# Patient Record
Sex: FEMALE | Race: WHITE | Employment: OTHER | URBAN - NONMETROPOLITAN AREA
[De-identification: names, ages, dates, MRNs, and addresses within clinical notes are randomized per-mention and may not be internally consistent; named-entity substitution may affect disease eponyms.]

---

## 2017-01-24 DIAGNOSIS — M79.7 FIBROMYALGIA: ICD-10-CM

## 2017-01-24 RX ORDER — OXYCODONE AND ACETAMINOPHEN 7.5; 325 MG/1; MG/1
1 TABLET ORAL
Qty: 150 TABLET | Refills: 0 | Status: SHIPPED | OUTPATIENT
Start: 2017-01-30 | End: 2017-02-16 | Stop reason: SDUPTHER

## 2017-02-16 ENCOUNTER — OFFICE VISIT (OUTPATIENT)
Dept: PAIN MANAGEMENT | Age: 57
End: 2017-02-16

## 2017-02-16 ENCOUNTER — HOSPITAL ENCOUNTER (OUTPATIENT)
Age: 57
Setting detail: SPECIMEN
Discharge: HOME | End: 2017-02-16
Payer: MEDICARE

## 2017-02-16 VITALS
HEIGHT: 63 IN | SYSTOLIC BLOOD PRESSURE: 130 MMHG | RESPIRATION RATE: 18 BRPM | WEIGHT: 243.8 LBS | HEART RATE: 80 BPM | DIASTOLIC BLOOD PRESSURE: 90 MMHG | BODY MASS INDEX: 43.2 KG/M2

## 2017-02-16 DIAGNOSIS — G89.29 CHRONIC BILATERAL LOW BACK PAIN WITHOUT SCIATICA: ICD-10-CM

## 2017-02-16 DIAGNOSIS — Z02.83 ENCOUNTER FOR DRUG SCREENING: ICD-10-CM

## 2017-02-16 DIAGNOSIS — M79.7 FIBROMYALGIA: ICD-10-CM

## 2017-02-16 DIAGNOSIS — Z79.891 ENCOUNTER FOR LONG-TERM OPIATE ANALGESIC USE: Primary | ICD-10-CM

## 2017-02-16 DIAGNOSIS — M50.20 CERVICAL HERNIATED DISC: ICD-10-CM

## 2017-02-16 DIAGNOSIS — M25.551 HIP PAIN, BILATERAL: ICD-10-CM

## 2017-02-16 DIAGNOSIS — M54.50 CHRONIC BILATERAL LOW BACK PAIN WITHOUT SCIATICA: ICD-10-CM

## 2017-02-16 DIAGNOSIS — M47.22 CERVICAL RADICULOPATHY DUE TO DEGENERATIVE JOINT DISEASE OF SPINE: ICD-10-CM

## 2017-02-16 DIAGNOSIS — M25.552 HIP PAIN, BILATERAL: ICD-10-CM

## 2017-02-16 PROCEDURE — 99214 OFFICE O/P EST MOD 30 MIN: CPT | Performed by: NURSE PRACTITIONER

## 2017-02-16 PROCEDURE — G8427 DOCREV CUR MEDS BY ELIG CLIN: HCPCS | Performed by: NURSE PRACTITIONER

## 2017-02-16 PROCEDURE — 3014F SCREEN MAMMO DOC REV: CPT | Performed by: NURSE PRACTITIONER

## 2017-02-16 PROCEDURE — G8417 CALC BMI ABV UP PARAM F/U: HCPCS | Performed by: NURSE PRACTITIONER

## 2017-02-16 PROCEDURE — 3017F COLORECTAL CA SCREEN DOC REV: CPT | Performed by: NURSE PRACTITIONER

## 2017-02-16 PROCEDURE — G8484 FLU IMMUNIZE NO ADMIN: HCPCS | Performed by: NURSE PRACTITIONER

## 2017-02-16 PROCEDURE — 1036F TOBACCO NON-USER: CPT | Performed by: NURSE PRACTITIONER

## 2017-02-16 RX ORDER — OXYCODONE AND ACETAMINOPHEN 7.5; 325 MG/1; MG/1
1 TABLET ORAL
Qty: 150 TABLET | Refills: 0 | Status: SHIPPED | OUTPATIENT
Start: 2017-03-01 | End: 2017-03-27 | Stop reason: SDUPTHER

## 2017-02-16 ASSESSMENT — ENCOUNTER SYMPTOMS
CONSTIPATION: 0
BLOOD IN STOOL: 0
BACK PAIN: 1

## 2017-02-22 LAB
6-ACETYLMORPHINE, UR: NOT DETECTED
7-AMINOCLONAZEPAM, URINE: NOT DETECTED
ALPHA-OH-ALPRAZ, URINE: PRESENT
ALPRAZOLAM, URINE: PRESENT
AMPHETAMINES, URINE: NOT DETECTED
BARBITURATES, URINE: NOT DETECTED
BENZOYLECGONINE, UR: NOT DETECTED
BUPRENORPHINE URINE: NOT DETECTED
CARISOPRODOL, UR: NOT DETECTED
CLONAZEPAM, URINE: NOT DETECTED
CODEINE, URINE: NOT DETECTED
CREATININE URINE: 158.1 MG/DL (ref 20–400)
DIAZEPAM, URINE: NOT DETECTED
EER PAIN MGT DRUG PANEL, HIGH RES/EMIT U: NORMAL
ETHYL GLUCURONIDE UR: NOT DETECTED
FENTANYL URINE: NOT DETECTED
HYDROCODONE, URINE: NOT DETECTED
HYDROMORPHONE, URINE: NOT DETECTED
LORAZEPAM, URINE: NOT DETECTED
MARIJUANA METAB, UR: NOT DETECTED
MDA, UR: NOT DETECTED
MDEA, EVE, UR: NOT DETECTED
MDMA URINE: NOT DETECTED
MEPERIDINE METAB, UR: NOT DETECTED
METHADONE, URINE: NOT DETECTED
METHAMPHETAMINE, URINE: NOT DETECTED
METHYLPHENIDATE: NOT DETECTED
MIDAZOLAM, URINE: NOT DETECTED
MORPHINE URINE: NOT DETECTED
NORBUPRENORPHINE, URINE: NOT DETECTED
NORDIAZEPAM, URINE: NOT DETECTED
NORFENTANYL, URINE: NOT DETECTED
NORHYDROCODONE, URINE: NOT DETECTED
NOROXYCODONE, URINE: PRESENT
NOROXYMORPHONE, URINE: NOT DETECTED
OXAZEPAM, URINE: NOT DETECTED
OXYCODONE URINE: PRESENT
OXYMORPHONE, URINE: PRESENT
PAIN MGT DRUG PANEL, HI RES, UR: NORMAL
PCP,URINE: NOT DETECTED
PHENTERMINE, UR: NOT DETECTED
PROPOXYPHENE, URINE: NOT DETECTED
TAPENTADOL, URINE: NOT DETECTED
TAPENTADOL-O-SULFATE, URINE: NOT DETECTED
TEMAZEPAM, URINE: NOT DETECTED
TRAMADOL, URINE: NOT DETECTED
ZOLPIDEM, URINE: NOT DETECTED

## 2017-02-24 ENCOUNTER — OFFICE VISIT (OUTPATIENT)
Dept: FAMILY MEDICINE CLINIC | Age: 57
End: 2017-02-24

## 2017-02-24 VITALS
WEIGHT: 236.4 LBS | TEMPERATURE: 97.3 F | SYSTOLIC BLOOD PRESSURE: 150 MMHG | HEART RATE: 91 BPM | DIASTOLIC BLOOD PRESSURE: 90 MMHG | HEIGHT: 63 IN | OXYGEN SATURATION: 97 % | BODY MASS INDEX: 41.89 KG/M2

## 2017-02-24 DIAGNOSIS — F33.42 RECURRENT MAJOR DEPRESSIVE EPISODES, IN FULL REMISSION (HCC): ICD-10-CM

## 2017-02-24 DIAGNOSIS — I10 ESSENTIAL HYPERTENSION: ICD-10-CM

## 2017-02-24 DIAGNOSIS — E66.01 MORBID OBESITY WITH BMI OF 40.0-44.9, ADULT (HCC): ICD-10-CM

## 2017-02-24 DIAGNOSIS — F41.1 GENERALIZED ANXIETY DISORDER: ICD-10-CM

## 2017-02-24 DIAGNOSIS — M25.562 CHRONIC PAIN OF LEFT KNEE: ICD-10-CM

## 2017-02-24 DIAGNOSIS — G89.29 CHRONIC PAIN OF LEFT KNEE: ICD-10-CM

## 2017-02-24 DIAGNOSIS — I10 ESSENTIAL HYPERTENSION: Primary | ICD-10-CM

## 2017-02-24 DIAGNOSIS — Z87.891 HISTORY OF TOBACCO ABUSE: ICD-10-CM

## 2017-02-24 DIAGNOSIS — I47.1 PAT (PAROXYSMAL ATRIAL TACHYCARDIA) (HCC): ICD-10-CM

## 2017-02-24 PROCEDURE — 1036F TOBACCO NON-USER: CPT | Performed by: FAMILY MEDICINE

## 2017-02-24 PROCEDURE — G8484 FLU IMMUNIZE NO ADMIN: HCPCS | Performed by: FAMILY MEDICINE

## 2017-02-24 PROCEDURE — G8427 DOCREV CUR MEDS BY ELIG CLIN: HCPCS | Performed by: FAMILY MEDICINE

## 2017-02-24 PROCEDURE — 99214 OFFICE O/P EST MOD 30 MIN: CPT | Performed by: FAMILY MEDICINE

## 2017-02-24 PROCEDURE — G8417 CALC BMI ABV UP PARAM F/U: HCPCS | Performed by: FAMILY MEDICINE

## 2017-02-24 PROCEDURE — 3014F SCREEN MAMMO DOC REV: CPT | Performed by: FAMILY MEDICINE

## 2017-02-24 PROCEDURE — 3017F COLORECTAL CA SCREEN DOC REV: CPT | Performed by: FAMILY MEDICINE

## 2017-02-24 RX ORDER — ARIPIPRAZOLE 5 MG/1
TABLET ORAL
Qty: 30 TABLET | Refills: 5 | Status: SHIPPED | OUTPATIENT
Start: 2017-02-24 | End: 2017-08-23 | Stop reason: SDUPTHER

## 2017-02-24 RX ORDER — CARVEDILOL 3.12 MG/1
TABLET ORAL
Qty: 60 TABLET | Refills: 5 | Status: SHIPPED | OUTPATIENT
Start: 2017-02-24 | End: 2017-04-26

## 2017-02-24 RX ORDER — LOSARTAN POTASSIUM 50 MG/1
50 TABLET ORAL DAILY
Qty: 30 TABLET | Refills: 1 | Status: SHIPPED | OUTPATIENT
Start: 2017-02-24 | End: 2017-04-20 | Stop reason: SDUPTHER

## 2017-02-24 ASSESSMENT — ENCOUNTER SYMPTOMS
CHEST TIGHTNESS: 1
COUGH: 0
DIARRHEA: 0
SHORTNESS OF BREATH: 1
BACK PAIN: 0
ABDOMINAL PAIN: 0
WHEEZING: 0
CONSTIPATION: 0

## 2017-03-15 ENCOUNTER — OFFICE VISIT (OUTPATIENT)
Dept: ORTHOPEDIC SURGERY | Age: 57
End: 2017-03-15
Payer: MEDICARE

## 2017-03-15 VITALS
BODY MASS INDEX: 41.82 KG/M2 | DIASTOLIC BLOOD PRESSURE: 80 MMHG | WEIGHT: 236 LBS | HEIGHT: 63 IN | HEART RATE: 74 BPM | SYSTOLIC BLOOD PRESSURE: 139 MMHG

## 2017-03-15 DIAGNOSIS — M17.12 PRIMARY OSTEOARTHRITIS OF LEFT KNEE: Primary | ICD-10-CM

## 2017-03-15 PROCEDURE — G8427 DOCREV CUR MEDS BY ELIG CLIN: HCPCS | Performed by: FAMILY MEDICINE

## 2017-03-15 PROCEDURE — 99214 OFFICE O/P EST MOD 30 MIN: CPT | Performed by: FAMILY MEDICINE

## 2017-03-15 PROCEDURE — G8417 CALC BMI ABV UP PARAM F/U: HCPCS | Performed by: FAMILY MEDICINE

## 2017-03-15 PROCEDURE — 20610 DRAIN/INJ JOINT/BURSA W/O US: CPT | Performed by: FAMILY MEDICINE

## 2017-03-15 PROCEDURE — 3017F COLORECTAL CA SCREEN DOC REV: CPT | Performed by: FAMILY MEDICINE

## 2017-03-15 PROCEDURE — 3014F SCREEN MAMMO DOC REV: CPT | Performed by: FAMILY MEDICINE

## 2017-03-15 PROCEDURE — 1036F TOBACCO NON-USER: CPT | Performed by: FAMILY MEDICINE

## 2017-03-15 PROCEDURE — G8484 FLU IMMUNIZE NO ADMIN: HCPCS | Performed by: FAMILY MEDICINE

## 2017-03-15 RX ORDER — BUPIVACAINE HYDROCHLORIDE 5 MG/ML
4 INJECTION, SOLUTION PERINEURAL ONCE
Status: COMPLETED | OUTPATIENT
Start: 2017-03-15 | End: 2017-03-15

## 2017-03-15 RX ORDER — TRIAMCINOLONE ACETONIDE 40 MG/ML
40 INJECTION, SUSPENSION INTRA-ARTICULAR; INTRAMUSCULAR ONCE
Status: COMPLETED | OUTPATIENT
Start: 2017-03-15 | End: 2017-03-15

## 2017-03-15 RX ADMIN — BUPIVACAINE HYDROCHLORIDE 20 MG: 5 INJECTION, SOLUTION PERINEURAL at 15:15

## 2017-03-15 RX ADMIN — TRIAMCINOLONE ACETONIDE 40 MG: 40 INJECTION, SUSPENSION INTRA-ARTICULAR; INTRAMUSCULAR at 15:15

## 2017-03-27 DIAGNOSIS — M79.7 FIBROMYALGIA: ICD-10-CM

## 2017-03-27 RX ORDER — OXYCODONE AND ACETAMINOPHEN 7.5; 325 MG/1; MG/1
1 TABLET ORAL
Qty: 150 TABLET | Refills: 0 | Status: SHIPPED | OUTPATIENT
Start: 2017-03-31 | End: 2017-04-17 | Stop reason: SDUPTHER

## 2017-04-17 ENCOUNTER — OFFICE VISIT (OUTPATIENT)
Dept: PAIN MANAGEMENT | Age: 57
End: 2017-04-17
Payer: MEDICARE

## 2017-04-17 VITALS
BODY MASS INDEX: 42.7 KG/M2 | WEIGHT: 241 LBS | HEIGHT: 63 IN | HEART RATE: 92 BPM | DIASTOLIC BLOOD PRESSURE: 70 MMHG | RESPIRATION RATE: 18 BRPM | SYSTOLIC BLOOD PRESSURE: 124 MMHG

## 2017-04-17 DIAGNOSIS — M51.36 LUMBAR DEGENERATIVE DISC DISEASE: ICD-10-CM

## 2017-04-17 DIAGNOSIS — M15.9 PRIMARY OSTEOARTHRITIS INVOLVING MULTIPLE JOINTS: ICD-10-CM

## 2017-04-17 DIAGNOSIS — M79.7 FIBROMYALGIA: Primary | ICD-10-CM

## 2017-04-17 PROCEDURE — G8427 DOCREV CUR MEDS BY ELIG CLIN: HCPCS | Performed by: NURSE PRACTITIONER

## 2017-04-17 PROCEDURE — 3014F SCREEN MAMMO DOC REV: CPT | Performed by: NURSE PRACTITIONER

## 2017-04-17 PROCEDURE — G8417 CALC BMI ABV UP PARAM F/U: HCPCS | Performed by: NURSE PRACTITIONER

## 2017-04-17 PROCEDURE — 3017F COLORECTAL CA SCREEN DOC REV: CPT | Performed by: NURSE PRACTITIONER

## 2017-04-17 PROCEDURE — 99213 OFFICE O/P EST LOW 20 MIN: CPT | Performed by: NURSE PRACTITIONER

## 2017-04-17 PROCEDURE — 1036F TOBACCO NON-USER: CPT | Performed by: NURSE PRACTITIONER

## 2017-04-17 RX ORDER — OXYCODONE AND ACETAMINOPHEN 7.5; 325 MG/1; MG/1
1 TABLET ORAL
Qty: 150 TABLET | Refills: 0 | Status: SHIPPED | OUTPATIENT
Start: 2017-04-30 | End: 2017-05-24 | Stop reason: SDUPTHER

## 2017-04-17 ASSESSMENT — ENCOUNTER SYMPTOMS
BLOOD IN STOOL: 0
BACK PAIN: 1
CONSTIPATION: 0

## 2017-04-26 ENCOUNTER — OFFICE VISIT (OUTPATIENT)
Dept: FAMILY MEDICINE CLINIC | Age: 57
End: 2017-04-26
Payer: MEDICARE

## 2017-04-26 ENCOUNTER — TELEPHONE (OUTPATIENT)
Dept: CARDIOLOGY | Age: 57
End: 2017-04-26

## 2017-04-26 ENCOUNTER — OFFICE VISIT (OUTPATIENT)
Dept: ORTHOPEDIC SURGERY | Age: 57
End: 2017-04-26
Payer: MEDICARE

## 2017-04-26 VITALS
HEIGHT: 63 IN | DIASTOLIC BLOOD PRESSURE: 82 MMHG | SYSTOLIC BLOOD PRESSURE: 116 MMHG | WEIGHT: 239 LBS | OXYGEN SATURATION: 97 % | HEART RATE: 110 BPM | BODY MASS INDEX: 42.35 KG/M2

## 2017-04-26 VITALS
BODY MASS INDEX: 41.82 KG/M2 | HEIGHT: 63 IN | HEART RATE: 98 BPM | DIASTOLIC BLOOD PRESSURE: 82 MMHG | WEIGHT: 236 LBS | SYSTOLIC BLOOD PRESSURE: 128 MMHG

## 2017-04-26 DIAGNOSIS — I10 ESSENTIAL HYPERTENSION: Primary | ICD-10-CM

## 2017-04-26 DIAGNOSIS — M17.12 PRIMARY OSTEOARTHRITIS OF LEFT KNEE: Primary | ICD-10-CM

## 2017-04-26 DIAGNOSIS — R07.9 CHEST PAIN ON EXERTION: ICD-10-CM

## 2017-04-26 DIAGNOSIS — R73.01 IMPAIRED FASTING GLUCOSE: ICD-10-CM

## 2017-04-26 PROCEDURE — G8417 CALC BMI ABV UP PARAM F/U: HCPCS | Performed by: FAMILY MEDICINE

## 2017-04-26 PROCEDURE — 3017F COLORECTAL CA SCREEN DOC REV: CPT | Performed by: FAMILY MEDICINE

## 2017-04-26 PROCEDURE — 3014F SCREEN MAMMO DOC REV: CPT | Performed by: FAMILY MEDICINE

## 2017-04-26 PROCEDURE — G8427 DOCREV CUR MEDS BY ELIG CLIN: HCPCS | Performed by: FAMILY MEDICINE

## 2017-04-26 PROCEDURE — 93000 ELECTROCARDIOGRAM COMPLETE: CPT | Performed by: FAMILY MEDICINE

## 2017-04-26 PROCEDURE — 99214 OFFICE O/P EST MOD 30 MIN: CPT | Performed by: FAMILY MEDICINE

## 2017-04-26 PROCEDURE — 1036F TOBACCO NON-USER: CPT | Performed by: FAMILY MEDICINE

## 2017-04-26 PROCEDURE — 99213 OFFICE O/P EST LOW 20 MIN: CPT | Performed by: FAMILY MEDICINE

## 2017-04-26 ASSESSMENT — ENCOUNTER SYMPTOMS
WHEEZING: 0
SHORTNESS OF BREATH: 1
CHEST TIGHTNESS: 1
COUGH: 0

## 2017-05-01 ENCOUNTER — TELEPHONE (OUTPATIENT)
Dept: CARDIOLOGY | Age: 57
End: 2017-05-01

## 2017-05-03 ENCOUNTER — TELEPHONE (OUTPATIENT)
Dept: CARDIOLOGY | Age: 57
End: 2017-05-03

## 2017-05-03 DIAGNOSIS — R07.9 CHEST PAIN ON EXERTION: Primary | ICD-10-CM

## 2017-05-10 ENCOUNTER — PROCEDURE VISIT (OUTPATIENT)
Dept: CARDIOLOGY | Age: 57
End: 2017-05-10
Payer: MEDICARE

## 2017-05-10 DIAGNOSIS — R07.9 CHEST PAIN ON EXERTION: Primary | ICD-10-CM

## 2017-05-10 PROCEDURE — 93015 CV STRESS TEST SUPVJ I&R: CPT | Performed by: INTERNAL MEDICINE

## 2017-05-10 RX ORDER — AMINOPHYLLINE DIHYDRATE 25 MG/ML
100 INJECTION, SOLUTION INTRAVENOUS ONCE
Status: COMPLETED | OUTPATIENT
Start: 2017-05-10 | End: 2017-05-10

## 2017-05-10 RX ADMIN — AMINOPHYLLINE DIHYDRATE 100 MG: 25 INJECTION, SOLUTION INTRAVENOUS at 10:28

## 2017-05-11 DIAGNOSIS — R07.9 CHEST PAIN ON EXERTION: ICD-10-CM

## 2017-05-16 RX ORDER — ERGOCALCIFEROL 1.25 MG/1
CAPSULE ORAL
Qty: 4 CAPSULE | Refills: 11 | Status: SHIPPED | OUTPATIENT
Start: 2017-05-16 | End: 2018-06-15 | Stop reason: SDUPTHER

## 2017-05-24 DIAGNOSIS — M79.7 FIBROMYALGIA: ICD-10-CM

## 2017-05-25 RX ORDER — OXYCODONE AND ACETAMINOPHEN 7.5; 325 MG/1; MG/1
1 TABLET ORAL
Qty: 150 TABLET | Refills: 0 | Status: SHIPPED | OUTPATIENT
Start: 2017-05-30 | End: 2017-06-12 | Stop reason: SDUPTHER

## 2017-06-12 ENCOUNTER — OFFICE VISIT (OUTPATIENT)
Dept: FAMILY MEDICINE CLINIC | Age: 57
End: 2017-06-12
Payer: MEDICARE

## 2017-06-12 ENCOUNTER — OFFICE VISIT (OUTPATIENT)
Dept: PAIN MANAGEMENT | Age: 57
End: 2017-06-12
Payer: MEDICARE

## 2017-06-12 ENCOUNTER — HOSPITAL ENCOUNTER (OUTPATIENT)
Age: 57
Setting detail: SPECIMEN
Discharge: HOME | End: 2017-06-12
Payer: MEDICARE

## 2017-06-12 VITALS
TEMPERATURE: 98.3 F | HEART RATE: 72 BPM | HEIGHT: 63 IN | BODY MASS INDEX: 42.74 KG/M2 | OXYGEN SATURATION: 98 % | WEIGHT: 241.2 LBS | DIASTOLIC BLOOD PRESSURE: 86 MMHG | SYSTOLIC BLOOD PRESSURE: 120 MMHG

## 2017-06-12 VITALS
WEIGHT: 240.4 LBS | SYSTOLIC BLOOD PRESSURE: 118 MMHG | DIASTOLIC BLOOD PRESSURE: 90 MMHG | HEART RATE: 64 BPM | BODY MASS INDEX: 42.59 KG/M2 | HEIGHT: 63 IN | RESPIRATION RATE: 12 BRPM

## 2017-06-12 DIAGNOSIS — M15.9 PRIMARY OSTEOARTHRITIS INVOLVING MULTIPLE JOINTS: Primary | ICD-10-CM

## 2017-06-12 DIAGNOSIS — M25.511 CHRONIC RIGHT SHOULDER PAIN: ICD-10-CM

## 2017-06-12 DIAGNOSIS — Z02.83 ENCOUNTER FOR DRUG SCREENING: ICD-10-CM

## 2017-06-12 DIAGNOSIS — I10 ESSENTIAL HYPERTENSION: Primary | ICD-10-CM

## 2017-06-12 DIAGNOSIS — M51.9 LUMBAR DISC DISEASE: ICD-10-CM

## 2017-06-12 DIAGNOSIS — M50.20 CERVICAL HERNIATED DISC: ICD-10-CM

## 2017-06-12 DIAGNOSIS — Z79.891 ENCOUNTER FOR LONG-TERM OPIATE ANALGESIC USE: ICD-10-CM

## 2017-06-12 DIAGNOSIS — G89.29 CHRONIC RIGHT SHOULDER PAIN: ICD-10-CM

## 2017-06-12 DIAGNOSIS — M79.7 FIBROMYALGIA: ICD-10-CM

## 2017-06-12 DIAGNOSIS — Z12.31 SCREENING MAMMOGRAM, ENCOUNTER FOR: ICD-10-CM

## 2017-06-12 PROCEDURE — 99214 OFFICE O/P EST MOD 30 MIN: CPT | Performed by: NURSE PRACTITIONER

## 2017-06-12 PROCEDURE — G8427 DOCREV CUR MEDS BY ELIG CLIN: HCPCS | Performed by: NURSE PRACTITIONER

## 2017-06-12 PROCEDURE — 3014F SCREEN MAMMO DOC REV: CPT | Performed by: NURSE PRACTITIONER

## 2017-06-12 PROCEDURE — 99213 OFFICE O/P EST LOW 20 MIN: CPT | Performed by: FAMILY MEDICINE

## 2017-06-12 PROCEDURE — G8417 CALC BMI ABV UP PARAM F/U: HCPCS | Performed by: FAMILY MEDICINE

## 2017-06-12 PROCEDURE — 3017F COLORECTAL CA SCREEN DOC REV: CPT | Performed by: NURSE PRACTITIONER

## 2017-06-12 PROCEDURE — G8417 CALC BMI ABV UP PARAM F/U: HCPCS | Performed by: NURSE PRACTITIONER

## 2017-06-12 PROCEDURE — 1036F TOBACCO NON-USER: CPT | Performed by: NURSE PRACTITIONER

## 2017-06-12 PROCEDURE — G8427 DOCREV CUR MEDS BY ELIG CLIN: HCPCS | Performed by: FAMILY MEDICINE

## 2017-06-12 PROCEDURE — 3017F COLORECTAL CA SCREEN DOC REV: CPT | Performed by: FAMILY MEDICINE

## 2017-06-12 PROCEDURE — 1036F TOBACCO NON-USER: CPT | Performed by: FAMILY MEDICINE

## 2017-06-12 PROCEDURE — 3014F SCREEN MAMMO DOC REV: CPT | Performed by: FAMILY MEDICINE

## 2017-06-12 RX ORDER — CARVEDILOL 3.12 MG/1
TABLET ORAL
Refills: 0 | COMMUNITY
Start: 2017-05-20 | End: 2017-06-12 | Stop reason: ALTCHOICE

## 2017-06-12 RX ORDER — OXYCODONE AND ACETAMINOPHEN 7.5; 325 MG/1; MG/1
1 TABLET ORAL
Qty: 150 TABLET | Refills: 0 | Status: SHIPPED | OUTPATIENT
Start: 2017-06-29 | End: 2017-07-19 | Stop reason: SDUPTHER

## 2017-06-12 ASSESSMENT — ENCOUNTER SYMPTOMS
BACK PAIN: 1
CONSTIPATION: 0
WHEEZING: 0
COUGH: 0
BLOOD IN STOOL: 0
CHEST TIGHTNESS: 0
SHORTNESS OF BREATH: 1

## 2017-06-17 LAB
6-ACETYLMORPHINE, UR: NOT DETECTED
7-AMINOCLONAZEPAM, URINE: NOT DETECTED
ALPHA-OH-ALPRAZ, URINE: PRESENT
ALPRAZOLAM, URINE: PRESENT
AMPHETAMINES, URINE: NOT DETECTED
BARBITURATES, URINE: NOT DETECTED
BENZOYLECGONINE, UR: NOT DETECTED
BUPRENORPHINE URINE: NOT DETECTED
CARISOPRODOL, UR: NOT DETECTED
CLONAZEPAM, URINE: NOT DETECTED
CODEINE, URINE: NOT DETECTED
CREATININE URINE: 202 MG/DL (ref 20–400)
DIAZEPAM, URINE: NOT DETECTED
EER PAIN MGT DRUG PANEL, HIGH RES/EMIT U: NORMAL
ETHYL GLUCURONIDE UR: NOT DETECTED
FENTANYL URINE: NOT DETECTED
HYDROCODONE, URINE: NOT DETECTED
HYDROMORPHONE, URINE: NOT DETECTED
LORAZEPAM, URINE: NOT DETECTED
MARIJUANA METAB, UR: NOT DETECTED
MDA, UR: NOT DETECTED
MDEA, EVE, UR: NOT DETECTED
MDMA URINE: NOT DETECTED
MEPERIDINE METAB, UR: NOT DETECTED
METHADONE, URINE: NOT DETECTED
METHAMPHETAMINE, URINE: NOT DETECTED
METHYLPHENIDATE: NOT DETECTED
MIDAZOLAM, URINE: NOT DETECTED
MORPHINE URINE: NOT DETECTED
NORBUPRENORPHINE, URINE: NOT DETECTED
NORDIAZEPAM, URINE: NOT DETECTED
NORFENTANYL, URINE: NOT DETECTED
NORHYDROCODONE, URINE: NOT DETECTED
NOROXYCODONE, URINE: NOT DETECTED
NOROXYMORPHONE, URINE: NOT DETECTED
OXAZEPAM, URINE: NOT DETECTED
OXYCODONE URINE: NOT DETECTED
OXYMORPHONE, URINE: NOT DETECTED
PAIN MGT DRUG PANEL, HI RES, UR: NORMAL
PCP,URINE: NOT DETECTED
PHENTERMINE, UR: NOT DETECTED
PROPOXYPHENE, URINE: NOT DETECTED
TAPENTADOL, URINE: NOT DETECTED
TAPENTADOL-O-SULFATE, URINE: NOT DETECTED
TEMAZEPAM, URINE: NOT DETECTED
TRAMADOL, URINE: NOT DETECTED
ZOLPIDEM, URINE: NOT DETECTED

## 2017-06-18 DIAGNOSIS — I10 ESSENTIAL HYPERTENSION: Primary | ICD-10-CM

## 2017-07-13 DIAGNOSIS — F41.1 GENERALIZED ANXIETY DISORDER: ICD-10-CM

## 2017-07-13 RX ORDER — ALPRAZOLAM 1 MG/1
TABLET ORAL
Qty: 90 TABLET | Refills: 0 | Status: SHIPPED | OUTPATIENT
Start: 2017-07-13 | End: 2017-08-23 | Stop reason: SDUPTHER

## 2017-07-19 DIAGNOSIS — M79.7 FIBROMYALGIA: ICD-10-CM

## 2017-07-20 RX ORDER — OXYCODONE AND ACETAMINOPHEN 7.5; 325 MG/1; MG/1
1 TABLET ORAL
Qty: 150 TABLET | Refills: 0 | Status: SHIPPED | OUTPATIENT
Start: 2017-07-29 | End: 2017-08-11 | Stop reason: SDUPTHER

## 2017-08-11 ENCOUNTER — OFFICE VISIT (OUTPATIENT)
Dept: PAIN MANAGEMENT | Age: 57
End: 2017-08-11
Payer: MEDICARE

## 2017-08-11 VITALS
HEART RATE: 84 BPM | SYSTOLIC BLOOD PRESSURE: 138 MMHG | RESPIRATION RATE: 14 BRPM | DIASTOLIC BLOOD PRESSURE: 74 MMHG | BODY MASS INDEX: 43.12 KG/M2 | WEIGHT: 243.4 LBS | HEIGHT: 63 IN

## 2017-08-11 DIAGNOSIS — M51.36 LUMBAR DEGENERATIVE DISC DISEASE: ICD-10-CM

## 2017-08-11 DIAGNOSIS — M79.7 FIBROMYALGIA: Primary | ICD-10-CM

## 2017-08-11 DIAGNOSIS — G89.29 ENCOUNTER FOR CHRONIC PAIN MANAGEMENT: ICD-10-CM

## 2017-08-11 PROCEDURE — 3017F COLORECTAL CA SCREEN DOC REV: CPT | Performed by: NURSE PRACTITIONER

## 2017-08-11 PROCEDURE — G8427 DOCREV CUR MEDS BY ELIG CLIN: HCPCS | Performed by: NURSE PRACTITIONER

## 2017-08-11 PROCEDURE — 99213 OFFICE O/P EST LOW 20 MIN: CPT | Performed by: NURSE PRACTITIONER

## 2017-08-11 PROCEDURE — G8417 CALC BMI ABV UP PARAM F/U: HCPCS | Performed by: NURSE PRACTITIONER

## 2017-08-11 PROCEDURE — 1036F TOBACCO NON-USER: CPT | Performed by: NURSE PRACTITIONER

## 2017-08-11 PROCEDURE — 3014F SCREEN MAMMO DOC REV: CPT | Performed by: NURSE PRACTITIONER

## 2017-08-11 RX ORDER — OXYCODONE AND ACETAMINOPHEN 7.5; 325 MG/1; MG/1
1 TABLET ORAL
Qty: 150 TABLET | Refills: 0 | Status: SHIPPED | OUTPATIENT
Start: 2017-08-11 | End: 2017-09-22 | Stop reason: SDUPTHER

## 2017-08-11 ASSESSMENT — ENCOUNTER SYMPTOMS
BACK PAIN: 1
CONSTIPATION: 0
BLOOD IN STOOL: 0

## 2017-08-23 DIAGNOSIS — F41.1 GENERALIZED ANXIETY DISORDER: ICD-10-CM

## 2017-08-23 DIAGNOSIS — I10 ESSENTIAL HYPERTENSION: ICD-10-CM

## 2017-08-23 RX ORDER — ALPRAZOLAM 1 MG/1
TABLET ORAL
Qty: 90 TABLET | Refills: 0 | Status: CANCELLED | OUTPATIENT
Start: 2017-08-23

## 2017-08-23 RX ORDER — ARIPIPRAZOLE 5 MG/1
TABLET ORAL
Qty: 30 TABLET | Refills: 5 | Status: SHIPPED | OUTPATIENT
Start: 2017-08-23 | End: 2018-02-17 | Stop reason: SDUPTHER

## 2017-08-23 RX ORDER — ALPRAZOLAM 1 MG/1
TABLET ORAL
Qty: 90 TABLET | Refills: 0 | Status: SHIPPED | OUTPATIENT
Start: 2017-08-23 | End: 2017-09-19 | Stop reason: SDUPTHER

## 2017-08-23 RX ORDER — CARVEDILOL 3.12 MG/1
TABLET ORAL
Qty: 60 TABLET | Refills: 5 | Status: SHIPPED | OUTPATIENT
Start: 2017-08-23 | End: 2017-09-14 | Stop reason: ALTCHOICE

## 2017-09-14 ENCOUNTER — HOSPITAL ENCOUNTER (OUTPATIENT)
Dept: LAB | Age: 57
Discharge: HOME | End: 2017-09-14
Payer: MEDICARE

## 2017-09-14 ENCOUNTER — OFFICE VISIT (OUTPATIENT)
Dept: FAMILY MEDICINE CLINIC | Age: 57
End: 2017-09-14
Payer: MEDICARE

## 2017-09-14 VITALS
DIASTOLIC BLOOD PRESSURE: 80 MMHG | OXYGEN SATURATION: 94 % | TEMPERATURE: 98 F | HEIGHT: 63 IN | WEIGHT: 245.6 LBS | SYSTOLIC BLOOD PRESSURE: 118 MMHG | BODY MASS INDEX: 43.52 KG/M2 | HEART RATE: 86 BPM

## 2017-09-14 DIAGNOSIS — B96.89 ACUTE BACTERIAL SINUSITIS: Primary | ICD-10-CM

## 2017-09-14 DIAGNOSIS — Z12.31 ENCOUNTER FOR SCREENING MAMMOGRAM FOR MALIGNANT NEOPLASM OF BREAST: ICD-10-CM

## 2017-09-14 DIAGNOSIS — R73.01 IMPAIRED FASTING GLUCOSE: ICD-10-CM

## 2017-09-14 DIAGNOSIS — J01.90 ACUTE BACTERIAL SINUSITIS: Primary | ICD-10-CM

## 2017-09-14 DIAGNOSIS — I10 ESSENTIAL HYPERTENSION: ICD-10-CM

## 2017-09-14 DIAGNOSIS — J02.9 SORE THROAT: ICD-10-CM

## 2017-09-14 LAB
ANION GAP SERPL CALCULATED.3IONS-SCNC: 12 MMOL/L (ref 9–17)
BUN BLDV-MCNC: 16 MG/DL (ref 6–20)
BUN/CREAT BLD: 26 (ref 9–20)
CALCIUM SERPL-MCNC: 9.2 MG/DL (ref 8.6–10.4)
CHLORIDE BLD-SCNC: 104 MMOL/L (ref 98–107)
CO2: 27 MMOL/L (ref 20–31)
CREAT SERPL-MCNC: 0.61 MG/DL (ref 0.5–0.9)
ESTIMATED AVERAGE GLUCOSE: 114 MG/DL
GFR AFRICAN AMERICAN: >60 ML/MIN
GFR NON-AFRICAN AMERICAN: >60 ML/MIN
GFR SERPL CREATININE-BSD FRML MDRD: ABNORMAL ML/MIN/{1.73_M2}
GFR SERPL CREATININE-BSD FRML MDRD: ABNORMAL ML/MIN/{1.73_M2}
GLUCOSE BLD-MCNC: 119 MG/DL (ref 70–99)
HBA1C MFR BLD: 5.6 % (ref 4.8–5.9)
POTASSIUM SERPL-SCNC: 4.5 MMOL/L (ref 3.7–5.3)
S PYO AG THROAT QL: NORMAL
SODIUM BLD-SCNC: 143 MMOL/L (ref 135–144)

## 2017-09-14 PROCEDURE — 99214 OFFICE O/P EST MOD 30 MIN: CPT | Performed by: FAMILY MEDICINE

## 2017-09-14 PROCEDURE — 3017F COLORECTAL CA SCREEN DOC REV: CPT | Performed by: FAMILY MEDICINE

## 2017-09-14 PROCEDURE — G8417 CALC BMI ABV UP PARAM F/U: HCPCS | Performed by: FAMILY MEDICINE

## 2017-09-14 PROCEDURE — 1036F TOBACCO NON-USER: CPT | Performed by: FAMILY MEDICINE

## 2017-09-14 PROCEDURE — 83036 HEMOGLOBIN GLYCOSYLATED A1C: CPT

## 2017-09-14 PROCEDURE — 87880 STREP A ASSAY W/OPTIC: CPT | Performed by: FAMILY MEDICINE

## 2017-09-14 PROCEDURE — G8427 DOCREV CUR MEDS BY ELIG CLIN: HCPCS | Performed by: FAMILY MEDICINE

## 2017-09-14 PROCEDURE — 36415 COLL VENOUS BLD VENIPUNCTURE: CPT

## 2017-09-14 PROCEDURE — 3014F SCREEN MAMMO DOC REV: CPT | Performed by: FAMILY MEDICINE

## 2017-09-14 PROCEDURE — 80048 BASIC METABOLIC PNL TOTAL CA: CPT

## 2017-09-14 RX ORDER — ALBUTEROL SULFATE 90 UG/1
AEROSOL, METERED RESPIRATORY (INHALATION)
Qty: 8.5 INHALER | Refills: 6 | Status: SHIPPED | OUTPATIENT
Start: 2017-09-14 | End: 2019-03-22 | Stop reason: SDUPTHER

## 2017-09-14 RX ORDER — PREDNISONE 20 MG/1
40 TABLET ORAL DAILY
Qty: 10 TABLET | Refills: 0 | Status: SHIPPED | OUTPATIENT
Start: 2017-09-14 | End: 2017-09-19

## 2017-09-14 RX ORDER — AMOXICILLIN 500 MG/1
500 CAPSULE ORAL 3 TIMES DAILY
Qty: 30 CAPSULE | Refills: 0 | Status: SHIPPED | OUTPATIENT
Start: 2017-09-14 | End: 2017-12-13 | Stop reason: ALTCHOICE

## 2017-09-14 ASSESSMENT — ENCOUNTER SYMPTOMS
TROUBLE SWALLOWING: 0
WHEEZING: 0
DIARRHEA: 0
CHOKING: 0
NAUSEA: 0
CONSTIPATION: 0
COUGH: 1
SINUS PRESSURE: 1
CHEST TIGHTNESS: 0
SORE THROAT: 1
SHORTNESS OF BREATH: 1
HOARSE VOICE: 1

## 2017-09-19 DIAGNOSIS — F41.1 GENERALIZED ANXIETY DISORDER: ICD-10-CM

## 2017-09-19 RX ORDER — ALPRAZOLAM 1 MG/1
TABLET ORAL
Qty: 90 TABLET | Refills: 2 | Status: SHIPPED | OUTPATIENT
Start: 2017-09-19 | End: 2017-12-13 | Stop reason: SDUPTHER

## 2017-09-22 DIAGNOSIS — M79.7 FIBROMYALGIA: ICD-10-CM

## 2017-09-25 RX ORDER — OXYCODONE AND ACETAMINOPHEN 7.5; 325 MG/1; MG/1
1 TABLET ORAL
Qty: 150 TABLET | Refills: 0 | Status: SHIPPED | OUTPATIENT
Start: 2017-09-27 | End: 2017-10-23 | Stop reason: SDUPTHER

## 2017-09-25 NOTE — TELEPHONE ENCOUNTER
OARRS Report checked for [place], [place], and [place]: 8/28/17 percocet 7.5-325mg #150. Due 9/27/17.

## 2017-09-26 ENCOUNTER — TELEPHONE (OUTPATIENT)
Dept: FAMILY MEDICINE CLINIC | Age: 57
End: 2017-09-26

## 2017-09-26 NOTE — TELEPHONE ENCOUNTER
She likely has allergies. She needs to stay on the flonase, claritin and nasal saline and try to stay inside especially when it is windy. If she is outside she should rinse her nose with the saline after she comes in to get the pollen out of her nose.

## 2017-09-29 ENCOUNTER — HOSPITAL ENCOUNTER (OUTPATIENT)
Dept: MAMMOGRAPHY | Age: 57
Discharge: HOME | End: 2017-09-29
Payer: MEDICARE

## 2017-09-29 ENCOUNTER — NURSE ONLY (OUTPATIENT)
Dept: LAB | Age: 57
End: 2017-09-29
Payer: MEDICARE

## 2017-09-29 DIAGNOSIS — Z23 NEED FOR INFLUENZA VACCINATION: Primary | ICD-10-CM

## 2017-09-29 DIAGNOSIS — Z12.31 ENCOUNTER FOR SCREENING MAMMOGRAM FOR MALIGNANT NEOPLASM OF BREAST: ICD-10-CM

## 2017-09-29 PROCEDURE — G0202 SCR MAMMO BI INCL CAD: HCPCS

## 2017-09-29 PROCEDURE — G0008 ADMIN INFLUENZA VIRUS VAC: HCPCS | Performed by: FAMILY MEDICINE

## 2017-09-29 PROCEDURE — 90686 IIV4 VACC NO PRSV 0.5 ML IM: CPT | Performed by: FAMILY MEDICINE

## 2017-10-11 ENCOUNTER — OFFICE VISIT (OUTPATIENT)
Dept: PAIN MANAGEMENT | Age: 57
End: 2017-10-11
Payer: MEDICARE

## 2017-10-11 ENCOUNTER — TELEPHONE (OUTPATIENT)
Dept: FAMILY MEDICINE CLINIC | Age: 57
End: 2017-10-11

## 2017-10-11 ENCOUNTER — HOSPITAL ENCOUNTER (OUTPATIENT)
Dept: LAB | Age: 57
Setting detail: SPECIMEN
Discharge: HOME | End: 2017-10-11
Payer: MEDICARE

## 2017-10-11 ENCOUNTER — HOSPITAL ENCOUNTER (OUTPATIENT)
Age: 57
Setting detail: SPECIMEN
Discharge: HOME | End: 2017-10-11
Payer: MEDICARE

## 2017-10-11 VITALS
WEIGHT: 244 LBS | SYSTOLIC BLOOD PRESSURE: 120 MMHG | BODY MASS INDEX: 41.66 KG/M2 | HEIGHT: 64 IN | RESPIRATION RATE: 16 BRPM | DIASTOLIC BLOOD PRESSURE: 72 MMHG | HEART RATE: 80 BPM

## 2017-10-11 DIAGNOSIS — Z79.891 ENCOUNTER FOR LONG-TERM OPIATE ANALGESIC USE: ICD-10-CM

## 2017-10-11 DIAGNOSIS — Z02.83 ENCOUNTER FOR DRUG SCREENING: ICD-10-CM

## 2017-10-11 DIAGNOSIS — M54.2 NECK PAIN: Primary | ICD-10-CM

## 2017-10-11 DIAGNOSIS — J02.9 SORE THROAT: ICD-10-CM

## 2017-10-11 DIAGNOSIS — M15.9 PRIMARY OSTEOARTHRITIS INVOLVING MULTIPLE JOINTS: ICD-10-CM

## 2017-10-11 DIAGNOSIS — M79.7 FIBROMYALGIA: ICD-10-CM

## 2017-10-11 DIAGNOSIS — J02.9 SORE THROAT: Primary | ICD-10-CM

## 2017-10-11 LAB — MONONUCLEOSIS SCREEN: NEGATIVE

## 2017-10-11 PROCEDURE — 99214 OFFICE O/P EST MOD 30 MIN: CPT | Performed by: NURSE PRACTITIONER

## 2017-10-11 PROCEDURE — G8417 CALC BMI ABV UP PARAM F/U: HCPCS | Performed by: NURSE PRACTITIONER

## 2017-10-11 PROCEDURE — 1036F TOBACCO NON-USER: CPT | Performed by: NURSE PRACTITIONER

## 2017-10-11 PROCEDURE — 36415 COLL VENOUS BLD VENIPUNCTURE: CPT

## 2017-10-11 PROCEDURE — 86308 HETEROPHILE ANTIBODY SCREEN: CPT

## 2017-10-11 PROCEDURE — G8427 DOCREV CUR MEDS BY ELIG CLIN: HCPCS | Performed by: NURSE PRACTITIONER

## 2017-10-11 PROCEDURE — 3017F COLORECTAL CA SCREEN DOC REV: CPT | Performed by: NURSE PRACTITIONER

## 2017-10-11 PROCEDURE — G8484 FLU IMMUNIZE NO ADMIN: HCPCS | Performed by: NURSE PRACTITIONER

## 2017-10-11 PROCEDURE — 3014F SCREEN MAMMO DOC REV: CPT | Performed by: NURSE PRACTITIONER

## 2017-10-11 PROCEDURE — 80307 DRUG TEST PRSMV CHEM ANLYZR: CPT

## 2017-10-11 ASSESSMENT — ENCOUNTER SYMPTOMS
BLOOD IN STOOL: 0
BACK PAIN: 1
CONSTIPATION: 0

## 2017-10-11 NOTE — TELEPHONE ENCOUNTER
Pt calling stating she would like the order placed to test for mono since her son has mono. Pt informed order will be placed and she can have it completed when she comes to [facility] for another apt this afternoon.

## 2017-10-11 NOTE — PROGRESS NOTES
Subjective:      Patient ID: Ivanna Mark is a 56 y.o. female. Neck Pain    Associated symptoms include numbness (tingling in hands). Shoulder Pain    Associated symptoms include numbness (tingling in hands). Hip Pain    Associated symptoms include numbness (tingling in hands). Here today for routine pain clinic recheck. Pain is stable on current medications. Fatigues easily, relates to her weight, fibromyalgia, depression and medications. Babysits for her grandchildren. Denies side effects. Pain Assessment  Location of Pain: Neck  Location Modifiers:  (C6, C5 hernination )  Severity of Pain: 4  Quality of Pain: Aching (burning )  Duration of Pain: Persistent  Frequency of Pain: Constant  Aggravating Factors:  (pt states \"everything, even sleeping\")  Limiting Behavior: Yes  Relieving Factors: Heat, Rest (warm baths, pain meds)  Are there other pain locations you wish to document?: No    Allergies   Allergen Reactions    Latex Rash    Fentanyl Other (See Comments)     sleepwalking       No outpatient prescriptions have been marked as taking for the 10/11/17 encounter (Office Visit) with Kaity Power NP. Past Medical History:   Diagnosis Date    Asthma     Blindness of left eye     Cervical herniated disc     C5-C6, with nerve compression.  Congenital hip dysplasia     Degenerative joint disease     (Right knee) -     Depression     Dysfunctional uterine bleeding     Dyspnea     (progressive - multifactorial.    Failed total right knee replacement (HCC)     Fatigue     Fibromyalgia     Generalized anxiety disorder     Grief at loss of child     4-yr old granddaughter  of brain tumor in .  Hip pain, bilateral     Secondary to piriformis syndrome and bilateral greater trochanteric bursitis. (Injection of Celestone and Marcaine).  History of tobacco use     Currently not smoking.  Hypertension     Kidney stone     Lumbar disc disease     Chronic.     Obesity     Osteoarthritis     Degenerative joint disease - of all joints.  Seasonal allergic rhinitis     Shoulder pain, left     (Injection of Celestone/Marcaine subacromially) - Dr. Ricardo Hodges - 10/2008.  Shoulder pain, right     (Injection of Celestone/Marcaine subacromially). Dr. Ricardo Hodges - 10/2008.  Supraventricular tachycardia, paroxysmal (HCC)     Status post cardiac ablation.  Vitamin D deficiency        Past Surgical History:   Procedure Laterality Date    ATRIAL ABLATION SURGERY      Radiofrequency ablation of slow AV pawel pathway.  CARDIAC CATHETERIZATION      no blockages    CHOLECYSTECTOMY, LAPAROSCOPIC  2011    (Dr. Marlen Hawthorne)    JOINT REPLACEMENT Right     FAILED KNEE RE;PLAC    KNEE ARTHROPLASTY Right 2006    OTHER SURGICAL HISTORY      Epidural steroids to cervical spine.  OTHER SURGICAL HISTORY      ECT therapy, one session only and she refused any further    TUBAL LIGATION         Family History   Problem Relation Age of Onset    Breast Cancer Mother     High Blood Pressure Mother     Coronary Art Dis Mother      Multiple stents.  Diabetes Mother     Heart Disease Father     Coronary Art Dis Father      Myocardial infarction at age 54 and .  COPD Father     Arthritis Brother     COPD Brother     Heart Disease Brother      Congenital heart defect -  at 10 wks of age. Social History     Social History    Marital status:      Spouse name: N/A    Number of children: N/A    Years of education: N/A     Occupational History    disabled STNA      Social History Main Topics    Smoking status: Former Smoker     Packs/day: 0.25     Years: 35.00     Types: Cigarettes    Smokeless tobacco: Former User     Quit date: 2015      Comment: 6 cigarettes per day since age 15 or so.     Alcohol use Yes      Comment: rarely    Drug use: No    Sexual activity: Not Currently     Partners: Male     Other Topics Concern    None Social History Narrative    None     Review of Systems   Gastrointestinal: Negative for blood in stool and constipation. Musculoskeletal: Positive for arthralgias, back pain, myalgias and neck pain. Neurological: Positive for numbness (tingling in hands). Psychiatric/Behavioral: Negative for sleep disturbance. Objective:   Physical Exam   Constitutional: She is oriented to person, place, and time. She appears well-developed and well-nourished. She is cooperative. HENT:   Head: Normocephalic and atraumatic. Right Ear: External ear normal.   Left Ear: External ear normal.   Nose: Nose normal.   Eyes: Conjunctivae, EOM and lids are normal.   Cardiovascular: Intact distal pulses and normal pulses. Pulmonary/Chest: Effort normal.   Abdominal: Normal appearance. Musculoskeletal:   Ambulates with single cane   Neurological: She is alert and oriented to person, place, and time. No cranial nerve deficit. Skin: Skin is warm, dry and intact. No cyanosis. Nails show no clubbing. Psychiatric: She has a normal mood and affect. Her speech is normal and behavior is normal.   Vitals reviewed. Assessment:      1. Neck pain    2. Encounter for long-term opiate analgesic use    3. Encounter for drug screening    4. Primary osteoarthritis involving multiple joints    5. Fibromyalgia           Plan:   Continue current pain medications to improve quality of life and function. SOAPP- the score is 6 . (Values indicates patient is <4minimal potential 4-7 Moderate potential >7 High potential for drug addiction)  Controlled Substances Monitoring: Attestation: The Prescription Monitoring Report for this patient was reviewed today. Akila Slaughter NP)  Documentation: Random urine drug screen sent today., Obtaining appropriate analgesic effect of treatment., No signs of potential drug abuse or diversion identified., Existing medication contract.  Akila Slaughter NP)  Follow up 2 months

## 2017-10-11 NOTE — PATIENT INSTRUCTIONS
Pain Management Plan:  1. Continue current pain medications to improve quality of life and function. Pain clinic phone numbers  Refills  - Call [phone]. Please leave your name, a working phone number, date of birth, the name, dose, quantity, and last refill date of the prescription, and the pharmacy. Medication or Procedure Questions - Call [phone]. This is the direct line to the [facility] Pain Management Nurses. Appointment or General Questions - Call  [phone]. This is the direct line the  [address] [place] [address] can also use [employer]. Is your MyChart Activated? How to dispose of unused pain medications safely:  · Flush all unused pain medications. This is the FDA's recommended way of disposing these medications. · All other medications can be disposed in the trash mixed in undesirable contents (used coffee grounds, used [employer], etc).

## 2017-10-15 LAB
6-ACETYLMORPHINE, UR: NOT DETECTED
7-AMINOCLONAZEPAM, URINE: NOT DETECTED
ALPHA-OH-ALPRAZ, URINE: PRESENT
ALPRAZOLAM, URINE: PRESENT
AMPHETAMINES, URINE: NOT DETECTED
BARBITURATES, URINE: NOT DETECTED
BENZOYLECGONINE, UR: NOT DETECTED
BUPRENORPHINE URINE: NOT DETECTED
CARISOPRODOL, UR: NOT DETECTED
CLONAZEPAM, URINE: NOT DETECTED
CODEINE, URINE: NOT DETECTED
CREATININE URINE: 326.8 MG/DL (ref 20–400)
DIAZEPAM, URINE: NOT DETECTED
EER PAIN MGT DRUG PANEL, HIGH RES/EMIT U: NORMAL
ETHYL GLUCURONIDE UR: NOT DETECTED
FENTANYL URINE: NOT DETECTED
HYDROCODONE, URINE: NOT DETECTED
HYDROMORPHONE, URINE: NOT DETECTED
LORAZEPAM, URINE: NOT DETECTED
MARIJUANA METAB, UR: NOT DETECTED
MDA, UR: NOT DETECTED
MDEA, EVE, UR: NOT DETECTED
MDMA URINE: NOT DETECTED
MEPERIDINE METAB, UR: NOT DETECTED
METHADONE, URINE: NOT DETECTED
METHAMPHETAMINE, URINE: NOT DETECTED
METHYLPHENIDATE: NOT DETECTED
MIDAZOLAM, URINE: NOT DETECTED
MORPHINE URINE: NOT DETECTED
NORBUPRENORPHINE, URINE: NOT DETECTED
NORDIAZEPAM, URINE: NOT DETECTED
NORFENTANYL, URINE: NOT DETECTED
NORHYDROCODONE, URINE: NOT DETECTED
NOROXYCODONE, URINE: PRESENT
NOROXYMORPHONE, URINE: PRESENT
OXAZEPAM, URINE: NOT DETECTED
OXYCODONE URINE: PRESENT
OXYMORPHONE, URINE: PRESENT
PAIN MGT DRUG PANEL, HI RES, UR: NORMAL
PCP,URINE: NOT DETECTED
PHENTERMINE, UR: NOT DETECTED
PROPOXYPHENE, URINE: NOT DETECTED
TAPENTADOL, URINE: NOT DETECTED
TAPENTADOL-O-SULFATE, URINE: NOT DETECTED
TEMAZEPAM, URINE: NOT DETECTED
TRAMADOL, URINE: NOT DETECTED
ZOLPIDEM, URINE: NOT DETECTED

## 2017-10-23 DIAGNOSIS — M79.7 FIBROMYALGIA: ICD-10-CM

## 2017-10-23 NOTE — TELEPHONE ENCOUNTER
OARRS Report checked for [place], and [place]: 9-27-17 Percocet 7.5-325  #150. Due 10-27-17.

## 2017-10-23 NOTE — TELEPHONE ENCOUNTER
Pt called refill line requesting Percocet.     Last Appt:  10/11/2017  Next Appt:   12/13/2017  Med verified in Epic

## 2017-10-24 RX ORDER — OXYCODONE AND ACETAMINOPHEN 7.5; 325 MG/1; MG/1
1 TABLET ORAL
Qty: 150 TABLET | Refills: 0 | Status: SHIPPED | OUTPATIENT
Start: 2017-10-27 | End: 2017-11-17 | Stop reason: SDUPTHER

## 2017-11-17 DIAGNOSIS — M79.7 FIBROMYALGIA: ICD-10-CM

## 2017-11-17 RX ORDER — DULOXETIN HYDROCHLORIDE 30 MG/1
CAPSULE, DELAYED RELEASE ORAL
Qty: 90 CAPSULE | Refills: 2 | Status: SHIPPED | OUTPATIENT
Start: 2017-11-17 | End: 2018-03-15 | Stop reason: SDUPTHER

## 2017-11-17 RX ORDER — OLOPATADINE HCL 0.2 %
DROPS OPHTHALMIC (EYE)
Qty: 2.5 ML | Refills: 11 | Status: SHIPPED | OUTPATIENT
Start: 2017-11-17 | End: 2019-03-22 | Stop reason: SDUPTHER

## 2017-11-17 RX ORDER — FLUTICASONE PROPIONATE 50 MCG
SPRAY, SUSPENSION (ML) NASAL
Qty: 16 G | Refills: 11 | Status: SHIPPED | OUTPATIENT
Start: 2017-11-17 | End: 2019-03-22 | Stop reason: SDUPTHER

## 2017-11-22 RX ORDER — OXYCODONE AND ACETAMINOPHEN 7.5; 325 MG/1; MG/1
1 TABLET ORAL
Qty: 150 TABLET | Refills: 0 | Status: SHIPPED | OUTPATIENT
Start: 2017-11-26 | End: 2017-12-13 | Stop reason: SDUPTHER

## 2017-11-29 ENCOUNTER — OFFICE VISIT (OUTPATIENT)
Dept: ORTHOPEDIC SURGERY | Age: 57
End: 2017-11-29
Payer: MEDICARE

## 2017-11-29 VITALS
HEIGHT: 63 IN | BODY MASS INDEX: 41.82 KG/M2 | WEIGHT: 236 LBS | HEART RATE: 74 BPM | DIASTOLIC BLOOD PRESSURE: 73 MMHG | SYSTOLIC BLOOD PRESSURE: 134 MMHG

## 2017-11-29 DIAGNOSIS — M17.12 PRIMARY OSTEOARTHRITIS OF LEFT KNEE: Primary | ICD-10-CM

## 2017-11-29 PROCEDURE — G8427 DOCREV CUR MEDS BY ELIG CLIN: HCPCS | Performed by: FAMILY MEDICINE

## 2017-11-29 PROCEDURE — 1036F TOBACCO NON-USER: CPT | Performed by: FAMILY MEDICINE

## 2017-11-29 PROCEDURE — 3017F COLORECTAL CA SCREEN DOC REV: CPT | Performed by: FAMILY MEDICINE

## 2017-11-29 PROCEDURE — G8484 FLU IMMUNIZE NO ADMIN: HCPCS | Performed by: FAMILY MEDICINE

## 2017-11-29 PROCEDURE — 99213 OFFICE O/P EST LOW 20 MIN: CPT | Performed by: FAMILY MEDICINE

## 2017-11-29 PROCEDURE — 20610 DRAIN/INJ JOINT/BURSA W/O US: CPT | Performed by: FAMILY MEDICINE

## 2017-11-29 PROCEDURE — G8417 CALC BMI ABV UP PARAM F/U: HCPCS | Performed by: FAMILY MEDICINE

## 2017-11-29 PROCEDURE — 3014F SCREEN MAMMO DOC REV: CPT | Performed by: FAMILY MEDICINE

## 2017-11-29 RX ORDER — TRIAMCINOLONE ACETONIDE 40 MG/ML
40 INJECTION, SUSPENSION INTRA-ARTICULAR; INTRAMUSCULAR ONCE
Status: COMPLETED | OUTPATIENT
Start: 2017-11-29 | End: 2017-11-29

## 2017-11-29 RX ORDER — BUPIVACAINE HYDROCHLORIDE 5 MG/ML
4 INJECTION, SOLUTION PERINEURAL ONCE
Status: COMPLETED | OUTPATIENT
Start: 2017-11-29 | End: 2017-11-29

## 2017-11-29 RX ADMIN — BUPIVACAINE HYDROCHLORIDE 20 MG: 5 INJECTION, SOLUTION PERINEURAL at 11:08

## 2017-11-29 RX ADMIN — TRIAMCINOLONE ACETONIDE 40 MG: 40 INJECTION, SUSPENSION INTRA-ARTICULAR; INTRAMUSCULAR at 11:07

## 2017-11-29 NOTE — PROGRESS NOTES
Number of children: N/A    Years of education: N/A     Occupational History    disabled STNA      Social History Main Topics    Smoking status: Former Smoker     Packs/day: 0.25     Years: 35.00     Types: Cigarettes    Smokeless tobacco: Former User     Quit date: 6/1/2015      Comment: 6 cigarettes per day since age 15 or so.  Alcohol use Yes      Comment: rarely    Drug use: No    Sexual activity: Not Currently     Partners: Male     Other Topics Concern    Not on file     Social History Narrative    No narrative on file       Current Outpatient Prescriptions   Medication Sig Dispense Refill    oxyCODONE-acetaminophen (PERCOCET) 7.5-325 MG per tablet Take 1 tablet by mouth 5 times daily .  150 tablet 0    DULoxetine (CYMBALTA) 30 MG extended release capsule take 3 capsules by mouth once daily 90 capsule 2    fluticasone (FLONASE) 50 MCG/ACT nasal spray instill 2 spray into each nostril once daily 16 g 11    PATADAY 0.2 % SOLN ophthalmic solution instill 1 drop into both eyes once daily if needed 2.5 mL 11    ALPRAZolam (XANAX) 1 MG tablet take 1 tablet by mouth three times a day if needed for anxiety 90 tablet 2    amoxicillin (AMOXIL) 500 MG capsule Take 1 capsule by mouth 3 times daily 30 capsule 0    albuterol sulfate HFA (PROAIR HFA) 108 (90 Base) MCG/ACT inhaler inhale 2 puffs by mouth every 4 hours 8.5 Inhaler 6    ARIPiprazole (ABILIFY) 5 MG tablet take 1 tablet by mouth every evening 30 tablet 5    metoprolol tartrate (LOPRESSOR) 25 MG tablet take 1 tablet by mouth twice a day 60 tablet 5    omeprazole (PRILOSEC) 20 MG delayed release capsule take 1 capsule by mouth twice a day 60 capsule 5    vitamin D (ERGOCALCIFEROL) 43709 UNITS CAPS capsule take 1 capsule by mouth every week 4 capsule 11    clonazePAM (KLONOPIN) 1 MG tablet take 1 tablet by mouth at bedtime 30 tablet 0    cetirizine (ZYRTEC) 10 MG tablet take 1 tablet by mouth once daily 30 tablet 11    cyclobenzaprine (FLEXERIL) 10 MG tablet Take 1 tablet by mouth every 8 hours as needed for Muscle spasms. 90 tablet 11    MAXALT-MLT 10 MG disintegrating tablet take as directed if needed for migraines 9 tablet 11     No current facility-administered medications for this visit. Allergies:  sheis allergic to latex and fentanyl. ROS:  CV:  Denies chest pain; palpitations; shortness of breath; swelling of feet, ankles; and loss of consciousness. CON: Denies fever and dizziness. ENT:  Denies hearing loss / ringing, ear infections hoarseness, and swallowing problems. RESP:  Denies chronic cough, spitting up blood, and asthma/wheezing. GI: Denies abdominal pain, change in bowel habits, nausea or vomiting, and blood in stools. :  Denies frequent urination, burning or painful urination, blood in the urine, and bladder incontinence. NEURO:  Denies headache, memory loss, sleep disturbance, and tremor or movement disorder. PHYSICAL EXAM:   /73   Pulse 74   Ht 5' 3\" (1.6 m)   Wt 236 lb (107 kg)   LMP 11/01/2011   BMI 41.81 kg/m²   GENERAL: Asha Turner is a 56 y.o. female who is alert and oriented and sitting comfortably in our office. SKIN:  Intact without rashes, lesions or ulcerations. NEURO: Sensation to the extremity is intact. VASC:  Capillary refill is less than 3 seconds. Distal pulses are palpable. There is no lymphadenopathy. Knee Exam  Musculoskeletal/Neurologic:  Inspection-Swelling: mild, Ecchymosis: no  Palpation-Tenderness:medial joint line  Pain with patellar grind: yes  ROM- 5-120  Strength- WNL  Sensation-normal to light touch    Special Tests-  Varus Laxity: negative   Valgus Laxity:  negative   Anterior Drawer: negative   Posterior Drawer: negative  Lachman's: negative  Roberto's:negative  Gait: antalgic    PSYCH:  Good fund of knowledge and displays understanding of exam.    RADIOLOGY: No results found.     Radiology:  4 views of the Left knee were ordered, independently

## 2017-12-13 ENCOUNTER — OFFICE VISIT (OUTPATIENT)
Dept: PAIN MANAGEMENT | Age: 57
End: 2017-12-13
Payer: MEDICARE

## 2017-12-13 VITALS
HEIGHT: 63 IN | SYSTOLIC BLOOD PRESSURE: 118 MMHG | WEIGHT: 245 LBS | HEART RATE: 68 BPM | BODY MASS INDEX: 43.41 KG/M2 | DIASTOLIC BLOOD PRESSURE: 94 MMHG

## 2017-12-13 DIAGNOSIS — M79.7 FIBROMYALGIA: ICD-10-CM

## 2017-12-13 DIAGNOSIS — M51.9 LUMBAR DISC DISEASE: ICD-10-CM

## 2017-12-13 DIAGNOSIS — G89.29 ENCOUNTER FOR CHRONIC PAIN MANAGEMENT: ICD-10-CM

## 2017-12-13 DIAGNOSIS — F41.1 GENERALIZED ANXIETY DISORDER: ICD-10-CM

## 2017-12-13 DIAGNOSIS — M50.20 CERVICAL HERNIATED DISC: Primary | ICD-10-CM

## 2017-12-13 PROCEDURE — 3014F SCREEN MAMMO DOC REV: CPT | Performed by: NURSE PRACTITIONER

## 2017-12-13 PROCEDURE — G8484 FLU IMMUNIZE NO ADMIN: HCPCS | Performed by: NURSE PRACTITIONER

## 2017-12-13 PROCEDURE — 3017F COLORECTAL CA SCREEN DOC REV: CPT | Performed by: NURSE PRACTITIONER

## 2017-12-13 PROCEDURE — 99214 OFFICE O/P EST MOD 30 MIN: CPT | Performed by: NURSE PRACTITIONER

## 2017-12-13 PROCEDURE — G8427 DOCREV CUR MEDS BY ELIG CLIN: HCPCS | Performed by: NURSE PRACTITIONER

## 2017-12-13 PROCEDURE — 1036F TOBACCO NON-USER: CPT | Performed by: NURSE PRACTITIONER

## 2017-12-13 PROCEDURE — G8417 CALC BMI ABV UP PARAM F/U: HCPCS | Performed by: NURSE PRACTITIONER

## 2017-12-13 RX ORDER — ALPRAZOLAM 1 MG/1
TABLET ORAL
Qty: 90 TABLET | Refills: 2 | Status: SHIPPED | OUTPATIENT
Start: 2017-12-13 | End: 2018-03-15 | Stop reason: SDUPTHER

## 2017-12-14 RX ORDER — OXYCODONE AND ACETAMINOPHEN 7.5; 325 MG/1; MG/1
1 TABLET ORAL
Qty: 150 TABLET | Refills: 0 | Status: SHIPPED | OUTPATIENT
Start: 2017-12-26 | End: 2017-12-21 | Stop reason: SDUPTHER

## 2017-12-14 ASSESSMENT — ENCOUNTER SYMPTOMS
CONSTIPATION: 0
BACK PAIN: 1
BLOOD IN STOOL: 0

## 2017-12-14 NOTE — PROGRESS NOTES
cardiac ablation.  Vitamin D deficiency        Past Surgical History:   Procedure Laterality Date    ATRIAL ABLATION SURGERY  2006    Radiofrequency ablation of slow AV pawel pathway.  CARDIAC CATHETERIZATION  2006    no blockages    CHOLECYSTECTOMY, LAPAROSCOPIC  2011    (Dr. Letty Pace)    JOINT REPLACEMENT Right     FAILED KNEE RE;PLAC    KNEE ARTHROPLASTY Right 2006    OTHER SURGICAL HISTORY      Epidural steroids to cervical spine.  OTHER SURGICAL HISTORY      ECT therapy, one session only and she refused any further    TUBAL LIGATION         Family History   Problem Relation Age of Onset    Breast Cancer Mother     High Blood Pressure Mother     Coronary Art Dis Mother      Multiple stents.  Diabetes Mother     Heart Disease Father     Coronary Art Dis Father      Myocardial infarction at age 54 and .  COPD Father     Arthritis Brother     COPD Brother     Heart Disease Brother      Congenital heart defect -  at 10 wks of age. Social History     Social History    Marital status:      Spouse name: N/A    Number of children: N/A    Years of education: N/A     Occupational History    disabled STNA      Social History Main Topics    Smoking status: Former Smoker     Packs/day: 0.25     Years: 35.00     Types: Cigarettes    Smokeless tobacco: Former User     Quit date: 2015      Comment: 6 cigarettes per day since age 15 or so.  Alcohol use Yes      Comment: rarely    Drug use: No    Sexual activity: Not Currently     Partners: Male     Other Topics Concern    None     Social History Narrative    None     Review of Systems   Gastrointestinal: Negative for blood in stool and constipation. Genitourinary: Positive for flank pain. Musculoskeletal: Positive for arthralgias, back pain, myalgias and neck pain. Neurological: Positive for numbness (tingling in hands). Psychiatric/Behavioral: Negative for sleep disturbance. Objective:   Physical Exam   Constitutional: She is oriented to person, place, and time. She appears well-developed and well-nourished. She is cooperative. HENT:   Head: Normocephalic and atraumatic. Right Ear: External ear normal.   Left Ear: External ear normal.   Nose: Nose normal.   Eyes: Conjunctivae, EOM and lids are normal.   Cardiovascular: Intact distal pulses and normal pulses. Pulmonary/Chest: Effort normal.   Abdominal: Normal appearance. Musculoskeletal:   Ambulates with single cane   Neurological: She is alert and oriented to person, place, and time. No cranial nerve deficit. Skin: Skin is warm, dry and intact. No cyanosis. Nails show no clubbing. Psychiatric: She has a normal mood and affect. Her speech is normal and behavior is normal.   Vitals reviewed. Assessment:      1. Cervical herniated disc    2. Fibromyalgia    3. Lumbar disc disease    4. Encounter for chronic pain management           Plan:   Continue current pain medications to improve quality of life and function. SOAPP- the score is 6 . (Values indicates patient is <4minimal potential 4-7 Moderate potential >7 High potential for drug addiction)  Controlled Substances Monitoring: Attestation: The Prescription Monitoring Report for this patient was reviewed today. Karo Mcbride NP)  Documentation: Obtaining appropriate analgesic effect of treatment., No signs of potential drug abuse or diversion identified.  Karo Mcbride NP)  Follow up 2 months

## 2017-12-21 ENCOUNTER — TELEPHONE (OUTPATIENT)
Dept: ORTHOPEDIC SURGERY | Age: 57
End: 2017-12-21

## 2017-12-21 DIAGNOSIS — M79.7 FIBROMYALGIA: ICD-10-CM

## 2017-12-21 RX ORDER — OXYCODONE AND ACETAMINOPHEN 7.5; 325 MG/1; MG/1
1 TABLET ORAL
Qty: 150 TABLET | Refills: 0 | Status: SHIPPED | OUTPATIENT
Start: 2017-12-26 | End: 2018-01-22 | Stop reason: SDUPTHER

## 2018-01-22 DIAGNOSIS — M79.7 FIBROMYALGIA: ICD-10-CM

## 2018-01-22 RX ORDER — OXYCODONE AND ACETAMINOPHEN 7.5; 325 MG/1; MG/1
1 TABLET ORAL
Qty: 150 TABLET | Refills: 0 | Status: SHIPPED | OUTPATIENT
Start: 2018-01-25 | End: 2018-02-23 | Stop reason: SDUPTHER

## 2018-01-29 RX ORDER — OMEPRAZOLE 20 MG/1
CAPSULE, DELAYED RELEASE ORAL
Qty: 60 CAPSULE | Refills: 5 | Status: SHIPPED | OUTPATIENT
Start: 2018-01-29 | End: 2019-02-05 | Stop reason: SDUPTHER

## 2018-02-17 DIAGNOSIS — F41.1 GENERALIZED ANXIETY DISORDER: ICD-10-CM

## 2018-02-17 DIAGNOSIS — I10 ESSENTIAL HYPERTENSION: ICD-10-CM

## 2018-02-19 RX ORDER — ARIPIPRAZOLE 5 MG/1
TABLET ORAL
Qty: 30 TABLET | Refills: 5 | Status: SHIPPED | OUTPATIENT
Start: 2018-02-19 | End: 2018-06-15

## 2018-02-23 DIAGNOSIS — M79.7 FIBROMYALGIA: ICD-10-CM

## 2018-02-23 RX ORDER — OXYCODONE AND ACETAMINOPHEN 7.5; 325 MG/1; MG/1
1 TABLET ORAL
Qty: 150 TABLET | Refills: 0 | Status: SHIPPED | OUTPATIENT
Start: 2018-02-24 | End: 2018-03-16 | Stop reason: SDUPTHER

## 2018-02-23 NOTE — TELEPHONE ENCOUNTER
Pt called refill line for Percocet , to be sent to [facility]. Last Appt:  12/13/2017  Next Appt:   3/9/2018  Med verified in [address] checked for [place], [place], and [place]: 01/25/2018 Percocet #150. Due 02/24/2018.

## 2018-03-13 DIAGNOSIS — G89.29 CHRONIC PAIN OF LEFT KNEE: Primary | ICD-10-CM

## 2018-03-13 DIAGNOSIS — M25.562 CHRONIC PAIN OF LEFT KNEE: Primary | ICD-10-CM

## 2018-03-14 ENCOUNTER — OFFICE VISIT (OUTPATIENT)
Dept: ORTHOPEDIC SURGERY | Age: 58
End: 2018-03-14
Payer: MEDICARE

## 2018-03-14 ENCOUNTER — HOSPITAL ENCOUNTER (OUTPATIENT)
Dept: GENERAL RADIOLOGY | Age: 58
Discharge: HOME | End: 2018-03-16
Payer: MEDICARE

## 2018-03-14 VITALS
WEIGHT: 245 LBS | HEIGHT: 63 IN | BODY MASS INDEX: 43.41 KG/M2 | DIASTOLIC BLOOD PRESSURE: 59 MMHG | SYSTOLIC BLOOD PRESSURE: 131 MMHG | HEART RATE: 69 BPM

## 2018-03-14 DIAGNOSIS — G89.29 CHRONIC PAIN OF LEFT KNEE: ICD-10-CM

## 2018-03-14 DIAGNOSIS — M25.562 CHRONIC PAIN OF LEFT KNEE: ICD-10-CM

## 2018-03-14 DIAGNOSIS — M17.12 PRIMARY OSTEOARTHRITIS OF LEFT KNEE: Primary | ICD-10-CM

## 2018-03-14 PROCEDURE — 3017F COLORECTAL CA SCREEN DOC REV: CPT | Performed by: FAMILY MEDICINE

## 2018-03-14 PROCEDURE — G8417 CALC BMI ABV UP PARAM F/U: HCPCS | Performed by: FAMILY MEDICINE

## 2018-03-14 PROCEDURE — 73562 X-RAY EXAM OF KNEE 3: CPT

## 2018-03-14 PROCEDURE — 99213 OFFICE O/P EST LOW 20 MIN: CPT | Performed by: FAMILY MEDICINE

## 2018-03-14 PROCEDURE — 1036F TOBACCO NON-USER: CPT | Performed by: FAMILY MEDICINE

## 2018-03-14 PROCEDURE — 3014F SCREEN MAMMO DOC REV: CPT | Performed by: FAMILY MEDICINE

## 2018-03-14 PROCEDURE — G8482 FLU IMMUNIZE ORDER/ADMIN: HCPCS | Performed by: FAMILY MEDICINE

## 2018-03-14 PROCEDURE — G8427 DOCREV CUR MEDS BY ELIG CLIN: HCPCS | Performed by: FAMILY MEDICINE

## 2018-03-14 PROCEDURE — 20610 DRAIN/INJ JOINT/BURSA W/O US: CPT | Performed by: FAMILY MEDICINE

## 2018-03-14 RX ORDER — BUPIVACAINE HYDROCHLORIDE 5 MG/ML
4 INJECTION, SOLUTION PERINEURAL ONCE
Status: COMPLETED | OUTPATIENT
Start: 2018-03-14 | End: 2018-03-14

## 2018-03-14 RX ORDER — TRIAMCINOLONE ACETONIDE 40 MG/ML
40 INJECTION, SUSPENSION INTRA-ARTICULAR; INTRAMUSCULAR ONCE
Status: COMPLETED | OUTPATIENT
Start: 2018-03-14 | End: 2018-03-14

## 2018-03-14 RX ADMIN — TRIAMCINOLONE ACETONIDE 40 MG: 40 INJECTION, SUSPENSION INTRA-ARTICULAR; INTRAMUSCULAR at 13:05

## 2018-03-14 RX ADMIN — BUPIVACAINE HYDROCHLORIDE 20 MG: 5 INJECTION, SOLUTION PERINEURAL at 13:05

## 2018-03-14 NOTE — PROGRESS NOTES
Sports Medicine Consultation     CHIEF COMPLAINT:  Knee Pain (rech left knee)      HPI:  Jonathan Randolph is a 66y.o. year old female who is a  established patient being seen for regarding follow up of a pre-existing problem left knee pain. The pain has been present for  month(s). The patient recalls a no new injury. The patient has tried cortisone with improvement. The pain is described as sharp. There is  pain on weightbearing. The knee does swell. There is is not painful popping and clicking. The knee does not catch or lock. It has not given out. It is  stiff upon arising from sitting. It is  painful to go up and down stairs and sit for a prolonged period of time. she has a past medical history of Asthma; Blindness of left eye; Cervical herniated disc; Congenital hip dysplasia; Degenerative joint disease; Depression; Dysfunctional uterine bleeding; Dyspnea; Failed total right knee replacement ([address] Fatigue; Fibromyalgia; Generalized anxiety disorder; Grief at loss of child; Hip pain, bilateral; History of tobacco use; Hypertension; Kidney stone; Lumbar disc disease; Obesity; Osteoarthritis; Seasonal allergic rhinitis; Shoulder pain, left; Shoulder pain, right; Supraventricular tachycardia, paroxysmal ([address] and Vitamin D deficiency. she has a past surgical history that includes Atrial ablation surgery (2006); Knee Arthroplasty (Right, 2006); joint replacement (Right); Tubal ligation (1996); Cardiac catheterization (2006); Cholecystectomy, laparoscopic (03-); other surgical history (2009); and other surgical history (2014). family history includes Arthritis in her brother; Breast Cancer in her mother; COPD in her brother and father; Coronary Art Dis in her father and mother; Diabetes in her mother; Heart Disease in her brother and father; High Blood Pressure in her mother.     Social History     Social History    Marital status:      Spouse name: N/A    Number of thrust    PSYCH:  Good fund of knowledge and displays understanding of exam.    RADIOLOGY: No results found. Radiology:  4 views of the Left knee were ordered, independently visualized by me, and discussed with patient. Findings: Radiographs of left knee demonstrate valgus alignment and severe bone-on-bone arthritis particularly in the lateral compartment    IMPRESSION:     1. Primary osteoarthritis of left knee          PLAN:   We discussed some of the etiologies and natural histories of     ICD-10-CM ICD-9-CM    1. Primary osteoarthritis of left knee M17.12 715.16 bupivacaine (MARCAINE) 0.5 % injection 20 mg      triamcinolone acetonide (KENALOG-40) injection 40 mg      NM ARTHROCENTESIS ASPIR&/INJ MAJOR JT/BURSA W/O US   . We discussed the various treatment alternatives including anti-inflammatory medications, physical therapy, injections, further imaging studies and as a last resort surgery. Her complaint is pain and do think we should treat it with a cortisone injection one was a  into her left knee in the manners typed and chart. Patient tolerated procedure well. Follow up in 6 weeks for viscous supplementation    Return to clinic in No Follow-up on file. .    Electronically signed by Jasen Fang DO, FAOASM  on 3/14/18 at 11:42 AM            Procedures    NM ARTHROCENTESIS ASPIR&/INJ MAJOR JT/BURSA W/O US     KNEE INJECTION PROCEDURE NOTE:  The patient was identified. The left knee was confirmed with the patient. After a sterile prep with Betadine followed by an alcohol wipe the knee was injected using a bent knee medial joint line approach with a mixture of 4 mL of 0.5% Marcaine and 40 mg of Kenalog. Patient tolerated the procedure well without post injection complications. I instructed the patient to call our office immediately if they have any swelling or increased pain at the injection site.

## 2018-03-15 ENCOUNTER — OFFICE VISIT (OUTPATIENT)
Dept: FAMILY MEDICINE CLINIC | Age: 58
End: 2018-03-15
Payer: MEDICARE

## 2018-03-15 VITALS
HEART RATE: 94 BPM | HEIGHT: 63 IN | DIASTOLIC BLOOD PRESSURE: 84 MMHG | BODY MASS INDEX: 43.27 KG/M2 | OXYGEN SATURATION: 99 % | SYSTOLIC BLOOD PRESSURE: 124 MMHG | WEIGHT: 244.2 LBS

## 2018-03-15 DIAGNOSIS — F41.1 GENERALIZED ANXIETY DISORDER: Primary | ICD-10-CM

## 2018-03-15 DIAGNOSIS — F32.1 MODERATE MAJOR DEPRESSION (HCC): ICD-10-CM

## 2018-03-15 DIAGNOSIS — I10 ESSENTIAL HYPERTENSION: ICD-10-CM

## 2018-03-15 DIAGNOSIS — Z87.891 HISTORY OF TOBACCO USE: ICD-10-CM

## 2018-03-15 PROCEDURE — G8482 FLU IMMUNIZE ORDER/ADMIN: HCPCS | Performed by: FAMILY MEDICINE

## 2018-03-15 PROCEDURE — G8417 CALC BMI ABV UP PARAM F/U: HCPCS | Performed by: FAMILY MEDICINE

## 2018-03-15 PROCEDURE — 3017F COLORECTAL CA SCREEN DOC REV: CPT | Performed by: FAMILY MEDICINE

## 2018-03-15 PROCEDURE — 3014F SCREEN MAMMO DOC REV: CPT | Performed by: FAMILY MEDICINE

## 2018-03-15 PROCEDURE — 1036F TOBACCO NON-USER: CPT | Performed by: FAMILY MEDICINE

## 2018-03-15 PROCEDURE — G8427 DOCREV CUR MEDS BY ELIG CLIN: HCPCS | Performed by: FAMILY MEDICINE

## 2018-03-15 PROCEDURE — 99214 OFFICE O/P EST MOD 30 MIN: CPT | Performed by: FAMILY MEDICINE

## 2018-03-15 RX ORDER — CLONAZEPAM 1 MG/1
TABLET ORAL
Qty: 30 TABLET | Refills: 5 | Status: SHIPPED | OUTPATIENT
Start: 2018-03-15 | End: 2019-07-17

## 2018-03-15 RX ORDER — DULOXETIN HYDROCHLORIDE 30 MG/1
CAPSULE, DELAYED RELEASE ORAL
Qty: 90 CAPSULE | Refills: 2 | Status: SHIPPED | OUTPATIENT
Start: 2018-03-15 | End: 2018-06-15

## 2018-03-15 RX ORDER — ALPRAZOLAM 1 MG/1
TABLET ORAL
Qty: 90 TABLET | Refills: 2 | Status: SHIPPED | OUTPATIENT
Start: 2018-03-15 | End: 2018-06-14 | Stop reason: SDUPTHER

## 2018-03-15 RX ORDER — CYCLOBENZAPRINE HCL 10 MG
10 TABLET ORAL EVERY 8 HOURS PRN
Qty: 90 TABLET | Refills: 11 | Status: SHIPPED | OUTPATIENT
Start: 2018-03-15 | End: 2019-03-11 | Stop reason: SDUPTHER

## 2018-03-15 ASSESSMENT — PATIENT HEALTH QUESTIONNAIRE - PHQ9
SUM OF ALL RESPONSES TO PHQ9 QUESTIONS 1 & 2: 1
SUM OF ALL RESPONSES TO PHQ QUESTIONS 1-9: 1
2. FEELING DOWN, DEPRESSED OR HOPELESS: 1
1. LITTLE INTEREST OR PLEASURE IN DOING THINGS: 0

## 2018-03-15 ASSESSMENT — ENCOUNTER SYMPTOMS
ABDOMINAL PAIN: 0
CONSTIPATION: 0
CHEST TIGHTNESS: 0
COUGH: 0
WHEEZING: 0
NAUSEA: 0
DIARRHEA: 0
SHORTNESS OF BREATH: 1

## 2018-03-16 ENCOUNTER — HOSPITAL ENCOUNTER (OUTPATIENT)
Age: 58
Setting detail: SPECIMEN
Discharge: HOME | End: 2018-03-16
Payer: MEDICARE

## 2018-03-16 ENCOUNTER — OFFICE VISIT (OUTPATIENT)
Dept: PAIN MANAGEMENT | Age: 58
End: 2018-03-16
Payer: MEDICARE

## 2018-03-16 VITALS
DIASTOLIC BLOOD PRESSURE: 80 MMHG | BODY MASS INDEX: 42.88 KG/M2 | SYSTOLIC BLOOD PRESSURE: 128 MMHG | HEIGHT: 63 IN | WEIGHT: 242 LBS | HEART RATE: 80 BPM

## 2018-03-16 DIAGNOSIS — G89.29 CHRONIC BILATERAL LOW BACK PAIN WITHOUT SCIATICA: Primary | ICD-10-CM

## 2018-03-16 DIAGNOSIS — Z79.891 ENCOUNTER FOR LONG-TERM OPIATE ANALGESIC USE: ICD-10-CM

## 2018-03-16 DIAGNOSIS — Z02.83 ENCOUNTER FOR DRUG SCREENING: ICD-10-CM

## 2018-03-16 DIAGNOSIS — M54.50 CHRONIC BILATERAL LOW BACK PAIN WITHOUT SCIATICA: Primary | ICD-10-CM

## 2018-03-16 DIAGNOSIS — M79.7 FIBROMYALGIA: ICD-10-CM

## 2018-03-16 PROCEDURE — 99214 OFFICE O/P EST MOD 30 MIN: CPT | Performed by: NURSE PRACTITIONER

## 2018-03-16 PROCEDURE — 80307 DRUG TEST PRSMV CHEM ANLYZR: CPT

## 2018-03-16 PROCEDURE — 3014F SCREEN MAMMO DOC REV: CPT | Performed by: NURSE PRACTITIONER

## 2018-03-16 PROCEDURE — G8482 FLU IMMUNIZE ORDER/ADMIN: HCPCS | Performed by: NURSE PRACTITIONER

## 2018-03-16 PROCEDURE — G8427 DOCREV CUR MEDS BY ELIG CLIN: HCPCS | Performed by: NURSE PRACTITIONER

## 2018-03-16 PROCEDURE — G8417 CALC BMI ABV UP PARAM F/U: HCPCS | Performed by: NURSE PRACTITIONER

## 2018-03-16 PROCEDURE — 3017F COLORECTAL CA SCREEN DOC REV: CPT | Performed by: NURSE PRACTITIONER

## 2018-03-16 PROCEDURE — 1036F TOBACCO NON-USER: CPT | Performed by: NURSE PRACTITIONER

## 2018-03-16 RX ORDER — OXYCODONE AND ACETAMINOPHEN 7.5; 325 MG/1; MG/1
1 TABLET ORAL
Qty: 150 TABLET | Refills: 0 | Status: SHIPPED | OUTPATIENT
Start: 2018-03-26 | End: 2018-04-25 | Stop reason: SDUPTHER

## 2018-03-16 ASSESSMENT — ENCOUNTER SYMPTOMS
CONSTIPATION: 0
BLOOD IN STOOL: 0
BACK PAIN: 1

## 2018-03-20 LAB
6-ACETYLMORPHINE, UR: NOT DETECTED
7-AMINOCLONAZEPAM, URINE: NOT DETECTED
ALPHA-OH-ALPRAZ, URINE: PRESENT
ALPRAZOLAM, URINE: PRESENT
AMPHETAMINES, URINE: NOT DETECTED
BARBITURATES, URINE: NOT DETECTED
BENZOYLECGONINE, UR: NOT DETECTED
BUPRENORPHINE URINE: NOT DETECTED
CARISOPRODOL, UR: NOT DETECTED
CLONAZEPAM, URINE: NOT DETECTED
CODEINE, URINE: NOT DETECTED
CREATININE URINE: 76.1 MG/DL (ref 20–400)
DIAZEPAM, URINE: NOT DETECTED
EER PAIN MGT DRUG PANEL, HIGH RES/EMIT U: NORMAL
ETHYL GLUCURONIDE UR: NOT DETECTED
FENTANYL URINE: NOT DETECTED
HYDROCODONE, URINE: NOT DETECTED
HYDROMORPHONE, URINE: NOT DETECTED
LORAZEPAM, URINE: NOT DETECTED
MARIJUANA METAB, UR: NOT DETECTED
MDA, UR: NOT DETECTED
MDEA, EVE, UR: NOT DETECTED
MDMA URINE: NOT DETECTED
MEPERIDINE METAB, UR: NOT DETECTED
METHADONE, URINE: NOT DETECTED
METHAMPHETAMINE, URINE: NOT DETECTED
METHYLPHENIDATE: NOT DETECTED
MIDAZOLAM, URINE: NOT DETECTED
MORPHINE URINE: NOT DETECTED
NORBUPRENORPHINE, URINE: NOT DETECTED
NORDIAZEPAM, URINE: NOT DETECTED
NORFENTANYL, URINE: NOT DETECTED
NORHYDROCODONE, URINE: NOT DETECTED
NOROXYCODONE, URINE: PRESENT
NOROXYMORPHONE, URINE: PRESENT
OXAZEPAM, URINE: NOT DETECTED
OXYCODONE URINE: PRESENT
OXYMORPHONE, URINE: NOT DETECTED
PAIN MGT DRUG PANEL, HI RES, UR: NORMAL
PCP,URINE: NOT DETECTED
PHENTERMINE, UR: NOT DETECTED
PROPOXYPHENE, URINE: NOT DETECTED
TAPENTADOL, URINE: NOT DETECTED
TAPENTADOL-O-SULFATE, URINE: NOT DETECTED
TEMAZEPAM, URINE: NOT DETECTED
TRAMADOL, URINE: NOT DETECTED
ZOLPIDEM, URINE: NOT DETECTED

## 2018-03-23 ENCOUNTER — TELEPHONE (OUTPATIENT)
Dept: ORTHOPEDIC SURGERY | Age: 58
End: 2018-03-23

## 2018-04-18 ENCOUNTER — OFFICE VISIT (OUTPATIENT)
Dept: ORTHOPEDIC SURGERY | Age: 58
End: 2018-04-18
Payer: MEDICARE

## 2018-04-18 ENCOUNTER — TELEPHONE (OUTPATIENT)
Dept: PAIN MANAGEMENT | Age: 58
End: 2018-04-18

## 2018-04-18 DIAGNOSIS — M17.12 PRIMARY OSTEOARTHRITIS OF LEFT KNEE: Primary | ICD-10-CM

## 2018-04-18 PROCEDURE — 20610 DRAIN/INJ JOINT/BURSA W/O US: CPT | Performed by: FAMILY MEDICINE

## 2018-04-18 RX ORDER — HYALURONATE SODIUM 10 MG/ML
20 SYRINGE (ML) INTRAARTICULAR ONCE
Status: COMPLETED | OUTPATIENT
Start: 2018-04-18 | End: 2018-04-18

## 2018-04-18 RX ADMIN — Medication 20 MG: at 12:55

## 2018-04-19 DIAGNOSIS — M79.7 FIBROMYALGIA: ICD-10-CM

## 2018-04-19 DIAGNOSIS — M25.552 BILATERAL HIP PAIN: Primary | ICD-10-CM

## 2018-04-19 DIAGNOSIS — M25.551 BILATERAL HIP PAIN: Primary | ICD-10-CM

## 2018-04-19 RX ORDER — OXYCODONE AND ACETAMINOPHEN 7.5; 325 MG/1; MG/1
1 TABLET ORAL
Qty: 150 TABLET | Refills: 0 | Status: CANCELLED | OUTPATIENT
Start: 2018-04-25 | End: 2018-05-25

## 2018-04-24 ENCOUNTER — HOSPITAL ENCOUNTER (OUTPATIENT)
Dept: GENERAL RADIOLOGY | Age: 58
Discharge: HOME | End: 2018-04-26
Payer: MEDICARE

## 2018-04-24 ENCOUNTER — OFFICE VISIT (OUTPATIENT)
Dept: ORTHOPEDIC SURGERY | Age: 58
End: 2018-04-24
Payer: MEDICARE

## 2018-04-24 ENCOUNTER — HOSPITAL ENCOUNTER (OUTPATIENT)
Dept: GENERAL RADIOLOGY | Age: 58
End: 2018-04-24
Payer: MEDICARE

## 2018-04-24 VITALS
DIASTOLIC BLOOD PRESSURE: 84 MMHG | WEIGHT: 242 LBS | HEIGHT: 63 IN | SYSTOLIC BLOOD PRESSURE: 128 MMHG | HEART RATE: 82 BPM | BODY MASS INDEX: 42.88 KG/M2

## 2018-04-24 DIAGNOSIS — M25.551 BILATERAL HIP PAIN: ICD-10-CM

## 2018-04-24 DIAGNOSIS — M25.552 BILATERAL HIP PAIN: ICD-10-CM

## 2018-04-24 DIAGNOSIS — M25.551 RIGHT HIP PAIN: ICD-10-CM

## 2018-04-24 DIAGNOSIS — M17.12 PRIMARY OSTEOARTHRITIS OF LEFT KNEE: Primary | ICD-10-CM

## 2018-04-24 PROCEDURE — 73502 X-RAY EXAM HIP UNI 2-3 VIEWS: CPT

## 2018-04-24 PROCEDURE — G8417 CALC BMI ABV UP PARAM F/U: HCPCS | Performed by: PHYSICIAN ASSISTANT

## 2018-04-24 PROCEDURE — G8427 DOCREV CUR MEDS BY ELIG CLIN: HCPCS | Performed by: PHYSICIAN ASSISTANT

## 2018-04-24 PROCEDURE — 3017F COLORECTAL CA SCREEN DOC REV: CPT | Performed by: PHYSICIAN ASSISTANT

## 2018-04-24 PROCEDURE — 99213 OFFICE O/P EST LOW 20 MIN: CPT | Performed by: PHYSICIAN ASSISTANT

## 2018-04-24 PROCEDURE — 1036F TOBACCO NON-USER: CPT | Performed by: PHYSICIAN ASSISTANT

## 2018-04-25 DIAGNOSIS — M79.7 FIBROMYALGIA: ICD-10-CM

## 2018-04-26 RX ORDER — OXYCODONE AND ACETAMINOPHEN 7.5; 325 MG/1; MG/1
1 TABLET ORAL
Qty: 150 TABLET | Refills: 0 | Status: SHIPPED | OUTPATIENT
Start: 2018-04-26 | End: 2018-05-18 | Stop reason: SDUPTHER

## 2018-04-27 ENCOUNTER — OFFICE VISIT (OUTPATIENT)
Dept: FAMILY MEDICINE CLINIC | Age: 58
End: 2018-04-27
Payer: MEDICARE

## 2018-04-27 ENCOUNTER — HOSPITAL ENCOUNTER (EMERGENCY)
Age: 58
Discharge: HOME | End: 2018-04-28
Attending: EMERGENCY MEDICINE
Payer: MEDICARE

## 2018-04-27 ENCOUNTER — HOSPITAL ENCOUNTER (OUTPATIENT)
Dept: LAB | Age: 58
Setting detail: SPECIMEN
Discharge: HOME | End: 2018-04-27
Payer: MEDICARE

## 2018-04-27 VITALS
HEIGHT: 63 IN | BODY MASS INDEX: 44.65 KG/M2 | SYSTOLIC BLOOD PRESSURE: 118 MMHG | DIASTOLIC BLOOD PRESSURE: 84 MMHG | HEART RATE: 72 BPM | TEMPERATURE: 97.4 F | WEIGHT: 252 LBS | OXYGEN SATURATION: 98 %

## 2018-04-27 DIAGNOSIS — R10.9 ABDOMINAL PAIN, UNSPECIFIED ABDOMINAL LOCATION: Primary | ICD-10-CM

## 2018-04-27 DIAGNOSIS — R10.84 ABDOMINAL DISCOMFORT, GENERALIZED: ICD-10-CM

## 2018-04-27 DIAGNOSIS — Z01.810 PREOP CARDIOVASCULAR EXAM: ICD-10-CM

## 2018-04-27 DIAGNOSIS — M15.9 PRIMARY OSTEOARTHRITIS INVOLVING MULTIPLE JOINTS: ICD-10-CM

## 2018-04-27 DIAGNOSIS — M25.562 CHRONIC PAIN OF LEFT KNEE: Primary | ICD-10-CM

## 2018-04-27 DIAGNOSIS — G89.29 CHRONIC PAIN OF LEFT KNEE: Primary | ICD-10-CM

## 2018-04-27 DIAGNOSIS — R39.15 URINARY URGENCY: ICD-10-CM

## 2018-04-27 DIAGNOSIS — I47.1 SUPRAVENTRICULAR TACHYCARDIA, PAROXYSMAL (HCC): ICD-10-CM

## 2018-04-27 LAB
-: ABNORMAL
ABSOLUTE EOS #: 0.23 K/UL (ref 0–0.4)
ABSOLUTE IMMATURE GRANULOCYTE: ABNORMAL K/UL (ref 0–0.3)
ABSOLUTE LYMPH #: 4.83 K/UL (ref 1–4.8)
ABSOLUTE MONO #: 0.81 K/UL (ref 0.1–1.2)
AMORPHOUS: ABNORMAL
ANION GAP SERPL CALCULATED.3IONS-SCNC: 9 MMOL/L (ref 9–17)
BACTERIA: ABNORMAL
BASOPHILS # BLD: 0 % (ref 0–1)
BASOPHILS ABSOLUTE: 0 K/UL (ref 0–0.2)
BILIRUBIN URINE: NEGATIVE
BUN BLDV-MCNC: 16 MG/DL (ref 6–20)
BUN/CREAT BLD: 24 (ref 9–20)
CALCIUM SERPL-MCNC: 9.3 MG/DL (ref 8.6–10.4)
CASTS UA: ABNORMAL /LPF (ref 0–2)
CHLORIDE BLD-SCNC: 103 MMOL/L (ref 98–107)
CO2: 31 MMOL/L (ref 20–31)
COLOR: ABNORMAL
COMMENT UA: ABNORMAL
CREAT SERPL-MCNC: 0.68 MG/DL (ref 0.5–0.9)
CRYSTALS, UA: ABNORMAL /HPF
DIFFERENTIAL TYPE: ABNORMAL
EOSINOPHILS RELATIVE PERCENT: 2 % (ref 1–7)
EPITHELIAL CELLS UA: ABNORMAL /HPF (ref 0–5)
GFR AFRICAN AMERICAN: >60 ML/MIN
GFR NON-AFRICAN AMERICAN: >60 ML/MIN
GFR SERPL CREATININE-BSD FRML MDRD: ABNORMAL ML/MIN/{1.73_M2}
GFR SERPL CREATININE-BSD FRML MDRD: ABNORMAL ML/MIN/{1.73_M2}
GLUCOSE BLD-MCNC: 106 MG/DL (ref 70–99)
GLUCOSE URINE: NEGATIVE
HCT VFR BLD CALC: 42.7 % (ref 36–46)
HEMOGLOBIN: 13.9 G/DL (ref 12–16)
IMMATURE GRANULOCYTES: ABNORMAL %
KETONES, URINE: NEGATIVE
LEUKOCYTE ESTERASE, URINE: NEGATIVE
LYMPHOCYTES # BLD: 42 % (ref 16–46)
MCH RBC QN AUTO: 30.2 PG (ref 26–34)
MCHC RBC AUTO-ENTMCNC: 32.6 G/DL (ref 31–37)
MCV RBC AUTO: 92.6 FL (ref 80–100)
MONOCYTES # BLD: 7 % (ref 4–11)
MORPHOLOGY: ABNORMAL
MUCUS: ABNORMAL
NITRITE, URINE: NEGATIVE
NRBC AUTOMATED: ABNORMAL PER 100 WBC
OTHER OBSERVATIONS UA: ABNORMAL
PDW BLD-RTO: 13.5 % (ref 11–14.5)
PH UA: 6 (ref 5–6)
PLATELET # BLD: 320 K/UL (ref 140–450)
PLATELET ESTIMATE: ABNORMAL
PMV BLD AUTO: 7.8 FL (ref 6–12)
POTASSIUM SERPL-SCNC: 4.5 MMOL/L (ref 3.7–5.3)
PROTEIN UA: NEGATIVE
RBC # BLD: 4.61 M/UL (ref 4–5.2)
RBC # BLD: ABNORMAL 10*6/UL
RBC UA: ABNORMAL /HPF (ref 0–4)
RENAL EPITHELIAL, UA: ABNORMAL /HPF
SEG NEUTROPHILS: 49 % (ref 43–77)
SEGMENTED NEUTROPHILS ABSOLUTE COUNT: 5.63 K/UL (ref 1.8–7.7)
SODIUM BLD-SCNC: 143 MMOL/L (ref 135–144)
SPECIFIC GRAVITY UA: 1.03 (ref 1.01–1.02)
TRICHOMONAS: ABNORMAL
TURBIDITY: ABNORMAL
URINE HGB: NEGATIVE
UROBILINOGEN, URINE: NORMAL
WBC # BLD: 11.5 K/UL (ref 3.5–11)
WBC # BLD: ABNORMAL 10*3/UL
WBC UA: ABNORMAL /HPF (ref 0–4)
YEAST: ABNORMAL

## 2018-04-27 PROCEDURE — 3017F COLORECTAL CA SCREEN DOC REV: CPT | Performed by: NURSE PRACTITIONER

## 2018-04-27 PROCEDURE — 80048 BASIC METABOLIC PNL TOTAL CA: CPT

## 2018-04-27 PROCEDURE — G8417 CALC BMI ABV UP PARAM F/U: HCPCS | Performed by: NURSE PRACTITIONER

## 2018-04-27 PROCEDURE — G8427 DOCREV CUR MEDS BY ELIG CLIN: HCPCS | Performed by: NURSE PRACTITIONER

## 2018-04-27 PROCEDURE — 99284 EMERGENCY DEPT VISIT MOD MDM: CPT

## 2018-04-27 PROCEDURE — 99214 OFFICE O/P EST MOD 30 MIN: CPT | Performed by: NURSE PRACTITIONER

## 2018-04-27 PROCEDURE — 85025 COMPLETE CBC W/AUTO DIFF WBC: CPT

## 2018-04-27 PROCEDURE — 81001 URINALYSIS AUTO W/SCOPE: CPT

## 2018-04-27 PROCEDURE — 80053 COMPREHEN METABOLIC PANEL: CPT

## 2018-04-27 PROCEDURE — 1036F TOBACCO NON-USER: CPT | Performed by: NURSE PRACTITIONER

## 2018-04-27 PROCEDURE — 83690 ASSAY OF LIPASE: CPT

## 2018-04-27 PROCEDURE — 36415 COLL VENOUS BLD VENIPUNCTURE: CPT

## 2018-04-27 ASSESSMENT — PAIN DESCRIPTION - ORIENTATION: ORIENTATION: LOWER

## 2018-04-27 ASSESSMENT — PAIN SCALES - GENERAL: PAINLEVEL_OUTOF10: 8

## 2018-04-27 ASSESSMENT — PAIN DESCRIPTION - PAIN TYPE: TYPE: ACUTE PAIN

## 2018-04-27 ASSESSMENT — PAIN DESCRIPTION - LOCATION: LOCATION: ABDOMEN

## 2018-04-28 ENCOUNTER — APPOINTMENT (OUTPATIENT)
Dept: CT IMAGING | Age: 58
End: 2018-04-28
Payer: MEDICARE

## 2018-04-28 VITALS
RESPIRATION RATE: 16 BRPM | HEIGHT: 63 IN | WEIGHT: 252 LBS | SYSTOLIC BLOOD PRESSURE: 142 MMHG | HEART RATE: 76 BPM | OXYGEN SATURATION: 93 % | BODY MASS INDEX: 44.65 KG/M2 | TEMPERATURE: 99 F | DIASTOLIC BLOOD PRESSURE: 91 MMHG

## 2018-04-28 LAB
-: ABNORMAL
ALBUMIN SERPL-MCNC: 4.1 G/DL (ref 3.5–5.2)
ALBUMIN/GLOBULIN RATIO: 1.6 (ref 1–2.5)
ALP BLD-CCNC: 117 U/L (ref 35–104)
ALT SERPL-CCNC: 45 U/L (ref 5–33)
AMORPHOUS: ABNORMAL
AST SERPL-CCNC: 27 U/L
BACTERIA: ABNORMAL
BILIRUB SERPL-MCNC: 0.25 MG/DL (ref 0.3–1.2)
BILIRUBIN DIRECT: <0.08 MG/DL
BILIRUBIN URINE: NEGATIVE
BILIRUBIN, INDIRECT: ABNORMAL MG/DL (ref 0–1)
CASTS UA: ABNORMAL /LPF (ref 0–2)
COLOR: ABNORMAL
COMMENT UA: ABNORMAL
CRYSTALS, UA: ABNORMAL /HPF
DIRECT EXAM: NORMAL
EPITHELIAL CELLS UA: ABNORMAL /HPF (ref 0–5)
GLOBULIN: 2.6 G/DL (ref 1.5–3.8)
GLUCOSE URINE: NEGATIVE
KETONES, URINE: NEGATIVE
LACTIC ACID, SEPSIS WHOLE BLOOD: NORMAL MMOL/L (ref 0.5–1.9)
LACTIC ACID, SEPSIS: 1 MMOL/L (ref 0.5–1.9)
LEUKOCYTE ESTERASE, URINE: NEGATIVE
LIPASE: 22 U/L (ref 13–60)
Lab: NORMAL
Lab: NORMAL
MUCUS: ABNORMAL
NITRITE, URINE: NEGATIVE
OTHER OBSERVATIONS UA: ABNORMAL
PH UA: 5 (ref 5–6)
PROTEIN UA: NEGATIVE
RBC UA: ABNORMAL /HPF (ref 0–4)
RENAL EPITHELIAL, UA: ABNORMAL /HPF
SPECIFIC GRAVITY UA: 1.01 (ref 1.01–1.02)
SPECIMEN DESCRIPTION: NORMAL
SPECIMEN DESCRIPTION: NORMAL
STATUS: NORMAL
STATUS: NORMAL
TOTAL PROTEIN: 6.7 G/DL (ref 6.4–8.3)
TRICHOMONAS: ABNORMAL
TURBIDITY: ABNORMAL
URINE HGB: NEGATIVE
UROBILINOGEN, URINE: NORMAL
WBC UA: ABNORMAL /HPF (ref 0–4)
YEAST: ABNORMAL

## 2018-04-28 PROCEDURE — 87210 SMEAR WET MOUNT SALINE/INK: CPT

## 2018-04-28 PROCEDURE — 83605 ASSAY OF LACTIC ACID: CPT

## 2018-04-28 PROCEDURE — 36415 COLL VENOUS BLD VENIPUNCTURE: CPT

## 2018-04-28 PROCEDURE — 2580000003 HC RX 258: Performed by: EMERGENCY MEDICINE

## 2018-04-28 PROCEDURE — 6360000004 HC RX CONTRAST MEDICATION: Performed by: EMERGENCY MEDICINE

## 2018-04-28 PROCEDURE — 96375 TX/PRO/DX INJ NEW DRUG ADDON: CPT

## 2018-04-28 PROCEDURE — 74177 CT ABD & PELVIS W/CONTRAST: CPT

## 2018-04-28 PROCEDURE — 81001 URINALYSIS AUTO W/SCOPE: CPT

## 2018-04-28 PROCEDURE — 87491 CHLMYD TRACH DNA AMP PROBE: CPT

## 2018-04-28 PROCEDURE — 80076 HEPATIC FUNCTION PANEL: CPT

## 2018-04-28 PROCEDURE — 6360000002 HC RX W HCPCS: Performed by: EMERGENCY MEDICINE

## 2018-04-28 PROCEDURE — 87591 N.GONORRHOEAE DNA AMP PROB: CPT

## 2018-04-28 PROCEDURE — 87086 URINE CULTURE/COLONY COUNT: CPT

## 2018-04-28 PROCEDURE — 96374 THER/PROPH/DIAG INJ IV PUSH: CPT

## 2018-04-28 PROCEDURE — 83690 ASSAY OF LIPASE: CPT

## 2018-04-28 RX ORDER — KETOROLAC TROMETHAMINE 30 MG/ML
15 INJECTION, SOLUTION INTRAMUSCULAR; INTRAVENOUS ONCE
Status: COMPLETED | OUTPATIENT
Start: 2018-04-28 | End: 2018-04-28

## 2018-04-28 RX ORDER — GRANULES FOR ORAL 3 G/1
3 POWDER ORAL ONCE
Qty: 1 EACH | Refills: 0 | Status: SHIPPED | OUTPATIENT
Start: 2018-04-28 | End: 2018-04-28

## 2018-04-28 RX ORDER — 0.9 % SODIUM CHLORIDE 0.9 %
1000 INTRAVENOUS SOLUTION INTRAVENOUS ONCE
Status: COMPLETED | OUTPATIENT
Start: 2018-04-28 | End: 2018-04-28

## 2018-04-28 RX ORDER — ONDANSETRON 2 MG/ML
4 INJECTION INTRAMUSCULAR; INTRAVENOUS ONCE
Status: COMPLETED | OUTPATIENT
Start: 2018-04-28 | End: 2018-04-28

## 2018-04-28 RX ADMIN — IOPAMIDOL 100 ML: 755 INJECTION, SOLUTION INTRAVENOUS at 01:02

## 2018-04-28 RX ADMIN — SODIUM CHLORIDE 1000 ML: 9 INJECTION, SOLUTION INTRAVENOUS at 00:19

## 2018-04-28 RX ADMIN — KETOROLAC TROMETHAMINE 15 MG: 30 INJECTION, SOLUTION INTRAMUSCULAR at 00:19

## 2018-04-28 RX ADMIN — ONDANSETRON 4 MG: 2 INJECTION INTRAMUSCULAR; INTRAVENOUS at 00:19

## 2018-04-28 ASSESSMENT — ENCOUNTER SYMPTOMS
SHORTNESS OF BREATH: 0
VOMITING: 0
ABDOMINAL PAIN: 1
NAUSEA: 0

## 2018-04-28 ASSESSMENT — PAIN DESCRIPTION - PROGRESSION: CLINICAL_PROGRESSION: GRADUALLY IMPROVING

## 2018-04-28 ASSESSMENT — PAIN SCALES - GENERAL: PAINLEVEL_OUTOF10: 7

## 2018-04-30 ENCOUNTER — TELEPHONE (OUTPATIENT)
Dept: FAMILY MEDICINE CLINIC | Age: 58
End: 2018-04-30

## 2018-04-30 LAB
C TRACH DNA GENITAL QL NAA+PROBE: NEGATIVE
CULTURE: NORMAL
CULTURE: NORMAL
Lab: NORMAL
N. GONORRHOEAE DNA: NEGATIVE
SPECIMEN DESCRIPTION: NORMAL
SPECIMEN DESCRIPTION: NORMAL
STATUS: NORMAL

## 2018-04-30 RX ORDER — CEPHALEXIN 500 MG/1
500 CAPSULE ORAL 2 TIMES DAILY
Qty: 14 CAPSULE | Refills: 0 | Status: SHIPPED | OUTPATIENT
Start: 2018-04-30 | End: 2018-05-07

## 2018-05-10 ENCOUNTER — TELEPHONE (OUTPATIENT)
Dept: FAMILY MEDICINE CLINIC | Age: 58
End: 2018-05-10

## 2018-05-10 ENCOUNTER — HOSPITAL ENCOUNTER (OUTPATIENT)
Dept: LAB | Age: 58
Setting detail: SPECIMEN
Discharge: HOME | End: 2018-05-10
Payer: MEDICARE

## 2018-05-10 ENCOUNTER — OFFICE VISIT (OUTPATIENT)
Dept: CARDIOLOGY | Age: 58
End: 2018-05-10
Payer: MEDICARE

## 2018-05-10 VITALS
HEIGHT: 63 IN | WEIGHT: 251.45 LBS | BODY MASS INDEX: 44.55 KG/M2 | DIASTOLIC BLOOD PRESSURE: 72 MMHG | SYSTOLIC BLOOD PRESSURE: 138 MMHG | HEART RATE: 80 BPM

## 2018-05-10 DIAGNOSIS — D72.829 LEUKOCYTOSIS, UNSPECIFIED TYPE: ICD-10-CM

## 2018-05-10 DIAGNOSIS — Q65.89 CONGENITAL HIP DYSPLASIA: ICD-10-CM

## 2018-05-10 DIAGNOSIS — R06.02 SOB (SHORTNESS OF BREATH): ICD-10-CM

## 2018-05-10 DIAGNOSIS — Z01.818 PREOP TESTING: ICD-10-CM

## 2018-05-10 DIAGNOSIS — N93.8 DYSFUNCTIONAL UTERINE BLEEDING: Primary | ICD-10-CM

## 2018-05-10 DIAGNOSIS — Z79.01 LONG TERM CURRENT USE OF ANTICOAGULANT: ICD-10-CM

## 2018-05-10 DIAGNOSIS — I47.1 SUPRAVENTRICULAR TACHYCARDIA, PAROXYSMAL (HCC): Primary | ICD-10-CM

## 2018-05-10 DIAGNOSIS — N30.00 ACUTE CYSTITIS WITHOUT HEMATURIA: Primary | ICD-10-CM

## 2018-05-10 DIAGNOSIS — Z01.818 PRE-OP TESTING: ICD-10-CM

## 2018-05-10 DIAGNOSIS — M15.9 PRIMARY OSTEOARTHRITIS INVOLVING MULTIPLE JOINTS: ICD-10-CM

## 2018-05-10 DIAGNOSIS — Z22.322 MRSA (METHICILLIN RESISTANT STAPHYLOCOCCUS AUREUS) CARRIER: Primary | ICD-10-CM

## 2018-05-10 DIAGNOSIS — R82.90 ABNORMAL URINE FINDINGS: ICD-10-CM

## 2018-05-10 DIAGNOSIS — D72.829 LEUKOCYTOSIS, UNSPECIFIED TYPE: Primary | ICD-10-CM

## 2018-05-10 DIAGNOSIS — M15.9 PRIMARY OSTEOARTHRITIS INVOLVING MULTIPLE JOINTS: Primary | ICD-10-CM

## 2018-05-10 LAB
-: ABNORMAL
ABSOLUTE EOS #: 0.2 K/UL (ref 0–0.4)
ABSOLUTE IMMATURE GRANULOCYTE: ABNORMAL K/UL (ref 0–0.3)
ABSOLUTE LYMPH #: 5.5 K/UL (ref 1–4.8)
ABSOLUTE MONO #: 0.9 K/UL (ref 0.1–1.2)
AMORPHOUS: ABNORMAL
BACTERIA: ABNORMAL
BASOPHILS # BLD: 1 % (ref 0–1)
BASOPHILS ABSOLUTE: 0.1 K/UL (ref 0–0.2)
BILIRUBIN URINE: NEGATIVE
CASTS UA: ABNORMAL /LPF (ref 0–2)
COLOR: ABNORMAL
COMMENT UA: ABNORMAL
CRYSTALS, UA: ABNORMAL /HPF
DIFFERENTIAL TYPE: ABNORMAL
EOSINOPHILS RELATIVE PERCENT: 2 % (ref 1–7)
EPITHELIAL CELLS UA: ABNORMAL /HPF (ref 0–5)
GLUCOSE URINE: NEGATIVE
HCT VFR BLD CALC: 42.8 % (ref 36–46)
HEMOGLOBIN: 14 G/DL (ref 12–16)
IMMATURE GRANULOCYTES: ABNORMAL %
KETONES, URINE: NEGATIVE
LEUKOCYTE ESTERASE, URINE: ABNORMAL
LYMPHOCYTES # BLD: 41 % (ref 16–46)
MCH RBC QN AUTO: 30.2 PG (ref 26–34)
MCHC RBC AUTO-ENTMCNC: 32.7 G/DL (ref 31–37)
MCV RBC AUTO: 92.5 FL (ref 80–100)
MONOCYTES # BLD: 7 % (ref 4–11)
MUCUS: ABNORMAL
NITRITE, URINE: NEGATIVE
NRBC AUTOMATED: ABNORMAL PER 100 WBC
OTHER OBSERVATIONS UA: ABNORMAL
PDW BLD-RTO: 13.9 % (ref 11–14.5)
PH UA: 6 (ref 5–6)
PLATELET # BLD: 325 K/UL (ref 140–450)
PLATELET ESTIMATE: ABNORMAL
PMV BLD AUTO: 7.9 FL (ref 6–12)
PROTEIN UA: NEGATIVE
RBC # BLD: 4.63 M/UL (ref 4–5.2)
RBC # BLD: ABNORMAL 10*6/UL
RBC UA: ABNORMAL /HPF (ref 0–4)
RENAL EPITHELIAL, UA: ABNORMAL /HPF
SEG NEUTROPHILS: 49 % (ref 43–77)
SEGMENTED NEUTROPHILS ABSOLUTE COUNT: 6.7 K/UL (ref 1.8–7.7)
SPECIFIC GRAVITY UA: 1.02 (ref 1.01–1.02)
TRICHOMONAS: ABNORMAL
TURBIDITY: ABNORMAL
URINE HGB: NEGATIVE
UROBILINOGEN, URINE: NORMAL
WBC # BLD: 13.3 K/UL (ref 3.5–11)
WBC # BLD: ABNORMAL 10*3/UL
WBC UA: ABNORMAL /HPF (ref 0–4)
YEAST: ABNORMAL

## 2018-05-10 PROCEDURE — 3017F COLORECTAL CA SCREEN DOC REV: CPT | Performed by: INTERNAL MEDICINE

## 2018-05-10 PROCEDURE — 93000 ELECTROCARDIOGRAM COMPLETE: CPT | Performed by: INTERNAL MEDICINE

## 2018-05-10 PROCEDURE — 99204 OFFICE O/P NEW MOD 45 MIN: CPT | Performed by: INTERNAL MEDICINE

## 2018-05-10 PROCEDURE — G8417 CALC BMI ABV UP PARAM F/U: HCPCS | Performed by: INTERNAL MEDICINE

## 2018-05-10 PROCEDURE — 87086 URINE CULTURE/COLONY COUNT: CPT

## 2018-05-10 PROCEDURE — G8427 DOCREV CUR MEDS BY ELIG CLIN: HCPCS | Performed by: INTERNAL MEDICINE

## 2018-05-10 PROCEDURE — 36415 COLL VENOUS BLD VENIPUNCTURE: CPT

## 2018-05-10 PROCEDURE — 85025 COMPLETE CBC W/AUTO DIFF WBC: CPT

## 2018-05-10 PROCEDURE — 81001 URINALYSIS AUTO W/SCOPE: CPT

## 2018-05-10 PROCEDURE — 1036F TOBACCO NON-USER: CPT | Performed by: INTERNAL MEDICINE

## 2018-05-10 RX ORDER — CIPROFLOXACIN 500 MG/1
500 TABLET, FILM COATED ORAL 2 TIMES DAILY
Qty: 10 TABLET | Refills: 0 | Status: SHIPPED | OUTPATIENT
Start: 2018-05-10 | End: 2018-05-15

## 2018-05-11 ENCOUNTER — TELEPHONE (OUTPATIENT)
Dept: FAMILY MEDICINE CLINIC | Age: 58
End: 2018-05-11

## 2018-05-11 LAB
CULTURE: NORMAL
CULTURE: NORMAL
Lab: NORMAL
SPECIMEN DESCRIPTION: NORMAL
SPECIMEN DESCRIPTION: NORMAL
STATUS: NORMAL

## 2018-05-14 ENCOUNTER — TELEPHONE (OUTPATIENT)
Dept: FAMILY MEDICINE CLINIC | Age: 58
End: 2018-05-14

## 2018-05-14 DIAGNOSIS — D72.829 LEUKOCYTOSIS, UNSPECIFIED TYPE: Primary | ICD-10-CM

## 2018-05-18 ENCOUNTER — OFFICE VISIT (OUTPATIENT)
Dept: PAIN MANAGEMENT | Age: 58
End: 2018-05-18
Payer: MEDICARE

## 2018-05-18 VITALS
HEIGHT: 63 IN | BODY MASS INDEX: 45 KG/M2 | SYSTOLIC BLOOD PRESSURE: 138 MMHG | DIASTOLIC BLOOD PRESSURE: 98 MMHG | HEART RATE: 84 BPM | WEIGHT: 254 LBS

## 2018-05-18 DIAGNOSIS — M17.12 OSTEOARTHRITIS OF LEFT KNEE, UNSPECIFIED OSTEOARTHRITIS TYPE: ICD-10-CM

## 2018-05-18 DIAGNOSIS — M25.562 CHRONIC PAIN OF LEFT KNEE: Primary | ICD-10-CM

## 2018-05-18 DIAGNOSIS — G89.29 CHRONIC PAIN OF LEFT KNEE: Primary | ICD-10-CM

## 2018-05-18 DIAGNOSIS — M79.7 FIBROMYALGIA: ICD-10-CM

## 2018-05-18 PROCEDURE — 1036F TOBACCO NON-USER: CPT | Performed by: NURSE PRACTITIONER

## 2018-05-18 PROCEDURE — G8417 CALC BMI ABV UP PARAM F/U: HCPCS | Performed by: NURSE PRACTITIONER

## 2018-05-18 PROCEDURE — G8427 DOCREV CUR MEDS BY ELIG CLIN: HCPCS | Performed by: NURSE PRACTITIONER

## 2018-05-18 PROCEDURE — 3017F COLORECTAL CA SCREEN DOC REV: CPT | Performed by: NURSE PRACTITIONER

## 2018-05-18 PROCEDURE — 99213 OFFICE O/P EST LOW 20 MIN: CPT | Performed by: NURSE PRACTITIONER

## 2018-05-18 RX ORDER — OXYCODONE AND ACETAMINOPHEN 7.5; 325 MG/1; MG/1
1 TABLET ORAL
Qty: 150 TABLET | Refills: 0 | Status: SHIPPED | OUTPATIENT
Start: 2018-05-18 | End: 2018-06-20 | Stop reason: SDUPTHER

## 2018-05-18 ASSESSMENT — ENCOUNTER SYMPTOMS
BLOOD IN STOOL: 0
BACK PAIN: 1
CONSTIPATION: 0

## 2018-05-21 ENCOUNTER — TELEPHONE (OUTPATIENT)
Dept: FAMILY MEDICINE CLINIC | Age: 58
End: 2018-05-21

## 2018-05-21 ENCOUNTER — HOSPITAL ENCOUNTER (OUTPATIENT)
Dept: NON INVASIVE DIAGNOSTICS | Age: 58
Discharge: HOME | End: 2018-05-21
Payer: MEDICARE

## 2018-05-21 ENCOUNTER — HOSPITAL ENCOUNTER (OUTPATIENT)
Dept: LAB | Age: 58
Setting detail: SPECIMEN
Discharge: HOME | End: 2018-05-21
Payer: MEDICARE

## 2018-05-21 DIAGNOSIS — Z79.01 LONG TERM CURRENT USE OF ANTICOAGULANT: ICD-10-CM

## 2018-05-21 DIAGNOSIS — R06.02 SOB (SHORTNESS OF BREATH): ICD-10-CM

## 2018-05-21 DIAGNOSIS — Z01.818 PRE-OP TESTING: Primary | ICD-10-CM

## 2018-05-21 DIAGNOSIS — D72.829 LEUKOCYTOSIS, UNSPECIFIED TYPE: ICD-10-CM

## 2018-05-21 DIAGNOSIS — Z01.818 PRE-OP TESTING: ICD-10-CM

## 2018-05-21 DIAGNOSIS — I47.1 SUPRAVENTRICULAR TACHYCARDIA, PAROXYSMAL (HCC): ICD-10-CM

## 2018-05-21 LAB
ABSOLUTE EOS #: 0.1 K/UL (ref 0–0.4)
ABSOLUTE IMMATURE GRANULOCYTE: NORMAL K/UL (ref 0–0.3)
ABSOLUTE LYMPH #: 3.3 K/UL (ref 1–4.8)
ABSOLUTE MONO #: 0.6 K/UL (ref 0.1–1.2)
BASOPHILS # BLD: 1 % (ref 0–1)
BASOPHILS ABSOLUTE: 0.1 K/UL (ref 0–0.2)
DIFFERENTIAL TYPE: NORMAL
EOSINOPHILS RELATIVE PERCENT: 1 % (ref 1–7)
HCT VFR BLD CALC: 41.6 % (ref 36–46)
HEMOGLOBIN: 13.8 G/DL (ref 12–16)
IMMATURE GRANULOCYTES: NORMAL %
LV EF: 55 %
LVEF MODALITY: NORMAL
LYMPHOCYTES # BLD: 37 % (ref 16–46)
MCH RBC QN AUTO: 30.6 PG (ref 26–34)
MCHC RBC AUTO-ENTMCNC: 33.1 G/DL (ref 31–37)
MCV RBC AUTO: 92.4 FL (ref 80–100)
MONOCYTES # BLD: 7 % (ref 4–11)
NRBC AUTOMATED: NORMAL PER 100 WBC
PDW BLD-RTO: 13.6 % (ref 11–14.5)
PLATELET # BLD: 314 K/UL (ref 140–450)
PLATELET ESTIMATE: NORMAL
PMV BLD AUTO: 8 FL (ref 6–12)
RBC # BLD: 4.5 M/UL (ref 4–5.2)
RBC # BLD: NORMAL 10*6/UL
SEG NEUTROPHILS: 54 % (ref 43–77)
SEGMENTED NEUTROPHILS ABSOLUTE COUNT: 4.9 K/UL (ref 1.8–7.7)
WBC # BLD: 9 K/UL (ref 3.5–11)
WBC # BLD: NORMAL 10*3/UL

## 2018-05-21 PROCEDURE — 85025 COMPLETE CBC W/AUTO DIFF WBC: CPT

## 2018-05-21 PROCEDURE — 93306 TTE W/DOPPLER COMPLETE: CPT

## 2018-05-21 PROCEDURE — 36415 COLL VENOUS BLD VENIPUNCTURE: CPT

## 2018-05-24 ENCOUNTER — TELEPHONE (OUTPATIENT)
Dept: ORTHOPEDIC SURGERY | Age: 58
End: 2018-05-24

## 2018-05-24 ENCOUNTER — NURSE ONLY (OUTPATIENT)
Dept: ORTHOPEDIC SURGERY | Age: 58
End: 2018-05-24

## 2018-05-24 ENCOUNTER — HOSPITAL ENCOUNTER (OUTPATIENT)
Age: 58
Setting detail: SPECIMEN
Discharge: HOME | End: 2018-05-24
Payer: MEDICARE

## 2018-05-24 DIAGNOSIS — Z22.322 MRSA (METHICILLIN RESISTANT STAPHYLOCOCCUS AUREUS) CARRIER: ICD-10-CM

## 2018-05-24 LAB
MRSA, DNA, NASAL: NORMAL
SPECIMEN DESCRIPTION: NORMAL

## 2018-05-24 PROCEDURE — 87641 MR-STAPH DNA AMP PROBE: CPT

## 2018-06-04 ENCOUNTER — HOSPITAL ENCOUNTER (OUTPATIENT)
Dept: LAB | Age: 58
Setting detail: SPECIMEN
Discharge: HOME | DRG: 470 | End: 2018-06-04
Payer: MEDICARE

## 2018-06-04 DIAGNOSIS — Z79.01 LONG TERM CURRENT USE OF ANTICOAGULANT: ICD-10-CM

## 2018-06-04 DIAGNOSIS — Z01.818 PRE-OP TESTING: ICD-10-CM

## 2018-06-04 LAB
ABO/RH: NORMAL
ABSOLUTE EOS #: 0.2 K/UL (ref 0–0.4)
ABSOLUTE IMMATURE GRANULOCYTE: NORMAL K/UL (ref 0–0.3)
ABSOLUTE LYMPH #: 4.6 K/UL (ref 1–4.8)
ABSOLUTE MONO #: 0.8 K/UL (ref 0.1–1.2)
ANTIBODY SCREEN: NEGATIVE
ARM BAND NUMBER: NORMAL
BASOPHILS # BLD: 1 % (ref 0–1)
BASOPHILS ABSOLUTE: 0.1 K/UL (ref 0–0.2)
DIFFERENTIAL TYPE: NORMAL
EOSINOPHILS RELATIVE PERCENT: 2 % (ref 1–7)
EXPIRATION DATE: NORMAL
HCT VFR BLD CALC: 42 % (ref 36–46)
HEMOGLOBIN: 13.9 G/DL (ref 12–16)
IMMATURE GRANULOCYTES: NORMAL %
INR BLD: 0.9
LYMPHOCYTES # BLD: 45 % (ref 16–46)
MCH RBC QN AUTO: 31.4 PG (ref 26–34)
MCHC RBC AUTO-ENTMCNC: 33.2 G/DL (ref 31–37)
MCV RBC AUTO: 94.6 FL (ref 80–100)
MONOCYTES # BLD: 7 % (ref 4–11)
NRBC AUTOMATED: NORMAL PER 100 WBC
PARTIAL THROMBOPLASTIN TIME: 25.4 SEC (ref 27–35)
PDW BLD-RTO: 13.7 % (ref 11–14.5)
PLATELET # BLD: 308 K/UL (ref 140–450)
PLATELET ESTIMATE: NORMAL
PMV BLD AUTO: 8.1 FL (ref 6–12)
PROTHROMBIN TIME: 9.9 SEC (ref 9.4–11.3)
RBC # BLD: 4.44 M/UL (ref 4–5.2)
RBC # BLD: NORMAL 10*6/UL
SEG NEUTROPHILS: 45 % (ref 43–77)
SEGMENTED NEUTROPHILS ABSOLUTE COUNT: 4.7 K/UL (ref 1.8–7.7)
WBC # BLD: 10.3 K/UL (ref 3.5–11)
WBC # BLD: NORMAL 10*3/UL

## 2018-06-04 PROCEDURE — 36415 COLL VENOUS BLD VENIPUNCTURE: CPT

## 2018-06-04 PROCEDURE — 85610 PROTHROMBIN TIME: CPT

## 2018-06-04 PROCEDURE — 86850 RBC ANTIBODY SCREEN: CPT

## 2018-06-04 PROCEDURE — 85730 THROMBOPLASTIN TIME PARTIAL: CPT

## 2018-06-04 PROCEDURE — 85025 COMPLETE CBC W/AUTO DIFF WBC: CPT

## 2018-06-04 PROCEDURE — 86900 BLOOD TYPING SEROLOGIC ABO: CPT

## 2018-06-04 PROCEDURE — 86901 BLOOD TYPING SEROLOGIC RH(D): CPT

## 2018-06-05 ENCOUNTER — ANESTHESIA EVENT (OUTPATIENT)
Dept: OPERATING ROOM | Age: 58
DRG: 470 | End: 2018-06-05
Payer: MEDICARE

## 2018-06-05 ENCOUNTER — HOSPITAL ENCOUNTER (INPATIENT)
Age: 58
LOS: 2 days | Discharge: HOME | DRG: 470 | End: 2018-06-07
Attending: ORTHOPAEDIC SURGERY | Admitting: ORTHOPAEDIC SURGERY
Payer: MEDICARE

## 2018-06-05 ENCOUNTER — ANESTHESIA (OUTPATIENT)
Dept: OPERATING ROOM | Age: 58
DRG: 470 | End: 2018-06-05
Payer: MEDICARE

## 2018-06-05 ENCOUNTER — APPOINTMENT (OUTPATIENT)
Dept: GENERAL RADIOLOGY | Age: 58
DRG: 470 | End: 2018-06-05
Attending: ORTHOPAEDIC SURGERY
Payer: MEDICARE

## 2018-06-05 VITALS
RESPIRATION RATE: 33 BRPM | SYSTOLIC BLOOD PRESSURE: 105 MMHG | OXYGEN SATURATION: 96 % | TEMPERATURE: 98.6 F | DIASTOLIC BLOOD PRESSURE: 79 MMHG

## 2018-06-05 DIAGNOSIS — Z96.652 STATUS POST LEFT KNEE REPLACEMENT: Primary | ICD-10-CM

## 2018-06-05 DIAGNOSIS — M17.12 PRIMARY OSTEOARTHRITIS OF LEFT KNEE: ICD-10-CM

## 2018-06-05 PROCEDURE — 6360000002 HC RX W HCPCS: Performed by: INTERNAL MEDICINE

## 2018-06-05 PROCEDURE — 6370000000 HC RX 637 (ALT 250 FOR IP)

## 2018-06-05 PROCEDURE — A6454 SELF-ADHER BAND W>=3" <5"/YD: HCPCS | Performed by: ORTHOPAEDIC SURGERY

## 2018-06-05 PROCEDURE — 6360000002 HC RX W HCPCS: Performed by: NURSE ANESTHETIST, CERTIFIED REGISTERED

## 2018-06-05 PROCEDURE — 7100000001 HC PACU RECOVERY - ADDTL 15 MIN: Performed by: ORTHOPAEDIC SURGERY

## 2018-06-05 PROCEDURE — 73560 X-RAY EXAM OF KNEE 1 OR 2: CPT

## 2018-06-05 PROCEDURE — 2720000010 HC SURG SUPPLY STERILE: Performed by: ORTHOPAEDIC SURGERY

## 2018-06-05 PROCEDURE — A6402 STERILE GAUZE <= 16 SQ IN: HCPCS | Performed by: ORTHOPAEDIC SURGERY

## 2018-06-05 PROCEDURE — 6360000002 HC RX W HCPCS

## 2018-06-05 PROCEDURE — 0SRD0J9 REPLACEMENT OF LEFT KNEE JOINT WITH SYNTHETIC SUBSTITUTE, CEMENTED, OPEN APPROACH: ICD-10-PCS | Performed by: ORTHOPAEDIC SURGERY

## 2018-06-05 PROCEDURE — 6370000000 HC RX 637 (ALT 250 FOR IP): Performed by: INTERNAL MEDICINE

## 2018-06-05 PROCEDURE — 2580000003 HC RX 258: Performed by: ORTHOPAEDIC SURGERY

## 2018-06-05 PROCEDURE — 3700000000 HC ANESTHESIA ATTENDED CARE: Performed by: ORTHOPAEDIC SURGERY

## 2018-06-05 PROCEDURE — 6360000002 HC RX W HCPCS: Performed by: NURSE PRACTITIONER

## 2018-06-05 PROCEDURE — 3700000001 HC ADD 15 MINUTES (ANESTHESIA): Performed by: ORTHOPAEDIC SURGERY

## 2018-06-05 PROCEDURE — 27447 TOTAL KNEE ARTHROPLASTY: CPT | Performed by: ORTHOPAEDIC SURGERY

## 2018-06-05 PROCEDURE — 01402 ANES OPN/ARTH TOT KNE ARTHRP: CPT | Performed by: NURSE ANESTHETIST, CERTIFIED REGISTERED

## 2018-06-05 PROCEDURE — 6360000002 HC RX W HCPCS: Performed by: ORTHOPAEDIC SURGERY

## 2018-06-05 PROCEDURE — 99222 1ST HOSP IP/OBS MODERATE 55: CPT | Performed by: INTERNAL MEDICINE

## 2018-06-05 PROCEDURE — 3600000004 HC SURGERY LEVEL 4 BASE: Performed by: ORTHOPAEDIC SURGERY

## 2018-06-05 PROCEDURE — 27447 TOTAL KNEE ARTHROPLASTY: CPT | Performed by: PHYSICIAN ASSISTANT

## 2018-06-05 PROCEDURE — 3600000014 HC SURGERY LEVEL 4 ADDTL 15MIN: Performed by: ORTHOPAEDIC SURGERY

## 2018-06-05 PROCEDURE — C1776 JOINT DEVICE (IMPLANTABLE): HCPCS | Performed by: ORTHOPAEDIC SURGERY

## 2018-06-05 PROCEDURE — 6370000000 HC RX 637 (ALT 250 FOR IP): Performed by: NURSE PRACTITIONER

## 2018-06-05 PROCEDURE — 2500000003 HC RX 250 WO HCPCS: Performed by: NURSE ANESTHETIST, CERTIFIED REGISTERED

## 2018-06-05 PROCEDURE — 94760 N-INVAS EAR/PLS OXIMETRY 1: CPT

## 2018-06-05 PROCEDURE — 94664 DEMO&/EVAL PT USE INHALER: CPT

## 2018-06-05 PROCEDURE — 94640 AIRWAY INHALATION TREATMENT: CPT

## 2018-06-05 PROCEDURE — 7100000000 HC PACU RECOVERY - FIRST 15 MIN: Performed by: ORTHOPAEDIC SURGERY

## 2018-06-05 PROCEDURE — C1713 ANCHOR/SCREW BN/BN,TIS/BN: HCPCS | Performed by: ORTHOPAEDIC SURGERY

## 2018-06-05 PROCEDURE — 2500000003 HC RX 250 WO HCPCS: Performed by: ORTHOPAEDIC SURGERY

## 2018-06-05 PROCEDURE — 94150 VITAL CAPACITY TEST: CPT

## 2018-06-05 PROCEDURE — 2060000000 HC ICU INTERMEDIATE R&B

## 2018-06-05 PROCEDURE — 2500000003 HC RX 250 WO HCPCS

## 2018-06-05 PROCEDURE — 2580000003 HC RX 258: Performed by: NURSE PRACTITIONER

## 2018-06-05 PROCEDURE — 6370000000 HC RX 637 (ALT 250 FOR IP): Performed by: NURSE ANESTHETIST, CERTIFIED REGISTERED

## 2018-06-05 DEVICE — COMPONENT TOT KNEE CAPPED FIX BEAR STN SIG: Type: IMPLANTABLE DEVICE | Site: KNEE | Status: FUNCTIONAL

## 2018-06-05 DEVICE — CEMENT BNE 40GM FULL DOSE PMMA W/O GENT M VISC N RADPQ LNG: Type: IMPLANTABLE DEVICE | Site: KNEE | Status: FUNCTIONAL

## 2018-06-05 DEVICE — IMPLANTABLE DEVICE: Type: IMPLANTABLE DEVICE | Site: KNEE | Status: FUNCTIONAL

## 2018-06-05 DEVICE — COMPONENT PAT DIA32MM DST POLY OVL DOME 3 PEG NP CEM MOD: Type: IMPLANTABLE DEVICE | Site: KNEE | Status: FUNCTIONAL

## 2018-06-05 RX ORDER — DEXAMETHASONE SODIUM PHOSPHATE 10 MG/ML
INJECTION INTRAMUSCULAR; INTRAVENOUS PRN
Status: DISCONTINUED | OUTPATIENT
Start: 2018-06-05 | End: 2018-06-05 | Stop reason: SDUPTHER

## 2018-06-05 RX ORDER — MORPHINE SULFATE 2 MG/ML
2 INJECTION, SOLUTION INTRAMUSCULAR; INTRAVENOUS
Status: DISCONTINUED | OUTPATIENT
Start: 2018-06-05 | End: 2018-06-07 | Stop reason: HOSPADM

## 2018-06-05 RX ORDER — FLUTICASONE PROPIONATE 50 MCG
2 SPRAY, SUSPENSION (ML) NASAL DAILY
Status: DISCONTINUED | OUTPATIENT
Start: 2018-06-05 | End: 2018-06-07 | Stop reason: HOSPADM

## 2018-06-05 RX ORDER — LIDOCAINE HYDROCHLORIDE 20 MG/ML
INJECTION, SOLUTION INFILTRATION; PERINEURAL PRN
Status: DISCONTINUED | OUTPATIENT
Start: 2018-06-05 | End: 2018-06-05 | Stop reason: SDUPTHER

## 2018-06-05 RX ORDER — SODIUM CHLORIDE FOR INHALATION 0.9 %
3 VIAL, NEBULIZER (ML) INHALATION
Status: DISCONTINUED | OUTPATIENT
Start: 2018-06-05 | End: 2018-06-05 | Stop reason: ALTCHOICE

## 2018-06-05 RX ORDER — DULOXETIN HYDROCHLORIDE 30 MG/1
30 CAPSULE, DELAYED RELEASE ORAL DAILY
Status: DISCONTINUED | OUTPATIENT
Start: 2018-06-05 | End: 2018-06-07 | Stop reason: HOSPADM

## 2018-06-05 RX ORDER — TRANEXAMIC ACID 100 MG/ML
INJECTION, SOLUTION INTRAVENOUS PRN
Status: DISCONTINUED | OUTPATIENT
Start: 2018-06-05 | End: 2018-06-05 | Stop reason: SDUPTHER

## 2018-06-05 RX ORDER — LACTULOSE 10 G/15ML
20 SOLUTION ORAL 3 TIMES DAILY
Status: DISCONTINUED | OUTPATIENT
Start: 2018-06-05 | End: 2018-06-07 | Stop reason: HOSPADM

## 2018-06-05 RX ORDER — MORPHINE SULFATE 10 MG/ML
INJECTION, SOLUTION INTRAMUSCULAR; INTRAVENOUS PRN
Status: DISCONTINUED | OUTPATIENT
Start: 2018-06-05 | End: 2018-06-05 | Stop reason: SDUPTHER

## 2018-06-05 RX ORDER — DIPHENHYDRAMINE HYDROCHLORIDE 50 MG/ML
INJECTION INTRAMUSCULAR; INTRAVENOUS PRN
Status: DISCONTINUED | OUTPATIENT
Start: 2018-06-05 | End: 2018-06-05 | Stop reason: SDUPTHER

## 2018-06-05 RX ORDER — PANTOPRAZOLE SODIUM 40 MG/1
40 TABLET, DELAYED RELEASE ORAL
Status: DISCONTINUED | OUTPATIENT
Start: 2018-06-05 | End: 2018-06-07 | Stop reason: HOSPADM

## 2018-06-05 RX ORDER — PANTOPRAZOLE SODIUM 40 MG/1
TABLET, DELAYED RELEASE ORAL
Status: COMPLETED
Start: 2018-06-05 | End: 2018-06-05

## 2018-06-05 RX ORDER — ARIPIPRAZOLE 5 MG/1
5 TABLET ORAL DAILY
Status: DISCONTINUED | OUTPATIENT
Start: 2018-06-05 | End: 2018-06-07 | Stop reason: HOSPADM

## 2018-06-05 RX ORDER — OXYCODONE HYDROCHLORIDE 5 MG/1
10 TABLET ORAL EVERY 4 HOURS PRN
Status: DISCONTINUED | OUTPATIENT
Start: 2018-06-05 | End: 2018-06-07 | Stop reason: HOSPADM

## 2018-06-05 RX ORDER — ONDANSETRON 2 MG/ML
4 INJECTION INTRAMUSCULAR; INTRAVENOUS EVERY 6 HOURS PRN
Status: DISCONTINUED | OUTPATIENT
Start: 2018-06-05 | End: 2018-06-07 | Stop reason: HOSPADM

## 2018-06-05 RX ORDER — MIDAZOLAM HYDROCHLORIDE 1 MG/ML
INJECTION INTRAMUSCULAR; INTRAVENOUS PRN
Status: DISCONTINUED | OUTPATIENT
Start: 2018-06-05 | End: 2018-06-05 | Stop reason: SDUPTHER

## 2018-06-05 RX ORDER — ALBUTEROL SULFATE 2.5 MG/3ML
2.5 SOLUTION RESPIRATORY (INHALATION)
Status: DISCONTINUED | OUTPATIENT
Start: 2018-06-05 | End: 2018-06-05

## 2018-06-05 RX ORDER — OXYCODONE HYDROCHLORIDE 5 MG/1
5 TABLET ORAL PRN
Status: DISCONTINUED | OUTPATIENT
Start: 2018-06-05 | End: 2018-06-05 | Stop reason: HOSPADM

## 2018-06-05 RX ORDER — ALBUTEROL SULFATE 2.5 MG/3ML
2.5 SOLUTION RESPIRATORY (INHALATION) EVERY 6 HOURS PRN
Status: DISCONTINUED | OUTPATIENT
Start: 2018-06-05 | End: 2018-06-07 | Stop reason: HOSPADM

## 2018-06-05 RX ORDER — FENTANYL CITRATE 50 UG/ML
INJECTION, SOLUTION INTRAMUSCULAR; INTRAVENOUS PRN
Status: DISCONTINUED | OUTPATIENT
Start: 2018-06-05 | End: 2018-06-05 | Stop reason: SDUPTHER

## 2018-06-05 RX ORDER — KETOROLAC TROMETHAMINE 30 MG/ML
15 INJECTION, SOLUTION INTRAMUSCULAR; INTRAVENOUS EVERY 6 HOURS
Status: COMPLETED | OUTPATIENT
Start: 2018-06-05 | End: 2018-06-07

## 2018-06-05 RX ORDER — DIPHENHYDRAMINE HYDROCHLORIDE 50 MG/ML
12.5 INJECTION INTRAMUSCULAR; INTRAVENOUS
Status: DISCONTINUED | OUTPATIENT
Start: 2018-06-05 | End: 2018-06-05 | Stop reason: HOSPADM

## 2018-06-05 RX ORDER — ROCURONIUM BROMIDE 10 MG/ML
INJECTION, SOLUTION INTRAVENOUS PRN
Status: DISCONTINUED | OUTPATIENT
Start: 2018-06-05 | End: 2018-06-05 | Stop reason: SDUPTHER

## 2018-06-05 RX ORDER — DOCUSATE SODIUM 100 MG/1
100 CAPSULE, LIQUID FILLED ORAL DAILY
Status: DISCONTINUED | OUTPATIENT
Start: 2018-06-05 | End: 2018-06-07 | Stop reason: HOSPADM

## 2018-06-05 RX ORDER — MORPHINE SULFATE 4 MG/ML
4 INJECTION, SOLUTION INTRAMUSCULAR; INTRAVENOUS
Status: DISCONTINUED | OUTPATIENT
Start: 2018-06-05 | End: 2018-06-07 | Stop reason: HOSPADM

## 2018-06-05 RX ORDER — OXYCODONE HYDROCHLORIDE 5 MG/1
10 TABLET ORAL PRN
Status: DISCONTINUED | OUTPATIENT
Start: 2018-06-05 | End: 2018-06-05 | Stop reason: HOSPADM

## 2018-06-05 RX ORDER — MORPHINE SULFATE 2 MG/ML
2 INJECTION, SOLUTION INTRAMUSCULAR; INTRAVENOUS EVERY 5 MIN PRN
Status: DISCONTINUED | OUTPATIENT
Start: 2018-06-05 | End: 2018-06-05 | Stop reason: HOSPADM

## 2018-06-05 RX ORDER — PROPOFOL 10 MG/ML
INJECTION, EMULSION INTRAVENOUS PRN
Status: DISCONTINUED | OUTPATIENT
Start: 2018-06-05 | End: 2018-06-05 | Stop reason: SDUPTHER

## 2018-06-05 RX ORDER — OLOPATADINE HYDROCHLORIDE 2 MG/ML
1 SOLUTION/ DROPS OPHTHALMIC DAILY PRN
Status: DISCONTINUED | OUTPATIENT
Start: 2018-06-05 | End: 2018-06-07 | Stop reason: HOSPADM

## 2018-06-05 RX ORDER — ALPRAZOLAM 0.25 MG/1
1 TABLET ORAL EVERY 8 HOURS PRN
Status: DISCONTINUED | OUTPATIENT
Start: 2018-06-05 | End: 2018-06-07 | Stop reason: HOSPADM

## 2018-06-05 RX ORDER — SODIUM CHLORIDE, SODIUM LACTATE, POTASSIUM CHLORIDE, CALCIUM CHLORIDE 600; 310; 30; 20 MG/100ML; MG/100ML; MG/100ML; MG/100ML
INJECTION, SOLUTION INTRAVENOUS CONTINUOUS
Status: DISCONTINUED | OUTPATIENT
Start: 2018-06-05 | End: 2018-06-05

## 2018-06-05 RX ORDER — CYCLOBENZAPRINE HCL 10 MG
10 TABLET ORAL 3 TIMES DAILY PRN
Status: DISCONTINUED | OUTPATIENT
Start: 2018-06-05 | End: 2018-06-07 | Stop reason: HOSPADM

## 2018-06-05 RX ORDER — OXYCODONE HYDROCHLORIDE 5 MG/1
5 TABLET ORAL EVERY 4 HOURS PRN
Status: DISCONTINUED | OUTPATIENT
Start: 2018-06-05 | End: 2018-06-07 | Stop reason: HOSPADM

## 2018-06-05 RX ORDER — ONDANSETRON 2 MG/ML
4 INJECTION INTRAMUSCULAR; INTRAVENOUS PRN
Status: DISCONTINUED | OUTPATIENT
Start: 2018-06-05 | End: 2018-06-05 | Stop reason: HOSPADM

## 2018-06-05 RX ORDER — ACETAMINOPHEN 10 MG/ML
1000 INJECTION, SOLUTION INTRAVENOUS EVERY 6 HOURS
Status: COMPLETED | OUTPATIENT
Start: 2018-06-05 | End: 2018-06-06

## 2018-06-05 RX ORDER — ALBUTEROL SULFATE 2.5 MG/3ML
2.5 SOLUTION RESPIRATORY (INHALATION)
Status: DISCONTINUED | OUTPATIENT
Start: 2018-06-05 | End: 2018-06-05 | Stop reason: SDUPTHER

## 2018-06-05 RX ORDER — ACETAMINOPHEN 325 MG/1
650 TABLET ORAL EVERY 4 HOURS PRN
Status: DISCONTINUED | OUTPATIENT
Start: 2018-06-06 | End: 2018-06-07 | Stop reason: HOSPADM

## 2018-06-05 RX ORDER — OMEPRAZOLE 20 MG/1
20 CAPSULE, DELAYED RELEASE ORAL
Status: DISCONTINUED | OUTPATIENT
Start: 2018-06-05 | End: 2018-06-05 | Stop reason: CLARIF

## 2018-06-05 RX ORDER — SODIUM CHLORIDE 9 MG/ML
INJECTION, SOLUTION INTRAVENOUS CONTINUOUS
Status: DISCONTINUED | OUTPATIENT
Start: 2018-06-05 | End: 2018-06-06

## 2018-06-05 RX ORDER — CETIRIZINE HYDROCHLORIDE 5 MG/1
10 TABLET ORAL DAILY
Status: DISCONTINUED | OUTPATIENT
Start: 2018-06-05 | End: 2018-06-07 | Stop reason: HOSPADM

## 2018-06-05 RX ADMIN — ROCURONIUM BROMIDE 30 MG: 10 INJECTION INTRAVENOUS at 11:34

## 2018-06-05 RX ADMIN — ONDANSETRON 4 MG: 2 INJECTION INTRAMUSCULAR; INTRAVENOUS at 13:41

## 2018-06-05 RX ADMIN — FENTANYL CITRATE 50 MCG: 50 INJECTION, SOLUTION INTRAMUSCULAR; INTRAVENOUS at 11:33

## 2018-06-05 RX ADMIN — ACETAMINOPHEN 1000 MG: 10 INJECTION, SOLUTION INTRAVENOUS at 15:37

## 2018-06-05 RX ADMIN — PHENYLEPHRINE HYDROCHLORIDE 100 MCG: 10 INJECTION INTRAMUSCULAR; INTRAVENOUS; SUBCUTANEOUS at 12:50

## 2018-06-05 RX ADMIN — LIDOCAINE HYDROCHLORIDE 60 MG: 20 INJECTION, SOLUTION INFILTRATION; PERINEURAL at 11:33

## 2018-06-05 RX ADMIN — MORPHINE SULFATE 2 MG: 10 INJECTION, SOLUTION INTRAMUSCULAR; INTRAVENOUS at 12:23

## 2018-06-05 RX ADMIN — DEXAMETHASONE SODIUM PHOSPHATE 10 MG: 10 INJECTION INTRAMUSCULAR; INTRAVENOUS at 11:43

## 2018-06-05 RX ADMIN — MORPHINE SULFATE 4 MG: 10 INJECTION, SOLUTION INTRAMUSCULAR; INTRAVENOUS at 12:11

## 2018-06-05 RX ADMIN — OXYCODONE HYDROCHLORIDE 10 MG: 5 TABLET ORAL at 21:33

## 2018-06-05 RX ADMIN — SODIUM CHLORIDE, POTASSIUM CHLORIDE, SODIUM LACTATE AND CALCIUM CHLORIDE: 600; 310; 30; 20 INJECTION, SOLUTION INTRAVENOUS at 09:24

## 2018-06-05 RX ADMIN — ACETAMINOPHEN 650 MG: 325 SUPPOSITORY RECTAL at 11:42

## 2018-06-05 RX ADMIN — DOCUSATE SODIUM 100 MG: 100 CAPSULE, LIQUID FILLED ORAL at 15:47

## 2018-06-05 RX ADMIN — MORPHINE SULFATE 4 MG: 4 INJECTION INTRAVENOUS at 17:44

## 2018-06-05 RX ADMIN — CEFAZOLIN SODIUM 1 G: 1 INJECTION, POWDER, FOR SOLUTION INTRAMUSCULAR; INTRAVENOUS at 19:58

## 2018-06-05 RX ADMIN — ONDANSETRON 4 MG: 2 INJECTION INTRAMUSCULAR; INTRAVENOUS at 15:48

## 2018-06-05 RX ADMIN — KETOROLAC TROMETHAMINE 15 MG: 30 INJECTION, SOLUTION INTRAMUSCULAR at 15:37

## 2018-06-05 RX ADMIN — ONDANSETRON 4 MG: 2 INJECTION INTRAMUSCULAR; INTRAVENOUS at 15:57

## 2018-06-05 RX ADMIN — ACETAMINOPHEN 1000 MG: 10 INJECTION, SOLUTION INTRAVENOUS at 21:33

## 2018-06-05 RX ADMIN — PANTOPRAZOLE SODIUM 40 MG: 40 TABLET, DELAYED RELEASE ORAL at 17:27

## 2018-06-05 RX ADMIN — PHENYLEPHRINE HYDROCHLORIDE 200 MCG: 10 INJECTION INTRAMUSCULAR; INTRAVENOUS; SUBCUTANEOUS at 12:32

## 2018-06-05 RX ADMIN — MORPHINE SULFATE 4 MG: 10 INJECTION, SOLUTION INTRAMUSCULAR; INTRAVENOUS at 12:05

## 2018-06-05 RX ADMIN — MIDAZOLAM HYDROCHLORIDE 2 MG: 1 INJECTION, SOLUTION INTRAMUSCULAR; INTRAVENOUS at 11:28

## 2018-06-05 RX ADMIN — DIPHENHYDRAMINE HYDROCHLORIDE 25 MG: 50 INJECTION, SOLUTION INTRAMUSCULAR; INTRAVENOUS at 11:43

## 2018-06-05 RX ADMIN — SODIUM CHLORIDE: 9 INJECTION, SOLUTION INTRAVENOUS at 14:29

## 2018-06-05 RX ADMIN — KETOROLAC TROMETHAMINE 15 MG: 30 INJECTION, SOLUTION INTRAMUSCULAR at 20:03

## 2018-06-05 RX ADMIN — PROPOFOL 200 MG: 10 INJECTION, EMULSION INTRAVENOUS at 11:33

## 2018-06-05 RX ADMIN — LACTULOSE 20 G: 20 SOLUTION ORAL at 21:33

## 2018-06-05 RX ADMIN — TRANEXAMIC ACID 100 MG: 100 INJECTION, SOLUTION INTRAVENOUS at 11:40

## 2018-06-05 RX ADMIN — ALBUTEROL SULFATE 2.5 MG: 2.5 SOLUTION RESPIRATORY (INHALATION) at 16:26

## 2018-06-05 RX ADMIN — FLUTICASONE PROPIONATE 2 SPRAY: 50 SPRAY, METERED NASAL at 15:47

## 2018-06-05 RX ADMIN — Medication 2 G: at 11:48

## 2018-06-05 RX ADMIN — RIVAROXABAN 10 MG: 10 TABLET, FILM COATED ORAL at 20:02

## 2018-06-05 RX ADMIN — MORPHINE SULFATE 2 MG: 2 INJECTION, SOLUTION INTRAMUSCULAR; INTRAVENOUS at 13:42

## 2018-06-05 RX ADMIN — METOPROLOL TARTRATE 25 MG: 25 TABLET ORAL at 21:33

## 2018-06-05 RX ADMIN — LACTULOSE 20 G: 20 SOLUTION ORAL at 15:47

## 2018-06-05 RX ADMIN — Medication 2 G: at 11:39

## 2018-06-05 RX ADMIN — ALPRAZOLAM 1 MG: 0.25 TABLET ORAL at 17:20

## 2018-06-05 RX ADMIN — SODIUM CHLORIDE, POTASSIUM CHLORIDE, SODIUM LACTATE AND CALCIUM CHLORIDE: 600; 310; 30; 20 INJECTION, SOLUTION INTRAVENOUS at 12:36

## 2018-06-05 RX ADMIN — SODIUM CHLORIDE, POTASSIUM CHLORIDE, SODIUM LACTATE AND CALCIUM CHLORIDE: 600; 310; 30; 20 INJECTION, SOLUTION INTRAVENOUS at 11:50

## 2018-06-05 RX ADMIN — FENTANYL CITRATE 50 MCG: 50 INJECTION, SOLUTION INTRAMUSCULAR; INTRAVENOUS at 11:28

## 2018-06-05 ASSESSMENT — PULMONARY FUNCTION TESTS
PIF_VALUE: 25
PIF_VALUE: 14
PIF_VALUE: 25
PIF_VALUE: 14
PIF_VALUE: 17
PIF_VALUE: 15
PIF_VALUE: 17
PIF_VALUE: 14
PIF_VALUE: 11
PIF_VALUE: 14
PIF_VALUE: 18
PIF_VALUE: 17
PIF_VALUE: 25
PIF_VALUE: 14
PIF_VALUE: 24
PIF_VALUE: 24
PIF_VALUE: 11
PIF_VALUE: 14
PIF_VALUE: 25
PIF_VALUE: 20
PIF_VALUE: 26
PIF_VALUE: 25
PIF_VALUE: 18
PIF_VALUE: 21
PIF_VALUE: 3
PIF_VALUE: 14
PIF_VALUE: 18
PIF_VALUE: 24
PIF_VALUE: 18
PIF_VALUE: 15
PIF_VALUE: 18
PIF_VALUE: 11
PIF_VALUE: 15
PIF_VALUE: 25
PIF_VALUE: 15
PIF_VALUE: 18
PIF_VALUE: 18
PIF_VALUE: 4
PIF_VALUE: 1
PIF_VALUE: 15
PIF_VALUE: 7
PIF_VALUE: 24
PIF_VALUE: 19
PIF_VALUE: 14
PIF_VALUE: 11
PIF_VALUE: 25
PIF_VALUE: 25
PIF_VALUE: 18
PIF_VALUE: 14
PIF_VALUE: 7
PIF_VALUE: 17
PIF_VALUE: 19
PIF_VALUE: 16
PIF_VALUE: 18
PIF_VALUE: 19
PIF_VALUE: 14
PIF_VALUE: 14
PIF_VALUE: 19
PIF_VALUE: 25
PIF_VALUE: 17
PIF_VALUE: 15
PIF_VALUE: 24
PIF_VALUE: 23
PIF_VALUE: 14
PIF_VALUE: 14
PIF_VALUE: 15
PIF_VALUE: 26
PIF_VALUE: 17
PIF_VALUE: 17
PIF_VALUE: 18
PIF_VALUE: 14
PIF_VALUE: 15
PIF_VALUE: 14
PIF_VALUE: 12
PIF_VALUE: 25
PIF_VALUE: 31
PIF_VALUE: 17
PIF_VALUE: 22
PIF_VALUE: 18
PIF_VALUE: 21
PIF_VALUE: 25
PIF_VALUE: 19
PIF_VALUE: 16
PIF_VALUE: 18
PIF_VALUE: 19
PIF_VALUE: 18
PIF_VALUE: 13
PIF_VALUE: 16
PIF_VALUE: 24
PIF_VALUE: 24
PIF_VALUE: 7
PIF_VALUE: 18
PIF_VALUE: 22
PIF_VALUE: 14
PIF_VALUE: 11
PIF_VALUE: 21
PIF_VALUE: 6
PIF_VALUE: 14

## 2018-06-05 ASSESSMENT — PAIN DESCRIPTION - PROGRESSION
CLINICAL_PROGRESSION: NOT CHANGED
CLINICAL_PROGRESSION: GRADUALLY WORSENING
CLINICAL_PROGRESSION: GRADUALLY IMPROVING
CLINICAL_PROGRESSION: OTHER (COMMENT)

## 2018-06-05 ASSESSMENT — PAIN SCALES - GENERAL
PAINLEVEL_OUTOF10: 8
PAINLEVEL_OUTOF10: 6
PAINLEVEL_OUTOF10: 3
PAINLEVEL_OUTOF10: 4
PAINLEVEL_OUTOF10: 8
PAINLEVEL_OUTOF10: 4
PAINLEVEL_OUTOF10: 6
PAINLEVEL_OUTOF10: 8
PAINLEVEL_OUTOF10: 6
PAINLEVEL_OUTOF10: 6

## 2018-06-05 ASSESSMENT — PAIN DESCRIPTION - ORIENTATION
ORIENTATION: LEFT

## 2018-06-05 ASSESSMENT — PAIN DESCRIPTION - DESCRIPTORS
DESCRIPTORS: ACHING;CONSTANT
DESCRIPTORS: ACHING;CONSTANT
DESCRIPTORS: TENDER
DESCRIPTORS: ACHING;SORE

## 2018-06-05 ASSESSMENT — PAIN DESCRIPTION - LOCATION
LOCATION: KNEE

## 2018-06-05 ASSESSMENT — PAIN DESCRIPTION - PAIN TYPE
TYPE: CHRONIC PAIN
TYPE: SURGICAL PAIN

## 2018-06-05 ASSESSMENT — PAIN DESCRIPTION - ONSET
ONSET: ON-GOING
ONSET: ON-GOING

## 2018-06-05 ASSESSMENT — PAIN DESCRIPTION - FREQUENCY
FREQUENCY: CONTINUOUS
FREQUENCY: CONTINUOUS

## 2018-06-05 ASSESSMENT — ENCOUNTER SYMPTOMS: SHORTNESS OF BREATH: 1

## 2018-06-06 LAB
ABSOLUTE EOS #: 0 K/UL (ref 0–0.4)
ABSOLUTE IMMATURE GRANULOCYTE: ABNORMAL K/UL (ref 0–0.3)
ABSOLUTE LYMPH #: 4.2 K/UL (ref 1–4.8)
ABSOLUTE MONO #: 0.9 K/UL (ref 0.1–1.2)
ANION GAP SERPL CALCULATED.3IONS-SCNC: 11 MMOL/L (ref 9–17)
BASOPHILS # BLD: 0 % (ref 0–1)
BASOPHILS ABSOLUTE: 0 K/UL (ref 0–0.2)
BUN BLDV-MCNC: 14 MG/DL (ref 6–20)
BUN/CREAT BLD: 19 (ref 9–20)
CALCIUM SERPL-MCNC: 8.4 MG/DL (ref 8.6–10.4)
CHLORIDE BLD-SCNC: 107 MMOL/L (ref 98–107)
CO2: 27 MMOL/L (ref 20–31)
CREAT SERPL-MCNC: 0.74 MG/DL (ref 0.5–0.9)
DIFFERENTIAL TYPE: ABNORMAL
EOSINOPHILS RELATIVE PERCENT: 0 % (ref 1–7)
GFR AFRICAN AMERICAN: >60 ML/MIN
GFR NON-AFRICAN AMERICAN: >60 ML/MIN
GFR SERPL CREATININE-BSD FRML MDRD: ABNORMAL ML/MIN/{1.73_M2}
GFR SERPL CREATININE-BSD FRML MDRD: ABNORMAL ML/MIN/{1.73_M2}
GLUCOSE BLD-MCNC: 106 MG/DL (ref 70–99)
HCT VFR BLD CALC: 31 % (ref 36–46)
HEMOGLOBIN: 10.1 G/DL (ref 12–16)
IMMATURE GRANULOCYTES: ABNORMAL %
LYMPHOCYTES # BLD: 33 % (ref 16–46)
MCH RBC QN AUTO: 31.3 PG (ref 26–34)
MCHC RBC AUTO-ENTMCNC: 32.4 G/DL (ref 31–37)
MCV RBC AUTO: 96.5 FL (ref 80–100)
MONOCYTES # BLD: 8 % (ref 4–11)
NRBC AUTOMATED: ABNORMAL PER 100 WBC
PDW BLD-RTO: 13.8 % (ref 11–14.5)
PLATELET # BLD: 244 K/UL (ref 140–450)
PLATELET ESTIMATE: ABNORMAL
PMV BLD AUTO: 7.9 FL (ref 6–12)
POTASSIUM SERPL-SCNC: 4.7 MMOL/L (ref 3.7–5.3)
RBC # BLD: 3.22 M/UL (ref 4–5.2)
RBC # BLD: ABNORMAL 10*6/UL
SEG NEUTROPHILS: 59 % (ref 43–77)
SEGMENTED NEUTROPHILS ABSOLUTE COUNT: 7.5 K/UL (ref 1.8–7.7)
SODIUM BLD-SCNC: 145 MMOL/L (ref 135–144)
WBC # BLD: 12.7 K/UL (ref 3.5–11)
WBC # BLD: ABNORMAL 10*3/UL

## 2018-06-06 PROCEDURE — G8978 MOBILITY CURRENT STATUS: HCPCS | Performed by: PHYSICAL THERAPIST

## 2018-06-06 PROCEDURE — G8988 SELF CARE GOAL STATUS: HCPCS | Performed by: OCCUPATIONAL THERAPIST

## 2018-06-06 PROCEDURE — 6370000000 HC RX 637 (ALT 250 FOR IP): Performed by: INTERNAL MEDICINE

## 2018-06-06 PROCEDURE — 6360000002 HC RX W HCPCS: Performed by: NURSE PRACTITIONER

## 2018-06-06 PROCEDURE — 97161 PT EVAL LOW COMPLEX 20 MIN: CPT | Performed by: PHYSICAL THERAPIST

## 2018-06-06 PROCEDURE — 94760 N-INVAS EAR/PLS OXIMETRY 1: CPT

## 2018-06-06 PROCEDURE — 97110 THERAPEUTIC EXERCISES: CPT | Performed by: PHYSICAL THERAPY ASSISTANT

## 2018-06-06 PROCEDURE — 36415 COLL VENOUS BLD VENIPUNCTURE: CPT

## 2018-06-06 PROCEDURE — 85025 COMPLETE CBC W/AUTO DIFF WBC: CPT

## 2018-06-06 PROCEDURE — 97165 OT EVAL LOW COMPLEX 30 MIN: CPT | Performed by: OCCUPATIONAL THERAPIST

## 2018-06-06 PROCEDURE — 2580000003 HC RX 258: Performed by: NURSE PRACTITIONER

## 2018-06-06 PROCEDURE — 99232 SBSQ HOSP IP/OBS MODERATE 35: CPT | Performed by: INTERNAL MEDICINE

## 2018-06-06 PROCEDURE — 2060000000 HC ICU INTERMEDIATE R&B

## 2018-06-06 PROCEDURE — G8979 MOBILITY GOAL STATUS: HCPCS | Performed by: PHYSICAL THERAPIST

## 2018-06-06 PROCEDURE — 6370000000 HC RX 637 (ALT 250 FOR IP): Performed by: NURSE PRACTITIONER

## 2018-06-06 PROCEDURE — G8987 SELF CARE CURRENT STATUS: HCPCS | Performed by: OCCUPATIONAL THERAPIST

## 2018-06-06 PROCEDURE — 80048 BASIC METABOLIC PNL TOTAL CA: CPT

## 2018-06-06 PROCEDURE — G8989 SELF CARE D/C STATUS: HCPCS | Performed by: OCCUPATIONAL THERAPIST

## 2018-06-06 PROCEDURE — 97116 GAIT TRAINING THERAPY: CPT | Performed by: PHYSICAL THERAPY ASSISTANT

## 2018-06-06 PROCEDURE — 97116 GAIT TRAINING THERAPY: CPT | Performed by: PHYSICAL THERAPIST

## 2018-06-06 RX ADMIN — METOPROLOL TARTRATE 25 MG: 25 TABLET ORAL at 20:35

## 2018-06-06 RX ADMIN — OXYCODONE HYDROCHLORIDE 10 MG: 5 TABLET ORAL at 17:48

## 2018-06-06 RX ADMIN — KETOROLAC TROMETHAMINE 15 MG: 30 INJECTION, SOLUTION INTRAMUSCULAR at 03:03

## 2018-06-06 RX ADMIN — PANTOPRAZOLE SODIUM 40 MG: 40 TABLET, DELAYED RELEASE ORAL at 06:44

## 2018-06-06 RX ADMIN — ACETAMINOPHEN 1000 MG: 10 INJECTION, SOLUTION INTRAVENOUS at 03:55

## 2018-06-06 RX ADMIN — CETIRIZINE HYDROCHLORIDE 10 MG: 5 TABLET, FILM COATED ORAL at 08:42

## 2018-06-06 RX ADMIN — ALPRAZOLAM 1 MG: 0.25 TABLET ORAL at 10:08

## 2018-06-06 RX ADMIN — PANTOPRAZOLE SODIUM 40 MG: 40 TABLET, DELAYED RELEASE ORAL at 17:48

## 2018-06-06 RX ADMIN — OXYCODONE HYDROCHLORIDE 10 MG: 5 TABLET ORAL at 23:05

## 2018-06-06 RX ADMIN — KETOROLAC TROMETHAMINE 15 MG: 30 INJECTION, SOLUTION INTRAMUSCULAR at 20:35

## 2018-06-06 RX ADMIN — KETOROLAC TROMETHAMINE 15 MG: 30 INJECTION, SOLUTION INTRAMUSCULAR at 15:11

## 2018-06-06 RX ADMIN — KETOROLAC TROMETHAMINE 15 MG: 30 INJECTION, SOLUTION INTRAMUSCULAR at 08:42

## 2018-06-06 RX ADMIN — SODIUM CHLORIDE: 9 INJECTION, SOLUTION INTRAVENOUS at 00:22

## 2018-06-06 RX ADMIN — ACETAMINOPHEN 1000 MG: 10 INJECTION, SOLUTION INTRAVENOUS at 09:19

## 2018-06-06 RX ADMIN — OXYCODONE HYDROCHLORIDE 10 MG: 5 TABLET ORAL at 10:08

## 2018-06-06 RX ADMIN — FLUTICASONE PROPIONATE 2 SPRAY: 50 SPRAY, METERED NASAL at 08:42

## 2018-06-06 RX ADMIN — RIVAROXABAN 10 MG: 10 TABLET, FILM COATED ORAL at 17:48

## 2018-06-06 ASSESSMENT — PAIN SCALES - GENERAL
PAINLEVEL_OUTOF10: 6
PAINLEVEL_OUTOF10: 0
PAINLEVEL_OUTOF10: 7
PAINLEVEL_OUTOF10: 0
PAINLEVEL_OUTOF10: 4
PAINLEVEL_OUTOF10: 7
PAINLEVEL_OUTOF10: 8
PAINLEVEL_OUTOF10: 0
PAINLEVEL_OUTOF10: 4
PAINLEVEL_OUTOF10: 6
PAINLEVEL_OUTOF10: 8
PAINLEVEL_OUTOF10: 8
PAINLEVEL_OUTOF10: 7
PAINLEVEL_OUTOF10: 5

## 2018-06-06 ASSESSMENT — PAIN DESCRIPTION - ONSET: ONSET: ON-GOING

## 2018-06-06 ASSESSMENT — PAIN DESCRIPTION - LOCATION
LOCATION: KNEE

## 2018-06-06 ASSESSMENT — PAIN DESCRIPTION - ORIENTATION
ORIENTATION: LEFT

## 2018-06-06 ASSESSMENT — PAIN DESCRIPTION - PROGRESSION: CLINICAL_PROGRESSION: NOT CHANGED

## 2018-06-06 ASSESSMENT — PAIN DESCRIPTION - FREQUENCY: FREQUENCY: CONTINUOUS

## 2018-06-06 ASSESSMENT — PAIN DESCRIPTION - DESCRIPTORS
DESCRIPTORS: ACHING;DISCOMFORT;CONSTANT
DESCRIPTORS: CONSTANT

## 2018-06-06 ASSESSMENT — PAIN DESCRIPTION - PAIN TYPE
TYPE: SURGICAL PAIN

## 2018-06-07 ENCOUNTER — TELEPHONE (OUTPATIENT)
Dept: FAMILY MEDICINE CLINIC | Age: 58
End: 2018-06-07

## 2018-06-07 VITALS
HEART RATE: 96 BPM | SYSTOLIC BLOOD PRESSURE: 108 MMHG | RESPIRATION RATE: 20 BRPM | BODY MASS INDEX: 45 KG/M2 | OXYGEN SATURATION: 91 % | HEIGHT: 63 IN | WEIGHT: 254 LBS | DIASTOLIC BLOOD PRESSURE: 56 MMHG | TEMPERATURE: 99.1 F

## 2018-06-07 LAB
ABSOLUTE EOS #: 0.1 K/UL (ref 0–0.4)
ABSOLUTE IMMATURE GRANULOCYTE: ABNORMAL K/UL (ref 0–0.3)
ABSOLUTE LYMPH #: 2.7 K/UL (ref 1–4.8)
ABSOLUTE MONO #: 0.8 K/UL (ref 0.1–1.2)
ANION GAP SERPL CALCULATED.3IONS-SCNC: 10 MMOL/L (ref 9–17)
BASOPHILS # BLD: 0 % (ref 0–1)
BASOPHILS ABSOLUTE: 0 K/UL (ref 0–0.2)
BUN BLDV-MCNC: 14 MG/DL (ref 6–20)
BUN/CREAT BLD: 21 (ref 9–20)
CALCIUM SERPL-MCNC: 8.3 MG/DL (ref 8.6–10.4)
CHLORIDE BLD-SCNC: 105 MMOL/L (ref 98–107)
CO2: 26 MMOL/L (ref 20–31)
CREAT SERPL-MCNC: 0.66 MG/DL (ref 0.5–0.9)
DIFFERENTIAL TYPE: ABNORMAL
EOSINOPHILS RELATIVE PERCENT: 1 % (ref 1–7)
GFR AFRICAN AMERICAN: >60 ML/MIN
GFR NON-AFRICAN AMERICAN: >60 ML/MIN
GFR SERPL CREATININE-BSD FRML MDRD: ABNORMAL ML/MIN/{1.73_M2}
GFR SERPL CREATININE-BSD FRML MDRD: ABNORMAL ML/MIN/{1.73_M2}
GLUCOSE BLD-MCNC: 110 MG/DL (ref 70–99)
HCT VFR BLD CALC: 30.1 % (ref 36–46)
HEMOGLOBIN: 9.8 G/DL (ref 12–16)
IMMATURE GRANULOCYTES: ABNORMAL %
LYMPHOCYTES # BLD: 25 % (ref 16–46)
MCH RBC QN AUTO: 31.3 PG (ref 26–34)
MCHC RBC AUTO-ENTMCNC: 32.7 G/DL (ref 31–37)
MCV RBC AUTO: 95.7 FL (ref 80–100)
MONOCYTES # BLD: 8 % (ref 4–11)
NRBC AUTOMATED: ABNORMAL PER 100 WBC
PDW BLD-RTO: 13.7 % (ref 11–14.5)
PLATELET # BLD: 221 K/UL (ref 140–450)
PLATELET ESTIMATE: ABNORMAL
PMV BLD AUTO: 8 FL (ref 6–12)
POTASSIUM SERPL-SCNC: 4.4 MMOL/L (ref 3.7–5.3)
RBC # BLD: 3.15 M/UL (ref 4–5.2)
RBC # BLD: ABNORMAL 10*6/UL
SEG NEUTROPHILS: 66 % (ref 43–77)
SEGMENTED NEUTROPHILS ABSOLUTE COUNT: 7 K/UL (ref 1.8–7.7)
SODIUM BLD-SCNC: 141 MMOL/L (ref 135–144)
WBC # BLD: 10.6 K/UL (ref 3.5–11)
WBC # BLD: ABNORMAL 10*3/UL

## 2018-06-07 PROCEDURE — 6370000000 HC RX 637 (ALT 250 FOR IP): Performed by: NURSE PRACTITIONER

## 2018-06-07 PROCEDURE — 97110 THERAPEUTIC EXERCISES: CPT | Performed by: PHYSICAL THERAPY ASSISTANT

## 2018-06-07 PROCEDURE — 6360000002 HC RX W HCPCS: Performed by: NURSE PRACTITIONER

## 2018-06-07 PROCEDURE — 80048 BASIC METABOLIC PNL TOTAL CA: CPT

## 2018-06-07 PROCEDURE — 36415 COLL VENOUS BLD VENIPUNCTURE: CPT

## 2018-06-07 PROCEDURE — 94760 N-INVAS EAR/PLS OXIMETRY 1: CPT

## 2018-06-07 PROCEDURE — 99232 SBSQ HOSP IP/OBS MODERATE 35: CPT | Performed by: INTERNAL MEDICINE

## 2018-06-07 PROCEDURE — 85025 COMPLETE CBC W/AUTO DIFF WBC: CPT

## 2018-06-07 PROCEDURE — 6370000000 HC RX 637 (ALT 250 FOR IP): Performed by: INTERNAL MEDICINE

## 2018-06-07 PROCEDURE — 6370000000 HC RX 637 (ALT 250 FOR IP): Performed by: ORTHOPAEDIC SURGERY

## 2018-06-07 RX ORDER — PSEUDOEPHEDRINE HCL 30 MG
100 TABLET ORAL DAILY
Qty: 30 CAPSULE | Refills: 0 | COMMUNITY
Start: 2018-06-08 | End: 2018-10-15 | Stop reason: ALTCHOICE

## 2018-06-07 RX ORDER — ACETAMINOPHEN 325 MG/1
650 TABLET ORAL EVERY 4 HOURS PRN
Qty: 120 TABLET | Refills: 0 | COMMUNITY
Start: 2018-06-07 | End: 2018-09-19 | Stop reason: CLARIF

## 2018-06-07 RX ADMIN — PANTOPRAZOLE SODIUM 40 MG: 40 TABLET, DELAYED RELEASE ORAL at 06:35

## 2018-06-07 RX ADMIN — METOPROLOL TARTRATE 25 MG: 25 TABLET ORAL at 09:15

## 2018-06-07 RX ADMIN — CETIRIZINE HYDROCHLORIDE 10 MG: 5 TABLET, FILM COATED ORAL at 09:15

## 2018-06-07 RX ADMIN — ACETAMINOPHEN 650 MG: 325 TABLET ORAL at 04:35

## 2018-06-07 RX ADMIN — ARIPIPRAZOLE 5 MG: 5 TABLET ORAL at 09:15

## 2018-06-07 RX ADMIN — KETOROLAC TROMETHAMINE 15 MG: 30 INJECTION, SOLUTION INTRAMUSCULAR at 03:16

## 2018-06-07 RX ADMIN — ALPRAZOLAM 1 MG: 0.25 TABLET ORAL at 09:15

## 2018-06-07 RX ADMIN — KETOROLAC TROMETHAMINE 15 MG: 30 INJECTION, SOLUTION INTRAMUSCULAR at 09:15

## 2018-06-07 RX ADMIN — DOCUSATE SODIUM 100 MG: 100 CAPSULE, LIQUID FILLED ORAL at 09:15

## 2018-06-07 ASSESSMENT — PAIN SCALES - GENERAL
PAINLEVEL_OUTOF10: 6
PAINLEVEL_OUTOF10: 6
PAINLEVEL_OUTOF10: 0
PAINLEVEL_OUTOF10: 5
PAINLEVEL_OUTOF10: 0

## 2018-06-07 ASSESSMENT — PAIN DESCRIPTION - ORIENTATION: ORIENTATION: LEFT

## 2018-06-07 ASSESSMENT — PAIN DESCRIPTION - LOCATION: LOCATION: KNEE

## 2018-06-07 ASSESSMENT — PAIN DESCRIPTION - PAIN TYPE: TYPE: SURGICAL PAIN

## 2018-06-08 ENCOUNTER — CARE COORDINATION (OUTPATIENT)
Dept: CASE MANAGEMENT | Age: 58
End: 2018-06-08

## 2018-06-08 LAB — CREATININE: 0.6 MG/DL

## 2018-06-13 NOTE — CARE COORDINATION
[facility] Transitions Initial Follow Up Call    Call within 2 business days of discharge: Yes    Patient: Shirley Hodges Patient : 1960   MRN: <[id number]>  Reason for Admission: There are no discharge diagnoses documented for the most recent discharge. Discharge Date: 18 RARS: Readmission Risk Score: 14     Spoke with: Second attempt to contact patient for transition call. Unable to reach, left voicemail with contact information for patient to call back.       Care Transitions 24 Hour Call    Care Transitions Interventions         Follow Up  Future Appointments  Date Time Provider Osvaldo Olivarez   6/15/2018 9:40 AM Tushar Bob MD [facility]   2018 3:15 PM TITO Leon - CNP [facility]PA[facility]   2018 8:50 AM Talita Alcala MD [facility]   2018 11:00 AM Tushar Bob MD [facility]   11/15/2018 10:00 AM Elayne Crawford MD [facility]       Aashish Jones RN

## 2018-06-14 DIAGNOSIS — F41.1 GENERALIZED ANXIETY DISORDER: ICD-10-CM

## 2018-06-14 RX ORDER — ALPRAZOLAM 1 MG/1
TABLET ORAL
Qty: 90 TABLET | Refills: 2 | Status: SHIPPED | OUTPATIENT
Start: 2018-06-14 | End: 2018-09-13 | Stop reason: SDUPTHER

## 2018-06-15 ENCOUNTER — OFFICE VISIT (OUTPATIENT)
Dept: FAMILY MEDICINE CLINIC | Age: 58
End: 2018-06-15
Payer: MEDICARE

## 2018-06-15 VITALS
OXYGEN SATURATION: 96 % | TEMPERATURE: 98.5 F | HEART RATE: 82 BPM | BODY MASS INDEX: 45 KG/M2 | SYSTOLIC BLOOD PRESSURE: 124 MMHG | HEIGHT: 63 IN | WEIGHT: 254 LBS | DIASTOLIC BLOOD PRESSURE: 82 MMHG

## 2018-06-15 DIAGNOSIS — D50.9 IRON DEFICIENCY ANEMIA, UNSPECIFIED IRON DEFICIENCY ANEMIA TYPE: ICD-10-CM

## 2018-06-15 DIAGNOSIS — M17.12 PRIMARY OSTEOARTHRITIS OF LEFT KNEE: Primary | ICD-10-CM

## 2018-06-15 PROCEDURE — 1111F DSCHRG MED/CURRENT MED MERGE: CPT | Performed by: FAMILY MEDICINE

## 2018-06-15 PROCEDURE — 99495 TRANSJ CARE MGMT MOD F2F 14D: CPT | Performed by: FAMILY MEDICINE

## 2018-06-15 RX ORDER — ERGOCALCIFEROL 1.25 MG/1
CAPSULE ORAL
Qty: 4 CAPSULE | Refills: 11 | Status: SHIPPED | OUTPATIENT
Start: 2018-06-15 | End: 2018-10-15 | Stop reason: ALTCHOICE

## 2018-06-15 ASSESSMENT — ENCOUNTER SYMPTOMS
WHEEZING: 0
COUGH: 0
SHORTNESS OF BREATH: 0
CHEST TIGHTNESS: 0

## 2018-06-19 ENCOUNTER — TELEPHONE (OUTPATIENT)
Dept: FAMILY MEDICINE CLINIC | Age: 58
End: 2018-06-19
Payer: MEDICARE

## 2018-06-19 DIAGNOSIS — F41.1 GENERALIZED ANXIETY DISORDER: ICD-10-CM

## 2018-06-19 DIAGNOSIS — M15.9 PRIMARY OSTEOARTHRITIS INVOLVING MULTIPLE JOINTS: ICD-10-CM

## 2018-06-19 DIAGNOSIS — Z96.652 AFTERCARE FOLLOWING LEFT KNEE JOINT REPLACEMENT SURGERY: Primary | ICD-10-CM

## 2018-06-19 DIAGNOSIS — M79.7 FIBROMYALGIA: ICD-10-CM

## 2018-06-19 DIAGNOSIS — Z47.1 AFTERCARE FOLLOWING LEFT KNEE JOINT REPLACEMENT SURGERY: Primary | ICD-10-CM

## 2018-06-19 PROCEDURE — G0180 MD CERTIFICATION HHA PATIENT: HCPCS | Performed by: FAMILY MEDICINE

## 2018-06-20 ENCOUNTER — TELEPHONE (OUTPATIENT)
Dept: FAMILY MEDICINE CLINIC | Age: 58
End: 2018-06-20

## 2018-06-20 DIAGNOSIS — M25.562 CHRONIC PAIN OF LEFT KNEE: ICD-10-CM

## 2018-06-20 DIAGNOSIS — G89.29 CHRONIC PAIN OF LEFT KNEE: ICD-10-CM

## 2018-06-20 DIAGNOSIS — M79.7 FIBROMYALGIA: ICD-10-CM

## 2018-06-21 ENCOUNTER — TELEPHONE (OUTPATIENT)
Dept: FAMILY MEDICINE CLINIC | Age: 58
End: 2018-06-21

## 2018-06-21 DIAGNOSIS — I10 ESSENTIAL HYPERTENSION: Primary | ICD-10-CM

## 2018-06-21 RX ORDER — OXYCODONE AND ACETAMINOPHEN 7.5; 325 MG/1; MG/1
1 TABLET ORAL
Qty: 150 TABLET | Refills: 0 | Status: SHIPPED | OUTPATIENT
Start: 2018-06-24 | End: 2018-07-19 | Stop reason: SDUPTHER

## 2018-06-21 RX ORDER — LISINOPRIL 10 MG/1
10 TABLET ORAL DAILY
Qty: 30 TABLET | Refills: 3 | Status: SHIPPED | OUTPATIENT
Start: 2018-06-21 | End: 2018-07-19

## 2018-07-03 ENCOUNTER — OFFICE VISIT (OUTPATIENT)
Dept: FAMILY MEDICINE CLINIC | Age: 58
End: 2018-07-03
Payer: MEDICARE

## 2018-07-03 VITALS
DIASTOLIC BLOOD PRESSURE: 82 MMHG | RESPIRATION RATE: 20 BRPM | BODY MASS INDEX: 45.43 KG/M2 | OXYGEN SATURATION: 97 % | WEIGHT: 256.4 LBS | SYSTOLIC BLOOD PRESSURE: 128 MMHG | TEMPERATURE: 97.6 F | HEART RATE: 84 BPM | HEIGHT: 63 IN

## 2018-07-03 DIAGNOSIS — M17.12 PRIMARY OSTEOARTHRITIS OF LEFT KNEE: Primary | ICD-10-CM

## 2018-07-03 DIAGNOSIS — Z96.652 STATUS POST LEFT KNEE REPLACEMENT: ICD-10-CM

## 2018-07-03 DIAGNOSIS — T84.012D FAILED TOTAL RIGHT KNEE REPLACEMENT, SUBSEQUENT ENCOUNTER: ICD-10-CM

## 2018-07-03 DIAGNOSIS — M25.551 HIP PAIN, BILATERAL: ICD-10-CM

## 2018-07-03 DIAGNOSIS — M25.552 HIP PAIN, BILATERAL: ICD-10-CM

## 2018-07-03 DIAGNOSIS — Q65.89 CONGENITAL HIP DYSPLASIA: ICD-10-CM

## 2018-07-03 DIAGNOSIS — M79.7 FIBROMYALGIA: ICD-10-CM

## 2018-07-03 DIAGNOSIS — M51.36 LUMBAR DEGENERATIVE DISC DISEASE: ICD-10-CM

## 2018-07-03 PROCEDURE — 1036F TOBACCO NON-USER: CPT | Performed by: FAMILY MEDICINE

## 2018-07-03 PROCEDURE — 3017F COLORECTAL CA SCREEN DOC REV: CPT | Performed by: FAMILY MEDICINE

## 2018-07-03 PROCEDURE — 99214 OFFICE O/P EST MOD 30 MIN: CPT | Performed by: FAMILY MEDICINE

## 2018-07-03 PROCEDURE — G8417 CALC BMI ABV UP PARAM F/U: HCPCS | Performed by: FAMILY MEDICINE

## 2018-07-03 PROCEDURE — 1111F DSCHRG MED/CURRENT MED MERGE: CPT | Performed by: FAMILY MEDICINE

## 2018-07-03 PROCEDURE — G8427 DOCREV CUR MEDS BY ELIG CLIN: HCPCS | Performed by: FAMILY MEDICINE

## 2018-07-03 ASSESSMENT — ENCOUNTER SYMPTOMS
BACK PAIN: 1
WHEEZING: 0
SHORTNESS OF BREATH: 0
COUGH: 0
COLOR CHANGE: 0
CHEST TIGHTNESS: 0

## 2018-07-03 ASSESSMENT — PATIENT HEALTH QUESTIONNAIRE - PHQ9
SUM OF ALL RESPONSES TO PHQ QUESTIONS 1-9: 1
2. FEELING DOWN, DEPRESSED OR HOPELESS: 1
SUM OF ALL RESPONSES TO PHQ9 QUESTIONS 1 & 2: 1
1. LITTLE INTEREST OR PLEASURE IN DOING THINGS: 0

## 2018-07-03 NOTE — PROGRESS NOTES
knee) -     Depression     Dysfunctional uterine bleeding     Dyspnea     (progressive - multifactorial.    Failed total right knee replacement (HCC)     Fatigue     Fibromyalgia     Generalized anxiety disorder     Grief at loss of child     4-yr old granddaughter  of brain tumor in .  Hip pain, bilateral     Secondary to piriformis syndrome and bilateral greater trochanteric bursitis. (Injection of Celestone and Marcaine).  History of tobacco use     Currently not smoking.  Hypertension     Kidney stone     Lumbar disc disease     Chronic.  Obesity     Osteoarthritis     Degenerative joint disease - of all joints.  Seasonal allergic rhinitis     Shoulder pain, left     (Injection of Celestone/Marcaine subacromially) - Dr. Noa Handley - 10/2008.  Shoulder pain, right     (Injection of Celestone/Marcaine subacromially). Dr. Noa Handley - 10/2008.  Supraventricular tachycardia, paroxysmal (HCC)     Status post cardiac ablation.  Vitamin D deficiency           Social History   Substance Use Topics    Smoking status: Former Smoker     Packs/day: 0.25     Years: 35.00     Types: Cigarettes    Smokeless tobacco: Former User     Quit date: 2015      Comment: 6 cigarettes per day since age 15 or so.  Alcohol use Yes      Comment: rarely      Current Outpatient Prescriptions   Medication Sig Dispense Refill    Handicap Placard MISC by Does not apply route 2 each 0    oxyCODONE-acetaminophen (PERCOCET) 7.5-325 MG per tablet Take 1 tablet by mouth 5 times daily for 30 days. . 150 tablet 0    lisinopril (PRINIVIL;ZESTRIL) 10 MG tablet Take 1 tablet by mouth daily 30 tablet 3    vitamin D (ERGOCALCIFEROL) 99319 units CAPS capsule take 1 capsule by mouth every week 4 capsule 11    ALPRAZolam (XANAX) 1 MG tablet take 1 tablet by mouth three times a day if needed for anxiety 90 tablet 2    acetaminophen (TYLENOL) 325 MG tablet Take 2 tablets by mouth every 4 hours as needed for Pain or Fever 120 tablet 0    docusate sodium (COLACE, DULCOLAX) 100 MG CAPS Take 100 mg by mouth daily 30 capsule 0    cyclobenzaprine (FLEXERIL) 10 MG tablet Take 1 tablet by mouth every 8 hours as needed for Muscle spasms 90 tablet 11    metoprolol tartrate (LOPRESSOR) 25 MG tablet take 1 tablet by mouth twice a day 60 tablet 5    omeprazole (PRILOSEC) 20 MG delayed release capsule take 1 capsule by mouth twice a day 60 capsule 5    fluticasone (FLONASE) 50 MCG/ACT nasal spray instill 2 spray into each nostril once daily 16 g 11    PATADAY 0.2 % SOLN ophthalmic solution instill 1 drop into both eyes once daily if needed 2.5 mL 11    albuterol sulfate HFA (PROAIR HFA) 108 (90 Base) MCG/ACT inhaler inhale 2 puffs by mouth every 4 hours 8.5 Inhaler 6    cetirizine (ZYRTEC) 10 MG tablet take 1 tablet by mouth once daily 30 tablet 11    MAXALT-MLT 10 MG disintegrating tablet take as directed if needed for migraines 9 tablet 11    clonazePAM (KLONOPIN) 1 MG tablet take 1 tablet by mouth at bedtime. 30 tablet 5     No current facility-administered medications for this visit. Allergies   Allergen Reactions    Latex Rash    Fentanyl Other (See Comments)     sleepwalking       Subjective:      Review of Systems   Constitutional: Negative for activity change, appetite change, chills, fatigue and fever. Eyes: Negative for visual disturbance. Respiratory: Negative for cough, chest tightness, shortness of breath and wheezing. Cardiovascular: Negative for chest pain, palpitations and leg swelling. Genitourinary: Negative for difficulty urinating. Musculoskeletal: Positive for arthralgias (hip pain, bilaterally, knee pain on the left), back pain, gait problem (limping due to knee pain) and myalgias. Negative for joint swelling, neck pain and neck stiffness. Skin: Positive for wound (scar on her knee is healing well). Negative for color change.    Neurological: Negative for dizziness, syncope, weakness,

## 2018-07-10 DIAGNOSIS — Z96.652 S/P TOTAL KNEE ARTHROPLASTY, LEFT: ICD-10-CM

## 2018-07-10 DIAGNOSIS — M17.12 PRIMARY OSTEOARTHRITIS OF LEFT KNEE: Primary | ICD-10-CM

## 2018-07-19 ENCOUNTER — OFFICE VISIT (OUTPATIENT)
Dept: PAIN MANAGEMENT | Age: 58
End: 2018-07-19
Payer: MEDICARE

## 2018-07-19 ENCOUNTER — HOSPITAL ENCOUNTER (OUTPATIENT)
Age: 58
Setting detail: SPECIMEN
Discharge: HOME | End: 2018-07-19
Payer: MEDICARE

## 2018-07-19 VITALS
BODY MASS INDEX: 45.17 KG/M2 | HEART RATE: 92 BPM | WEIGHT: 255 LBS | SYSTOLIC BLOOD PRESSURE: 132 MMHG | DIASTOLIC BLOOD PRESSURE: 84 MMHG

## 2018-07-19 DIAGNOSIS — M79.7 FIBROMYALGIA: Primary | ICD-10-CM

## 2018-07-19 DIAGNOSIS — G89.29 CHRONIC PAIN OF LEFT KNEE: ICD-10-CM

## 2018-07-19 DIAGNOSIS — M25.562 CHRONIC PAIN OF LEFT KNEE: ICD-10-CM

## 2018-07-19 DIAGNOSIS — Z02.83 ENCOUNTER FOR DRUG SCREENING: ICD-10-CM

## 2018-07-19 DIAGNOSIS — Z79.891 ENCOUNTER FOR LONG-TERM OPIATE ANALGESIC USE: ICD-10-CM

## 2018-07-19 PROCEDURE — 80307 DRUG TEST PRSMV CHEM ANLYZR: CPT

## 2018-07-19 PROCEDURE — 3017F COLORECTAL CA SCREEN DOC REV: CPT | Performed by: NURSE PRACTITIONER

## 2018-07-19 PROCEDURE — 1036F TOBACCO NON-USER: CPT | Performed by: NURSE PRACTITIONER

## 2018-07-19 PROCEDURE — G8417 CALC BMI ABV UP PARAM F/U: HCPCS | Performed by: NURSE PRACTITIONER

## 2018-07-19 PROCEDURE — G8427 DOCREV CUR MEDS BY ELIG CLIN: HCPCS | Performed by: NURSE PRACTITIONER

## 2018-07-19 PROCEDURE — 99214 OFFICE O/P EST MOD 30 MIN: CPT | Performed by: NURSE PRACTITIONER

## 2018-07-19 RX ORDER — OXYCODONE AND ACETAMINOPHEN 7.5; 325 MG/1; MG/1
1 TABLET ORAL
Qty: 150 TABLET | Refills: 0 | Status: SHIPPED | OUTPATIENT
Start: 2018-07-24 | End: 2018-08-22 | Stop reason: SDUPTHER

## 2018-07-19 ASSESSMENT — ENCOUNTER SYMPTOMS
BLOOD IN STOOL: 0
BACK PAIN: 1
CONSTIPATION: 0

## 2018-07-19 NOTE — PROGRESS NOTES
Subjective:      Patient ID: Latrice Heller is a 66 y.o. female. Chief Complaint   Patient presents with    Pain     2 month follow up     HPI   Here today for routine pain clinic recheck. Percocet effective for back pain, but not knee pain, no side effects. Underwent left knee TKA last month. Underwent home physical therapy. Schedule tomorrow to start regular therapy at [facility].     Pain Assessment  Location of Pain: Neck  Location Modifiers: Inferior  Severity of Pain: 5  Quality of Pain:  (burning pain)  Duration of Pain: Persistent  Frequency of Pain: Constant  Aggravating Factors: Walking, Standing, Squatting  Limiting Behavior: Yes  Relieving Factors: Heat  Are there other pain locations you wish to document?: Yes (shoulders)    Allergies   Allergen Reactions    Latex Rash    Fentanyl Other (See Comments)     sleepwalking       Outpatient Prescriptions Marked as Taking for the 7/19/18 encounter (Office Visit) with TITO Feng CNP   Medication Sig Dispense Refill    [START ON 7/24/2018] oxyCODONE-acetaminophen (PERCOCET) 7.5-325 MG per tablet Take 1 tablet by mouth 5 times daily for 30 days. . 150 tablet 0    Handicap Placard MISC by Does not apply route 2 each 0    vitamin D (ERGOCALCIFEROL) 33875 units CAPS capsule take 1 capsule by mouth every week 4 capsule 11    ALPRAZolam (XANAX) 1 MG tablet take 1 tablet by mouth three times a day if needed for anxiety 90 tablet 2    acetaminophen (TYLENOL) 325 MG tablet Take 2 tablets by mouth every 4 hours as needed for Pain or Fever 120 tablet 0    docusate sodium (COLACE, DULCOLAX) 100 MG CAPS Take 100 mg by mouth daily 30 capsule 0    cyclobenzaprine (FLEXERIL) 10 MG tablet Take 1 tablet by mouth every 8 hours as needed for Muscle spasms 90 tablet 11    metoprolol tartrate (LOPRESSOR) 25 MG tablet take 1 tablet by mouth twice a day 60 tablet 5    omeprazole (PRILOSEC) 20 MG delayed release capsule take 1 capsule by mouth

## 2018-07-25 LAB
6-ACETYLMORPHINE, UR: NOT DETECTED
7-AMINOCLONAZEPAM, URINE: NOT DETECTED
ALPHA-OH-ALPRAZ, URINE: PRESENT
ALPRAZOLAM, URINE: PRESENT
AMPHETAMINES, URINE: NOT DETECTED
BARBITURATES, URINE: NOT DETECTED
BENZOYLECGONINE, UR: NOT DETECTED
BUPRENORPHINE URINE: NOT DETECTED
CARISOPRODOL, UR: NOT DETECTED
CLONAZEPAM, URINE: NOT DETECTED
CODEINE, URINE: NOT DETECTED
CREATININE URINE: >400 MG/DL (ref 20–400)
DIAZEPAM, URINE: NOT DETECTED
EER PAIN MGT DRUG PANEL, HIGH RES/EMIT U: ABNORMAL
ETHYL GLUCURONIDE UR: ABNORMAL
FENTANYL URINE: NOT DETECTED
HYDROCODONE, URINE: NOT DETECTED
HYDROMORPHONE, URINE: NOT DETECTED
LORAZEPAM, URINE: NOT DETECTED
MARIJUANA METAB, UR: NOT DETECTED
MDA, UR: NOT DETECTED
MDEA, EVE, UR: NOT DETECTED
MDMA URINE: NOT DETECTED
MEPERIDINE METAB, UR: NOT DETECTED
METHADONE, URINE: NOT DETECTED
METHAMPHETAMINE, URINE: NOT DETECTED
METHYLPHENIDATE: NOT DETECTED
MIDAZOLAM, URINE: NOT DETECTED
MORPHINE URINE: NOT DETECTED
NORBUPRENORPHINE, URINE: NOT DETECTED
NORDIAZEPAM, URINE: NOT DETECTED
NORFENTANYL, URINE: NOT DETECTED
NORHYDROCODONE, URINE: NOT DETECTED
NOROXYCODONE, URINE: PRESENT
NOROXYMORPHONE, URINE: PRESENT
OXAZEPAM, URINE: NOT DETECTED
OXYCODONE URINE: PRESENT
OXYMORPHONE, URINE: PRESENT
PAIN MGT DRUG PANEL, HI RES, UR: ABNORMAL
PCP,URINE: NOT DETECTED
PHENTERMINE, UR: NOT DETECTED
PROPOXYPHENE, URINE: NOT DETECTED
TAPENTADOL, URINE: NOT DETECTED
TAPENTADOL-O-SULFATE, URINE: NOT DETECTED
TEMAZEPAM, URINE: NOT DETECTED
TRAMADOL, URINE: NOT DETECTED
ZOLPIDEM, URINE: NOT DETECTED

## 2018-07-31 ENCOUNTER — OFFICE VISIT (OUTPATIENT)
Dept: ORTHOPEDIC SURGERY | Age: 58
End: 2018-07-31

## 2018-07-31 VITALS
WEIGHT: 255 LBS | HEART RATE: 68 BPM | DIASTOLIC BLOOD PRESSURE: 76 MMHG | BODY MASS INDEX: 45.18 KG/M2 | HEIGHT: 63 IN | SYSTOLIC BLOOD PRESSURE: 126 MMHG

## 2018-07-31 DIAGNOSIS — M17.12 PRIMARY OSTEOARTHRITIS OF LEFT KNEE: Primary | ICD-10-CM

## 2018-07-31 PROCEDURE — 99024 POSTOP FOLLOW-UP VISIT: CPT | Performed by: PHYSICIAN ASSISTANT

## 2018-08-11 DIAGNOSIS — I10 ESSENTIAL HYPERTENSION: ICD-10-CM

## 2018-08-13 NOTE — TELEPHONE ENCOUNTER
Last Appt:  7/3/2018  Next Appt:   9/18/2018  Med verified in [facility] 8/13/18

## 2018-08-22 DIAGNOSIS — M79.7 FIBROMYALGIA: ICD-10-CM

## 2018-08-22 DIAGNOSIS — M25.562 CHRONIC PAIN OF LEFT KNEE: ICD-10-CM

## 2018-08-22 DIAGNOSIS — G89.29 CHRONIC PAIN OF LEFT KNEE: ICD-10-CM

## 2018-08-22 RX ORDER — OXYCODONE AND ACETAMINOPHEN 7.5; 325 MG/1; MG/1
1 TABLET ORAL
Qty: 150 TABLET | Refills: 0 | Status: SHIPPED | OUTPATIENT
Start: 2018-08-23 | End: 2018-09-19 | Stop reason: SDUPTHER

## 2018-08-22 NOTE — TELEPHONE ENCOUNTER
From: Selene Sandoval  Sent: 8/22/2018 8:28 AM EDT  Subject: Medication Renewal Request    Judd Hodgson.  Gladis Gaming would like a refill of the following medications:     oxyCODONE-acetaminophen (PERCOCET) 7.5-325 MG per tablet Alexnadria Hernandez, APRN - CNP]    Preferred pharmacy: [place] [phone] - F [phone]

## 2018-08-29 RX ORDER — RIVAROXABAN 10 MG/1
1 TABLET, FILM COATED ORAL DAILY
COMMUNITY
Start: 2018-06-07 | End: 2018-09-18 | Stop reason: ALTCHOICE

## 2018-08-30 RX ORDER — RIZATRIPTAN BENZOATE 10 MG/1
10 TABLET, ORALLY DISINTEGRATING ORAL
Qty: 9 TABLET | Refills: 11 | Status: SHIPPED | OUTPATIENT
Start: 2018-08-30 | End: 2022-08-23

## 2018-09-06 DIAGNOSIS — M17.12 PRIMARY OSTEOARTHRITIS OF LEFT KNEE: Primary | ICD-10-CM

## 2018-09-13 DIAGNOSIS — F41.1 GENERALIZED ANXIETY DISORDER: ICD-10-CM

## 2018-09-13 RX ORDER — ALPRAZOLAM 1 MG/1
TABLET ORAL
Qty: 90 TABLET | Refills: 2 | Status: SHIPPED | OUTPATIENT
Start: 2018-09-13 | End: 2018-12-18 | Stop reason: SDUPTHER

## 2018-09-18 ENCOUNTER — HOSPITAL ENCOUNTER (OUTPATIENT)
Dept: GENERAL RADIOLOGY | Age: 58
Discharge: HOME | End: 2018-09-20
Payer: MEDICARE

## 2018-09-18 ENCOUNTER — OFFICE VISIT (OUTPATIENT)
Dept: ORTHOPEDIC SURGERY | Age: 58
End: 2018-09-18
Payer: MEDICARE

## 2018-09-18 VITALS
HEART RATE: 65 BPM | SYSTOLIC BLOOD PRESSURE: 122 MMHG | WEIGHT: 255 LBS | BODY MASS INDEX: 45.18 KG/M2 | HEIGHT: 63 IN | DIASTOLIC BLOOD PRESSURE: 84 MMHG

## 2018-09-18 DIAGNOSIS — M17.12 PRIMARY OSTEOARTHRITIS OF LEFT KNEE: Primary | ICD-10-CM

## 2018-09-18 DIAGNOSIS — M17.12 PRIMARY OSTEOARTHRITIS OF LEFT KNEE: ICD-10-CM

## 2018-09-18 PROCEDURE — 99212 OFFICE O/P EST SF 10 MIN: CPT | Performed by: PHYSICIAN ASSISTANT

## 2018-09-18 PROCEDURE — 1036F TOBACCO NON-USER: CPT | Performed by: PHYSICIAN ASSISTANT

## 2018-09-18 PROCEDURE — G8427 DOCREV CUR MEDS BY ELIG CLIN: HCPCS | Performed by: PHYSICIAN ASSISTANT

## 2018-09-18 PROCEDURE — 73562 X-RAY EXAM OF KNEE 3: CPT

## 2018-09-18 PROCEDURE — 3017F COLORECTAL CA SCREEN DOC REV: CPT | Performed by: PHYSICIAN ASSISTANT

## 2018-09-18 PROCEDURE — G8417 CALC BMI ABV UP PARAM F/U: HCPCS | Performed by: PHYSICIAN ASSISTANT

## 2018-09-19 ENCOUNTER — OFFICE VISIT (OUTPATIENT)
Dept: PAIN MANAGEMENT | Age: 58
End: 2018-09-19
Payer: MEDICARE

## 2018-09-19 VITALS
DIASTOLIC BLOOD PRESSURE: 88 MMHG | BODY MASS INDEX: 45.75 KG/M2 | HEIGHT: 63 IN | WEIGHT: 258.2 LBS | RESPIRATION RATE: 16 BRPM | SYSTOLIC BLOOD PRESSURE: 130 MMHG

## 2018-09-19 DIAGNOSIS — M51.9 LUMBAR DISC DISEASE: Primary | ICD-10-CM

## 2018-09-19 DIAGNOSIS — M51.36 LUMBAR DEGENERATIVE DISC DISEASE: ICD-10-CM

## 2018-09-19 DIAGNOSIS — G89.29 CHRONIC PAIN OF LEFT KNEE: ICD-10-CM

## 2018-09-19 DIAGNOSIS — M25.562 CHRONIC PAIN OF LEFT KNEE: ICD-10-CM

## 2018-09-19 DIAGNOSIS — G89.29 ENCOUNTER FOR CHRONIC PAIN MANAGEMENT: ICD-10-CM

## 2018-09-19 DIAGNOSIS — M79.7 FIBROMYALGIA: ICD-10-CM

## 2018-09-19 PROCEDURE — 99213 OFFICE O/P EST LOW 20 MIN: CPT | Performed by: NURSE PRACTITIONER

## 2018-09-19 PROCEDURE — G8417 CALC BMI ABV UP PARAM F/U: HCPCS | Performed by: NURSE PRACTITIONER

## 2018-09-19 PROCEDURE — 3017F COLORECTAL CA SCREEN DOC REV: CPT | Performed by: NURSE PRACTITIONER

## 2018-09-19 PROCEDURE — G8427 DOCREV CUR MEDS BY ELIG CLIN: HCPCS | Performed by: NURSE PRACTITIONER

## 2018-09-19 PROCEDURE — 1036F TOBACCO NON-USER: CPT | Performed by: NURSE PRACTITIONER

## 2018-09-19 RX ORDER — OXYCODONE AND ACETAMINOPHEN 7.5; 325 MG/1; MG/1
1 TABLET ORAL
Qty: 150 TABLET | Refills: 0 | Status: SHIPPED | OUTPATIENT
Start: 2018-09-19 | End: 2018-10-18 | Stop reason: SDUPTHER

## 2018-09-19 ASSESSMENT — ENCOUNTER SYMPTOMS
BLOOD IN STOOL: 0
CONSTIPATION: 0
BACK PAIN: 1

## 2018-09-19 NOTE — PROGRESS NOTES
Subjective:      Patient ID: Abhay Rene is a 66 y.o. female. Chief Complaint   Patient presents with    Follow-up     2 month follow up    Chronic Pain    Lower Back Pain     HPI   Here today for routine pain clinic recheck. Percocet effective for back pain, but not knee pain, no side effects. Underwent left knee TKA last month. Underwent home physical therapy. Schedule tomorrow to start regular therapy at [facility].     Pain Assessment  Location of Pain: Back  Location Modifiers:  (lumbar)  Severity of Pain: 5  Quality of Pain: Aching  Duration of Pain: Persistent  Frequency of Pain: Constant  Aggravating Factors: Bending, Stretching, Straightening, Exercise, Kneeling, Squatting, Standing, Walking, Stairs (sitting to long, spasms)  Limiting Behavior: Yes  Relieving Factors: Rest  Result of Injury: No  Work-Related Injury: No  Are there other pain locations you wish to document?: No    Allergies   Allergen Reactions    Latex Rash    Fentanyl Other (See Comments)     sleepwalking       Outpatient Prescriptions Marked as Taking for the 9/19/18 encounter (Office Visit) with TITO Selby CNP   Medication Sig Dispense Refill    ALPRAZolam (XANAX) 1 MG tablet take 1 tablet by mouth three times a day if needed for anxiety 90 tablet 2    oxyCODONE-acetaminophen (PERCOCET) 7.5-325 MG per tablet Take 1 tablet by mouth 5 times daily for 30 days. . 150 tablet 0    metoprolol tartrate (LOPRESSOR) 25 MG tablet take 1 tablet by mouth twice a day 60 tablet 5    Handicap Placard MISC by Does not apply route 2 each 0    vitamin D (ERGOCALCIFEROL) 38125 units CAPS capsule take 1 capsule by mouth every week 4 capsule 11    docusate sodium (COLACE, DULCOLAX) 100 MG CAPS Take 100 mg by mouth daily 30 capsule 0    cyclobenzaprine (FLEXERIL) 10 MG tablet Take 1 tablet by mouth every 8 hours as needed for Muscle spasms 90 tablet 11    omeprazole (PRILOSEC) 20 MG delayed release capsule take 1 Right     FAILED KNEE RE;PLAC    KNEE ARTHROPLASTY Right 2006    OTHER SURGICAL HISTORY  2009    Epidural steroids to cervical spine. [address] HISTORY  2014    ECT therapy, one session only and she refused any further    [place] TOTAL KNEE ARTHROPLASTY Left 2018    Left Total Knee Arthroplasty performed by Nicolle Chandler MD at [facility]       Family History   Problem Relation Age of Onset    Breast Cancer Mother     High Blood Pressure Mother     Coronary Art Dis Mother         Multiple stents.  Diabetes Mother     Heart Disease Father     Coronary Art Dis Father         Myocardial infarction at age 54 and .  COPD Father     Arthritis Brother     COPD Brother     Heart Disease Brother         Congenital heart defect -  at 10 wks of age. Social History     Social History    Marital status:      Spouse name: N/A    Number of children: N/A    Years of education: N/A     Occupational History    disabled [employer]      Social History Main Topics    Smoking status: Former Smoker     Packs/day: 0.25     Years: 35.00     Types: Cigarettes    Smokeless tobacco: Former User     Quit date: 2015      Comment: 6 cigarettes per day since age 15 or so.  Alcohol use Yes      Comment: rarely    Drug use: No    Sexual activity: Not Currently     Partners: Male     Other Topics Concern    None     Social History Narrative    None     Review of Systems   Gastrointestinal: Negative for blood in stool and constipation. Genitourinary: Positive for flank pain. Musculoskeletal: Positive for arthralgias, back pain, myalgias and neck pain. Neurological: Positive for numbness (tingling in hands). Psychiatric/Behavioral: Negative for sleep disturbance. Objective:   Physical Exam   Constitutional: She is oriented to person, place, and time. She appears well-developed and well-nourished. She is cooperative. HENT:   Head: Normocephalic and atraumatic.

## 2018-10-15 ENCOUNTER — OFFICE VISIT (OUTPATIENT)
Dept: FAMILY MEDICINE CLINIC | Age: 58
End: 2018-10-15
Payer: MEDICARE

## 2018-10-15 VITALS
WEIGHT: 255 LBS | SYSTOLIC BLOOD PRESSURE: 132 MMHG | BODY MASS INDEX: 45.18 KG/M2 | HEART RATE: 80 BPM | HEIGHT: 63 IN | DIASTOLIC BLOOD PRESSURE: 88 MMHG | TEMPERATURE: 97.3 F | OXYGEN SATURATION: 97 % | RESPIRATION RATE: 16 BRPM

## 2018-10-15 DIAGNOSIS — Z12.4 CERVICAL CANCER SCREENING: ICD-10-CM

## 2018-10-15 DIAGNOSIS — Z23 NEED FOR INFLUENZA VACCINATION: ICD-10-CM

## 2018-10-15 DIAGNOSIS — Z23 NEED FOR SHINGLES VACCINE: ICD-10-CM

## 2018-10-15 DIAGNOSIS — E66.01 MORBID OBESITY WITH BMI OF 45.0-49.9, ADULT (HCC): ICD-10-CM

## 2018-10-15 DIAGNOSIS — Z00.00 ROUTINE HEALTH MAINTENANCE: Primary | ICD-10-CM

## 2018-10-15 DIAGNOSIS — Z76.89 ENCOUNTER TO ESTABLISH CARE: ICD-10-CM

## 2018-10-15 DIAGNOSIS — I10 ESSENTIAL HYPERTENSION: ICD-10-CM

## 2018-10-15 PROCEDURE — 3017F COLORECTAL CA SCREEN DOC REV: CPT | Performed by: NURSE PRACTITIONER

## 2018-10-15 PROCEDURE — G8427 DOCREV CUR MEDS BY ELIG CLIN: HCPCS | Performed by: NURSE PRACTITIONER

## 2018-10-15 PROCEDURE — G8417 CALC BMI ABV UP PARAM F/U: HCPCS | Performed by: NURSE PRACTITIONER

## 2018-10-15 PROCEDURE — 90686 IIV4 VACC NO PRSV 0.5 ML IM: CPT | Performed by: NURSE PRACTITIONER

## 2018-10-15 PROCEDURE — G0008 ADMIN INFLUENZA VIRUS VAC: HCPCS | Performed by: NURSE PRACTITIONER

## 2018-10-15 PROCEDURE — G8482 FLU IMMUNIZE ORDER/ADMIN: HCPCS | Performed by: NURSE PRACTITIONER

## 2018-10-15 PROCEDURE — 1036F TOBACCO NON-USER: CPT | Performed by: NURSE PRACTITIONER

## 2018-10-15 PROCEDURE — 99215 OFFICE O/P EST HI 40 MIN: CPT | Performed by: NURSE PRACTITIONER

## 2018-10-15 RX ORDER — BLOOD PRESSURE TEST KIT
1 KIT MISCELLANEOUS 2 TIMES DAILY PRN
Qty: 1 KIT | Refills: 0 | Status: SHIPPED | OUTPATIENT
Start: 2018-10-15

## 2018-10-15 ASSESSMENT — ENCOUNTER SYMPTOMS
EYES NEGATIVE: 1
CONSTIPATION: 0
TROUBLE SWALLOWING: 0
COUGH: 0
CHEST TIGHTNESS: 0
ALLERGIC/IMMUNOLOGIC NEGATIVE: 1
GASTROINTESTINAL NEGATIVE: 1
DIARRHEA: 0
NAUSEA: 0
ABDOMINAL PAIN: 0
SHORTNESS OF BREATH: 0
SINUS PRESSURE: 0
VOMITING: 0

## 2018-10-15 NOTE — PROGRESS NOTES
Have you had an allergic reaction to the flu (influenza) shot? NO  Are you allergic to eggs or any component of the flu vaccine? NO  Do you have a history of Guillain-[place] Syndrome (GBS), which is paralysis after receiving the flu vaccine? NO  Are you feeling well today? YES    Flu vaccine given as ordered. Patient tolerated it well. No questions re: VIS information.

## 2018-10-15 NOTE — PROGRESS NOTES
APRN - CNP   ALPRAZolam (XANAX) 1 MG tablet take 1 tablet by mouth three times a day if needed for anxiety Yes Harman Grissom MD   rizatriptan (MAXALT-MLT) 10 MG disintegrating tablet Take 1 tablet by mouth once as needed for Migraine May repeat in 2 hours if needed Yes Harman Grissom MD   metoprolol tartrate (LOPRESSOR) 25 MG tablet take 1 tablet by mouth twice a day Yes Harman Grissom MD   Handicap Placard MISC by Does not apply route Yes Harman Grissom MD   clonazePAM (KLONOPIN) 1 MG tablet take 1 tablet by mouth at bedtime. Yes Harman Grissom MD   cyclobenzaprine (FLEXERIL) 10 MG tablet Take 1 tablet by mouth every 8 hours as needed for Muscle spasms Yes Harman Grissom MD   omeprazole (PRILOSEC) 20 MG delayed release capsule take 1 capsule by mouth twice a day Yes Harman Grissom MD   fluticasone (FLONASE) 50 MCG/ACT nasal spray instill 2 spray into each nostril once daily Yes Harman Grissom MD   PATADAY 0.2 % SOLN ophthalmic solution instill 1 drop into both eyes once daily if needed Yes Harman Grissom MD   albuterol sulfate HFA (PROAIR HFA) 108 (90 Base) MCG/ACT inhaler inhale 2 puffs by mouth every 4 hours Yes Harman Grissom MD   cetirizine (ZYRTEC) 10 MG tablet take 1 tablet by mouth once daily Yes Adina Sudeep, DO        Allergies   Allergen Reactions    Latex Rash    Fentanyl Other (See Comments)     sleepwalking       Past Medical History:   Diagnosis Date    Asthma     Blindness of left eye     Cervical herniated disc     C5-C6, with nerve compression.  Congenital hip dysplasia     Degenerative joint disease     (Right knee) -     Depression     Dysfunctional uterine bleeding     Dyspnea     (progressive - multifactorial.    Failed total right knee replacement (HCC)     Fatigue     Fibromyalgia     Generalized anxiety disorder     Grief at loss of child     4-yr old granddaughter  of brain tumor in .     Hip pain, bilateral     Secondary to piriformis syndrome and Relation Age of Onset    Breast Cancer Mother     High Blood Pressure Mother     Coronary Art Dis Mother         Multiple stents.  Diabetes Mother     Heart Disease Father     Coronary Art Dis Father         Myocardial infarction at age 54 and .  COPD Father     Arthritis Brother     COPD Brother     Heart Disease Brother         Congenital heart defect -  at 10 wks of age. Vitals:    10/15/18 1048   BP: 132/88   Site: Right Upper Arm   Position: Sitting   Cuff Size: Large Adult   Pulse: 80   Resp: 16   Temp: 97.3 °F (36.3 °C)   TempSrc: Tympanic   SpO2: 97%   Weight: 255 lb (115.7 kg)   Height: 5' 2.99\" (1.6 m)     Estimated body mass index is 45.18 kg/m² as calculated from the following:    Height as of this encounter: 5' 2.99\" (1.6 m). Weight as of this encounter: 255 lb (115.7 kg). Physical Exam   Constitutional: She is oriented to person, place, and time. She appears well-developed and well-nourished. HENT:   Head: Normocephalic and atraumatic. Right Ear: Hearing, tympanic membrane and external ear normal.   Left Ear: Hearing, tympanic membrane and external ear normal.   Nose: Nose normal.   Mouth/Throat: Uvula is midline, oropharynx is clear and moist and mucous membranes are normal. No oropharyngeal exudate or posterior oropharyngeal erythema. Eyes: Pupils are equal, round, and reactive to light. Conjunctivae and EOM are normal.   Neck: Normal range of motion. Neck supple. Cardiovascular: Normal rate, regular rhythm, normal heart sounds and intact distal pulses. Pulmonary/Chest: Effort normal and breath sounds normal.   Abdominal: Soft. Bowel sounds are normal.   Musculoskeletal: Normal range of motion. Neurological: She is alert and oriented to person, place, and time. Skin: Skin is warm and dry. Psychiatric: She has a normal mood and affect. Her behavior is normal. Judgment and thought content normal.       ASSESSMENT/PLAN:  1.  Cervical cancer screening    -

## 2018-10-18 DIAGNOSIS — M79.7 FIBROMYALGIA: ICD-10-CM

## 2018-10-18 DIAGNOSIS — G89.29 CHRONIC PAIN OF LEFT KNEE: ICD-10-CM

## 2018-10-18 DIAGNOSIS — M25.562 CHRONIC PAIN OF LEFT KNEE: ICD-10-CM

## 2018-10-18 RX ORDER — OXYCODONE AND ACETAMINOPHEN 7.5; 325 MG/1; MG/1
1 TABLET ORAL
Qty: 150 TABLET | Refills: 0 | Status: SHIPPED | OUTPATIENT
Start: 2018-10-20 | End: 2018-11-16 | Stop reason: SDUPTHER

## 2018-11-08 ENCOUNTER — HOSPITAL ENCOUNTER (OUTPATIENT)
Age: 58
Setting detail: SPECIMEN
Discharge: HOME | End: 2018-11-08
Payer: MEDICARE

## 2018-11-08 ENCOUNTER — OFFICE VISIT (OUTPATIENT)
Dept: OBGYN | Age: 58
End: 2018-11-08
Payer: MEDICARE

## 2018-11-08 ENCOUNTER — HOSPITAL ENCOUNTER (OUTPATIENT)
Dept: LAB | Age: 58
Discharge: HOME | End: 2018-11-08
Payer: MEDICARE

## 2018-11-08 VITALS
BODY MASS INDEX: 45.18 KG/M2 | HEIGHT: 63 IN | HEART RATE: 90 BPM | WEIGHT: 255 LBS | SYSTOLIC BLOOD PRESSURE: 142 MMHG | DIASTOLIC BLOOD PRESSURE: 90 MMHG

## 2018-11-08 DIAGNOSIS — Z12.4 SCREENING FOR CERVICAL CANCER: ICD-10-CM

## 2018-11-08 DIAGNOSIS — Z00.00 ROUTINE HEALTH MAINTENANCE: ICD-10-CM

## 2018-11-08 DIAGNOSIS — Z01.419 WELL FEMALE EXAM WITH ROUTINE GYNECOLOGICAL EXAM: ICD-10-CM

## 2018-11-08 DIAGNOSIS — Z12.31 VISIT FOR SCREENING MAMMOGRAM: ICD-10-CM

## 2018-11-08 DIAGNOSIS — R32 FEMALE INCONTINENCE: Primary | ICD-10-CM

## 2018-11-08 DIAGNOSIS — N64.59 INVERSION OF NIPPLE: ICD-10-CM

## 2018-11-08 LAB
ABSOLUTE EOS #: 0.2 K/UL (ref 0–0.4)
ABSOLUTE IMMATURE GRANULOCYTE: ABNORMAL K/UL (ref 0–0.3)
ABSOLUTE LYMPH #: 5.2 K/UL (ref 1–4.8)
ABSOLUTE MONO #: 0.7 K/UL (ref 0.1–1.2)
ALBUMIN SERPL-MCNC: 4.3 G/DL (ref 3.5–5.2)
ALBUMIN/GLOBULIN RATIO: 1.3 (ref 1–2.5)
ALP BLD-CCNC: 152 U/L (ref 35–104)
ALT SERPL-CCNC: 35 U/L (ref 5–33)
ANION GAP SERPL CALCULATED.3IONS-SCNC: 13 MMOL/L (ref 9–17)
AST SERPL-CCNC: 28 U/L
BASOPHILS # BLD: 1 % (ref 0–1)
BASOPHILS ABSOLUTE: 0.1 K/UL (ref 0–0.2)
BILIRUB SERPL-MCNC: 0.38 MG/DL (ref 0.3–1.2)
BUN BLDV-MCNC: 13 MG/DL (ref 6–20)
BUN/CREAT BLD: 19 (ref 9–20)
CALCIUM SERPL-MCNC: 9.7 MG/DL (ref 8.6–10.4)
CHLORIDE BLD-SCNC: 102 MMOL/L (ref 98–107)
CHOLESTEROL/HDL RATIO: 2.1
CHOLESTEROL: 129 MG/DL
CO2: 28 MMOL/L (ref 20–31)
CREAT SERPL-MCNC: 0.68 MG/DL (ref 0.5–0.9)
DIFFERENTIAL TYPE: ABNORMAL
EOSINOPHILS RELATIVE PERCENT: 2 % (ref 1–7)
GFR AFRICAN AMERICAN: >60 ML/MIN
GFR NON-AFRICAN AMERICAN: >60 ML/MIN
GFR SERPL CREATININE-BSD FRML MDRD: ABNORMAL ML/MIN/{1.73_M2}
GFR SERPL CREATININE-BSD FRML MDRD: ABNORMAL ML/MIN/{1.73_M2}
GLUCOSE BLD-MCNC: 114 MG/DL (ref 70–99)
HCT VFR BLD CALC: 44.7 % (ref 36–46)
HDLC SERPL-MCNC: 62 MG/DL
HEMOGLOBIN: 14.2 G/DL (ref 12–16)
IMMATURE GRANULOCYTES: ABNORMAL %
LDL CHOLESTEROL: 50 MG/DL (ref 0–130)
LYMPHOCYTES # BLD: 43 % (ref 16–46)
MCH RBC QN AUTO: 28.3 PG (ref 26–34)
MCHC RBC AUTO-ENTMCNC: 31.7 G/DL (ref 31–37)
MCV RBC AUTO: 89.4 FL (ref 80–100)
MONOCYTES # BLD: 6 % (ref 4–11)
NRBC AUTOMATED: ABNORMAL PER 100 WBC
PDW BLD-RTO: 15.8 % (ref 11–14.5)
PLATELET # BLD: 379 K/UL (ref 140–450)
PLATELET ESTIMATE: ABNORMAL
PMV BLD AUTO: 7.7 FL (ref 6–12)
POTASSIUM SERPL-SCNC: 4 MMOL/L (ref 3.7–5.3)
RBC # BLD: 5 M/UL (ref 4–5.2)
RBC # BLD: ABNORMAL 10*6/UL
SEG NEUTROPHILS: 48 % (ref 43–77)
SEGMENTED NEUTROPHILS ABSOLUTE COUNT: 5.7 K/UL (ref 1.8–7.7)
SODIUM BLD-SCNC: 143 MMOL/L (ref 135–144)
TOTAL PROTEIN: 7.5 G/DL (ref 6.4–8.3)
TRIGL SERPL-MCNC: 87 MG/DL
VLDLC SERPL CALC-MCNC: NORMAL MG/DL (ref 1–30)
WBC # BLD: 11.9 K/UL (ref 3.5–11)
WBC # BLD: ABNORMAL 10*3/UL

## 2018-11-08 PROCEDURE — G8427 DOCREV CUR MEDS BY ELIG CLIN: HCPCS | Performed by: NURSE PRACTITIONER

## 2018-11-08 PROCEDURE — G8417 CALC BMI ABV UP PARAM F/U: HCPCS | Performed by: NURSE PRACTITIONER

## 2018-11-08 PROCEDURE — G8482 FLU IMMUNIZE ORDER/ADMIN: HCPCS | Performed by: NURSE PRACTITIONER

## 2018-11-08 PROCEDURE — 1036F TOBACCO NON-USER: CPT | Performed by: NURSE PRACTITIONER

## 2018-11-08 PROCEDURE — 80061 LIPID PANEL: CPT

## 2018-11-08 PROCEDURE — 85025 COMPLETE CBC W/AUTO DIFF WBC: CPT

## 2018-11-08 PROCEDURE — 3017F COLORECTAL CA SCREEN DOC REV: CPT | Performed by: NURSE PRACTITIONER

## 2018-11-08 PROCEDURE — 80053 COMPREHEN METABOLIC PANEL: CPT

## 2018-11-08 PROCEDURE — 36415 COLL VENOUS BLD VENIPUNCTURE: CPT

## 2018-11-08 PROCEDURE — G0145 SCR C/V CYTO,THINLAYER,RESCR: HCPCS

## 2018-11-08 PROCEDURE — G0101 CA SCREEN;PELVIC/BREAST EXAM: HCPCS | Performed by: NURSE PRACTITIONER

## 2018-11-16 DIAGNOSIS — G89.29 CHRONIC PAIN OF LEFT KNEE: ICD-10-CM

## 2018-11-16 DIAGNOSIS — M79.7 FIBROMYALGIA: ICD-10-CM

## 2018-11-16 DIAGNOSIS — M25.562 CHRONIC PAIN OF LEFT KNEE: ICD-10-CM

## 2018-11-16 RX ORDER — OXYCODONE AND ACETAMINOPHEN 7.5; 325 MG/1; MG/1
1 TABLET ORAL
Qty: 150 TABLET | Refills: 0 | Status: SHIPPED | OUTPATIENT
Start: 2018-11-19 | End: 2018-12-14 | Stop reason: SDUPTHER

## 2018-11-16 NOTE — TELEPHONE ENCOUNTER
Pt called the refill line requesting a refill of percocet. [place] for [place], [place], and [place]: 10/20/18 percocet 7.5-325mg #150. Due 11/19/18.     Last Appt:  9/19/2018  Next Appt:   11/21/2018  Med verified in Epic

## 2018-11-21 ENCOUNTER — HOSPITAL ENCOUNTER (OUTPATIENT)
Age: 58
Setting detail: SPECIMEN
Discharge: HOME | End: 2018-11-21
Payer: MEDICARE

## 2018-11-21 ENCOUNTER — OFFICE VISIT (OUTPATIENT)
Dept: PAIN MANAGEMENT | Age: 58
End: 2018-11-21
Payer: MEDICARE

## 2018-11-21 VITALS
WEIGHT: 258.4 LBS | DIASTOLIC BLOOD PRESSURE: 84 MMHG | BODY MASS INDEX: 45.79 KG/M2 | SYSTOLIC BLOOD PRESSURE: 138 MMHG | HEIGHT: 63 IN | RESPIRATION RATE: 16 BRPM

## 2018-11-21 DIAGNOSIS — M79.7 FIBROMYALGIA: Primary | ICD-10-CM

## 2018-11-21 DIAGNOSIS — M25.551 HIP PAIN, BILATERAL: ICD-10-CM

## 2018-11-21 DIAGNOSIS — Z79.891 ENCOUNTER FOR LONG-TERM OPIATE ANALGESIC USE: ICD-10-CM

## 2018-11-21 DIAGNOSIS — M51.36 LUMBAR DEGENERATIVE DISC DISEASE: ICD-10-CM

## 2018-11-21 DIAGNOSIS — M25.552 HIP PAIN, BILATERAL: ICD-10-CM

## 2018-11-21 DIAGNOSIS — Z02.83 ENCOUNTER FOR DRUG SCREENING: ICD-10-CM

## 2018-11-21 PROCEDURE — G8427 DOCREV CUR MEDS BY ELIG CLIN: HCPCS | Performed by: NURSE PRACTITIONER

## 2018-11-21 PROCEDURE — 3017F COLORECTAL CA SCREEN DOC REV: CPT | Performed by: NURSE PRACTITIONER

## 2018-11-21 PROCEDURE — 80307 DRUG TEST PRSMV CHEM ANLYZR: CPT

## 2018-11-21 PROCEDURE — 99214 OFFICE O/P EST MOD 30 MIN: CPT | Performed by: NURSE PRACTITIONER

## 2018-11-21 PROCEDURE — 1036F TOBACCO NON-USER: CPT | Performed by: NURSE PRACTITIONER

## 2018-11-21 PROCEDURE — G8417 CALC BMI ABV UP PARAM F/U: HCPCS | Performed by: NURSE PRACTITIONER

## 2018-11-21 PROCEDURE — G8482 FLU IMMUNIZE ORDER/ADMIN: HCPCS | Performed by: NURSE PRACTITIONER

## 2018-11-21 ASSESSMENT — PATIENT HEALTH QUESTIONNAIRE - PHQ9
SUM OF ALL RESPONSES TO PHQ9 QUESTIONS 1 & 2: 2
SUM OF ALL RESPONSES TO PHQ QUESTIONS 1-9: 2
2. FEELING DOWN, DEPRESSED OR HOPELESS: 1
1. LITTLE INTEREST OR PLEASURE IN DOING THINGS: 1
SUM OF ALL RESPONSES TO PHQ QUESTIONS 1-9: 2

## 2018-11-21 ASSESSMENT — ENCOUNTER SYMPTOMS
BLOOD IN STOOL: 0
BACK PAIN: 1
CONSTIPATION: 0

## 2018-11-26 LAB
6-ACETYLMORPHINE, UR: NOT DETECTED
7-AMINOCLONAZEPAM, URINE: NOT DETECTED
ALPHA-OH-ALPRAZ, URINE: PRESENT
ALPRAZOLAM, URINE: PRESENT
AMPHETAMINES, URINE: NOT DETECTED
BARBITURATES, URINE: NOT DETECTED
BENZOYLECGONINE, UR: NOT DETECTED
BUPRENORPHINE URINE: NOT DETECTED
CARISOPRODOL, UR: NOT DETECTED
CLONAZEPAM, URINE: NOT DETECTED
CODEINE, URINE: NOT DETECTED
CREATININE URINE: 137.6 MG/DL (ref 20–400)
DIAZEPAM, URINE: NOT DETECTED
EER PAIN MGT DRUG PANEL, HIGH RES/EMIT U: NORMAL
ETHYL GLUCURONIDE UR: NOT DETECTED
FENTANYL URINE: NOT DETECTED
HYDROCODONE, URINE: NOT DETECTED
HYDROMORPHONE, URINE: NOT DETECTED
LORAZEPAM, URINE: NOT DETECTED
MARIJUANA METAB, UR: NOT DETECTED
MDA, UR: NOT DETECTED
MDEA, EVE, UR: NOT DETECTED
MDMA URINE: NOT DETECTED
MEPERIDINE METAB, UR: NOT DETECTED
METHADONE, URINE: NOT DETECTED
METHAMPHETAMINE, URINE: NOT DETECTED
METHYLPHENIDATE: NOT DETECTED
MIDAZOLAM, URINE: NOT DETECTED
MORPHINE URINE: NOT DETECTED
NORBUPRENORPHINE, URINE: NOT DETECTED
NORDIAZEPAM, URINE: NOT DETECTED
NORFENTANYL, URINE: NOT DETECTED
NORHYDROCODONE, URINE: NOT DETECTED
NOROXYCODONE, URINE: PRESENT
NOROXYMORPHONE, URINE: PRESENT
OXAZEPAM, URINE: NOT DETECTED
OXYCODONE URINE: PRESENT
OXYMORPHONE, URINE: NOT DETECTED
PAIN MGT DRUG PANEL, HI RES, UR: NORMAL
PCP,URINE: NOT DETECTED
PHENTERMINE, UR: NOT DETECTED
PROPOXYPHENE, URINE: NOT DETECTED
TAPENTADOL, URINE: NOT DETECTED
TAPENTADOL-O-SULFATE, URINE: NOT DETECTED
TEMAZEPAM, URINE: NOT DETECTED
TRAMADOL, URINE: NOT DETECTED
ZOLPIDEM, URINE: NOT DETECTED

## 2018-11-29 LAB — CYTOLOGY REPORT: NORMAL

## 2018-12-14 DIAGNOSIS — F41.1 GENERALIZED ANXIETY DISORDER: ICD-10-CM

## 2018-12-14 DIAGNOSIS — G89.29 CHRONIC PAIN OF LEFT KNEE: ICD-10-CM

## 2018-12-14 DIAGNOSIS — M79.7 FIBROMYALGIA: ICD-10-CM

## 2018-12-14 DIAGNOSIS — M25.562 CHRONIC PAIN OF LEFT KNEE: ICD-10-CM

## 2018-12-14 RX ORDER — ALPRAZOLAM 1 MG/1
TABLET ORAL
Qty: 90 TABLET | Refills: 2 | OUTPATIENT
Start: 2018-12-14 | End: 2019-03-14

## 2018-12-17 RX ORDER — OXYCODONE AND ACETAMINOPHEN 7.5; 325 MG/1; MG/1
1 TABLET ORAL
Qty: 150 TABLET | Refills: 0 | Status: SHIPPED | OUTPATIENT
Start: 2018-12-19 | End: 2019-01-14 | Stop reason: SDUPTHER

## 2018-12-18 ENCOUNTER — OFFICE VISIT (OUTPATIENT)
Dept: FAMILY MEDICINE CLINIC | Age: 58
End: 2018-12-18
Payer: MEDICARE

## 2018-12-18 VITALS
BODY MASS INDEX: 46.03 KG/M2 | TEMPERATURE: 98.1 F | HEART RATE: 96 BPM | WEIGHT: 259.8 LBS | SYSTOLIC BLOOD PRESSURE: 140 MMHG | RESPIRATION RATE: 16 BRPM | DIASTOLIC BLOOD PRESSURE: 78 MMHG | HEIGHT: 63 IN | OXYGEN SATURATION: 98 %

## 2018-12-18 DIAGNOSIS — F41.1 GENERALIZED ANXIETY DISORDER: ICD-10-CM

## 2018-12-18 PROCEDURE — G8427 DOCREV CUR MEDS BY ELIG CLIN: HCPCS | Performed by: NURSE PRACTITIONER

## 2018-12-18 PROCEDURE — G8482 FLU IMMUNIZE ORDER/ADMIN: HCPCS | Performed by: NURSE PRACTITIONER

## 2018-12-18 PROCEDURE — 99213 OFFICE O/P EST LOW 20 MIN: CPT | Performed by: NURSE PRACTITIONER

## 2018-12-18 PROCEDURE — 3017F COLORECTAL CA SCREEN DOC REV: CPT | Performed by: NURSE PRACTITIONER

## 2018-12-18 PROCEDURE — 1036F TOBACCO NON-USER: CPT | Performed by: NURSE PRACTITIONER

## 2018-12-18 PROCEDURE — G8417 CALC BMI ABV UP PARAM F/U: HCPCS | Performed by: NURSE PRACTITIONER

## 2018-12-18 RX ORDER — ALPRAZOLAM 1 MG/1
TABLET ORAL
Qty: 90 TABLET | Refills: 0 | Status: SHIPPED | OUTPATIENT
Start: 2018-12-18 | End: 2019-01-18 | Stop reason: SDUPTHER

## 2018-12-18 RX ORDER — ALPRAZOLAM 1 MG/1
1 TABLET ORAL 3 TIMES DAILY PRN
Qty: 90 TABLET | Refills: 0 | OUTPATIENT
Start: 2018-12-18 | End: 2019-03-18

## 2018-12-18 ASSESSMENT — ENCOUNTER SYMPTOMS
NAUSEA: 0
DIARRHEA: 0
VOMITING: 0
ALLERGIC/IMMUNOLOGIC NEGATIVE: 1
ABDOMINAL PAIN: 0
CHEST TIGHTNESS: 0
EYES NEGATIVE: 1
SHORTNESS OF BREATH: 0
SINUS PRESSURE: 0
CONSTIPATION: 0
TROUBLE SWALLOWING: 0
COUGH: 0

## 2018-12-18 NOTE — TELEPHONE ENCOUNTER
Per [facility], patient will need seen for a refill of Xanax. See MyChart encounter.

## 2018-12-18 NOTE — PROGRESS NOTES
REPLACEMENT Right     FAILED KNEE RE;PLAC    KNEE ARTHROPLASTY Right     OTHER SURGICAL HISTORY  2009    Epidural steroids to cervical spine. [address] HISTORY      ECT therapy, one session only and she refused any further    [place] TOTAL KNEE ARTHROPLASTY Left 2018    Left Total Knee Arthroplasty performed by Benny Leroy MD at [facility]       Family History   Problem Relation Age of Onset    Breast Cancer Mother 76    High Blood Pressure Mother     Coronary Art Dis Mother         Multiple stents.  Diabetes Mother     Heart Disease Father     Coronary Art Dis Father         Myocardial infarction at age 54 and .  COPD Father     Arthritis Brother     COPD Brother     Heart Disease Brother         Congenital heart defect -  at 10 wks of age.  Cancer Other        Allergies   Allergen Reactions    Latex Rash    Fentanyl Other (See Comments)     sleepwalking       Current Outpatient Prescriptions   Medication Sig Dispense Refill    ALPRAZolam (XANAX) 1 MG tablet take 1 tablet by mouth three times a day if needed for anxiety. 90 tablet 0    [START ON 2018] oxyCODONE-acetaminophen (PERCOCET) 7.5-325 MG per tablet Take 1 tablet by mouth 5 times daily for 30 days. . 150 tablet 0    rizatriptan (MAXALT-MLT) 10 MG disintegrating tablet Take 1 tablet by mouth once as needed for Migraine May repeat in 2 hours if needed 9 tablet 11    metoprolol tartrate (LOPRESSOR) 25 MG tablet take 1 tablet by mouth twice a day 60 tablet 5    Handicap Placard MISC by Does not apply route 2 each 0    clonazePAM (KLONOPIN) 1 MG tablet take 1 tablet by mouth at bedtime.  30 tablet 5    cyclobenzaprine (FLEXERIL) 10 MG tablet Take 1 tablet by mouth every 8 hours as needed for Muscle spasms 90 tablet 11    omeprazole (PRILOSEC) 20 MG delayed release capsule take 1 capsule by mouth twice a day 60 capsule 5    fluticasone (FLONASE) 50 MCG/ACT nasal spray instill 2

## 2019-01-14 DIAGNOSIS — M79.7 FIBROMYALGIA: ICD-10-CM

## 2019-01-14 DIAGNOSIS — M25.562 CHRONIC PAIN OF LEFT KNEE: ICD-10-CM

## 2019-01-14 DIAGNOSIS — G89.29 CHRONIC PAIN OF LEFT KNEE: ICD-10-CM

## 2019-01-15 RX ORDER — OXYCODONE AND ACETAMINOPHEN 7.5; 325 MG/1; MG/1
1 TABLET ORAL
Qty: 150 TABLET | Refills: 0 | Status: SHIPPED | OUTPATIENT
Start: 2019-01-18 | End: 2019-02-05 | Stop reason: SDUPTHER

## 2019-01-18 DIAGNOSIS — F41.1 GENERALIZED ANXIETY DISORDER: ICD-10-CM

## 2019-01-18 RX ORDER — ALPRAZOLAM 1 MG/1
1 TABLET ORAL 3 TIMES DAILY PRN
Qty: 270 TABLET | Refills: 0 | Status: SHIPPED | OUTPATIENT
Start: 2019-01-18 | End: 2019-04-15 | Stop reason: SDUPTHER

## 2019-02-01 DIAGNOSIS — I10 ESSENTIAL HYPERTENSION: ICD-10-CM

## 2019-02-05 ENCOUNTER — OFFICE VISIT (OUTPATIENT)
Dept: PAIN MANAGEMENT | Age: 59
End: 2019-02-05
Payer: MEDICARE

## 2019-02-05 VITALS
WEIGHT: 249 LBS | HEART RATE: 98 BPM | BODY MASS INDEX: 44.12 KG/M2 | RESPIRATION RATE: 16 BRPM | HEIGHT: 63 IN | DIASTOLIC BLOOD PRESSURE: 80 MMHG | SYSTOLIC BLOOD PRESSURE: 122 MMHG

## 2019-02-05 DIAGNOSIS — M25.562 CHRONIC PAIN OF LEFT KNEE: ICD-10-CM

## 2019-02-05 DIAGNOSIS — G89.29 CHRONIC BILATERAL LOW BACK PAIN WITHOUT SCIATICA: ICD-10-CM

## 2019-02-05 DIAGNOSIS — G89.29 CHRONIC PAIN OF MULTIPLE JOINTS: ICD-10-CM

## 2019-02-05 DIAGNOSIS — M51.9 LUMBAR DISC DISEASE: Primary | ICD-10-CM

## 2019-02-05 DIAGNOSIS — M25.50 CHRONIC PAIN OF MULTIPLE JOINTS: ICD-10-CM

## 2019-02-05 DIAGNOSIS — G89.29 CHRONIC PAIN OF LEFT KNEE: ICD-10-CM

## 2019-02-05 DIAGNOSIS — M79.7 FIBROMYALGIA: ICD-10-CM

## 2019-02-05 DIAGNOSIS — M54.50 CHRONIC BILATERAL LOW BACK PAIN WITHOUT SCIATICA: ICD-10-CM

## 2019-02-05 DIAGNOSIS — M79.18 MYOFASCIAL PAIN: ICD-10-CM

## 2019-02-05 PROCEDURE — 1036F TOBACCO NON-USER: CPT | Performed by: NURSE PRACTITIONER

## 2019-02-05 PROCEDURE — G8417 CALC BMI ABV UP PARAM F/U: HCPCS | Performed by: NURSE PRACTITIONER

## 2019-02-05 PROCEDURE — 99214 OFFICE O/P EST MOD 30 MIN: CPT | Performed by: NURSE PRACTITIONER

## 2019-02-05 PROCEDURE — G8482 FLU IMMUNIZE ORDER/ADMIN: HCPCS | Performed by: NURSE PRACTITIONER

## 2019-02-05 PROCEDURE — 3017F COLORECTAL CA SCREEN DOC REV: CPT | Performed by: NURSE PRACTITIONER

## 2019-02-05 PROCEDURE — G8427 DOCREV CUR MEDS BY ELIG CLIN: HCPCS | Performed by: NURSE PRACTITIONER

## 2019-02-05 RX ORDER — OMEPRAZOLE 20 MG/1
CAPSULE, DELAYED RELEASE ORAL
Qty: 60 CAPSULE | Refills: 5 | Status: SHIPPED | OUTPATIENT
Start: 2019-02-05 | End: 2019-07-17 | Stop reason: SDUPTHER

## 2019-02-05 RX ORDER — OXYCODONE AND ACETAMINOPHEN 7.5; 325 MG/1; MG/1
1 TABLET ORAL
Qty: 150 TABLET | Refills: 0 | Status: SHIPPED | OUTPATIENT
Start: 2019-02-17 | End: 2019-03-11 | Stop reason: SDUPTHER

## 2019-02-05 ASSESSMENT — PATIENT HEALTH QUESTIONNAIRE - PHQ9
1. LITTLE INTEREST OR PLEASURE IN DOING THINGS: 0
SUM OF ALL RESPONSES TO PHQ QUESTIONS 1-9: 0
SUM OF ALL RESPONSES TO PHQ9 QUESTIONS 1 & 2: 0
SUM OF ALL RESPONSES TO PHQ QUESTIONS 1-9: 0
2. FEELING DOWN, DEPRESSED OR HOPELESS: 0

## 2019-02-05 ASSESSMENT — ENCOUNTER SYMPTOMS
SHORTNESS OF BREATH: 0
CONSTIPATION: 0
BACK PAIN: 1
BLOOD IN STOOL: 0

## 2019-03-11 DIAGNOSIS — G89.29 CHRONIC PAIN OF LEFT KNEE: ICD-10-CM

## 2019-03-11 DIAGNOSIS — M47.22 CERVICAL RADICULOPATHY DUE TO DEGENERATIVE JOINT DISEASE OF SPINE: Primary | ICD-10-CM

## 2019-03-11 DIAGNOSIS — M79.7 FIBROMYALGIA: ICD-10-CM

## 2019-03-11 DIAGNOSIS — M25.562 CHRONIC PAIN OF LEFT KNEE: ICD-10-CM

## 2019-03-11 RX ORDER — CYCLOBENZAPRINE HCL 10 MG
TABLET ORAL
Qty: 90 TABLET | Refills: 11 | Status: SHIPPED | OUTPATIENT
Start: 2019-03-11 | End: 2020-02-17

## 2019-03-12 RX ORDER — OXYCODONE AND ACETAMINOPHEN 7.5; 325 MG/1; MG/1
1 TABLET ORAL
Qty: 150 TABLET | Refills: 0 | Status: SHIPPED | OUTPATIENT
Start: 2019-03-19 | End: 2019-04-05 | Stop reason: SDUPTHER

## 2019-03-22 RX ORDER — OLOPATADINE HCL 0.2 %
DROPS OPHTHALMIC (EYE)
Qty: 2.5 ML | Refills: 0 | Status: SHIPPED | OUTPATIENT
Start: 2019-03-22 | End: 2019-05-28 | Stop reason: SDUPTHER

## 2019-03-22 RX ORDER — ALBUTEROL SULFATE 90 UG/1
AEROSOL, METERED RESPIRATORY (INHALATION)
Qty: 18 G | Refills: 0 | Status: SHIPPED | OUTPATIENT
Start: 2019-03-22 | End: 2019-05-28 | Stop reason: SDUPTHER

## 2019-03-22 RX ORDER — FLUTICASONE PROPIONATE 50 MCG
SPRAY, SUSPENSION (ML) NASAL
Qty: 16 G | Refills: 0 | Status: SHIPPED | OUTPATIENT
Start: 2019-03-22 | End: 2019-05-28 | Stop reason: SDUPTHER

## 2019-04-05 ENCOUNTER — OFFICE VISIT (OUTPATIENT)
Dept: PAIN MANAGEMENT | Age: 59
End: 2019-04-05
Payer: MEDICARE

## 2019-04-05 ENCOUNTER — HOSPITAL ENCOUNTER (OUTPATIENT)
Age: 59
Setting detail: SPECIMEN
Discharge: HOME | End: 2019-04-05
Payer: MEDICARE

## 2019-04-05 VITALS
BODY MASS INDEX: 44.1 KG/M2 | HEART RATE: 91 BPM | WEIGHT: 248.9 LBS | SYSTOLIC BLOOD PRESSURE: 122 MMHG | OXYGEN SATURATION: 96 % | DIASTOLIC BLOOD PRESSURE: 80 MMHG | RESPIRATION RATE: 16 BRPM | HEIGHT: 63 IN

## 2019-04-05 DIAGNOSIS — M25.562 CHRONIC PAIN OF LEFT KNEE: ICD-10-CM

## 2019-04-05 DIAGNOSIS — Z02.83 ENCOUNTER FOR DRUG SCREENING: ICD-10-CM

## 2019-04-05 DIAGNOSIS — G89.29 CHRONIC PAIN OF LEFT KNEE: ICD-10-CM

## 2019-04-05 DIAGNOSIS — M79.7 FIBROMYALGIA: Primary | ICD-10-CM

## 2019-04-05 DIAGNOSIS — Z79.891 ENCOUNTER FOR LONG-TERM OPIATE ANALGESIC USE: ICD-10-CM

## 2019-04-05 PROCEDURE — G8417 CALC BMI ABV UP PARAM F/U: HCPCS | Performed by: NURSE PRACTITIONER

## 2019-04-05 PROCEDURE — 1036F TOBACCO NON-USER: CPT | Performed by: NURSE PRACTITIONER

## 2019-04-05 PROCEDURE — 3017F COLORECTAL CA SCREEN DOC REV: CPT | Performed by: NURSE PRACTITIONER

## 2019-04-05 PROCEDURE — 80307 DRUG TEST PRSMV CHEM ANLYZR: CPT

## 2019-04-05 PROCEDURE — 99214 OFFICE O/P EST MOD 30 MIN: CPT | Performed by: NURSE PRACTITIONER

## 2019-04-05 PROCEDURE — G8427 DOCREV CUR MEDS BY ELIG CLIN: HCPCS | Performed by: NURSE PRACTITIONER

## 2019-04-05 RX ORDER — OXYCODONE AND ACETAMINOPHEN 7.5; 325 MG/1; MG/1
1 TABLET ORAL
Qty: 150 TABLET | Refills: 0 | Status: SHIPPED | OUTPATIENT
Start: 2019-04-18 | End: 2019-05-14 | Stop reason: SDUPTHER

## 2019-04-05 ASSESSMENT — ENCOUNTER SYMPTOMS
BLOOD IN STOOL: 0
SHORTNESS OF BREATH: 0
CONSTIPATION: 0
BACK PAIN: 1

## 2019-04-05 ASSESSMENT — PATIENT HEALTH QUESTIONNAIRE - PHQ9
SUM OF ALL RESPONSES TO PHQ QUESTIONS 1-9: 0
1. LITTLE INTEREST OR PLEASURE IN DOING THINGS: 0
2. FEELING DOWN, DEPRESSED OR HOPELESS: 0
SUM OF ALL RESPONSES TO PHQ9 QUESTIONS 1 & 2: 0
SUM OF ALL RESPONSES TO PHQ QUESTIONS 1-9: 0

## 2019-04-05 NOTE — PROGRESS NOTES
Subjective:      Patient ID: Robert Caal is a 64 y.o. female. Chief Complaint   Patient presents with    Back Pain    Pain     Fibromyalgia     Back Pain   Associated symptoms include numbness (tingling in hands). Pertinent negatives include no chest pain. Here today for routine pain clinic recheck, update pain contract, informed consent, SOAPP, UDS    Percocet effectively reducing low back pain. No side effects, denies constipation. Pain Assessment  Location of Pain: Back  Location Modifiers: Left, Right  Severity of Pain: 5  Quality of Pain: Aching(feels like fallen from a stairs)  Duration of Pain: Persistent  Frequency of Pain: Constant  Aggravating Factors: Bending, Walking, Standing  Limiting Behavior: Yes  Relieving Factors: Rest, Ice, Heat(Pain medication)  Result of Injury: No  Work-Related Injury: No  Are there other pain locations you wish to document?: No    Allergies   Allergen Reactions    Latex Rash    Fentanyl Other (See Comments)     sleepwalking       Outpatient Medications Marked as Taking for the 4/5/19 encounter (Office Visit) with TITO Red CNP   Medication Sig Dispense Refill    [START ON 4/18/2019] oxyCODONE-acetaminophen (PERCOCET) 7.5-325 MG per tablet Take 1 tablet by mouth 5 times daily for 30 days.  150 tablet 0    albuterol sulfate HFA (VENTOLIN HFA) 108 (90 Base) MCG/ACT inhaler INHALE 2 PUFFS BY MOUTH EVERY 4 HOURS 18 g 0    PATADAY 0.2 % SOLN ophthalmic solution instill 1 drop into both eyes once daily if needed 2.5 mL 0    fluticasone (FLONASE) 50 MCG/ACT nasal spray instill 2 sprays into each nostril once daily 16 g 0    cyclobenzaprine (FLEXERIL) 10 MG tablet take 1 tablet by mouth every 8 hours if needed for MSUCLE SPASMS 90 tablet 11    omeprazole (PRILOSEC) 20 MG delayed release capsule take 1 capsule by mouth twice a day 60 capsule 5    metoprolol tartrate (LOPRESSOR) 25 MG tablet take 1 tablet by mouth twice a day 60 tablet 5    ALPRAZolam Gerri Levo) 1 MG tablet Take 1 tablet by mouth 3 times daily as needed for Anxiety for up to 90 days. . 270 tablet 0    zoster recombinant adjuvanted vaccine (SHINGRIX) 50 MCG SUSR injection Inject 0.5 mLs into the muscle every 6 months for 2 doses 0.5 mL 1    Blood Pressure KIT 1 kit by Does not apply route 2 times daily as needed (BP) 1 kit 0    rizatriptan (MAXALT-MLT) 10 MG disintegrating tablet Take 1 tablet by mouth once as needed for Migraine May repeat in 2 hours if needed 9 tablet 11    Handicap Placard MISC by Does not apply route 2 each 0    clonazePAM (KLONOPIN) 1 MG tablet take 1 tablet by mouth at bedtime. 30 tablet 5    cetirizine (ZYRTEC) 10 MG tablet take 1 tablet by mouth once daily 30 tablet 11       Past Medical History:   Diagnosis Date    Asthma     Blindness of left eye     Cervical herniated disc     C5-C6, with nerve compression.  Congenital hip dysplasia     Degenerative joint disease     (Right knee) -     Depression     Dysfunctional uterine bleeding     Dyspnea     (progressive - multifactorial.    Failed total right knee replacement (HCC)     Fatigue     Fibromyalgia     Generalized anxiety disorder     Grief at loss of child     4-yr old granddaughter  of brain tumor in .  Hip pain, bilateral     Secondary to piriformis syndrome and bilateral greater trochanteric bursitis. (Injection of Celestone and Marcaine).  History of tobacco use     Currently not smoking.  Hypertension     Inverted nipple     Left. Lifelong    Kidney stone     Lumbar disc disease     Chronic.  Obesity     Osteoarthritis     Degenerative joint disease - of all joints.  Seasonal allergic rhinitis     Shoulder pain, left     (Injection of Celestone/Marcaine subacromially) - Dr. David Berkowitz - 10/2008.  Shoulder pain, right     (Injection of Celestone/Marcaine subacromially). Dr. David Berkowitz - 10/2008.     Supraventricular tachycardia, paroxysmal (HCC)     Status post cardiac ablation.  Vitamin D deficiency        Past Surgical History:   Procedure Laterality Date    ATRIAL ABLATION SURGERY  2006    Radiofrequency ablation of slow AV pawel pathway.  CARDIAC CATHETERIZATION  2006    no blockages    CHOLECYSTECTOMY, LAPAROSCOPIC  2011    (Dr. Robby Hough)    JOINT REPLACEMENT Right     FAILED KNEE RE;PLAC    KNEE ARTHROPLASTY Right 2006    OTHER SURGICAL HISTORY      Epidural steroids to cervical spine. [address] HISTORY      ECT therapy, one session only and she refused any further    [place] TOTAL KNEE ARTHROPLASTY Left 2018    Left Total Knee Arthroplasty [facility] by Letty [facility]rejon [facility] at [facility]       Family History   Problem Relation Age of Onset    Breast Cancer Mother 76    High Blood Pressure Mother     Coronary Art Dis Mother         Multiple stents.  Diabetes Mother     Heart Disease Father     Coronary Art Dis Father         Myocardial infarction at age 54 and .  COPD Father     Arthritis Brother     COPD Brother     Heart Disease Brother         Congenital heart defect -  at 10 wks of age.  Cancer Other        Social History     Socioeconomic History    Marital status:      Spouse name: None    Number of children: None    Years of education: None    Highest education level: None   Occupational History    Occupation: disabled [employer]   Social Needs    Financial resource strain: None    Food insecurity:     Worry: None     Inability: None    Transportation needs:     Medical: None     Non-medical: None   Tobacco Use    Smoking status: Former Smoker     Packs/day: 0.25     Years: 35.00     Pack years: 8.75     Types: Cigarettes    Smokeless tobacco: Former User     Quit date: 2015    Tobacco comment: 6 cigarettes per day since age 15 or so.    Substance and Sexual Activity    Alcohol use: Yes     Comment: rarely    Drug use: No    Sexual activity: Not Currently     Partners: Male Lifestyle    Physical activity:     Days per week: None     Minutes per session: None    Stress: None   Relationships    Social connections:     Talks on phone: None     Gets together: None     Attends Sikhism service: None     Active member of club or organization: None     Attends meetings of clubs or organizations: None     Relationship status: None    Intimate partner violence:     Fear of current or ex partner: None     Emotionally abused: None     Physically abused: None     Forced sexual activity: None   Other Topics Concern    None   Social History Narrative    None     Review of Systems   Constitutional: Negative for activity change. HENT: Negative. Respiratory: Negative for shortness of breath. Cardiovascular: Negative for chest pain. Gastrointestinal: Negative for blood in stool and constipation. Genitourinary: Positive for flank pain. Musculoskeletal: Positive for arthralgias, back pain, myalgias and neck pain. Neurological: Positive for numbness (tingling in hands). Psychiatric/Behavioral: Negative for sleep disturbance. Objective:   Physical Exam   Constitutional: She is oriented to person, place, and time. She appears well-developed and well-nourished. She is cooperative. She is not intubated. HENT:   Head: Normocephalic and atraumatic. Right Ear: External ear normal.   Left Ear: External ear normal.   Nose: Nose normal.   Eyes: Conjunctivae, EOM and lids are normal.   Cardiovascular: Intact distal pulses and normal pulses. Pulmonary/Chest: Effort normal. No accessory muscle usage. No apnea, no tachypnea and no bradypnea. She is not intubated. No respiratory distress. Abdominal: Normal appearance. Musculoskeletal:   Ambulates with single cane   Neurological: She is alert and oriented to person, place, and time. No cranial nerve deficit. Skin: Skin is warm, dry and intact. No cyanosis. Nails show no clubbing. Psychiatric: She has a normal mood and affect. Her speech is normal and behavior is normal.   Vitals reviewed. Assessment:      1. Fibromyalgia    2. Encounter for long-term opiate analgesic use    3. Encounter for drug screening    4. Chronic pain of left knee          Plan:     Controlled Substances Monitoring: The Prescription Monitoring Report for this patient was reviewed today. TITO Stafford CNP)    Random urine drug screen sent today., Obtaining appropriate analgesic effect of treatment., No signs of potential drug abuse or diversion identified: otherwise, see note documentation TITO Stafford CNP)         Obtained or confirmed a written medication contract was on file. TITO Stafford CNP)         Chronic pain diagnoses such as   1. Fibromyalgia    2. Encounter for long-term opiate analgesic use    3. Encounter for drug screening    4. Chronic pain of left knee     controlled on current medication regime, wll continue current pain medications to improve quality of life and function. Opiate informed consent for opiate therapy obtained at today's visit, all questions/concerns answered, voices understanding. Pain contract with [address] Providers as the only providers of opiate prescriptions for management of chronic pain. Treatment of acute pain, post-op pain, etc., from any qualified provider will not violate the pain contract.     Follow up two months

## 2019-04-07 LAB
6-ACETYLMORPHINE, UR: NOT DETECTED
7-AMINOCLONAZEPAM, URINE: NOT DETECTED
ALPHA-OH-ALPRAZ, URINE: PRESENT
ALPRAZOLAM, URINE: NOT DETECTED
AMPHETAMINES, URINE: NOT DETECTED
BARBITURATES, URINE: NOT DETECTED
BENZOYLECGONINE, UR: NOT DETECTED
BUPRENORPHINE URINE: NOT DETECTED
CARISOPRODOL, UR: NOT DETECTED
CLONAZEPAM, URINE: NOT DETECTED
CODEINE, URINE: NOT DETECTED
CREATININE URINE: 278.3 MG/DL (ref 20–400)
DIAZEPAM, URINE: NOT DETECTED
EER PAIN MGT DRUG PANEL, HIGH RES/EMIT U: NORMAL
ETHYL GLUCURONIDE UR: NOT DETECTED
FENTANYL URINE: NOT DETECTED
HYDROCODONE, URINE: NOT DETECTED
HYDROMORPHONE, URINE: NOT DETECTED
LORAZEPAM, URINE: NOT DETECTED
MARIJUANA METAB, UR: NOT DETECTED
MDA, UR: NOT DETECTED
MDEA, EVE, UR: NOT DETECTED
MDMA URINE: NOT DETECTED
MEPERIDINE METAB, UR: NOT DETECTED
METHADONE, URINE: NOT DETECTED
METHAMPHETAMINE, URINE: NOT DETECTED
METHYLPHENIDATE: NOT DETECTED
MIDAZOLAM, URINE: NOT DETECTED
MORPHINE URINE: NOT DETECTED
NORBUPRENORPHINE, URINE: NOT DETECTED
NORDIAZEPAM, URINE: NOT DETECTED
NORFENTANYL, URINE: NOT DETECTED
NORHYDROCODONE, URINE: NOT DETECTED
NOROXYCODONE, URINE: PRESENT
NOROXYMORPHONE, URINE: PRESENT
OXAZEPAM, URINE: NOT DETECTED
OXYCODONE URINE: PRESENT
OXYMORPHONE, URINE: PRESENT
PAIN MGT DRUG PANEL, HI RES, UR: NORMAL
PCP,URINE: NOT DETECTED
PHENTERMINE, UR: NOT DETECTED
PROPOXYPHENE, URINE: NOT DETECTED
TAPENTADOL, URINE: NOT DETECTED
TAPENTADOL-O-SULFATE, URINE: NOT DETECTED
TEMAZEPAM, URINE: NOT DETECTED
TRAMADOL, URINE: NOT DETECTED
ZOLPIDEM, URINE: NOT DETECTED

## 2019-04-08 NOTE — PROGRESS NOTES
[facility]
History     Social History    Marital status:      Spouse name: N/A    Number of children: N/A    Years of education: N/A     Occupational History    disabled STNA      Social History Main Topics    Smoking status: Former Smoker     Packs/day: 0.25     Years: 35.00     Types: Cigarettes    Smokeless tobacco: Former User     Quit date: 6/1/2015      Comment: 6 cigarettes per day since age 15 or so.  Alcohol use Yes      Comment: rarely    Drug use: No    Sexual activity: Not Currently     Partners: Male     Other Topics Concern    Not on file     Social History Narrative    No narrative on file       MEDICATIONS:  Current Outpatient Prescriptions   Medication Sig Dispense Refill    oxyCODONE-acetaminophen (PERCOCET) 7.5-325 MG per tablet Take 1 tablet by mouth 5 times daily for 30 days. . 150 tablet 0    ALPRAZolam (XANAX) 1 MG tablet take 1 tablet by mouth three times a day if needed for anxiety 90 tablet 2    metoprolol tartrate (LOPRESSOR) 25 MG tablet take 1 tablet by mouth twice a day 60 tablet 5    Handicap Placard MISC by Does not apply route 2 each 0    cyclobenzaprine (FLEXERIL) 10 MG tablet Take 1 tablet by mouth every 8 hours as needed for Muscle spasms 90 tablet 11    omeprazole (PRILOSEC) 20 MG delayed release capsule take 1 capsule by mouth twice a day 60 capsule 5    fluticasone (FLONASE) 50 MCG/ACT nasal spray instill 2 spray into each nostril once daily 16 g 11    PATADAY 0.2 % SOLN ophthalmic solution instill 1 drop into both eyes once daily if needed 2.5 mL 11    albuterol sulfate HFA (PROAIR HFA) 108 (90 Base) MCG/ACT inhaler inhale 2 puffs by mouth every 4 hours 8.5 Inhaler 6    cetirizine (ZYRTEC) 10 MG tablet take 1 tablet by mouth once daily 30 tablet 11    zoster recombinant adjuvanted vaccine (SHINGRIX) 50 MCG SUSR injection Inject 0.5 mLs into the muscle every 6 months for 2 doses 0.5 mL 1    Blood Pressure KIT 1 kit by Does not apply route 2 times daily as

## 2019-04-15 ENCOUNTER — HOSPITAL ENCOUNTER (OUTPATIENT)
Age: 59
Setting detail: SPECIMEN
Discharge: HOME | End: 2019-04-15
Payer: MEDICARE

## 2019-04-15 ENCOUNTER — OFFICE VISIT (OUTPATIENT)
Dept: FAMILY MEDICINE CLINIC | Age: 59
End: 2019-04-15
Payer: MEDICARE

## 2019-04-15 VITALS
HEART RATE: 84 BPM | SYSTOLIC BLOOD PRESSURE: 124 MMHG | DIASTOLIC BLOOD PRESSURE: 74 MMHG | BODY MASS INDEX: 43.34 KG/M2 | HEIGHT: 63 IN | RESPIRATION RATE: 12 BRPM | WEIGHT: 244.6 LBS | OXYGEN SATURATION: 98 % | TEMPERATURE: 97.9 F

## 2019-04-15 DIAGNOSIS — F32.1 MODERATE MAJOR DEPRESSION (HCC): ICD-10-CM

## 2019-04-15 DIAGNOSIS — I47.1 SUPRAVENTRICULAR TACHYCARDIA, PAROXYSMAL (HCC): ICD-10-CM

## 2019-04-15 DIAGNOSIS — J30.9 ALLERGIC RHINITIS, UNSPECIFIED SEASONALITY, UNSPECIFIED TRIGGER: ICD-10-CM

## 2019-04-15 DIAGNOSIS — R10.9 ABDOMINAL PRESSURE: ICD-10-CM

## 2019-04-15 DIAGNOSIS — E66.01 MORBID OBESITY WITH BMI OF 40.0-44.9, ADULT (HCC): ICD-10-CM

## 2019-04-15 DIAGNOSIS — Z00.00 ROUTINE GENERAL MEDICAL EXAMINATION AT A HEALTH CARE FACILITY: Primary | ICD-10-CM

## 2019-04-15 DIAGNOSIS — F41.1 GENERALIZED ANXIETY DISORDER: ICD-10-CM

## 2019-04-15 LAB
-: ABNORMAL
AMORPHOUS: ABNORMAL
BACTERIA: ABNORMAL
BILIRUBIN URINE: NEGATIVE
CASTS UA: ABNORMAL /LPF (ref 0–2)
COLOR: ABNORMAL
COMMENT UA: ABNORMAL
CRYSTALS, UA: ABNORMAL /HPF
EPITHELIAL CELLS UA: ABNORMAL /HPF (ref 0–5)
GLUCOSE URINE: NEGATIVE
KETONES, URINE: NEGATIVE
LEUKOCYTE ESTERASE, URINE: NEGATIVE
MUCUS: ABNORMAL
NITRITE, URINE: NEGATIVE
OTHER OBSERVATIONS UA: ABNORMAL
PH UA: 5.5 (ref 5–6)
PROTEIN UA: NEGATIVE
RBC UA: ABNORMAL /HPF (ref 0–4)
RENAL EPITHELIAL, UA: ABNORMAL /HPF
SPECIFIC GRAVITY UA: 1.03 (ref 1.01–1.02)
TRICHOMONAS: ABNORMAL
TURBIDITY: ABNORMAL
URINE HGB: NEGATIVE
UROBILINOGEN, URINE: NORMAL
WBC UA: ABNORMAL /HPF (ref 0–4)
YEAST: ABNORMAL

## 2019-04-15 PROCEDURE — 1036F TOBACCO NON-USER: CPT | Performed by: NURSE PRACTITIONER

## 2019-04-15 PROCEDURE — 3017F COLORECTAL CA SCREEN DOC REV: CPT | Performed by: NURSE PRACTITIONER

## 2019-04-15 PROCEDURE — 99214 OFFICE O/P EST MOD 30 MIN: CPT | Performed by: NURSE PRACTITIONER

## 2019-04-15 PROCEDURE — G8427 DOCREV CUR MEDS BY ELIG CLIN: HCPCS | Performed by: NURSE PRACTITIONER

## 2019-04-15 PROCEDURE — G0438 PPPS, INITIAL VISIT: HCPCS | Performed by: NURSE PRACTITIONER

## 2019-04-15 PROCEDURE — G8417 CALC BMI ABV UP PARAM F/U: HCPCS | Performed by: NURSE PRACTITIONER

## 2019-04-15 PROCEDURE — 81001 URINALYSIS AUTO W/SCOPE: CPT

## 2019-04-15 RX ORDER — LEVOCETIRIZINE DIHYDROCHLORIDE 5 MG/1
5 TABLET, FILM COATED ORAL NIGHTLY
Qty: 30 TABLET | Refills: 11 | Status: SHIPPED | OUTPATIENT
Start: 2019-04-15 | End: 2019-05-15

## 2019-04-15 RX ORDER — ALPRAZOLAM 1 MG/1
1 TABLET ORAL 3 TIMES DAILY PRN
Qty: 270 TABLET | Refills: 0 | Status: SHIPPED | OUTPATIENT
Start: 2019-04-15 | End: 2019-07-12 | Stop reason: SDUPTHER

## 2019-04-15 ASSESSMENT — PATIENT HEALTH QUESTIONNAIRE - PHQ9
SUM OF ALL RESPONSES TO PHQ QUESTIONS 1-9: 0
SUM OF ALL RESPONSES TO PHQ QUESTIONS 1-9: 0

## 2019-04-15 ASSESSMENT — ENCOUNTER SYMPTOMS
RESPIRATORY NEGATIVE: 1
GASTROINTESTINAL NEGATIVE: 1

## 2019-04-15 ASSESSMENT — ANXIETY QUESTIONNAIRES: GAD7 TOTAL SCORE: 2

## 2019-04-15 NOTE — PROGRESS NOTES
[facility]    Subjective:     Patient ID: Janet Mireles is a 64 y.o. y.o. female. HPI Patient in for office for follow up on anxiety, HTN, and  concerns about dysuria and frequency that started a couple of weeks ago. She started taking AZO without much effect. Will obtain a urine at this visit. She takes Xanax as needed for her Anxiety. She takes prilosec for GERD and she does well on this. She tried to wean off and her heartburn returned. She takes flonase and zyrtec for her allergies. She states that she cannot get this through her insurance. She has HTN and she checks this at home and it is stable. It is WNL today. She takes lopressor for this. She has lost some weight as well. She is watching her diet. Past Medical History:   Diagnosis Date    Asthma     Blindness of left eye     Cervical herniated disc     C5-C6, with nerve compression.  Congenital hip dysplasia     Degenerative joint disease     (Right knee) -     Depression     Dysfunctional uterine bleeding     Dyspnea     (progressive - multifactorial.    Failed total right knee replacement (HCC)     Fatigue     Fibromyalgia     Generalized anxiety disorder     Grief at loss of child     4-yr old granddaughter  of brain tumor in .  Hip pain, bilateral     Secondary to piriformis syndrome and bilateral greater trochanteric bursitis. (Injection of Celestone and Marcaine).  History of tobacco use     Currently not smoking.  Hypertension     Inverted nipple     Left. Lifelong    Kidney stone     Lumbar disc disease     Chronic.  Obesity     Osteoarthritis     Degenerative joint disease - of all joints.  Seasonal allergic rhinitis     Shoulder pain, left     (Injection of Celestone/Marcaine subacromially) - Dr. Juanis Yu - 10/2008.  Shoulder pain, right     (Injection of Celestone/Marcaine subacromially). Dr. Juanis Yu - 10/2008.     Supraventricular tachycardia, paroxysmal ([facility] [address] Status post cardiac ablation.  Vitamin D deficiency        Past Surgical History:   Procedure Laterality Date    ATRIAL ABLATION SURGERY  2006    Radiofrequency ablation of slow AV pawel pathway.  CARDIAC CATHETERIZATION  2006    no blockages    CHOLECYSTECTOMY, LAPAROSCOPIC  2011    ( Simone Cousins)    JOINT REPLACEMENT Right     FAILED KNEE RE;PLAC    KNEE ARTHROPLASTY Right 2006    OTHER SURGICAL HISTORY      Epidural steroids to cervical spine. [address] HISTORY      ECT therapy, one session only and she refused any further    [place] TOTAL KNEE ARTHROPLASTY Left 2018    Left Total Knee Arthroplasty performed [facility] Michael Gamb[facility]       Family History   Problem Relation Age of Onset    Breast Cancer Mother 76    High Blood Pressure Mother     Coronary Art Dis Mother         Multiple stents.  Diabetes Mother     Heart Disease Father     Coronary Art Dis Father         Myocardial infarction at age 54 and .  COPD Father     Arthritis Brother     COPD Brother     Heart Disease Brother         Congenital heart defect -  at 10 wks of age.  Cancer Other           Allergies   Allergen Reactions    Latex Rash    Fentanyl Other (See Comments)     sleepwalking       Current Outpatient Medications   Medication Sig Dispense Refill    ALPRAZolam (XANAX) 1 MG tablet Take 1 tablet by mouth 3 times daily as needed for Anxiety for up to 90 days. 270 tablet 0    zoster recombinant adjuvanted vaccine (SHINGRIX) 50 MCG/0.5ML SUSR injection Inject 0.5 mLs into the muscle See Admin Instructions 1 dose now and repeat in 2-6 months 0.5 mL 1    levocetirizine (XYZAL) 5 MG tablet Take 1 tablet by mouth nightly 30 tablet 11    [START ON 2019] oxyCODONE-acetaminophen (PERCOCET) 7.5-325 MG per tablet Take 1 tablet by mouth 5 times daily for 30 days.  150 tablet 0    albuterol sulfate HFA (VENTOLIN HFA) 108 (90 Base) MCG/ACT inhaler INHALE 2 PUFFS BY MOUTH EVERY 4 HOURS 18 g 0    PATADAY 0.2 % SOLN ophthalmic solution instill 1 drop into both eyes once daily if needed 2.5 mL 0    fluticasone (FLONASE) 50 MCG/ACT nasal spray instill 2 sprays into each nostril once daily 16 g 0    cyclobenzaprine (FLEXERIL) 10 MG tablet take 1 tablet by mouth every 8 hours if needed for MSUCLE SPASMS 90 tablet 11    omeprazole (PRILOSEC) 20 MG delayed release capsule take 1 capsule by mouth twice a day 60 capsule 5    metoprolol tartrate (LOPRESSOR) 25 MG tablet take 1 tablet by mouth twice a day 60 tablet 5    rizatriptan (MAXALT-MLT) 10 MG disintegrating tablet Take 1 tablet by mouth once as needed for Migraine May repeat in 2 hours if needed 9 tablet 11    Handicap Placard MISC by Does not apply route 2 each 0    Blood Pressure KIT 1 kit by Does not apply route 2 times daily as needed (BP) 1 kit 0    clonazePAM (KLONOPIN) 1 MG tablet take 1 tablet by mouth at bedtime. 30 tablet 5     No current facility-administered medications for this visit. Review of Systems   Constitutional: Negative. HENT: Negative. Respiratory: Negative. Cardiovascular: Negative. Gastrointestinal: Negative. Genitourinary: Positive for dysuria, frequency and pelvic pain (discomfort and pressure). Musculoskeletal: Negative. Neurological: Negative. Psychiatric/Behavioral: Negative. Objective:       /74 (Site: Right Upper Arm, Position: Sitting, Cuff Size: Medium Adult)   Pulse 84   Temp 97.9 °F (36.6 °C) (Tympanic)   Resp 12   Ht 5' 2.99\" (1.6 m)   Wt 244 lb 9.6 oz (110.9 kg)   LMP 11/01/2011 (Exact Date)   SpO2 98%   Breastfeeding? No   BMI 43.34 kg/m²     Physical Exam   Constitutional: She is oriented to person, place, and time. Vital signs are normal. She appears well-developed and well-nourished. She is active and cooperative. HENT:   Head: Normocephalic and atraumatic.    Right Ear: External ear normal.   Left Ear: External ear normal.   Nose: Nose normal.   Mouth/Throat: Oropharynx is clear and moist.   Eyes: Pupils are equal, round, and reactive to light. Conjunctivae and EOM are normal.   Neck: Normal range of motion. Cardiovascular: Normal rate and regular rhythm. Pulmonary/Chest: Effort normal and breath sounds normal.   Abdominal: Soft. Bowel sounds are normal.   Musculoskeletal: Normal range of motion. Neurological: She is alert and oriented to person, place, and time. Skin: Skin is warm and dry. Psychiatric: She has a normal mood and affect. Her behavior is normal. Judgment and thought content normal.   Nursing note and vitals reviewed. Assessment & Plan:      1. Routine general medical examination at a health care facility      2. Generalized anxiety disorder-chronic stable on medication    - ALPRAZolam (XANAX) 1 MG tablet; Take 1 tablet by mouth 3 times daily as needed for Anxiety for up to 90 days. Dispense: 270 tablet; Refill: 0    3. Abdominal pressure-acute new  Will call with results  - Urinalysis Reflex to Culture; Future    4. Morbid obesity with BMI of 40.0-44.9, adult (HCC)-chronic  Cont of diet and lifestyle    5. Moderate major depression (HCC)-chronic stable      6. Supraventricular tachycardia, paroxysmal (HCC)-chronic stable  Cont seeing cardiologist    7. Allergic rhinitis, unspecified seasonality, unspecified trigger-chronic stable  Trial new medication and we will see if this one gets covered. - levocetirizine (XYZAL) 5 MG tablet; Take 1 tablet by mouth nightly  Dispense: 30 tablet; Refill: 11    Answered all of the patient's questions. Agrees with plan of care. Follow up 6 month follow up or sooner if needed.       TITO Chan - CNP   4/15/2019 11:05 AM

## 2019-04-15 NOTE — PROGRESS NOTES
Medicare Annual Wellness Visit  Name: Emily Swift Date: 4/15/2019   MRN: [id number] Sex: Female   Age: 64 y.o. Ethnicity: Non-/Non    : 1960 Race: Roland Merchant is here for Medicare AWV; Anxiety (needs refill of Xanax); Allergies (her allergy med is no longer covered by insurance plan, wants to see if she can get a new script-aslo wants to know what she can get allergy eye drops); Urinary Tract Infection (stinging and burning); and Hypertension    Screenings for behavioral, psychosocial and functional/safety risks, and cognitive dysfunction are all negative except as indicated below. These results, as well as other patient data from the [address] form, are documented in Flowsheets linked to this Encounter. Allergies   Allergen Reactions    Latex Rash    Fentanyl Other (See Comments)     sleepwalking     Prior to Visit Medications    Medication Sig Taking? Authorizing Provider   oxyCODONE-acetaminophen (PERCOCET) 7.5-325 MG per tablet Take 1 tablet by mouth 5 times daily for 30 days.  Yes Minerva No, TITO Corral CNP   albuterol sulfate HFA (VENTOLIN HFA) 108 (90 Base) MCG/ACT inhaler INHALE 2 PUFFS BY MOUTH EVERY 4 HOURS Yes TITO Gilbert CNP   PATADAY 0.2 % SOLN ophthalmic solution instill 1 drop into both eyes once daily if needed Yes TITO Gilbert CNP   fluticasone (FLONASE) 50 MCG/ACT nasal spray instill 2 sprays into each nostril once daily Yes TITO Gilbert CNP   cyclobenzaprine (FLEXERIL) 10 MG tablet take 1 tablet by mouth every 8 hours if needed for MSUCLE SPASMS Yes Minerva No, TITO Corral CNP   omeprazole (PRILOSEC) 20 MG delayed release capsule take 1 capsule by mouth twice a day Yes TITO Gilbert CNP   metoprolol tartrate (LOPRESSOR) 25 MG tablet take 1 tablet by mouth twice a day Yes TITO Gilbert CNP   ALPRAZolam (XANAX) 1 MG tablet Take 1 tablet by mouth 3 times daily as needed for Anxiety for up to 90 days. Magdaline TITO Kennedy - CNP   rizatriptan (MAXALT-MLT) 10 MG disintegrating tablet Take 1 tablet by mouth once as needed for Migraine May repeat in 2 hours if needed Yes Bren Arce MD   Handfarzadp Placard MISC by Does not apply route Yes Bren Arce MD   cetirizine (ZYRTEC) 10 MG tablet take 1 tablet by mouth once daily Yes Mary Chapman,    Blood Pressure KIT 1 kit by Does not apply route 2 times daily as needed (BP)  TITO Gilbert - CNP   clonazePAM (KLONOPIN) 1 MG tablet take 1 tablet by mouth at bedtime. Bren Arce MD     Past Medical History:   Diagnosis Date    Asthma     Blindness of left eye     Cervical herniated disc     C5-C6, with nerve compression.  Congenital hip dysplasia     Degenerative joint disease     (Right knee) -     Depression     Dysfunctional uterine bleeding     Dyspnea     (progressive - multifactorial.    Failed total right knee replacement (HCC)     Fatigue     Fibromyalgia     Generalized anxiety disorder     Grief at loss of child     4-yr old granddaughter  of brain tumor in .  Hip pain, bilateral     Secondary to piriformis syndrome and bilateral greater trochanteric bursitis. (Injection of Celestone and Marcaine).  History of tobacco use     Currently not smoking.  Hypertension     Inverted nipple     Left. Lifelong    Kidney stone     Lumbar disc disease     Chronic.  Obesity     Osteoarthritis     Degenerative joint disease - of all joints.  Seasonal allergic rhinitis     Shoulder pain, left     (Injection of Celestone/Marcaine subacromially) - Dr. Dany Wyatt - 10/2008.  Shoulder pain, right     (Injection of Celestone/Marcaine subacromially). Dr. Dany Wyatt - 10/2008.  Supraventricular tachycardia, paroxysmal (HCC)     Status post cardiac ablation.     Vitamin D deficiency      Past Surgical History:   Procedure Laterality Date    ATRIAL ABLATION SURGERY 2006    Radiofrequency ablation of slow AV pawel pathway.  CARDIAC CATHETERIZATION  2006    no blockages    CHOLECYSTECTOMY, LAPAROSCOPIC  2011    (Dr. Holden Anaya)    JOINT REPLACEMENT Right     FAILED KNEE RE;PLAC    KNEE ARTHROPLASTY Right 2006    OTHER SURGICAL HISTORY      Epidural steroids to cervical spine. [address] HISTORY      ECT therapy, one session only and she refused any further    [place] TOTAL KNEE ARTHROPLASTY Left 2018    Left Total Knee Arthroplasty performed by Eron Nar[facility]an MD [facility]     Family History   Problem Relation Age of Onset    Breast Cancer Mother 76    High Blood Pressure Mother     Coronary Art Dis Mother         Multiple stents.  Diabetes Mother     Heart Disease Father     Coronary Art Dis Father         Myocardial infarction at age 54 and .  COPD Father     Arthritis Brother     COPD Brother     Heart Disease Brother         Congenital heart defect -  at 10 wks of age.  Cancer Other        CareTeam (Including outside providers/suppliers regularly involved in providing care):   Patient Care Team:  TITO Méndez CNP as PCP - General (Family Medicine)  TITO Méndez CNP as PCP - [facility] Attributed Provider    Wt Readings from Last 3 Encounters:   04/15/19 244 lb 9.6 oz (110.9 kg)   19 248 lb 14.4 oz (112.9 kg)   19 249 lb (112.9 kg)     Vitals:    04/15/19 1041   BP: 124/74   Site: Right Upper Arm   Position: Sitting   Cuff Size: Medium Adult   Pulse: 84   Resp: 12   Temp: 97.9 °F (36.6 °C)   TempSrc: Tympanic   SpO2: 98%   Weight: 244 lb 9.6 oz (110.9 kg)   Height: 5' 2.99\" (1.6 m)     Body mass index is 43.34 kg/m². Based upon direct observation of the patient, evaluation of cognition reveals recent and remote memory intact. Patient's complete Health Risk Assessment and screening values have been reviewed and are found in Flowsheets.  The following problems were reviewed today and where indicated follow up appointments were made and/or referrals ordered. Positive Risk Factor Screenings with Interventions:     Fall Risk:  Timed Up and Go Test > 12 seconds?: no  2 or more falls in past year?: (!) yes(within last 6 months)  Fall with injury in past year?: no  Fall Risk Assessment[de-identified] (!) Positive Screening for Falls  Fall Risk Interventions:    · Patient declines any further evaluation/treatment for this issue    Health Habits/Nutrition:     Body mass index is 43.34 kg/m². Health Habits/Nutrition Interventions:  · will work on diet and exercise at home    Personalized Preventive Plan   Current Health Maintenance Status  Immunization History   Administered Date(s) Administered    Influenza Virus Vaccine 12/14/2009, 10/07/2010, 11/09/2012, 01/15/2014, 11/04/2014, 12/07/2015    Influenza, [place], 3 yrs and older, IM, PF (Fluzone 3 yrs and older or Afluria 5 yrs and older) 10/28/2016, 09/29/2017, 10/15/2018    Pneumococcal 13-valent Conjugate ([id number]) 05/12/2015    Pneumococcal Polysaccharide ([id number]) 03/04/2011    Tdap (Boostrix, Adacel) 01/01/2011        Health Maintenance   Topic Date Due    Shingles Vaccine (1 of 2) 02/10/2012    Breast cancer screen  09/29/2018    Diabetes screen  09/14/2020    Cervical cancer screen  11/08/2020    DTaP/Tdap/Td vaccine (2 - Td) 01/01/2021    Colon cancer screen colonoscopy  08/14/2023    Lipid screen  11/08/2023    Flu vaccine  Completed    Pneumococcal 0-64 years Vaccine  Completed    Hepatitis C screen  Addressed    HIV screen  Addressed     Recommendations for Preventive Services Due: see orders and patient instructions/AVS.  .   Recommended screening schedule for the next 5-10 years is provided to the patient in written form: see Patient Instructions/AVS.

## 2019-05-03 ENCOUNTER — TELEPHONE (OUTPATIENT)
Dept: FAMILY MEDICINE CLINIC | Age: 59
End: 2019-05-03

## 2019-05-03 NOTE — TELEPHONE ENCOUNTER
Do you want to order BMP and/or HA1C to give patient peace of mind? Another UA? Patient declined referral to OB/GYN previously.

## 2019-05-03 NOTE — TELEPHONE ENCOUNTER
We can order A1c. Her last CMP was okay. Please remind patient that this may not be covered under medicare.

## 2019-05-06 NOTE — TELEPHONE ENCOUNTER
Pt informed we could order an A1c, but pt states I'm calling about a UTI, and you should be able to order a lab that is covered under my ins, please advise pt at above number.

## 2019-05-06 NOTE — TELEPHONE ENCOUNTER
Left message for patient to return phone call, left message that she could do an E-visit so that we are aware of all symptoms.

## 2019-05-07 ENCOUNTER — PATIENT MESSAGE (OUTPATIENT)
Dept: PRIMARY CARE CLINIC | Age: 59
End: 2019-05-07

## 2019-05-07 ENCOUNTER — TELEPHONE (OUTPATIENT)
Dept: FAMILY MEDICINE CLINIC | Age: 59
End: 2019-05-07

## 2019-05-07 NOTE — TELEPHONE ENCOUNTER
From: Andrea Cleaning  To: Tristan Torres LPN  Sent: 3965 2:98 PM EDT  Subject: RE:urinary symptoms    Am babysitting grandkids today, REALLY don't feel This is a OBGYN issue, I am not sexual active , am post menopause . ..would not account for unusual smell to urine, as I am high risk for diabetes, ( grandmother  age 76 from it, ) mother age [de-identified] currently diabetic. .causes some concerns. This is why I asked for blood work to determine symptoms of urinary tract infection. Remona Mellow Remona Mellow I really do not understand why it's become such an ordeal, I have tested negative for urinary tract infection in past and then tested positive later. .is this so unusual. Meanwhile while we debate . ..im not getting any better, this is not a new problem, she was awhere at my last visit. Dannielle Palencia 1960  ----- Message -----  From: PRAKASH PATINO  Sent: 29 9:45 AM EDT  To: Courtney Palencia  Subject: urinary symptoms  Adolfo Lujan would like to see you for an appointment. Please give us a call and schedule. She is in Urgent care today until 5.

## 2019-05-07 NOTE — TELEPHONE ENCOUNTER
Per patient email:  \"As I said on April appointment,  was having discomfort and unusual smell to urine,  not really pain but a weight like feel. .and urgency.  Urinalysis said neg. But 3 weeks since test ,still same symptoms,  smell is rather a yeast type smell, yet no yeast infection symptoms. Addie Gaona tried to do an evisit,  it refused saying symptoms needed a real visit. When I refered to blood urine testing,  it was because I understand it is more accurate than standard urinalysis. nurse called left message stating my Medicare and Medicaid services would not pay for the test, I'm in no shape financial to pay out of pocket for the test, so it appears I will remain untreated. Addie Palencia  1960     Do you want to order a new UA?

## 2019-05-07 NOTE — TELEPHONE ENCOUNTER
From: Percell Gitelman  To: Jayden Cavazos LPN  Sent: 6/8/6790 6:41 PM EDT  Subject: RE:urinary symptoms    Also. ..upon checking chart, I have had episodes of abdominal pain, Dx as appendigitis, E.R. doctor at both [facility] and [facility] have mentioned if issue continued, may need appendix removed, but then we have the mystery of urine scent. ..so ... T.E.  ----- Message -----  From: PRAKASH PATINO  Sent: 4/9/1092 9:45 AM EDT  To: Darline Palencia  Subject: urinary symptoms  Kathleen Ervin would like to see you for an appointment. Please give us a call and schedule. She is in Urgent care today until 5.

## 2019-05-14 DIAGNOSIS — M25.562 CHRONIC PAIN OF LEFT KNEE: ICD-10-CM

## 2019-05-14 DIAGNOSIS — M79.7 FIBROMYALGIA: ICD-10-CM

## 2019-05-14 DIAGNOSIS — G89.29 CHRONIC PAIN OF LEFT KNEE: ICD-10-CM

## 2019-05-15 RX ORDER — OXYCODONE AND ACETAMINOPHEN 7.5; 325 MG/1; MG/1
1 TABLET ORAL
Qty: 150 TABLET | Refills: 0 | Status: SHIPPED | OUTPATIENT
Start: 2019-05-18 | End: 2019-06-05 | Stop reason: SDUPTHER

## 2019-05-24 ENCOUNTER — TELEPHONE (OUTPATIENT)
Dept: OBGYN | Age: 59
End: 2019-05-24

## 2019-05-28 RX ORDER — FLUTICASONE PROPIONATE 50 MCG
SPRAY, SUSPENSION (ML) NASAL
Qty: 16 G | Refills: 0 | Status: SHIPPED | OUTPATIENT
Start: 2019-05-28 | End: 2019-08-28 | Stop reason: SDUPTHER

## 2019-05-28 RX ORDER — OLOPATADINE HYDROCHLORIDE 2 MG/ML
SOLUTION/ DROPS OPHTHALMIC
Qty: 2.5 ML | Refills: 0 | Status: SHIPPED | OUTPATIENT
Start: 2019-05-28 | End: 2019-08-28 | Stop reason: SDUPTHER

## 2019-05-28 RX ORDER — ALBUTEROL SULFATE 90 UG/1
AEROSOL, METERED RESPIRATORY (INHALATION)
Qty: 18 G | Refills: 0 | Status: SHIPPED | OUTPATIENT
Start: 2019-05-28 | End: 2019-08-28 | Stop reason: SDUPTHER

## 2019-05-28 NOTE — TELEPHONE ENCOUNTER
Last Appt:  4/15/2019  Next Appt:   7/15/2019    Eye gtts prev prescribed by LK, prior to pt EST care with [facility]    Med verified in Epic

## 2019-06-03 ENCOUNTER — HOSPITAL ENCOUNTER (OUTPATIENT)
Age: 59
Setting detail: SPECIMEN
Discharge: HOME | End: 2019-06-03
Payer: MEDICARE

## 2019-06-03 ENCOUNTER — OFFICE VISIT (OUTPATIENT)
Dept: PRIMARY CARE CLINIC | Age: 59
End: 2019-06-03
Payer: MEDICARE

## 2019-06-03 VITALS
HEIGHT: 63 IN | WEIGHT: 245 LBS | DIASTOLIC BLOOD PRESSURE: 80 MMHG | RESPIRATION RATE: 14 BRPM | SYSTOLIC BLOOD PRESSURE: 124 MMHG | HEART RATE: 67 BPM | BODY MASS INDEX: 43.41 KG/M2 | OXYGEN SATURATION: 94 % | TEMPERATURE: 96.6 F

## 2019-06-03 DIAGNOSIS — R30.0 DYSURIA: ICD-10-CM

## 2019-06-03 DIAGNOSIS — J01.41 ACUTE RECURRENT PANSINUSITIS: ICD-10-CM

## 2019-06-03 DIAGNOSIS — N30.00 ACUTE CYSTITIS WITHOUT HEMATURIA: ICD-10-CM

## 2019-06-03 DIAGNOSIS — R30.0 DYSURIA: Primary | ICD-10-CM

## 2019-06-03 LAB
-: ABNORMAL
AMORPHOUS: ABNORMAL
BACTERIA: ABNORMAL
BILIRUBIN URINE: NEGATIVE
CASTS UA: ABNORMAL /LPF (ref 0–2)
COLOR: ABNORMAL
COMMENT UA: ABNORMAL
CRYSTALS, UA: ABNORMAL /HPF
EPITHELIAL CELLS UA: ABNORMAL /HPF (ref 0–5)
GLUCOSE URINE: NEGATIVE
KETONES, URINE: ABNORMAL
LEUKOCYTE ESTERASE, URINE: NEGATIVE
MUCUS: ABNORMAL
NITRITE, URINE: POSITIVE
OTHER OBSERVATIONS UA: ABNORMAL
PH UA: 7 (ref 5–6)
PROTEIN UA: ABNORMAL
RBC UA: ABNORMAL /HPF (ref 0–4)
RENAL EPITHELIAL, UA: ABNORMAL /HPF
SPECIFIC GRAVITY UA: 1.02 (ref 1.01–1.02)
TRICHOMONAS: ABNORMAL
TURBIDITY: ABNORMAL
URINE HGB: NEGATIVE
UROBILINOGEN, URINE: NORMAL
WBC UA: ABNORMAL /HPF (ref 0–4)
YEAST: ABNORMAL

## 2019-06-03 PROCEDURE — 81001 URINALYSIS AUTO W/SCOPE: CPT

## 2019-06-03 PROCEDURE — G8417 CALC BMI ABV UP PARAM F/U: HCPCS | Performed by: PHYSICIAN ASSISTANT

## 2019-06-03 PROCEDURE — 87086 URINE CULTURE/COLONY COUNT: CPT

## 2019-06-03 PROCEDURE — 87186 SC STD MICRODIL/AGAR DIL: CPT

## 2019-06-03 PROCEDURE — 3017F COLORECTAL CA SCREEN DOC REV: CPT | Performed by: PHYSICIAN ASSISTANT

## 2019-06-03 PROCEDURE — G8427 DOCREV CUR MEDS BY ELIG CLIN: HCPCS | Performed by: PHYSICIAN ASSISTANT

## 2019-06-03 PROCEDURE — 87088 URINE BACTERIA CULTURE: CPT

## 2019-06-03 PROCEDURE — 1036F TOBACCO NON-USER: CPT | Performed by: PHYSICIAN ASSISTANT

## 2019-06-03 PROCEDURE — 99214 OFFICE O/P EST MOD 30 MIN: CPT | Performed by: PHYSICIAN ASSISTANT

## 2019-06-03 RX ORDER — SULFAMETHOXAZOLE AND TRIMETHOPRIM 800; 160 MG/1; MG/1
1 TABLET ORAL 2 TIMES DAILY
Qty: 20 TABLET | Refills: 0 | Status: SHIPPED | OUTPATIENT
Start: 2019-06-03 | End: 2019-06-13

## 2019-06-03 RX ORDER — PHENAZOPYRIDINE HYDROCHLORIDE 200 MG/1
200 TABLET, FILM COATED ORAL 3 TIMES DAILY PRN
Qty: 9 TABLET | Refills: 0 | Status: SHIPPED | OUTPATIENT
Start: 2019-06-03 | End: 2019-06-06

## 2019-06-03 RX ORDER — AMOXICILLIN 875 MG/1
875 TABLET, COATED ORAL 2 TIMES DAILY
Qty: 28 TABLET | Refills: 0 | Status: SHIPPED | OUTPATIENT
Start: 2019-06-03 | End: 2019-06-03 | Stop reason: CLARIF

## 2019-06-03 ASSESSMENT — ENCOUNTER SYMPTOMS
DIARRHEA: 0
VOMITING: 0
SINUS PRESSURE: 1
NAUSEA: 0
TROUBLE SWALLOWING: 0
ABDOMINAL PAIN: 1
SINUS PAIN: 1
CHEST TIGHTNESS: 0
SHORTNESS OF BREATH: 0
SORE THROAT: 1
WHEEZING: 0
COUGH: 1

## 2019-06-03 ASSESSMENT — PATIENT HEALTH QUESTIONNAIRE - PHQ9
SUM OF ALL RESPONSES TO PHQ9 QUESTIONS 1 & 2: 0
SUM OF ALL RESPONSES TO PHQ QUESTIONS 1-9: 0
2. FEELING DOWN, DEPRESSED OR HOPELESS: 0
SUM OF ALL RESPONSES TO PHQ QUESTIONS 1-9: 0
1. LITTLE INTEREST OR PLEASURE IN DOING THINGS: 0

## 2019-06-03 NOTE — PROGRESS NOTES
Subjective:      Patient ID: Alice Silvestre is a 64 y.o. female. Patient is seen due to ongoing sinus symptoms. Is getting worse so came in. Breathing fine but has congestion. Has pain and pressure. Can not hear out of the left ear. Review of Systems   Constitutional: Negative for appetite change, fatigue and fever. HENT: Positive for congestion, postnasal drip, sinus pressure, sinus pain and sore throat. Negative for trouble swallowing. Using her flonase. Has bad taste in throat and mouth. Respiratory: Positive for cough. Negative for chest tightness, shortness of breath and wheezing. States had a cough mucous at times is green gray. Cardiovascular: Negative for chest pain and palpitations. Gastrointestinal: Positive for abdominal pain. Negative for diarrhea, nausea and vomiting. Having lower abdominal discomfort. Genitourinary: Positive for dysuria, frequency and urgency. Negative for difficulty urinating and hematuria. Has urinary odor. Urine is cloudy. Musculoskeletal: Positive for myalgias. Skin: Negative for rash. Neurological: Negative for light-headedness, numbness and headaches. Psychiatric/Behavioral: Positive for sleep disturbance. The patient is not nervous/anxious. Past Medical History:   Diagnosis Date    Asthma     Blindness of left eye     Cervical herniated disc     C5-C6, with nerve compression.  Congenital hip dysplasia     Degenerative joint disease     (Right knee) -     Depression     Dysfunctional uterine bleeding     Dyspnea     (progressive - multifactorial.    Failed total right knee replacement (HCC)     Fatigue     Fibromyalgia     Generalized anxiety disorder     Grief at loss of child     4-yr old granddaughter  of brain tumor in .  Hip pain, bilateral     Secondary to piriformis syndrome and bilateral greater trochanteric bursitis. (Injection of Celestone and Marcaine).     History of tobacco use     Currently not smoking.  Hypertension     Inverted nipple     Left. Lifelong    Kidney stone     Lumbar disc disease     Chronic.  Obesity     Osteoarthritis     Degenerative joint disease - of all joints.  Seasonal allergic rhinitis     Shoulder pain, left     (Injection of Celestone/Marcaine subacromially) - Dr. Dany Wyatt - 10/2008.  Shoulder pain, right     (Injection of Celestone/Marcaine subacromially). Dr. Dany Wyatt - 10/2008.  Supraventricular tachycardia, paroxysmal (HCC)     Status post cardiac ablation.  Vitamin D deficiency      Past Surgical History:   Procedure Laterality Date    ATRIAL ABLATION SURGERY  2006    Radiofrequency ablation of slow AV pawel pathway.  CARDIAC CATHETERIZATION  2006    no blockages    CHOLECYSTECTOMY, LAPAROSCOPIC  03-    (Dr. Vicente Rodriguez)    JOINT REPLACEMENT Right     FAILED KNEE RE;PLAC    KNEE ARTHROPLASTY Right 2006    OTHER SURGICAL HISTORY  2009    Epidural steroids to cervical spine.  OTHER SURGICAL HISTORY  2014    ECT therapy, one session only and she refused any further    [place] TOTAL KNEE ARTHROPLASTY Left 6/5/2018    Left Total Knee Arthroplasty performed by Gomez Panda MD at [address] History     Socioeconomic History    Marital status:      Spouse name: Not on file    Number of children: Not on file    Years of education: Not on file    Highest education level: Not on file   Occupational History    Occupation: disabled [employer]   Social Needs    Financial resource strain: Not on file    Food insecurity:     Worry: Not on file     Inability: Not on file    Transportation needs:     Medical: Not on file     Non-medical: Not on file   Tobacco Use    Smoking status: Former Smoker     Packs/day: 0.25     Years: 35.00     Pack years: 8.75     Types: Cigarettes    Smokeless tobacco: Former User     Quit date: 6/1/2015    Tobacco comment: 6 cigarettes per day since age 15 or so. Substance and Sexual Activity    Alcohol use: Yes     Comment: rarely    Drug use: No    Sexual activity: Not Currently     Partners: Male   Lifestyle    Physical activity:     Days per week: Not on file     Minutes per session: Not on file    Stress: Not on file   Relationships    Social connections:     Talks on phone: Not on file     Gets together: Not on file     Attends Druze service: Not on file     Active member of club or organization: Not on file     Attends meetings of clubs or organizations: Not on file     Relationship status: Not on file    Intimate partner violence:     Fear of current or ex partner: Not on file     Emotionally abused: Not on file     Physically abused: Not on file     Forced sexual activity: Not on file   Other Topics Concern    Not on file   Social History Narrative    Not on file     Family History   Problem Relation Age of Onset    Breast Cancer Mother 76    High Blood Pressure Mother     Coronary Art Dis Mother         Multiple stents.  Diabetes Mother     Heart Disease Father     Coronary Art Dis Father         Myocardial infarction at age 54 and .  COPD Father     Arthritis Brother     COPD Brother     Heart Disease Brother         Congenital heart defect -  at 10 wks of age.  Cancer Other        Allergies   Allergen Reactions    Latex Rash    Fentanyl Other (See Comments)     sleepwalking       /80   Pulse 67   Temp 96.6 °F (35.9 °C)   Resp 14   Ht 5' 3\" (1.6 m)   Wt 245 lb (111.1 kg)   LMP 2011 (Exact Date)   SpO2 94%   BMI 43.40 kg/m²     Objective:   Physical Exam   Constitutional: She is oriented to person, place, and time. She appears well-developed and well-nourished. No distress. HENT:   Head: Normocephalic and atraumatic. Mouth/Throat: No oropharyngeal exudate. She had pain with palpation over the maxillary sinuses bilaterally. Postnasal drip noted. Eyes: Conjunctivae are normal. No scleral icterus. Neck: Normal range of motion. Neck supple. No thyromegaly present. Cardiovascular: Normal rate, regular rhythm and normal heart sounds. Exam reveals no gallop and no friction rub. No murmur heard. Pulmonary/Chest: Effort normal and breath sounds normal. She has no wheezes. She has no rales. Abdominal: Soft. She exhibits no distension, no abdominal bruit and no mass. There is no hepatosplenomegaly. There is tenderness. There is no CVA tenderness. Slight discomfort with palpation over the lower abdomen. No guarding rigidity or rebound noted. Musculoskeletal: She exhibits no edema. Lymphadenopathy:     She has no cervical adenopathy. Neurological: She is alert and oriented to person, place, and time. Skin: Skin is warm and dry. No rash noted. No erythema. Psychiatric: She has a normal mood and affect. Nursing note and vitals reviewed.     Hospital Outpatient Visit on 06/03/2019   Component Date Value Ref Range Status    Color, UA 06/03/2019 NOT REPORTED  YELLOW Final    Turbidity UA 06/03/2019 NOT REPORTED  CLEAR Final    Glucose, Ur 06/03/2019 NEGATIVE  NEGATIVE Final    Bilirubin Urine 06/03/2019 NEGATIVE  NEGATIVE Final    Ketones, Urine 06/03/2019 TRACE* NEGATIVE Final    Specific Gravity, UA 06/03/2019 1.020  1.010 - 1.025 Final    Urine Hgb 06/03/2019 NEGATIVE  NEGATIVE Final    pH, UA 06/03/2019 7.0* 5.0 - 6.0 Final    Protein, UA 06/03/2019 1+* NEGATIVE Final    Urobilinogen, Urine 06/03/2019 Normal  Normal Final    Nitrite, Urine 06/03/2019 POSITIVE* NEGATIVE Final    Leukocyte Esterase, Urine 06/03/2019 NEGATIVE  NEGATIVE Final    Urinalysis Comments 06/03/2019 NOT REPORTED   Final    - 06/03/2019        Final    WBC, UA 06/03/2019 10 TO 25  0 - 4 /HPF Final    RBC, UA 06/03/2019 0 TO 4  0 - 4 /HPF Final    Casts UA 06/03/2019 NOT REPORTED  0 - 2 /LPF Final    Crystals UA 06/03/2019 NOT REPORTED  None /HPF Final    Epithelial Cells UA 06/03/2019 0 TO 4  0 - 5 /HPF

## 2019-06-04 ENCOUNTER — TELEPHONE (OUTPATIENT)
Dept: FAMILY MEDICINE CLINIC | Age: 59
End: 2019-06-04

## 2019-06-04 NOTE — TELEPHONE ENCOUNTER
Patient aware says she was able to  the pyridium but had to pay cash of $10.00 for it says she is feeling better today also.

## 2019-06-04 NOTE — TELEPHONE ENCOUNTER
Her insurance does not cover the pyridium says  She needs to have formulary med Methenamine Hippurate.

## 2019-06-05 ENCOUNTER — OFFICE VISIT (OUTPATIENT)
Dept: PAIN MANAGEMENT | Age: 59
End: 2019-06-05
Payer: MEDICARE

## 2019-06-05 VITALS
BODY MASS INDEX: 43.05 KG/M2 | SYSTOLIC BLOOD PRESSURE: 134 MMHG | HEART RATE: 70 BPM | HEIGHT: 63 IN | DIASTOLIC BLOOD PRESSURE: 74 MMHG | WEIGHT: 243 LBS | OXYGEN SATURATION: 96 %

## 2019-06-05 DIAGNOSIS — M54.12 CERVICAL RADICULOPATHY: ICD-10-CM

## 2019-06-05 DIAGNOSIS — M54.2 CERVICALGIA: ICD-10-CM

## 2019-06-05 DIAGNOSIS — M51.9 LUMBAR DISC DISEASE: ICD-10-CM

## 2019-06-05 DIAGNOSIS — M50.20 CERVICAL HERNIATED DISC: ICD-10-CM

## 2019-06-05 DIAGNOSIS — G89.29 ENCOUNTER FOR CHRONIC PAIN MANAGEMENT: ICD-10-CM

## 2019-06-05 DIAGNOSIS — M79.7 FIBROMYALGIA: Primary | ICD-10-CM

## 2019-06-05 PROBLEM — M25.562 CHRONIC PAIN OF LEFT KNEE: Status: ACTIVE | Noted: 2019-06-05

## 2019-06-05 LAB
CULTURE: ABNORMAL
Lab: ABNORMAL
SPECIMEN DESCRIPTION: ABNORMAL

## 2019-06-05 PROCEDURE — 1036F TOBACCO NON-USER: CPT | Performed by: NURSE PRACTITIONER

## 2019-06-05 PROCEDURE — G8417 CALC BMI ABV UP PARAM F/U: HCPCS | Performed by: NURSE PRACTITIONER

## 2019-06-05 PROCEDURE — G8427 DOCREV CUR MEDS BY ELIG CLIN: HCPCS | Performed by: NURSE PRACTITIONER

## 2019-06-05 PROCEDURE — 3017F COLORECTAL CA SCREEN DOC REV: CPT | Performed by: NURSE PRACTITIONER

## 2019-06-05 PROCEDURE — 99214 OFFICE O/P EST MOD 30 MIN: CPT | Performed by: NURSE PRACTITIONER

## 2019-06-05 RX ORDER — OXYCODONE AND ACETAMINOPHEN 7.5; 325 MG/1; MG/1
1 TABLET ORAL
Qty: 150 TABLET | Refills: 0 | Status: SHIPPED | OUTPATIENT
Start: 2019-06-17 | End: 2019-07-09 | Stop reason: SDUPTHER

## 2019-06-05 ASSESSMENT — ENCOUNTER SYMPTOMS
SHORTNESS OF BREATH: 0
BLOOD IN STOOL: 0
CONSTIPATION: 0
BACK PAIN: 1

## 2019-06-05 NOTE — PROGRESS NOTES
Subjective:      Patient ID: Andrea Cleaning is a 64 y.o. female. Chief Complaint   Patient presents with    Pain     fibromyalgia     Back Pain    Medication Check     Back Pain   Associated symptoms include numbness (tingling in hands). Pertinent negatives include no chest pain. Here today for routine pain clinic recheck, no change in pain level since last visit. Percocet effectively reducing low back pain. No side effects, denies constipation. Improves her overall quality of life by adequately reducing her pain so she is able to interact with and enjoy her grandchildren. Chronic pain conditions include back, neck, shoulders, unchanged. Degenerative conditions. She did undergo recent cervical flare, considered ER visit, gradually improving.      Pain Assessment  Location of Pain: Back  Location Modifiers: Left, Right, Inferior, Medial, Superior  Severity of Pain: 6  Duration of Pain: Persistent  Frequency of Pain: Constant  Aggravating Factors: Walking, Standing, Bending, Exercise, Straightening, Stretching, Kneeling, Squatting  Limiting Behavior: Yes  Relieving Factors: Heat, Rest  Result of Injury: No  Work-Related Injury: No  Are there other pain locations you wish to document?: No    Allergies   Allergen Reactions    Latex Rash    Fentanyl Other (See Comments)     sleepwalking       Outpatient Medications Marked as Taking for the 6/5/19 encounter (Office Visit) with TITO Lyles CNP   Medication Sig Dispense Refill    sulfamethoxazole-trimethoprim (BACTRIM DS;SEPTRA DS) 800-160 MG per tablet Take 1 tablet by mouth 2 times daily for 10 days 20 tablet 0    phenazopyridine (PYRIDIUM) 200 MG tablet Take 1 tablet by mouth 3 times daily as needed for Pain 9 tablet 0    olopatadine (PATADAY) 0.2 % SOLN ophthalmic solution INSTILL 1 DROP INTO BOTH EYES ONCE DAILY IF NEEDED 2.5 mL 0    fluticasone (FLONASE) 50 MCG/ACT nasal spray instill 2 sprays into each nostril once daily 16 g 0    albuterol sulfate HFA (VENTOLIN HFA) 108 (90 Base) MCG/ACT inhaler inhale 2 puffs by mouth every 4 hours if needed 18 g 0    oxyCODONE-acetaminophen (PERCOCET) 7.5-325 MG per tablet Take 1 tablet by mouth 5 times daily for 30 days. 150 tablet 0    ALPRAZolam (XANAX) 1 MG tablet Take 1 tablet by mouth 3 times daily as needed for Anxiety for up to 90 days. 270 tablet 0    cyclobenzaprine (FLEXERIL) 10 MG tablet take 1 tablet by mouth every 8 hours if needed for MSUCLE SPASMS 90 tablet 11    omeprazole (PRILOSEC) 20 MG delayed release capsule take 1 capsule by mouth twice a day 60 capsule 5    metoprolol tartrate (LOPRESSOR) 25 MG tablet take 1 tablet by mouth twice a day 60 tablet 5    Blood Pressure KIT 1 kit by Does not apply route 2 times daily as needed (BP) 1 kit 0    Handicap Placard MISC by Does not apply route 2 each 0       Past Medical History:   Diagnosis Date    Asthma     Blindness of left eye     Cervical herniated disc     C5-C6, with nerve compression.  Congenital hip dysplasia     Degenerative joint disease     (Right knee) -     Depression     Dysfunctional uterine bleeding     Dyspnea     (progressive - multifactorial.    Failed total right knee replacement (HCC)     Fatigue     Fibromyalgia     Generalized anxiety disorder     Grief at loss of child     4-yr old granddaughter  of brain tumor in .  Hip pain, bilateral     Secondary to piriformis syndrome and bilateral greater trochanteric bursitis. (Injection of Celestone and Marcaine).  History of tobacco use     Currently not smoking.  Hypertension     Inverted nipple     Left. Lifelong    Kidney stone     Lumbar disc disease     Chronic.  Obesity     Osteoarthritis     Degenerative joint disease - of all joints.  Seasonal allergic rhinitis     Shoulder pain, left     (Injection of Celestone/Marcaine subacromially) - Dr. Kirby Kil - 10/2008.     Shoulder pain, right     (Injection of Celestone/Marcaine subacromially). Dr. Fredrick Anaya - 10/2008.  Supraventricular tachycardia, paroxysmal (HCC)     Status post cardiac ablation.  Vitamin D deficiency        Past Surgical History:   Procedure Laterality Date    ATRIAL ABLATION SURGERY      Radiofrequency ablation of slow AV pawel pathway.  CARDIAC CATHETERIZATION      no blockages    CHOLECYSTECTOMY, LAPAROSCOPIC  2011    (Dr. Luis Pimentel)    JOINT REPLACEMENT Right     FAILED KNEE RE;PLAC    KNEE ARTHROPLASTY Right 2006    OTHER SURGICAL HISTORY      Epidural steroids to cervical spine. [address] HISTORY      ECT therapy, one session only and she refused any further    [place] TOTAL KNEE ARTHROPLASTY Left 2018    Left Total Knee Arthroplasty performed [facility] George [facility]       Family History   Problem Relation Age of Onset    Breast Cancer Mother 76    High Blood Pressure Mother     Coronary Art Dis Mother         Multiple stents.  Diabetes Mother     Heart Disease Father     Coronary Art Dis Father         Myocardial infarction at age 54 and .  COPD Father     Arthritis Brother     COPD Brother     Heart Disease Brother         Congenital heart defect -  at 10 wks of age.  Cancer Other        Social History     Socioeconomic History    Marital status:      Spouse name: None    Number of children: None    Years of education: None    Highest education level: None   Occupational History    Occupation: disabled [employer]   Social Needs    Financial resource strain: None    Food insecurity:     Worry: None     Inability: None    Transportation needs:     Medical: None     Non-medical: None   Tobacco Use    Smoking status: Former Smoker     Packs/day: 0.25     Years: 35.00     Pack years: 8.75     Types: Cigarettes    Smokeless tobacco: Former User     Quit date: 2015    Tobacco comment: 6 cigarettes per day since age 15 or so.    Substance and Sexual Activity  Alcohol use: Yes     Comment: rarely    Drug use: No    Sexual activity: Not Currently     Partners: Male   Lifestyle    Physical activity:     Days per week: None     Minutes per session: None    Stress: None   Relationships    Social connections:     Talks on phone: None     Gets together: None     Attends Rastafarian service: None     Active member of club or organization: None     Attends meetings of clubs or organizations: None     Relationship status: None    Intimate partner violence:     Fear of current or ex partner: None     Emotionally abused: None     Physically abused: None     Forced sexual activity: None   Other Topics Concern    None   Social History Narrative    None     Review of Systems   Constitutional: Negative for activity change. HENT: Negative. Respiratory: Negative for shortness of breath. Cardiovascular: Negative for chest pain. Gastrointestinal: Negative for blood in stool and constipation. Genitourinary: Positive for flank pain. Musculoskeletal: Positive for arthralgias, back pain, myalgias and neck pain. Neurological: Positive for numbness (tingling in hands). Psychiatric/Behavioral: Negative for sleep disturbance. Objective:   Physical Exam   Constitutional: She is oriented to person, place, and time. She appears well-developed and well-nourished. She is cooperative. No distress. She is not intubated. HENT:   Head: Normocephalic and atraumatic. Right Ear: External ear normal.   Left Ear: External ear normal.   Nose: Nose normal.   Eyes: Conjunctivae, EOM and lids are normal.   Cardiovascular: Intact distal pulses and normal pulses. Pulmonary/Chest: Effort normal. No accessory muscle usage. No apnea, no tachypnea and no bradypnea. She is not intubated. No respiratory distress. Abdominal: Normal appearance. Musculoskeletal:   Ambulates with single cane   Neurological: She is alert and oriented to person, place, and time.  No cranial nerve deficit. Skin: Skin is warm, dry and intact. She is not diaphoretic. No cyanosis. Nails show no clubbing. Psychiatric: She has a normal mood and affect. Her speech is normal and behavior is normal.   Vitals reviewed. Assessment:      1. Fibromyalgia    2. Lumbar disc disease    3. Encounter for chronic pain management    4. Cervical herniated disc    5. Cervicalgia    6. Cervical radiculopathy          Plan:      Chronic pain diagnoses such as   1. Fibromyalgia    2. Lumbar disc disease    3. Encounter for chronic pain management    4. Cervical herniated disc    5. Cervicalgia    6. Cervical radiculopathy     controlled on current medication regime, wll continue current pain medications to improve quality of life and function. Consider updating cervical imaging, recent flare, may consider  Neck injections if does not continue to improve. Declined physical therapy, in the past strenuous and hurtful. Controlled Substance Monitoring:    Acute and Chronic Pain Monitoring:   RX Monitoring 6/5/2019   Attestation -   Periodic Controlled Substance Monitoring Obtaining appropriate analgesic effect of treatment. ;Assessed functional status. ;No signs of potential drug abuse or diversion identified. Chronic Pain > 50 MEDD Obtained or confirmed \"Consent for Opioid Use\" on file. ;Re-evaluated the status of the patient's underlying condition causing pain.    Chronic Pain > 80 MEDD -           Follow up one month

## 2019-06-25 ENCOUNTER — OFFICE VISIT (OUTPATIENT)
Dept: PRIMARY CARE CLINIC | Age: 59
End: 2019-06-25
Payer: MEDICARE

## 2019-06-25 ENCOUNTER — HOSPITAL ENCOUNTER (OUTPATIENT)
Age: 59
Setting detail: SPECIMEN
Discharge: HOME | End: 2019-06-25
Payer: MEDICARE

## 2019-06-25 VITALS
RESPIRATION RATE: 18 BRPM | SYSTOLIC BLOOD PRESSURE: 120 MMHG | WEIGHT: 243 LBS | OXYGEN SATURATION: 97 % | HEIGHT: 63 IN | TEMPERATURE: 98.2 F | BODY MASS INDEX: 43.05 KG/M2 | HEART RATE: 82 BPM | DIASTOLIC BLOOD PRESSURE: 82 MMHG

## 2019-06-25 DIAGNOSIS — J06.9 VIRAL UPPER RESPIRATORY TRACT INFECTION: ICD-10-CM

## 2019-06-25 DIAGNOSIS — R30.9 PAINFUL URINATION: ICD-10-CM

## 2019-06-25 DIAGNOSIS — N30.00 ACUTE CYSTITIS WITHOUT HEMATURIA: Primary | ICD-10-CM

## 2019-06-25 LAB
-: ABNORMAL
AMORPHOUS: ABNORMAL
BACTERIA: ABNORMAL
BILIRUBIN URINE: ABNORMAL
CASTS UA: ABNORMAL /LPF (ref 0–2)
COLOR: ABNORMAL
COMMENT UA: ABNORMAL
CRYSTALS, UA: ABNORMAL /HPF
EPITHELIAL CELLS UA: ABNORMAL /HPF (ref 0–5)
GLUCOSE URINE: NEGATIVE
KETONES, URINE: ABNORMAL
LEUKOCYTE ESTERASE, URINE: ABNORMAL
MUCUS: ABNORMAL
NITRITE, URINE: NEGATIVE
OTHER OBSERVATIONS UA: ABNORMAL
PH UA: 6 (ref 5–6)
PROTEIN UA: ABNORMAL
RBC UA: ABNORMAL /HPF (ref 0–4)
RENAL EPITHELIAL, UA: ABNORMAL /HPF
SPECIFIC GRAVITY UA: 1.02 (ref 1.01–1.02)
TRICHOMONAS: ABNORMAL
TURBIDITY: ABNORMAL
URINE HGB: NEGATIVE
UROBILINOGEN, URINE: NORMAL
WBC UA: ABNORMAL /HPF (ref 0–4)
YEAST: ABNORMAL

## 2019-06-25 PROCEDURE — 99213 OFFICE O/P EST LOW 20 MIN: CPT | Performed by: NURSE PRACTITIONER

## 2019-06-25 PROCEDURE — G8427 DOCREV CUR MEDS BY ELIG CLIN: HCPCS | Performed by: NURSE PRACTITIONER

## 2019-06-25 PROCEDURE — G8417 CALC BMI ABV UP PARAM F/U: HCPCS | Performed by: NURSE PRACTITIONER

## 2019-06-25 PROCEDURE — 3017F COLORECTAL CA SCREEN DOC REV: CPT | Performed by: NURSE PRACTITIONER

## 2019-06-25 PROCEDURE — 1036F TOBACCO NON-USER: CPT | Performed by: NURSE PRACTITIONER

## 2019-06-25 PROCEDURE — 87086 URINE CULTURE/COLONY COUNT: CPT

## 2019-06-25 PROCEDURE — 81001 URINALYSIS AUTO W/SCOPE: CPT

## 2019-06-25 RX ORDER — CEPHALEXIN 500 MG/1
500 CAPSULE ORAL 2 TIMES DAILY
Qty: 14 CAPSULE | Refills: 0 | Status: SHIPPED | OUTPATIENT
Start: 2019-06-25 | End: 2019-07-02

## 2019-06-25 ASSESSMENT — ENCOUNTER SYMPTOMS
SORE THROAT: 0
RESPIRATORY NEGATIVE: 1
SINUS COMPLAINT: 1
HOARSE VOICE: 0
RHINORRHEA: 0
GASTROINTESTINAL NEGATIVE: 1
COUGH: 0
NAUSEA: 0
SHORTNESS OF BREATH: 0
SINUS PRESSURE: 1
VOMITING: 0

## 2019-06-25 NOTE — PROGRESS NOTES
[facility]             [address], [address]                        Telephone [phone]             Fax [phone]     Rashid Field  1960  [facility]:[id number]   Date of visit:  6/25/2019    Subjective:    Rashid Field is a 64 y.o.  female who presents to [facility] today (6/25/2019) for evaluation of:    Chief Complaint   Patient presents with    Dysuria     frequency, urgency, was seen 6/3/19 for same issue    Sinus Problem     sinus pressure, face pain, burning feeling. started 1 month ago       Dysuria    This is a recurrent problem. The current episode started in the past 7 days (X 5 days worse). The problem occurs every urination. The problem has been gradually worsening. The quality of the pain is described as burning. The pain is moderate. There has been no fever. She is not sexually active. There is no history of pyelonephritis. Associated symptoms include frequency and urgency. Pertinent negatives include no chills, discharge, flank pain, hematuria, nausea, sweats or vomiting. Associated symptoms comments: Foul odor of urine. Treatments tried: completed bactrim 06/13/2019; The treatment provided no relief. Sinus Problem   This is a recurrent problem. The current episode started in the past 7 days (X 5 days). The problem is unchanged. There has been no fever. The pain is mild. Associated symptoms include congestion, headaches (frontal) and sinus pressure. Pertinent negatives include no chills, coughing, diaphoresis, ear pain (bilateral intermittent \"stabbing\"), hoarse voice, shortness of breath, sneezing or sore throat. Treatments tried: jo-ann back and body. The treatment provided mild relief.        She has the following problem list:  Patient Active Problem List   Diagnosis    Fibromyalgia    Osteoarthritis    Hypertension    Generalized anxiety disorder    Congenital hip dysplasia    Kidney stone    Cervical herniated disc    Dysfunctional uterine bleeding    Seasonal allergic rhinitis    Asthma    Vitamin D deficiency    Supraventricular tachycardia, paroxysmal (HCC)    Lumbar disc disease    Obesity    History of tobacco use    Dyspnea    Fatigue    Grief at loss of child    Blindness of left eye    Primary osteoarthritis of left knee    Hip pain, bilateral    Shoulder pain, right    Shoulder pain, left    Failed total right knee replacement ([facility] [address]    Encounter for chronic pain management    Moderate major depression (HCC)    Lumbar degenerative disc disease    Low back pain    Cervical radiculopathy due to degenerative joint disease of spine    History of tobacco abuse    Cervicalgia    Osteoarthritis of left knee    Status post left knee replacement    Chronic pain of left knee    Cervical radiculopathy        Current medications are:  Current Outpatient Medications   Medication Sig Dispense Refill    cephALEXin (KEFLEX) 500 MG capsule Take 1 capsule by mouth 2 times daily for 7 days 14 capsule 0    oxyCODONE-acetaminophen (PERCOCET) 7.5-325 MG per tablet Take 1 tablet by mouth 5 times daily for 30 days. 150 tablet 0    olopatadine (PATADAY) 0.2 % SOLN ophthalmic solution INSTILL 1 DROP INTO BOTH EYES ONCE DAILY IF NEEDED 2.5 mL 0    fluticasone (FLONASE) 50 MCG/ACT nasal spray instill 2 sprays into each nostril once daily 16 g 0    albuterol sulfate HFA (VENTOLIN HFA) 108 (90 Base) MCG/ACT inhaler inhale 2 puffs by mouth every 4 hours if needed 18 g 0    ALPRAZolam (XANAX) 1 MG tablet Take 1 tablet by mouth 3 times daily as needed for Anxiety for up to 90 days.  270 tablet 0    cyclobenzaprine (FLEXERIL) 10 MG tablet take 1 tablet by mouth every 8 hours if needed for MSUCLE SPASMS 90 tablet 11    omeprazole (PRILOSEC) 20 MG delayed release capsule take 1 capsule by mouth twice a day 60 capsule 5    metoprolol tartrate (LOPRESSOR) 25 MG tablet take 1 tablet by mouth twice a day 60 tablet 5    Blood Pressure KIT 1 kit by Does not apply route 2 times daily as needed (BP) 1 kit 0    Handicap Placard MISC by Does not apply route 2 each 0    rizatriptan (MAXALT-MLT) 10 MG disintegrating tablet Take 1 tablet by mouth once as needed for Migraine May repeat in 2 hours if needed 9 tablet 11    clonazePAM (KLONOPIN) 1 MG tablet take 1 tablet by mouth at bedtime. 30 tablet 5     No current facility-administered medications for this visit. She is allergic to latex and fentanyl. .    She  reports that she has quit smoking. Her smoking use included cigarettes. She has a 8.75 pack-year smoking history. She quit smokeless tobacco use about 4 years ago. Objective:    Vitals:    06/25/19 1224   BP: 120/82   Pulse: 82   Resp: 18   Temp: 98.2 °F (36.8 °C)   SpO2: 97%     Body mass index is 43.05 kg/m². Review of Systems   Constitutional: Negative. Negative for appetite change, chills, diaphoresis, fatigue and fever. HENT: Positive for congestion and sinus pressure. Negative for ear pain (bilateral intermittent \"stabbing\"), hoarse voice, postnasal drip, rhinorrhea, sneezing and sore throat. Respiratory: Negative. Negative for cough and shortness of breath. Cardiovascular: Negative. Gastrointestinal: Negative. Negative for nausea and vomiting. Genitourinary: Positive for dysuria, frequency and urgency. Negative for flank pain, hematuria and vaginal discharge. Neurological: Positive for headaches (frontal). Physical Exam   Constitutional: She is oriented to person, place, and time. She appears well-developed and well-nourished. HENT:   Head: Normocephalic. Right Ear: Hearing, external ear and ear canal normal. A middle ear effusion is present. Left Ear: Hearing, external ear and ear canal normal. A middle ear effusion is present. Nose: Mucosal edema (erythema) present.  Right sinus exhibits no maxillary sinus tenderness and no frontal sinus tenderness. Left sinus exhibits no maxillary sinus tenderness and no frontal sinus tenderness. Mouth/Throat: Uvula is midline, oropharynx is clear and moist and mucous membranes are normal.   Eyes: Pupils are equal, round, and reactive to light. Neck: Normal range of motion. Neck supple. Cardiovascular: Normal rate, regular rhythm and normal heart sounds. Pulmonary/Chest: Effort normal and breath sounds normal.   Abdominal: Soft. Normal appearance and bowel sounds are normal. There is no tenderness. There is no CVA tenderness. Lymphadenopathy:     She has no cervical adenopathy. Neurological: She is alert and oriented to person, place, and time. Skin: Skin is warm and dry. Psychiatric: She has a normal mood and affect. Her behavior is normal. Thought content normal.   Nursing note and vitals reviewed.     Assessment and Plan:    Hospital Outpatient Visit on 06/25/2019   Component Date Value Ref Range Status    Color, UA 06/25/2019 NOT REPORTED  YELLOW Final    Turbidity UA 06/25/2019 NOT REPORTED  CLEAR Final    Glucose, Ur 06/25/2019 NEGATIVE  NEGATIVE Final    Bilirubin Urine 06/25/2019 1+* NEGATIVE Final    Ketones, Urine 06/25/2019 TRACE* NEGATIVE Final    Specific Gravity, UA 06/25/2019 1.025  1.010 - 1.025 Final    Urine Hgb 06/25/2019 NEGATIVE  NEGATIVE Final    pH, UA 06/25/2019 6.0  5.0 - 6.0 Final    Protein, UA 06/25/2019 TRACE* NEGATIVE Final    Urobilinogen, Urine 06/25/2019 Normal  Normal Final    Nitrite, Urine 06/25/2019 NEGATIVE  NEGATIVE Final    Leukocyte Esterase, Urine 06/25/2019 TRACE* NEGATIVE Final    Urinalysis Comments 06/25/2019 NOT REPORTED   Final    - 06/25/2019        Final    WBC, UA 06/25/2019 5 TO 10  0 - 4 /HPF Final    RBC, UA 06/25/2019 None  0 - 4 /HPF Final    Casts UA 06/25/2019 NOT REPORTED  0 - 2 /LPF Final    Crystals UA 06/25/2019 NOT REPORTED  None /HPF Final    Epithelial Cells UA 06/25/2019 5 TO 10  0 - 5 /HPF Final  Renal Epithelial, Urine 06/25/2019 NOT REPORTED  0 /HPF Final    Bacteria, UA 06/25/2019 1+* None Final    Mucus, UA 06/25/2019 1+* None Final    Trichomonas, UA 06/25/2019 NOT REPORTED  None Final    Amorphous, UA 06/25/2019 NOT REPORTED  None Final    Other Observations UA 06/25/2019 NOT REPORTED  NOT REQ. Final    Yeast, UA 06/25/2019 NOT REPORTED  None Final          Diagnosis Orders   1. Acute cystitis without hematuria  cephALEXin (KEFLEX) 500 MG capsule   2. Painful urination  Urinalysis Reflex to Culture    Urine Culture   3. Viral upper respiratory tract infection       I reviewed labs with patient. Complete full course of antibiotic. Increase water intake. Continue Xyzal daily for sinus symptoms. Call or return to clinic as needed if these symptoms worsen or fail to improve in the next 48 hours. If urine culture was sent, the patient will be notified of results. No orders of the defined types were placed in this encounter.         Electronically signed by TITO Espinal CNP on 6/25/19 at 12:35 PM

## 2019-06-25 NOTE — PATIENT INSTRUCTIONS
Patient Education        Urinary Tract Infection in Women: Care Instructions  Your Care Instructions    A urinary tract infection, or UTI, is a general term for an infection anywhere between the kidneys and the urethra (where urine comes out). Most UTIs are bladder infections. They often cause pain or burning when you urinate. UTIs are caused by bacteria and can be cured with antibiotics. Be sure to complete your treatment so that the infection goes away. Follow-up care is a key part of your treatment and safety. Be sure to make and go to all appointments, and call your doctor if you are having problems. It's also a good idea to know your test results and keep a list of the medicines you take. How can you care for yourself at home? · Take your antibiotics as directed. Do not stop taking them just because you feel better. You need to take the full course of antibiotics. · Drink extra water and other fluids for the next day or two. This may help wash out the bacteria that are causing the infection. (If you have kidney, heart, or liver disease and have to limit fluids, talk with your doctor before you increase your fluid intake.)  · Avoid drinks that are carbonated or have caffeine. They can irritate the bladder. · Urinate often. Try to empty your bladder each time. · To relieve pain, take a hot bath or lay a heating pad set on low over your lower belly or genital area. Never go to sleep with a heating pad in place. To prevent UTIs  · Drink plenty of water each day. This helps you urinate often, which clears bacteria from your system. (If you have kidney, heart, or liver disease and have to limit fluids, talk with your doctor before you increase your fluid intake.)  · Urinate when you need to. · Urinate right after you have sex. · Change sanitary pads often. · Avoid douches, bubble baths, feminine hygiene sprays, and other feminine hygiene products that have deodorants.   · After going to the bathroom, wipe from front to back. When should you call for help? Call your doctor now or seek immediate medical care if:    · Symptoms such as fever, chills, nausea, or vomiting get worse or appear for the first time.     · You have new pain in your back just below your rib cage. This is called flank pain.     · There is new blood or pus in your urine.     · You have any problems with your antibiotic medicine.    Watch closely for changes in your health, and be sure to contact your doctor if:    · You are not getting better after taking an antibiotic for 2 days.     · Your symptoms go away but then come back. Where can you learn more? Go to https://MathZeepeDouble Doodseb.Integrity Tracking. org and sign in to your [employer] account. Enter I047 in the Aldera box to learn more about \"Urinary Tract Infection in Women: Care Instructions. \"     If you do not have an account, please click on the \"Sign Up Now\" link. Current as of: December 19, 2018  Content Version: 12.0  © 5197-0351 Healthwise, Incorporated. Care instructions adapted under license by [facility] ([facility]). If you have questions about a medical condition or this instruction, always ask your healthcare professional. [place] [address] any warranty or liability for your use of this information.

## 2019-06-26 LAB
CULTURE: NORMAL
Lab: NORMAL
SPECIMEN DESCRIPTION: NORMAL

## 2019-07-01 ENCOUNTER — TELEPHONE (OUTPATIENT)
Dept: PRIMARY CARE CLINIC | Age: 59
End: 2019-07-01

## 2019-07-01 NOTE — TELEPHONE ENCOUNTER
----- Message from TITO Angel CNP sent at 6/28/2019  3:16 PM EDT -----  Please call patient. No growth on urine culture. Patient should stop Keflex. Increase water intake.  Follow up with PCP if symptoms persist.

## 2019-07-09 ENCOUNTER — OFFICE VISIT (OUTPATIENT)
Dept: PAIN MANAGEMENT | Age: 59
End: 2019-07-09
Payer: MEDICARE

## 2019-07-09 VITALS
DIASTOLIC BLOOD PRESSURE: 70 MMHG | BODY MASS INDEX: 43.05 KG/M2 | HEIGHT: 63 IN | HEART RATE: 58 BPM | WEIGHT: 242.95 LBS | RESPIRATION RATE: 17 BRPM | SYSTOLIC BLOOD PRESSURE: 134 MMHG

## 2019-07-09 DIAGNOSIS — M79.7 FIBROMYALGIA: ICD-10-CM

## 2019-07-09 DIAGNOSIS — M54.12 CERVICAL RADICULOPATHY: ICD-10-CM

## 2019-07-09 DIAGNOSIS — M54.2 CERVICALGIA: Primary | ICD-10-CM

## 2019-07-09 DIAGNOSIS — G89.29 ENCOUNTER FOR CHRONIC PAIN MANAGEMENT: ICD-10-CM

## 2019-07-09 PROCEDURE — 1036F TOBACCO NON-USER: CPT | Performed by: NURSE PRACTITIONER

## 2019-07-09 PROCEDURE — 99214 OFFICE O/P EST MOD 30 MIN: CPT | Performed by: NURSE PRACTITIONER

## 2019-07-09 PROCEDURE — G8417 CALC BMI ABV UP PARAM F/U: HCPCS | Performed by: NURSE PRACTITIONER

## 2019-07-09 PROCEDURE — G8427 DOCREV CUR MEDS BY ELIG CLIN: HCPCS | Performed by: NURSE PRACTITIONER

## 2019-07-09 PROCEDURE — 3017F COLORECTAL CA SCREEN DOC REV: CPT | Performed by: NURSE PRACTITIONER

## 2019-07-09 RX ORDER — OXYCODONE AND ACETAMINOPHEN 7.5; 325 MG/1; MG/1
1 TABLET ORAL
Qty: 150 TABLET | Refills: 0 | Status: SHIPPED | OUTPATIENT
Start: 2019-07-17 | End: 2019-08-09 | Stop reason: SDUPTHER

## 2019-07-09 ASSESSMENT — ENCOUNTER SYMPTOMS
CONSTIPATION: 0
BLOOD IN STOOL: 0
BACK PAIN: 1
SHORTNESS OF BREATH: 0

## 2019-07-09 NOTE — PROGRESS NOTES
she refused any further    AL TOTAL KNEE ARTHROPLASTY Left 2018    Left Total Knee Arthroplasty performed by Unruly Vásquez MD at [facility]       Family History   Problem Relation Age of Onset    Breast Cancer Mother 76    High Blood Pressure Mother     Coronary Art Dis Mother         Multiple stents.  Diabetes Mother     Heart Disease Father     Coronary Art Dis Father         Myocardial infarction at age 54 and .  COPD Father     Arthritis Brother     COPD Brother     Heart Disease Brother         Congenital heart defect -  at 10 wks of age.  Cancer Other        Social History     Socioeconomic History    Marital status:      Spouse name: None    Number of children: None    Years of education: None    Highest education level: None   Occupational History    Occupation: disabled [employer]   Social Needs    Financial resource strain: None    Food insecurity:     Worry: None     Inability: None    Transportation needs:     Medical: None     Non-medical: None   Tobacco Use    Smoking status: Former Smoker     Packs/day: 0.25     Years: 35.00     Pack years: 8.75     Types: Cigarettes    Smokeless tobacco: Former User     Quit date: 2015    Tobacco comment: 6 cigarettes per day since age 15 or so.    Substance and Sexual Activity    Alcohol use: Yes     Comment: rarely    Drug use: No    Sexual activity: Not Currently     Partners: Male   Lifestyle    Physical activity:     Days per week: None     Minutes per session: None    Stress: None   Relationships    Social connections:     Talks on phone: None     Gets together: None     Attends Orthodox service: None     Active member of club or organization: None     Attends meetings of clubs or organizations: None     Relationship status: None    Intimate partner violence:     Fear of current or ex partner: None     Emotionally abused: None     Physically abused: None     Forced sexual activity:

## 2019-07-12 DIAGNOSIS — F41.1 GENERALIZED ANXIETY DISORDER: ICD-10-CM

## 2019-07-12 RX ORDER — ALPRAZOLAM 1 MG/1
TABLET ORAL
Qty: 270 TABLET | Refills: 0 | Status: SHIPPED | OUTPATIENT
Start: 2019-07-12 | End: 2019-10-10 | Stop reason: SDUPTHER

## 2019-07-17 ENCOUNTER — HOSPITAL ENCOUNTER (OUTPATIENT)
Dept: LAB | Age: 59
Discharge: HOME | End: 2019-07-17
Payer: MEDICARE

## 2019-07-17 ENCOUNTER — OFFICE VISIT (OUTPATIENT)
Dept: FAMILY MEDICINE CLINIC | Age: 59
End: 2019-07-17
Payer: MEDICARE

## 2019-07-17 VITALS
HEIGHT: 62 IN | DIASTOLIC BLOOD PRESSURE: 86 MMHG | RESPIRATION RATE: 16 BRPM | BODY MASS INDEX: 45.19 KG/M2 | SYSTOLIC BLOOD PRESSURE: 128 MMHG | WEIGHT: 245.6 LBS | HEART RATE: 82 BPM

## 2019-07-17 DIAGNOSIS — Z83.3 FAMILY HISTORY OF DIABETES MELLITUS: ICD-10-CM

## 2019-07-17 DIAGNOSIS — R73.01 ELEVATED FASTING GLUCOSE: ICD-10-CM

## 2019-07-17 DIAGNOSIS — F41.1 GENERALIZED ANXIETY DISORDER: ICD-10-CM

## 2019-07-17 DIAGNOSIS — I10 ESSENTIAL HYPERTENSION: Primary | ICD-10-CM

## 2019-07-17 DIAGNOSIS — R51.9 NONINTRACTABLE EPISODIC HEADACHE, UNSPECIFIED HEADACHE TYPE: ICD-10-CM

## 2019-07-17 DIAGNOSIS — I10 ESSENTIAL HYPERTENSION: ICD-10-CM

## 2019-07-17 DIAGNOSIS — Z23 NEED FOR SHINGLES VACCINE: ICD-10-CM

## 2019-07-17 LAB
ALBUMIN SERPL-MCNC: 4.5 G/DL (ref 3.5–5.2)
ALBUMIN/GLOBULIN RATIO: 1.5 (ref 1–2.5)
ALP BLD-CCNC: 187 U/L (ref 35–104)
ALT SERPL-CCNC: 23 U/L (ref 5–33)
ANION GAP SERPL CALCULATED.3IONS-SCNC: 13 MMOL/L (ref 9–17)
AST SERPL-CCNC: 20 U/L
BILIRUB SERPL-MCNC: 0.23 MG/DL (ref 0.3–1.2)
BUN BLDV-MCNC: 13 MG/DL (ref 6–20)
BUN/CREAT BLD: 18 (ref 9–20)
CALCIUM SERPL-MCNC: 10 MG/DL (ref 8.6–10.4)
CHLORIDE BLD-SCNC: 102 MMOL/L (ref 98–107)
CO2: 27 MMOL/L (ref 20–31)
CREAT SERPL-MCNC: 0.73 MG/DL (ref 0.5–0.9)
ESTIMATED AVERAGE GLUCOSE: 114 MG/DL
GFR AFRICAN AMERICAN: >60 ML/MIN
GFR NON-AFRICAN AMERICAN: >60 ML/MIN
GFR SERPL CREATININE-BSD FRML MDRD: ABNORMAL ML/MIN/{1.73_M2}
GFR SERPL CREATININE-BSD FRML MDRD: ABNORMAL ML/MIN/{1.73_M2}
GLUCOSE BLD-MCNC: 115 MG/DL (ref 70–99)
HBA1C MFR BLD: 5.6 % (ref 4.8–5.9)
POTASSIUM SERPL-SCNC: 4.2 MMOL/L (ref 3.7–5.3)
SODIUM BLD-SCNC: 142 MMOL/L (ref 135–144)
TOTAL PROTEIN: 7.5 G/DL (ref 6.4–8.3)

## 2019-07-17 PROCEDURE — 1036F TOBACCO NON-USER: CPT | Performed by: FAMILY MEDICINE

## 2019-07-17 PROCEDURE — 3017F COLORECTAL CA SCREEN DOC REV: CPT | Performed by: FAMILY MEDICINE

## 2019-07-17 PROCEDURE — 80053 COMPREHEN METABOLIC PANEL: CPT

## 2019-07-17 PROCEDURE — 99214 OFFICE O/P EST MOD 30 MIN: CPT | Performed by: FAMILY MEDICINE

## 2019-07-17 PROCEDURE — G8417 CALC BMI ABV UP PARAM F/U: HCPCS | Performed by: FAMILY MEDICINE

## 2019-07-17 PROCEDURE — 83036 HEMOGLOBIN GLYCOSYLATED A1C: CPT

## 2019-07-17 PROCEDURE — G8427 DOCREV CUR MEDS BY ELIG CLIN: HCPCS | Performed by: FAMILY MEDICINE

## 2019-07-17 PROCEDURE — 36415 COLL VENOUS BLD VENIPUNCTURE: CPT

## 2019-07-17 RX ORDER — OMEPRAZOLE 20 MG/1
CAPSULE, DELAYED RELEASE ORAL
Qty: 60 CAPSULE | Refills: 5 | Status: SHIPPED | OUTPATIENT
Start: 2019-07-17 | End: 2020-08-21 | Stop reason: SDUPTHER

## 2019-07-17 NOTE — PROGRESS NOTES
Her smoking use included cigarettes. She has a 8.75 pack-year smoking history. She quit smokeless tobacco use about 4 years ago. Objective:    Vitals:    07/17/19 1608   BP: 128/86   Site: Right Upper Arm   Position: Sitting   Cuff Size: Large Adult   Pulse: 82   Resp: 16   Weight: 245 lb 9.6 oz (111.4 kg)   Height: 5' 2\" (1.575 m)     Body mass index is 44.92 kg/m². Extremely obese  female, alert, cooperative and in no distress. Cranial nerves II-XII are intact. Neck supple. No adenopathy. Thyroid symmetric, normal size. Chest:  Normal expansion. Clear to auscultation. No rales, rhonchi, or wheezing. Heart sounds are normal.  Regular rate and rhythm without murmur, gallop or rub. Lower extremities have no edema. Affect is bright. Thought processes are logical. There is no evidence of paranoia or delusions. Responses to questions are appropriate. Dress and grooming are appropriate. She has had no recent labs. Assessment and Plan:    1. Essential hypertension  Her blood pressure is adequately-controlled. (BP: 128/86)   She was advised to continue current medications. Lopressor was refilled:   - metoprolol tartrate (LOPRESSOR) 25 MG tablet; take 1 tablet by mouth twice a day  Dispense: 60 tablet; Refill: 5    - Comprehensive Metabolic Panel; Future was ordered to be done today. 2. Nonintractable episodic headache, unspecified headache type  As above, she has an MRI of the cervical spine ordered. I recommended neurology referral.  She prefers to wait until the MRI results are available. 3. Generalized anxiety disorder  I discussed with the patient that her current regimen of Xanax TID is not consistent with how I manage anxiety. I have recommended a daily antidepressant medication, with prn benzodiazapine, with the goal of using the benzodiazapine a few times a week, ideally.   She states that she has tried antidepressant medication in the past, and she does not like the way she feels when she takes those medications. I have referred her to psychiatry:  - External Referral To Psychiatry    I have recommended that she decrease her Xanax dosing. To begin, I have recommended that she continue taking 1 mg in the morning and at night, and decrease her noon dose to 0.5 mg.    I have also recommended referral to a counselor. She declined. 4. Family history of diabetes mellitus  5. Elevated fasting glucose  - Hemoglobin A1C; Future was ordered to be done today. 6.  Routine health maintenance  Health maintenance was reviewed with the patient. Shingrix was recommended, and a printed prescription for Shingrix was given to the patient. She was advised that she is due for a mammogram.  She states that she has an appointment scheduled. She has an order for a mammogram.  All other health maintenance, including Pneumovax, Prevnar-13, and Tdap, is up to date at this time.          (Please note that portions of this note were completed with a voice-recognition program. Efforts were made to edit the dictation but occasionally words are mis-transcribed.)

## 2019-07-18 ENCOUNTER — TELEPHONE (OUTPATIENT)
Dept: FAMILY MEDICINE CLINIC | Age: 59
End: 2019-07-18

## 2019-07-18 DIAGNOSIS — R74.8 ALKALINE PHOSPHATASE ELEVATION: Primary | ICD-10-CM

## 2019-07-21 ENCOUNTER — PATIENT MESSAGE (OUTPATIENT)
Dept: FAMILY MEDICINE CLINIC | Age: 59
End: 2019-07-21

## 2019-07-22 ENCOUNTER — HOSPITAL ENCOUNTER (EMERGENCY)
Age: 59
Discharge: HOME | End: 2019-07-22
Attending: EMERGENCY MEDICINE
Payer: MEDICARE

## 2019-07-22 VITALS
DIASTOLIC BLOOD PRESSURE: 101 MMHG | OXYGEN SATURATION: 93 % | RESPIRATION RATE: 16 BRPM | HEART RATE: 86 BPM | TEMPERATURE: 97.7 F | SYSTOLIC BLOOD PRESSURE: 132 MMHG

## 2019-07-22 DIAGNOSIS — G89.29 CHRONIC NECK PAIN: Primary | ICD-10-CM

## 2019-07-22 DIAGNOSIS — M54.2 CHRONIC NECK PAIN: Primary | ICD-10-CM

## 2019-07-22 DIAGNOSIS — R09.81 SINUS CONGESTION: ICD-10-CM

## 2019-07-22 PROCEDURE — 99282 EMERGENCY DEPT VISIT SF MDM: CPT

## 2019-07-22 ASSESSMENT — PAIN DESCRIPTION - ONSET: ONSET: ON-GOING

## 2019-07-22 ASSESSMENT — PAIN DESCRIPTION - PROGRESSION: CLINICAL_PROGRESSION: GRADUALLY WORSENING

## 2019-07-22 ASSESSMENT — PAIN SCALES - GENERAL
PAINLEVEL_OUTOF10: 7
PAINLEVEL_OUTOF10: 7

## 2019-07-22 ASSESSMENT — PAIN DESCRIPTION - PAIN TYPE: TYPE: ACUTE PAIN;CHRONIC PAIN

## 2019-07-22 ASSESSMENT — PAIN DESCRIPTION - FREQUENCY: FREQUENCY: CONTINUOUS

## 2019-07-22 ASSESSMENT — PAIN DESCRIPTION - LOCATION: LOCATION: HEAD;NECK

## 2019-07-22 NOTE — ED PROVIDER NOTES
Obesity, Osteoarthritis, Seasonal allergic rhinitis, Shoulder pain, left, Shoulder pain, right, Supraventricular tachycardia, paroxysmal ([address] and Vitamin D deficiency. SURGICAL HISTORY      has a past surgical history that includes Atrial ablation surgery (); Knee Arthroplasty (Right, ); joint replacement (Right); Tubal ligation (); Cardiac catheterization (); Cholecystectomy, laparoscopic (2011); other surgical history (); other surgical history (); and pr total knee arthroplasty (Left, 2018). CURRENT MEDICATIONS       Previous Medications    ALBUTEROL SULFATE HFA (VENTOLIN HFA) 108 (90 BASE) MCG/ACT INHALER    inhale 2 puffs by mouth every 4 hours if needed    ALPRAZOLAM (XANAX) 1 MG TABLET    take 1 tablet by mouth three times a day if needed for anxiety    BLOOD PRESSURE KIT    1 kit by Does not apply route 2 times daily as needed (BP)    CYCLOBENZAPRINE (FLEXERIL) 10 MG TABLET    take 1 tablet by mouth every 8 hours if needed for MSUCLE SPASMS    FLUTICASONE (FLONASE) 50 MCG/ACT NASAL SPRAY    instill 2 sprays into each nostril once daily    HANDICAP PLACARD MISC    by Does not apply route    METOPROLOL TARTRATE (LOPRESSOR) 25 MG TABLET    take 1 tablet by mouth twice a day    OLOPATADINE (PATADAY) 0.2 % SOLN OPHTHALMIC SOLUTION    INSTILL 1 DROP INTO BOTH EYES ONCE DAILY IF NEEDED    OMEPRAZOLE (PRILOSEC) 20 MG DELAYED RELEASE CAPSULE    take 1 capsule by mouth twice a day    OXYCODONE-ACETAMINOPHEN (PERCOCET) 7.5-325 MG PER TABLET    Take 1 tablet by mouth 5 times daily for 30 days. RIZATRIPTAN (MAXALT-MLT) 10 MG DISINTEGRATING TABLET    Take 1 tablet by mouth once as needed for Migraine May repeat in 2 hours if needed       ALLERGIES     is allergic to latex; fentanyl; and seasonal.    FAMILY HISTORY     She indicated that her mother is alive. She indicated that her father is . She indicated that only one of her two brothers is alive.  She indicated that the status of her maternal grandmother is unknown. She indicated that all of her four children are alive. She indicated that her other is . family history includes Arthritis in her brother; Breast Cancer (age of onset: 76) in her mother; COPD in her brother and father; Cancer in an other family member; Coronary Art Dis in her father and mother; Diabetes in her maternal grandmother and mother; Heart Disease in her brother and father; High Blood Pressure in her mother. SOCIAL HISTORY      reports that she has quit smoking. Her smoking use included cigarettes. She has a 8.75 pack-year smoking history. She quit smokeless tobacco use about 4 years ago. She reports that she drinks alcohol. She reports that she does not use drugs. PHYSICAL EXAM     INITIAL VITALS:  tympanic temperature is 97.7 °F (36.5 °C). Her pulse is 86. Her respiration is 16 and oxygen saturation is 93%. Patient is alert and oriented, in no apparent distress. HEENT is atraumatic. Pupils are PERRL at 4 mm with normal extraocular motion. Mucous membranes moist.  No sinus tenderness to percussion. Neck is supple with no lymphadenopathy. No JVD. No meningismus. No pain or step-off. Heart sounds regular rate and rhythm with no gallops, murmurs, or rubs. Lungs clear, no wheezes, rales or rhonchi. Abdomen: soft, nontender with no pain to palpation. Skin: no rash or edema. Neurological exam reveals cranial nerves 2 through 12 grossly intact. Patient has equal  and normal deep tendon reflexes. Psychiatric: appropriate  Lymphatics.:  No lymphadenopathy.        DIFFERENTIAL DIAGNOSIS/ MDM:     Sinusitis, sinus congestion, chronic neck pain, radiculopathy    DIAGNOSTIC RESULTS         EMERGENCY DEPARTMENT COURSE:   Vitals:    Vitals:    19 1234   Pulse: 86   Resp: 16   Temp: 97.7 °F (36.5 °C)   TempSrc: Tympanic   SpO2: 93%     -------------------------   , Temp: 97.7 °F (36.5 °C), Pulse: 86, Resp: 16      Re-evaluation Notes    The patient has chronic neck problems. I see no indication for an MRI at this time. She can have the outpatient one as scheduled and see her pain specialist.  I have written for a netipot to use for her sinus congestion. I'm also referring her to an ENT physician. The patient is discharged in good condition. FINAL IMPRESSION      1. Chronic neck pain    2.  Sinus congestion          DISPOSITION/PLAN   DISPOSITION Decision To Discharge 07/22/2019 01:02:50 PM      Condition on Disposition    good    PATIENT REFERRED TO:  Sean Little MD  [address] [facility]  [phone]    In 1 week      Sindi Calvo MD  [address] [facility]  [phone]    In 2 days      your pain specialist    In 2 days        DISCHARGE MEDICATIONS:  New Prescriptions    SODIUM CHLORIDE-SODIUM BICARB (NETI POT SINUS [address]) 2300-700 MG KIT    1 spray by Nasal route daily       (Please note that portions of this note were completed with a voice recognition program.  Efforts were made to edit the dictations but occasionally words are mis-transcribed.)    Payne MD   Attending Emergency Physician         Rad Melchor MD  07/22/19 3720

## 2019-07-24 ENCOUNTER — HOSPITAL ENCOUNTER (OUTPATIENT)
Dept: MRI IMAGING | Age: 59
Discharge: HOME | End: 2019-07-26
Payer: MEDICARE

## 2019-07-24 ENCOUNTER — TELEPHONE (OUTPATIENT)
Dept: PAIN MANAGEMENT | Age: 59
End: 2019-07-24

## 2019-07-24 DIAGNOSIS — M54.2 CERVICALGIA: ICD-10-CM

## 2019-07-24 DIAGNOSIS — M54.12 CERVICAL RADICULOPATHY: ICD-10-CM

## 2019-07-24 PROCEDURE — 72141 MRI NECK SPINE W/O DYE: CPT

## 2019-08-05 ENCOUNTER — PATIENT MESSAGE (OUTPATIENT)
Dept: FAMILY MEDICINE CLINIC | Age: 59
End: 2019-08-05

## 2019-08-05 ENCOUNTER — PATIENT MESSAGE (OUTPATIENT)
Dept: PAIN MANAGEMENT | Age: 59
End: 2019-08-05

## 2019-08-05 DIAGNOSIS — R93.7 ABNORMAL MRI, CERVICAL SPINE: Primary | ICD-10-CM

## 2019-08-05 NOTE — TELEPHONE ENCOUNTER
From: Rodolfo Alcantar  To: Mira Bowser MD  Sent: 8/5/2019 6:21 AM EDT  Subject: Test Results Question    Request for referral to neurologist, based on MRI results.     Phu Wood

## 2019-08-06 ENCOUNTER — HOSPITAL ENCOUNTER (OUTPATIENT)
Dept: LAB | Age: 59
Discharge: HOME | End: 2019-08-06
Payer: MEDICARE

## 2019-08-06 DIAGNOSIS — R74.8 ALKALINE PHOSPHATASE ELEVATION: ICD-10-CM

## 2019-08-06 LAB
ALBUMIN SERPL-MCNC: 4.2 G/DL (ref 3.5–5.2)
ALBUMIN/GLOBULIN RATIO: 1.4 (ref 1–2.5)
ALP BLD-CCNC: 172 U/L (ref 35–104)
ALT SERPL-CCNC: 23 U/L (ref 5–33)
AST SERPL-CCNC: 26 U/L
BILIRUB SERPL-MCNC: 0.24 MG/DL (ref 0.3–1.2)
BILIRUBIN DIRECT: 0.08 MG/DL
BILIRUBIN, INDIRECT: 0.16 MG/DL (ref 0–1)
GLOBULIN: 3 G/DL (ref 1.5–3.8)
TOTAL PROTEIN: 7.2 G/DL (ref 6.4–8.3)

## 2019-08-06 PROCEDURE — 80076 HEPATIC FUNCTION PANEL: CPT

## 2019-08-06 PROCEDURE — 36415 COLL VENOUS BLD VENIPUNCTURE: CPT

## 2019-08-07 ENCOUNTER — OFFICE VISIT (OUTPATIENT)
Dept: OTOLARYNGOLOGY | Age: 59
End: 2019-08-07
Payer: MEDICARE

## 2019-08-07 VITALS
BODY MASS INDEX: 45.08 KG/M2 | HEIGHT: 62 IN | SYSTOLIC BLOOD PRESSURE: 110 MMHG | DIASTOLIC BLOOD PRESSURE: 82 MMHG | WEIGHT: 245 LBS | HEART RATE: 72 BPM | RESPIRATION RATE: 14 BRPM

## 2019-08-07 DIAGNOSIS — J32.9 SINUSITIS, UNSPECIFIED CHRONICITY, UNSPECIFIED LOCATION: Primary | ICD-10-CM

## 2019-08-07 DIAGNOSIS — I10 ESSENTIAL HYPERTENSION: Primary | ICD-10-CM

## 2019-08-07 PROCEDURE — 31231 NASAL ENDOSCOPY DX: CPT | Performed by: OTOLARYNGOLOGY

## 2019-08-07 NOTE — PROGRESS NOTES
session: Not on file    Stress: Not on file   Relationships    Social connections:     Talks on phone: Not on file     Gets together: Not on file     Attends Mandaen service: Not on file     Active member of club or organization: Not on file     Attends meetings of clubs or organizations: Not on file     Relationship status: Not on file    Intimate partner violence:     Fear of current or ex partner: Not on file     Emotionally abused: Not on file     Physically abused: Not on file     Forced sexual activity: Not on file   Other Topics Concern    Not on file   Social History Narrative    Not on file       Current Medications:     Current Outpatient Medications:     Sodium Chloride-Sodium Bicarb (NETI POT SINUS [address]) 2300-700 MG KIT, 1 spray by Nasal route daily, Disp: 1 kit, Rfl: 0    metoprolol tartrate (LOPRESSOR) 25 MG tablet, take 1 tablet by mouth twice a day, Disp: 60 tablet, Rfl: 5    omeprazole (PRILOSEC) 20 MG delayed release capsule, take 1 capsule by mouth twice a day, Disp: 60 capsule, Rfl: 5    ALPRAZolam (XANAX) 1 MG tablet, take 1 tablet by mouth three times a day if needed for anxiety, Disp: 270 tablet, Rfl: 0    oxyCODONE-acetaminophen (PERCOCET) 7.5-325 MG per tablet, Take 1 tablet by mouth 5 times daily for 30 days. , Disp: 150 tablet, Rfl: 0    olopatadine (PATADAY) 0.2 % SOLN ophthalmic solution, INSTILL 1 DROP INTO BOTH EYES ONCE DAILY IF NEEDED, Disp: 2.5 mL, Rfl: 0    albuterol sulfate HFA (VENTOLIN HFA) 108 (90 Base) MCG/ACT inhaler, inhale 2 puffs by mouth every 4 hours if needed, Disp: 18 g, Rfl: 0    cyclobenzaprine (FLEXERIL) 10 MG tablet, take 1 tablet by mouth every 8 hours if needed for MSUCLE SPASMS, Disp: 90 tablet, Rfl: 11    Blood Pressure KIT, 1 kit by Does not apply route 2 times daily as needed (BP), Disp: 1 kit, Rfl: 0    rizatriptan (MAXALT-MLT) 10 MG disintegrating tablet, Take 1 tablet by mouth once as needed for Migraine May repeat in 2 hours if needed,

## 2019-08-08 ENCOUNTER — PATIENT MESSAGE (OUTPATIENT)
Dept: FAMILY MEDICINE CLINIC | Age: 59
End: 2019-08-08

## 2019-08-09 ENCOUNTER — OFFICE VISIT (OUTPATIENT)
Dept: PAIN MANAGEMENT | Age: 59
End: 2019-08-09
Payer: MEDICARE

## 2019-08-09 VITALS
RESPIRATION RATE: 16 BRPM | SYSTOLIC BLOOD PRESSURE: 128 MMHG | HEIGHT: 62 IN | WEIGHT: 242 LBS | DIASTOLIC BLOOD PRESSURE: 86 MMHG | BODY MASS INDEX: 44.53 KG/M2

## 2019-08-09 DIAGNOSIS — M54.12 CERVICAL RADICULOPATHY: ICD-10-CM

## 2019-08-09 DIAGNOSIS — M48.02 NEURAL FORAMINAL STENOSIS OF CERVICAL SPINE: Primary | ICD-10-CM

## 2019-08-09 DIAGNOSIS — M47.22 CERVICAL RADICULOPATHY DUE TO DEGENERATIVE JOINT DISEASE OF SPINE: ICD-10-CM

## 2019-08-09 DIAGNOSIS — M54.2 CERVICALGIA: ICD-10-CM

## 2019-08-09 DIAGNOSIS — M79.7 FIBROMYALGIA: ICD-10-CM

## 2019-08-09 PROCEDURE — G8417 CALC BMI ABV UP PARAM F/U: HCPCS | Performed by: NURSE PRACTITIONER

## 2019-08-09 PROCEDURE — 99214 OFFICE O/P EST MOD 30 MIN: CPT | Performed by: NURSE PRACTITIONER

## 2019-08-09 PROCEDURE — 1036F TOBACCO NON-USER: CPT | Performed by: NURSE PRACTITIONER

## 2019-08-09 PROCEDURE — 3017F COLORECTAL CA SCREEN DOC REV: CPT | Performed by: NURSE PRACTITIONER

## 2019-08-09 PROCEDURE — G8427 DOCREV CUR MEDS BY ELIG CLIN: HCPCS | Performed by: NURSE PRACTITIONER

## 2019-08-09 RX ORDER — OXYCODONE AND ACETAMINOPHEN 7.5; 325 MG/1; MG/1
1 TABLET ORAL
Qty: 150 TABLET | Refills: 0 | Status: SHIPPED | OUTPATIENT
Start: 2019-08-16 | End: 2019-09-10 | Stop reason: SDUPTHER

## 2019-08-09 ASSESSMENT — ENCOUNTER SYMPTOMS
CONSTIPATION: 0
SHORTNESS OF BREATH: 0
BACK PAIN: 1
BLOOD IN STOOL: 0

## 2019-08-10 ENCOUNTER — PATIENT MESSAGE (OUTPATIENT)
Dept: FAMILY MEDICINE CLINIC | Age: 59
End: 2019-08-10

## 2019-08-12 ENCOUNTER — TELEPHONE (OUTPATIENT)
Dept: PAIN MANAGEMENT | Age: 59
End: 2019-08-12

## 2019-08-12 NOTE — TELEPHONE ENCOUNTER
Patient aware. Patient is being seen 9/27 and voiced understanding. Patient states current pain medication Is keeping her pain at a tolerable level until consultation with neurosurgery.

## 2019-08-15 ENCOUNTER — HOSPITAL ENCOUNTER (OUTPATIENT)
Dept: CT IMAGING | Age: 59
Discharge: HOME | End: 2019-08-17
Payer: MEDICARE

## 2019-08-15 DIAGNOSIS — J32.9 SINUSITIS, UNSPECIFIED CHRONICITY, UNSPECIFIED LOCATION: ICD-10-CM

## 2019-08-15 PROCEDURE — 70486 CT MAXILLOFACIAL W/O DYE: CPT

## 2019-08-28 DIAGNOSIS — E55.9 VITAMIN D DEFICIENCY: Primary | ICD-10-CM

## 2019-08-28 RX ORDER — ALBUTEROL SULFATE 90 UG/1
AEROSOL, METERED RESPIRATORY (INHALATION)
Qty: 18 G | Refills: 0 | Status: SHIPPED | OUTPATIENT
Start: 2019-08-28

## 2019-08-28 RX ORDER — ERGOCALCIFEROL 1.25 MG/1
CAPSULE ORAL
Qty: 4 CAPSULE | Refills: 2 | OUTPATIENT
Start: 2019-08-28

## 2019-08-28 RX ORDER — FLUTICASONE PROPIONATE 50 MCG
SPRAY, SUSPENSION (ML) NASAL
Qty: 16 G | Refills: 0 | Status: SHIPPED | OUTPATIENT
Start: 2019-08-28

## 2019-08-28 RX ORDER — OLOPATADINE HYDROCHLORIDE 2 MG/ML
SOLUTION/ DROPS OPHTHALMIC
Qty: 2.5 ML | Refills: 0 | Status: SHIPPED | OUTPATIENT
Start: 2019-08-28

## 2019-09-10 ENCOUNTER — OFFICE VISIT (OUTPATIENT)
Dept: PAIN MANAGEMENT | Age: 59
End: 2019-09-10
Payer: MEDICARE

## 2019-09-10 VITALS
HEIGHT: 62 IN | BODY MASS INDEX: 44.55 KG/M2 | SYSTOLIC BLOOD PRESSURE: 138 MMHG | DIASTOLIC BLOOD PRESSURE: 78 MMHG | WEIGHT: 242.06 LBS | RESPIRATION RATE: 16 BRPM | HEART RATE: 77 BPM

## 2019-09-10 DIAGNOSIS — M48.02 NEURAL FORAMINAL STENOSIS OF CERVICAL SPINE: ICD-10-CM

## 2019-09-10 DIAGNOSIS — M54.12 CERVICAL RADICULOPATHY: Primary | ICD-10-CM

## 2019-09-10 DIAGNOSIS — M47.892 OTHER OSTEOARTHRITIS OF SPINE, CERVICAL REGION: ICD-10-CM

## 2019-09-10 DIAGNOSIS — M79.7 FIBROMYALGIA: ICD-10-CM

## 2019-09-10 DIAGNOSIS — M54.2 CERVICALGIA: ICD-10-CM

## 2019-09-10 PROCEDURE — 1036F TOBACCO NON-USER: CPT | Performed by: NURSE PRACTITIONER

## 2019-09-10 PROCEDURE — G8427 DOCREV CUR MEDS BY ELIG CLIN: HCPCS | Performed by: NURSE PRACTITIONER

## 2019-09-10 PROCEDURE — G8417 CALC BMI ABV UP PARAM F/U: HCPCS | Performed by: NURSE PRACTITIONER

## 2019-09-10 PROCEDURE — 99214 OFFICE O/P EST MOD 30 MIN: CPT | Performed by: NURSE PRACTITIONER

## 2019-09-10 PROCEDURE — 3017F COLORECTAL CA SCREEN DOC REV: CPT | Performed by: NURSE PRACTITIONER

## 2019-09-10 RX ORDER — CELECOXIB 200 MG/1
200 CAPSULE ORAL DAILY
Qty: 30 CAPSULE | Refills: 3 | Status: SHIPPED | OUTPATIENT
Start: 2019-09-10 | End: 2019-11-11

## 2019-09-10 RX ORDER — OXYCODONE AND ACETAMINOPHEN 7.5; 325 MG/1; MG/1
1 TABLET ORAL
Qty: 150 TABLET | Refills: 0 | Status: SHIPPED | OUTPATIENT
Start: 2019-09-15 | End: 2019-10-10 | Stop reason: SDUPTHER

## 2019-09-10 RX ORDER — GABAPENTIN 300 MG/1
CAPSULE ORAL
Qty: 90 CAPSULE | Refills: 0 | Status: SHIPPED | OUTPATIENT
Start: 2019-09-10 | End: 2019-11-11

## 2019-09-10 ASSESSMENT — ENCOUNTER SYMPTOMS
BACK PAIN: 1
CONSTIPATION: 0
BLOOD IN STOOL: 0
SHORTNESS OF BREATH: 0

## 2019-09-10 NOTE — PROGRESS NOTES
LINNEA Torres, APRN - CNP   Medication Sig Dispense Refill    gabapentin (NEURONTIN) 300 MG capsule Gabapentin (Neurontin) Dosing Instructions: Take 1 (one) at bedtime for 5 days, then twice daily for 5 days, then three times per day 90 capsule 0    celecoxib (CELEBREX) 200 MG capsule Take 1 capsule by mouth daily 30 capsule 3    [START ON 9/15/2019] oxyCODONE-acetaminophen (PERCOCET) 7.5-325 MG per tablet Take 1 tablet by mouth 5 times daily for 30 days. 150 tablet 0    olopatadine (PATADAY) 0.2 % SOLN ophthalmic solution insert 1 drop into both eyes once daily if needed 2.5 mL 0    fluticasone (FLONASE) 50 MCG/ACT nasal spray spray 2 sprays INTO EACH NOSTRIL ONCE DAILY 16 g 0    albuterol sulfate HFA (VENTOLIN HFA) 108 (90 Base) MCG/ACT inhaler inhale 2 puffs every 4 hours if needed 18 g 0    Sodium Chloride-Sodium Bicarb (NETI POT SINUS WASH) 2300-700 MG KIT 1 spray by Nasal route daily 1 kit 0    metoprolol tartrate (LOPRESSOR) 25 MG tablet take 1 tablet by mouth twice a day 60 tablet 5    omeprazole (PRILOSEC) 20 MG delayed release capsule take 1 capsule by mouth twice a day 60 capsule 5    ALPRAZolam (XANAX) 1 MG tablet take 1 tablet by mouth three times a day if needed for anxiety 270 tablet 0    cyclobenzaprine (FLEXERIL) 10 MG tablet take 1 tablet by mouth every 8 hours if needed for MSUCLE SPASMS 90 tablet 11    Blood Pressure KIT 1 kit by Does not apply route 2 times daily as needed (BP) 1 kit 0    rizatriptan (MAXALT-MLT) 10 MG disintegrating tablet Take 1 tablet by mouth once as needed for Migraine May repeat in 2 hours if needed 9 tablet 11    Handicap Placard MISC by Does not apply route 2 each 0       Past Medical History:   Diagnosis Date    Asthma     Blindness of left eye     Cervical herniated disc     C5-C6, with nerve compression.     Congenital hip dysplasia     Degenerative joint disease     (Right knee) -     Depression     Dysfunctional uterine bleeding     Dyspnea Pain Monitoring:   RX Monitoring 6/5/2019   Attestation -   Periodic Controlled Substance Monitoring Obtaining appropriate analgesic effect of treatment. ;Assessed functional status. ;No signs of potential drug abuse or diversion identified. Chronic Pain > 50 MEDD Obtained or confirmed \"Consent for Opioid Use\" on file. ;Re-evaluated the status of the patient's underlying condition causing pain.    Chronic Pain > 80 MEDD -           Follow up one month

## 2019-09-27 ENCOUNTER — OFFICE VISIT (OUTPATIENT)
Dept: NEUROSURGERY | Age: 59
End: 2019-09-27
Payer: MEDICARE

## 2019-09-27 ENCOUNTER — HOSPITAL ENCOUNTER (OUTPATIENT)
Dept: LAB | Age: 59
Discharge: HOME | End: 2019-09-27
Payer: MEDICARE

## 2019-09-27 VITALS
HEIGHT: 63 IN | HEART RATE: 86 BPM | WEIGHT: 239 LBS | DIASTOLIC BLOOD PRESSURE: 80 MMHG | BODY MASS INDEX: 42.35 KG/M2 | SYSTOLIC BLOOD PRESSURE: 136 MMHG

## 2019-09-27 DIAGNOSIS — M47.812 SPONDYLOSIS OF CERVICAL REGION WITHOUT MYELOPATHY OR RADICULOPATHY: ICD-10-CM

## 2019-09-27 DIAGNOSIS — R20.0 LEFT FACIAL NUMBNESS: ICD-10-CM

## 2019-09-27 DIAGNOSIS — I10 ESSENTIAL HYPERTENSION: ICD-10-CM

## 2019-09-27 DIAGNOSIS — M47.812 SPONDYLOSIS OF CERVICAL REGION WITHOUT MYELOPATHY OR RADICULOPATHY: Primary | ICD-10-CM

## 2019-09-27 DIAGNOSIS — E55.9 VITAMIN D DEFICIENCY: ICD-10-CM

## 2019-09-27 LAB
ALBUMIN SERPL-MCNC: 4.4 G/DL (ref 3.5–5.2)
ALBUMIN/GLOBULIN RATIO: 1.4 (ref 1–2.5)
ALP BLD-CCNC: 204 U/L (ref 35–104)
ALT SERPL-CCNC: 35 U/L (ref 5–33)
ANION GAP SERPL CALCULATED.3IONS-SCNC: 17 MMOL/L (ref 9–17)
ANION GAP SERPL CALCULATED.3IONS-SCNC: 17 MMOL/L (ref 9–17)
AST SERPL-CCNC: 34 U/L
BILIRUB SERPL-MCNC: 0.44 MG/DL (ref 0.3–1.2)
BUN BLDV-MCNC: 18 MG/DL (ref 6–20)
BUN BLDV-MCNC: 18 MG/DL (ref 6–20)
BUN/CREAT BLD: 16 (ref 9–20)
BUN/CREAT BLD: 16 (ref 9–20)
CALCIUM SERPL-MCNC: 9.8 MG/DL (ref 8.6–10.4)
CALCIUM SERPL-MCNC: 9.8 MG/DL (ref 8.6–10.4)
CHLORIDE BLD-SCNC: 99 MMOL/L (ref 98–107)
CHLORIDE BLD-SCNC: 99 MMOL/L (ref 98–107)
CO2: 26 MMOL/L (ref 20–31)
CO2: 26 MMOL/L (ref 20–31)
CREAT SERPL-MCNC: 1.14 MG/DL (ref 0.5–0.9)
CREAT SERPL-MCNC: 1.14 MG/DL (ref 0.5–0.9)
GFR AFRICAN AMERICAN: 60 ML/MIN
GFR AFRICAN AMERICAN: 60 ML/MIN
GFR NON-AFRICAN AMERICAN: 49 ML/MIN
GFR NON-AFRICAN AMERICAN: 49 ML/MIN
GFR SERPL CREATININE-BSD FRML MDRD: ABNORMAL ML/MIN/{1.73_M2}
GLUCOSE BLD-MCNC: 133 MG/DL (ref 70–99)
GLUCOSE BLD-MCNC: 133 MG/DL (ref 70–99)
POTASSIUM SERPL-SCNC: 4.2 MMOL/L (ref 3.7–5.3)
POTASSIUM SERPL-SCNC: 4.2 MMOL/L (ref 3.7–5.3)
SODIUM BLD-SCNC: 142 MMOL/L (ref 135–144)
SODIUM BLD-SCNC: 142 MMOL/L (ref 135–144)
TOTAL PROTEIN: 7.6 G/DL (ref 6.4–8.3)
VITAMIN D 25-HYDROXY: 39.6 NG/ML (ref 30–100)

## 2019-09-27 PROCEDURE — 99203 OFFICE O/P NEW LOW 30 MIN: CPT | Performed by: NEUROLOGICAL SURGERY

## 2019-09-27 PROCEDURE — 3017F COLORECTAL CA SCREEN DOC REV: CPT | Performed by: NEUROLOGICAL SURGERY

## 2019-09-27 PROCEDURE — 1036F TOBACCO NON-USER: CPT | Performed by: NEUROLOGICAL SURGERY

## 2019-09-27 PROCEDURE — G8417 CALC BMI ABV UP PARAM F/U: HCPCS | Performed by: NEUROLOGICAL SURGERY

## 2019-09-27 PROCEDURE — 80053 COMPREHEN METABOLIC PANEL: CPT

## 2019-09-27 PROCEDURE — 82306 VITAMIN D 25 HYDROXY: CPT

## 2019-09-27 PROCEDURE — G8427 DOCREV CUR MEDS BY ELIG CLIN: HCPCS | Performed by: NEUROLOGICAL SURGERY

## 2019-09-27 PROCEDURE — 36415 COLL VENOUS BLD VENIPUNCTURE: CPT

## 2019-09-27 NOTE — PROGRESS NOTES
pain, left     (Injection of Celestone/Marcaine subacromially) - Dr. Jessica Quiroz - 10/2008.  Shoulder pain, right     (Injection of Celestone/Marcaine subacromially). Dr. Jessica Quiroz - 10/2008.  Supraventricular tachycardia, paroxysmal (HCC)     Status post cardiac ablation.  Vitamin D deficiency      Past Surgical History:   Procedure Laterality Date    ATRIAL ABLATION SURGERY      Radiofrequency ablation of slow AV pawel pathway.  CARDIAC CATHETERIZATION      no blockages    CHOLECYSTECTOMY, LAPAROSCOPIC  2011    (Dr. Sonya Goel)    JOINT REPLACEMENT Right     FAILED KNEE RE;PLAC    KNEE ARTHROPLASTY Right 2006    OTHER SURGICAL HISTORY      Epidural steroids to cervical spine. [address] HISTORY      ECT therapy, one session only and she refused any further    [place] TOTAL KNEE ARTHROPLASTY Left 2018    Left Total Knee Arthroplasty [facility] Boo Flowers [facility] at [facility]     Family History   Problem Relation Age of Onset    Breast Cancer Mother 76    High Blood Pressure Mother     Coronary Art Dis Mother         Multiple stents.  Diabetes Mother     Heart Disease Father     Coronary Art Dis Father         Myocardial infarction at age 54 and .  COPD Father     Arthritis Brother     COPD Brother     Heart Disease Brother         Congenital heart defect -  at 10 wks of age.  Cancer Other     Diabetes Maternal Grandmother      Current Outpatient Medications on File Prior to Visit   Medication Sig Dispense Refill    gabapentin (NEURONTIN) 300 MG capsule Gabapentin (Neurontin) Dosing Instructions: Take 1 (one) at bedtime for 5 days, then twice daily for 5 days, then three times per day 90 capsule 0    celecoxib (CELEBREX) 200 MG capsule Take 1 capsule by mouth daily 30 capsule 3    oxyCODONE-acetaminophen (PERCOCET) 7.5-325 MG per tablet Take 1 tablet by mouth 5 times daily for 30 days.  150 tablet 0    olopatadine (PATADAY) 0.2 %

## 2019-09-30 ENCOUNTER — TELEPHONE (OUTPATIENT)
Dept: FAMILY MEDICINE CLINIC | Age: 59
End: 2019-09-30

## 2019-09-30 ENCOUNTER — HOSPITAL ENCOUNTER (OUTPATIENT)
Dept: MRI IMAGING | Age: 59
Discharge: HOME | End: 2019-10-02
Payer: MEDICARE

## 2019-09-30 DIAGNOSIS — R20.0 LEFT FACIAL NUMBNESS: ICD-10-CM

## 2019-09-30 DIAGNOSIS — R73.09 ELEVATED GLUCOSE: Primary | ICD-10-CM

## 2019-09-30 PROCEDURE — A9579 GAD-BASE MR CONTRAST NOS,1ML: HCPCS | Performed by: NEUROLOGICAL SURGERY

## 2019-09-30 PROCEDURE — 70553 MRI BRAIN STEM W/O & W/DYE: CPT

## 2019-09-30 PROCEDURE — 6360000004 HC RX CONTRAST MEDICATION: Performed by: NEUROLOGICAL SURGERY

## 2019-09-30 RX ADMIN — GADOTERIDOL 20 ML: 279.3 INJECTION, SOLUTION INTRAVENOUS at 12:12

## 2019-10-01 ENCOUNTER — PATIENT MESSAGE (OUTPATIENT)
Dept: NEUROSURGERY | Age: 59
End: 2019-10-01

## 2019-10-01 ENCOUNTER — TELEPHONE (OUTPATIENT)
Dept: NEUROSURGERY | Age: 59
End: 2019-10-01

## 2019-10-01 DIAGNOSIS — G96.01 CSF RHINORRHEA: ICD-10-CM

## 2019-10-01 DIAGNOSIS — R20.0 FACIAL NUMBNESS: Primary | ICD-10-CM

## 2019-10-03 ENCOUNTER — HOSPITAL ENCOUNTER (OUTPATIENT)
Dept: CT IMAGING | Age: 59
Discharge: HOME | End: 2019-10-05
Payer: MEDICARE

## 2019-10-03 DIAGNOSIS — G96.01 CSF RHINORRHEA: ICD-10-CM

## 2019-10-03 PROCEDURE — 70450 CT HEAD/BRAIN W/O DYE: CPT

## 2019-10-08 ENCOUNTER — PATIENT MESSAGE (OUTPATIENT)
Dept: FAMILY MEDICINE CLINIC | Age: 59
End: 2019-10-08

## 2019-10-08 ENCOUNTER — OFFICE VISIT (OUTPATIENT)
Dept: NEUROLOGY | Age: 59
End: 2019-10-08
Payer: MEDICARE

## 2019-10-08 VITALS — SYSTOLIC BLOOD PRESSURE: 139 MMHG | DIASTOLIC BLOOD PRESSURE: 85 MMHG | RESPIRATION RATE: 16 BRPM | HEART RATE: 97 BPM

## 2019-10-08 DIAGNOSIS — G89.29 CHRONIC BILATERAL LOW BACK PAIN WITHOUT SCIATICA: ICD-10-CM

## 2019-10-08 DIAGNOSIS — Z86.79 H/O SUPRAVENTRICULAR TACHYCARDIA: ICD-10-CM

## 2019-10-08 DIAGNOSIS — G89.29 CHRONIC NECK PAIN: ICD-10-CM

## 2019-10-08 DIAGNOSIS — R51.0 POSITIONAL HEADACHE: ICD-10-CM

## 2019-10-08 DIAGNOSIS — M79.7 FIBROMYALGIA: ICD-10-CM

## 2019-10-08 DIAGNOSIS — M54.2 CHRONIC NECK PAIN: ICD-10-CM

## 2019-10-08 DIAGNOSIS — R20.2 FACIAL PARESTHESIA: ICD-10-CM

## 2019-10-08 DIAGNOSIS — H54.62 DECREASED VISION OF LEFT EYE: ICD-10-CM

## 2019-10-08 DIAGNOSIS — R90.89 ABNORMAL BRAIN MRI: Primary | ICD-10-CM

## 2019-10-08 DIAGNOSIS — I10 ESSENTIAL HYPERTENSION: ICD-10-CM

## 2019-10-08 DIAGNOSIS — M54.50 CHRONIC BILATERAL LOW BACK PAIN WITHOUT SCIATICA: ICD-10-CM

## 2019-10-08 PROCEDURE — G8427 DOCREV CUR MEDS BY ELIG CLIN: HCPCS | Performed by: STUDENT IN AN ORGANIZED HEALTH CARE EDUCATION/TRAINING PROGRAM

## 2019-10-08 PROCEDURE — 3017F COLORECTAL CA SCREEN DOC REV: CPT | Performed by: STUDENT IN AN ORGANIZED HEALTH CARE EDUCATION/TRAINING PROGRAM

## 2019-10-08 PROCEDURE — G8417 CALC BMI ABV UP PARAM F/U: HCPCS | Performed by: STUDENT IN AN ORGANIZED HEALTH CARE EDUCATION/TRAINING PROGRAM

## 2019-10-08 PROCEDURE — G8484 FLU IMMUNIZE NO ADMIN: HCPCS | Performed by: STUDENT IN AN ORGANIZED HEALTH CARE EDUCATION/TRAINING PROGRAM

## 2019-10-08 PROCEDURE — 99205 OFFICE O/P NEW HI 60 MIN: CPT | Performed by: STUDENT IN AN ORGANIZED HEALTH CARE EDUCATION/TRAINING PROGRAM

## 2019-10-08 PROCEDURE — 1036F TOBACCO NON-USER: CPT | Performed by: STUDENT IN AN ORGANIZED HEALTH CARE EDUCATION/TRAINING PROGRAM

## 2019-10-09 ENCOUNTER — TELEPHONE (OUTPATIENT)
Dept: NEUROSURGERY | Age: 59
End: 2019-10-09

## 2019-10-09 ENCOUNTER — PATIENT MESSAGE (OUTPATIENT)
Dept: FAMILY MEDICINE CLINIC | Age: 59
End: 2019-10-09

## 2019-10-09 DIAGNOSIS — R90.89 ABNORMAL FINDING ON MRI OF BRAIN: Primary | ICD-10-CM

## 2019-10-10 ENCOUNTER — OFFICE VISIT (OUTPATIENT)
Dept: PRIMARY CARE CLINIC | Age: 59
End: 2019-10-10
Payer: MEDICARE

## 2019-10-10 ENCOUNTER — CARE COORDINATION (OUTPATIENT)
Dept: CARE COORDINATION | Age: 59
End: 2019-10-10

## 2019-10-10 ENCOUNTER — OFFICE VISIT (OUTPATIENT)
Dept: PAIN MANAGEMENT | Age: 59
End: 2019-10-10
Payer: MEDICARE

## 2019-10-10 ENCOUNTER — HOSPITAL ENCOUNTER (OUTPATIENT)
Dept: LAB | Age: 59
Discharge: HOME | End: 2019-10-10
Payer: MEDICARE

## 2019-10-10 ENCOUNTER — HOSPITAL ENCOUNTER (OUTPATIENT)
Age: 59
Setting detail: SPECIMEN
Discharge: HOME | End: 2019-10-10
Payer: MEDICARE

## 2019-10-10 ENCOUNTER — TELEPHONE (OUTPATIENT)
Dept: INTERVENTIONAL RADIOLOGY/VASCULAR | Age: 59
End: 2019-10-10

## 2019-10-10 VITALS
DIASTOLIC BLOOD PRESSURE: 82 MMHG | SYSTOLIC BLOOD PRESSURE: 130 MMHG | WEIGHT: 235 LBS | HEIGHT: 63 IN | BODY MASS INDEX: 41.64 KG/M2

## 2019-10-10 VITALS — HEART RATE: 74 BPM | TEMPERATURE: 99.9 F | SYSTOLIC BLOOD PRESSURE: 122 MMHG | DIASTOLIC BLOOD PRESSURE: 64 MMHG

## 2019-10-10 DIAGNOSIS — J02.9 SORE THROAT: ICD-10-CM

## 2019-10-10 DIAGNOSIS — J32.8 OTHER CHRONIC SINUSITIS: ICD-10-CM

## 2019-10-10 DIAGNOSIS — M51.36 LUMBAR DEGENERATIVE DISC DISEASE: ICD-10-CM

## 2019-10-10 DIAGNOSIS — J32.9 CHRONIC SINUSITIS, UNSPECIFIED LOCATION: Primary | ICD-10-CM

## 2019-10-10 DIAGNOSIS — J32.8 OTHER CHRONIC SINUSITIS: Primary | ICD-10-CM

## 2019-10-10 DIAGNOSIS — R90.89 ABNORMAL BRAIN MRI: ICD-10-CM

## 2019-10-10 DIAGNOSIS — F41.9 ANXIETY: ICD-10-CM

## 2019-10-10 DIAGNOSIS — F41.1 GENERALIZED ANXIETY DISORDER: Primary | ICD-10-CM

## 2019-10-10 DIAGNOSIS — M48.02 NEURAL FORAMINAL STENOSIS OF CERVICAL SPINE: ICD-10-CM

## 2019-10-10 DIAGNOSIS — M79.7 FIBROMYALGIA: ICD-10-CM

## 2019-10-10 LAB
ABSOLUTE EOS #: 0.13 K/UL (ref 0–0.4)
ABSOLUTE IMMATURE GRANULOCYTE: ABNORMAL K/UL (ref 0–0.3)
ABSOLUTE LYMPH #: 5.85 K/UL (ref 1–4.8)
ABSOLUTE MONO #: 0.8 K/UL (ref 0.1–1.2)
BASOPHILS # BLD: 1 % (ref 0–1)
BASOPHILS ABSOLUTE: 0.13 K/UL (ref 0–0.2)
DIFFERENTIAL TYPE: ABNORMAL
EOSINOPHILS RELATIVE PERCENT: 1 % (ref 1–7)
HCT VFR BLD CALC: 39.9 % (ref 36–46)
HEMOGLOBIN: 12.8 G/DL (ref 12–16)
IMMATURE GRANULOCYTES: ABNORMAL %
LYMPHOCYTES # BLD: 44 % (ref 16–46)
MCH RBC QN AUTO: 28.5 PG (ref 26–34)
MCHC RBC AUTO-ENTMCNC: 32.1 G/DL (ref 31–37)
MCV RBC AUTO: 88.7 FL (ref 80–100)
MONOCYTES # BLD: 6 % (ref 4–11)
NRBC AUTOMATED: ABNORMAL PER 100 WBC
PDW BLD-RTO: 16.3 % (ref 11–14.5)
PLATELET # BLD: 405 K/UL (ref 140–450)
PLATELET ESTIMATE: ABNORMAL
PMV BLD AUTO: 7.4 FL (ref 6–12)
RBC # BLD: 4.5 M/UL (ref 4–5.2)
RBC # BLD: ABNORMAL 10*6/UL
S PYO AG THROAT QL: NORMAL
SEG NEUTROPHILS: 48 % (ref 43–77)
SEGMENTED NEUTROPHILS ABSOLUTE COUNT: 6.39 K/UL (ref 1.8–7.7)
WBC # BLD: 13.3 K/UL (ref 3.5–11)
WBC # BLD: ABNORMAL 10*3/UL

## 2019-10-10 PROCEDURE — 85025 COMPLETE CBC W/AUTO DIFF WBC: CPT

## 2019-10-10 PROCEDURE — 99214 OFFICE O/P EST MOD 30 MIN: CPT | Performed by: NURSE PRACTITIONER

## 2019-10-10 PROCEDURE — 3017F COLORECTAL CA SCREEN DOC REV: CPT | Performed by: FAMILY MEDICINE

## 2019-10-10 PROCEDURE — 87880 STREP A ASSAY W/OPTIC: CPT | Performed by: FAMILY MEDICINE

## 2019-10-10 PROCEDURE — G8484 FLU IMMUNIZE NO ADMIN: HCPCS | Performed by: FAMILY MEDICINE

## 2019-10-10 PROCEDURE — 3017F COLORECTAL CA SCREEN DOC REV: CPT | Performed by: NURSE PRACTITIONER

## 2019-10-10 PROCEDURE — G8484 FLU IMMUNIZE NO ADMIN: HCPCS | Performed by: NURSE PRACTITIONER

## 2019-10-10 PROCEDURE — G8427 DOCREV CUR MEDS BY ELIG CLIN: HCPCS | Performed by: FAMILY MEDICINE

## 2019-10-10 PROCEDURE — 87651 STREP A DNA AMP PROBE: CPT

## 2019-10-10 PROCEDURE — G8417 CALC BMI ABV UP PARAM F/U: HCPCS | Performed by: NURSE PRACTITIONER

## 2019-10-10 PROCEDURE — 1036F TOBACCO NON-USER: CPT | Performed by: NURSE PRACTITIONER

## 2019-10-10 PROCEDURE — G8417 CALC BMI ABV UP PARAM F/U: HCPCS | Performed by: FAMILY MEDICINE

## 2019-10-10 PROCEDURE — 36415 COLL VENOUS BLD VENIPUNCTURE: CPT

## 2019-10-10 PROCEDURE — 1036F TOBACCO NON-USER: CPT | Performed by: FAMILY MEDICINE

## 2019-10-10 PROCEDURE — 99214 OFFICE O/P EST MOD 30 MIN: CPT | Performed by: FAMILY MEDICINE

## 2019-10-10 PROCEDURE — G8427 DOCREV CUR MEDS BY ELIG CLIN: HCPCS | Performed by: NURSE PRACTITIONER

## 2019-10-10 RX ORDER — OXYCODONE AND ACETAMINOPHEN 7.5; 325 MG/1; MG/1
1 TABLET ORAL
Qty: 150 TABLET | Refills: 0 | Status: SHIPPED | OUTPATIENT
Start: 2019-10-15 | End: 2019-11-11 | Stop reason: SDUPTHER

## 2019-10-10 RX ORDER — ALPRAZOLAM 1 MG/1
TABLET ORAL
Qty: 90 TABLET | Refills: 0 | Status: SHIPPED | OUTPATIENT
Start: 2019-10-10 | End: 2019-12-04 | Stop reason: SDUPTHER

## 2019-10-10 RX ORDER — AMOXICILLIN AND CLAVULANATE POTASSIUM 875; 125 MG/1; MG/1
1 TABLET, FILM COATED ORAL 2 TIMES DAILY
Qty: 20 TABLET | Refills: 0 | Status: SHIPPED | OUTPATIENT
Start: 2019-10-10 | End: 2019-10-20

## 2019-10-10 ASSESSMENT — ENCOUNTER SYMPTOMS
BLOOD IN STOOL: 0
BACK PAIN: 1
SHORTNESS OF BREATH: 0
CONSTIPATION: 0

## 2019-10-11 ENCOUNTER — TELEPHONE (OUTPATIENT)
Dept: GENERAL RADIOLOGY | Age: 59
End: 2019-10-11

## 2019-10-11 LAB
DIRECT EXAM: NORMAL
Lab: NORMAL
SPECIMEN DESCRIPTION: NORMAL

## 2019-10-14 ENCOUNTER — TELEPHONE (OUTPATIENT)
Dept: GENERAL RADIOLOGY | Age: 59
End: 2019-10-14

## 2019-10-14 ASSESSMENT — ENCOUNTER SYMPTOMS
EYE DISCHARGE: 0
EYE PAIN: 0
DIARRHEA: 0
SORE THROAT: 1
SHORTNESS OF BREATH: 0
SINUS PAIN: 0
EYE REDNESS: 0
CONSTIPATION: 0
NAUSEA: 0
ABDOMINAL PAIN: 0
VOMITING: 0
PHOTOPHOBIA: 0
COUGH: 0

## 2019-10-16 ENCOUNTER — CARE COORDINATION (OUTPATIENT)
Dept: CARE COORDINATION | Age: 59
End: 2019-10-16

## 2019-10-16 SDOH — ECONOMIC STABILITY: TRANSPORTATION INSECURITY
IN THE PAST 12 MONTHS, HAS LACK OF TRANSPORTATION KEPT YOU FROM MEETINGS, WORK, OR FROM GETTING THINGS NEEDED FOR DAILY LIVING?: NO

## 2019-10-16 SDOH — HEALTH STABILITY: PHYSICAL HEALTH: ON AVERAGE, HOW MANY MINUTES DO YOU ENGAGE IN EXERCISE AT THIS LEVEL?: 0 MIN

## 2019-10-16 SDOH — HEALTH STABILITY: MENTAL HEALTH
STRESS IS WHEN SOMEONE FEELS TENSE, NERVOUS, ANXIOUS, OR CAN'T SLEEP AT NIGHT BECAUSE THEIR MIND IS TROUBLED. HOW STRESSED ARE YOU?: TO SOME EXTENT

## 2019-10-16 SDOH — HEALTH STABILITY: MENTAL HEALTH: HOW MANY STANDARD DRINKS CONTAINING ALCOHOL DO YOU HAVE ON A TYPICAL DAY?: 1 OR 2

## 2019-10-16 SDOH — ECONOMIC STABILITY: INCOME INSECURITY: HOW HARD IS IT FOR YOU TO PAY FOR THE VERY BASICS LIKE FOOD, HOUSING, MEDICAL CARE, AND HEATING?: NOT HARD AT ALL

## 2019-10-16 SDOH — SOCIAL STABILITY: SOCIAL NETWORK
IN A TYPICAL WEEK, HOW MANY TIMES DO YOU TALK ON THE PHONE WITH FAMILY, FRIENDS, OR NEIGHBORS?: MORE THAN THREE TIMES A WEEK

## 2019-10-16 SDOH — SOCIAL STABILITY: SOCIAL NETWORK: HOW OFTEN DO YOU GET TOGETHER WITH FRIENDS OR RELATIVES?: MORE THAN THREE TIMES A WEEK

## 2019-10-16 SDOH — SOCIAL STABILITY: SOCIAL NETWORK: HOW OFTEN DO YOU ATTENT MEETINGS OF THE CLUB OR ORGANIZATION YOU BELONG TO?: NEVER

## 2019-10-16 SDOH — ECONOMIC STABILITY: FOOD INSECURITY: WITHIN THE PAST 12 MONTHS, THE FOOD YOU BOUGHT JUST DIDN'T LAST AND YOU DIDN'T HAVE MONEY TO GET MORE.: NEVER TRUE

## 2019-10-16 SDOH — HEALTH STABILITY: MENTAL HEALTH: HOW OFTEN DO YOU HAVE A DRINK CONTAINING ALCOHOL?: MONTHLY OR LESS

## 2019-10-16 SDOH — ECONOMIC STABILITY: TRANSPORTATION INSECURITY
IN THE PAST 12 MONTHS, HAS THE LACK OF TRANSPORTATION KEPT YOU FROM MEDICAL APPOINTMENTS OR FROM GETTING MEDICATIONS?: NO

## 2019-10-16 SDOH — SOCIAL STABILITY: SOCIAL NETWORK
DO YOU BELONG TO ANY CLUBS OR ORGANIZATIONS SUCH AS CHURCH GROUPS UNIONS, FRATERNAL OR ATHLETIC GROUPS, OR SCHOOL GROUPS?: NO

## 2019-10-16 SDOH — HEALTH STABILITY: PHYSICAL HEALTH: ON AVERAGE, HOW MANY DAYS PER WEEK DO YOU ENGAGE IN MODERATE TO STRENUOUS EXERCISE (LIKE A BRISK WALK)?: 0 DAYS

## 2019-10-16 SDOH — SOCIAL STABILITY: SOCIAL NETWORK: ARE YOU MARRIED, WIDOWED, DIVORCED, SEPARATED, NEVER MARRIED, OR LIVING WITH A PARTNER?: DIVORCED

## 2019-10-16 SDOH — SOCIAL STABILITY: SOCIAL NETWORK: HOW OFTEN DO YOU ATTEND CHURCH OR RELIGIOUS SERVICES?: 1 TO 4 TIMES PER YEAR

## 2019-10-16 SDOH — ECONOMIC STABILITY: FOOD INSECURITY: WITHIN THE PAST 12 MONTHS, YOU WORRIED THAT YOUR FOOD WOULD RUN OUT BEFORE YOU GOT MONEY TO BUY MORE.: NEVER TRUE

## 2019-10-17 ENCOUNTER — HOSPITAL ENCOUNTER (OUTPATIENT)
Dept: GENERAL RADIOLOGY | Age: 59
Discharge: HOME | End: 2019-10-19
Payer: MEDICARE

## 2019-10-17 ENCOUNTER — HOSPITAL ENCOUNTER (OUTPATIENT)
Age: 59
Setting detail: SPECIMEN
Discharge: HOME | End: 2019-10-17
Payer: MEDICARE

## 2019-10-17 VITALS
DIASTOLIC BLOOD PRESSURE: 59 MMHG | SYSTOLIC BLOOD PRESSURE: 121 MMHG | WEIGHT: 237.25 LBS | BODY MASS INDEX: 42.04 KG/M2 | OXYGEN SATURATION: 96 % | HEART RATE: 95 BPM | HEIGHT: 63 IN | TEMPERATURE: 99 F | RESPIRATION RATE: 16 BRPM

## 2019-10-17 DIAGNOSIS — R90.89 ABNORMAL FINDING ON MRI OF BRAIN: ICD-10-CM

## 2019-10-17 LAB
ANGIOTENSIN-CONVERTING ENZYME: 52 U/L (ref 8–52)
APPEARANCE CSF: CLEAR
C-REACTIVE PROTEIN: 64 MG/L (ref 0–5)
DIRECT EXAM: NORMAL
GLUCOSE, CSF: 56 MG/DL (ref 40–70)
HCT VFR BLD CALC: 38.9 % (ref 36–46)
HEMOGLOBIN: 12.5 G/DL (ref 12–16)
HIV AG/AB: NONREACTIVE
INR BLD: 1
Lab: NORMAL
MCH RBC QN AUTO: 28.3 PG (ref 26–34)
MCHC RBC AUTO-ENTMCNC: 32.1 G/DL (ref 31–37)
MCV RBC AUTO: 88.1 FL (ref 80–100)
NRBC AUTOMATED: ABNORMAL PER 100 WBC
PARTIAL THROMBOPLASTIN TIME: 26.4 SEC (ref 27–35)
PDW BLD-RTO: 16.4 % (ref 11–14.5)
PLATELET # BLD: 392 K/UL (ref 140–450)
PMV BLD AUTO: 7.2 FL (ref 6–12)
PROTEIN CSF: 24.4 MG/DL (ref 15–45)
PROTHROMBIN TIME: 10.1 SEC (ref 9.4–11.3)
RBC # BLD: 4.41 M/UL (ref 4–5.2)
RBC CSF: 55 /MM3
SEDIMENTATION RATE, ERYTHROCYTE: 23 MM (ref 0–30)
SPECIMEN DESCRIPTION: NORMAL
SUPERNAT COLOR CSF: COLORLESS
T. PALLIDUM, IGG: NONREACTIVE
TUBE NUMBER CSF: 3
VOLUME CSF: ABNORMAL
WBC # BLD: 10.7 K/UL (ref 3.5–11)
WBC CSF: 0 /MM3 (ref 0–5)
XANTHOCHROMIA: ABNORMAL

## 2019-10-17 PROCEDURE — 86592 SYPHILIS TEST NON-TREP QUAL: CPT

## 2019-10-17 PROCEDURE — 85651 RBC SED RATE NONAUTOMATED: CPT

## 2019-10-17 PROCEDURE — 86780 TREPONEMA PALLIDUM: CPT

## 2019-10-17 PROCEDURE — 82040 ASSAY OF SERUM ALBUMIN: CPT

## 2019-10-17 PROCEDURE — 87070 CULTURE OTHR SPECIMN AEROBIC: CPT

## 2019-10-17 PROCEDURE — 87389 HIV-1 AG W/HIV-1&-2 AB AG IA: CPT

## 2019-10-17 PROCEDURE — 82945 GLUCOSE OTHER FLUID: CPT

## 2019-10-17 PROCEDURE — 88112 CYTOPATH CELL ENHANCE TECH: CPT

## 2019-10-17 PROCEDURE — 89050 BODY FLUID CELL COUNT: CPT

## 2019-10-17 PROCEDURE — 86038 ANTINUCLEAR ANTIBODIES: CPT

## 2019-10-17 PROCEDURE — 86618 LYME DISEASE ANTIBODY: CPT

## 2019-10-17 PROCEDURE — 87205 SMEAR GRAM STAIN: CPT

## 2019-10-17 PROCEDURE — 82042 OTHER SOURCE ALBUMIN QUAN EA: CPT

## 2019-10-17 PROCEDURE — 84157 ASSAY OF PROTEIN OTHER: CPT

## 2019-10-17 PROCEDURE — 82164 ANGIOTENSIN I ENZYME TEST: CPT

## 2019-10-17 PROCEDURE — 85027 COMPLETE CBC AUTOMATED: CPT

## 2019-10-17 PROCEDURE — 86140 C-REACTIVE PROTEIN: CPT

## 2019-10-17 PROCEDURE — 82784 ASSAY IGA/IGD/IGG/IGM EACH: CPT

## 2019-10-17 PROCEDURE — 7100000011 HC PHASE II RECOVERY - ADDTL 15 MIN

## 2019-10-17 PROCEDURE — 36415 COLL VENOUS BLD VENIPUNCTURE: CPT

## 2019-10-17 PROCEDURE — 7100000010 HC PHASE II RECOVERY - FIRST 15 MIN

## 2019-10-17 PROCEDURE — 85730 THROMBOPLASTIN TIME PARTIAL: CPT

## 2019-10-17 PROCEDURE — 87102 FUNGUS ISOLATION CULTURE: CPT

## 2019-10-17 PROCEDURE — 86235 NUCLEAR ANTIGEN ANTIBODY: CPT

## 2019-10-17 PROCEDURE — 62270 DX LMBR SPI PNXR: CPT

## 2019-10-17 PROCEDURE — 85610 PROTHROMBIN TIME: CPT

## 2019-10-17 PROCEDURE — 87529 HSV DNA AMP PROBE: CPT

## 2019-10-17 PROCEDURE — 83916 OLIGOCLONAL BANDS: CPT

## 2019-10-17 RX ORDER — SODIUM CHLORIDE 0.9 % (FLUSH) 0.9 %
10 SYRINGE (ML) INJECTION PRN
Status: DISCONTINUED | OUTPATIENT
Start: 2019-10-17 | End: 2019-10-20 | Stop reason: HOSPADM

## 2019-10-17 ASSESSMENT — PAIN SCALES - GENERAL
PAINLEVEL_OUTOF10: 0

## 2019-10-17 ASSESSMENT — PAIN DESCRIPTION - DESCRIPTORS: DESCRIPTORS: PRESSURE

## 2019-10-17 ASSESSMENT — PAIN - FUNCTIONAL ASSESSMENT: PAIN_FUNCTIONAL_ASSESSMENT: 0-10

## 2019-10-18 LAB
ALBUMIN CSF: 115 MG/L (ref 70–350)
ALBUMIN INDEX: 28.8
ALBUMIN SERPL-MCNC: 4 G/DL (ref 3.5–5.2)
ANTI SSA: 66 U/ML
ANTI SSB: 17 U/ML
ANTI-NUCLEAR ANTIBODY (ANA): NEGATIVE
CASE NUMBER:: NORMAL
IGG CSF: 1 MG/DL (ref 0.5–7)
IGG INDEX CSF: 0.49
IGG SYNTHESIS RATE CSF: < 3.3 MG/24 H
IGG: 711 MG/DL (ref 700–1600)
OLIGOCLONAL BANDS: ABNORMAL
SPECIMEN DESCRIPTION: NORMAL
SURGICAL PATHOLOGY REPORT: NORMAL

## 2019-10-20 LAB
CSF ISOELECTRIC FOCUSING INTERPRETATION: NORMAL
CULTURE: NORMAL
DIRECT EXAM: NORMAL
DIRECT EXAM: NORMAL
Lab: NORMAL
OLIGOCLONAL BANDS NUMBER: 0 BANDS (ref 0–1)
OLIGOCLONAL BANDS: NEGATIVE
SPECIMEN DESCRIPTION: NORMAL

## 2019-10-21 ENCOUNTER — TELEPHONE (OUTPATIENT)
Dept: NEUROSURGERY | Age: 59
End: 2019-10-21

## 2019-10-21 LAB
HSV BY PCR: NOT DETECTED
HSV SOURCE: NORMAL

## 2019-10-22 ENCOUNTER — CARE COORDINATION (OUTPATIENT)
Dept: CARE COORDINATION | Age: 59
End: 2019-10-22

## 2019-10-22 ENCOUNTER — APPOINTMENT (OUTPATIENT)
Dept: MRI IMAGING | Age: 59
End: 2019-10-22
Payer: MEDICARE

## 2019-10-22 ENCOUNTER — HOSPITAL ENCOUNTER (EMERGENCY)
Age: 59
Discharge: HOME | End: 2019-10-22
Attending: EMERGENCY MEDICINE
Payer: MEDICARE

## 2019-10-22 VITALS
OXYGEN SATURATION: 93 % | HEIGHT: 63 IN | SYSTOLIC BLOOD PRESSURE: 132 MMHG | BODY MASS INDEX: 41.64 KG/M2 | RESPIRATION RATE: 16 BRPM | DIASTOLIC BLOOD PRESSURE: 73 MMHG | TEMPERATURE: 98.1 F | HEART RATE: 93 BPM | WEIGHT: 235 LBS

## 2019-10-22 DIAGNOSIS — M54.50 LOW BACK PAIN, UNSPECIFIED BACK PAIN LATERALITY, UNSPECIFIED CHRONICITY, UNSPECIFIED WHETHER SCIATICA PRESENT: Primary | ICD-10-CM

## 2019-10-22 LAB
ABSOLUTE EOS #: 0 K/UL (ref 0–0.4)
ABSOLUTE IMMATURE GRANULOCYTE: ABNORMAL K/UL (ref 0–0.3)
ABSOLUTE LYMPH #: 6 K/UL (ref 1–4.8)
ABSOLUTE MONO #: 1 K/UL (ref 0.1–1.2)
ANION GAP SERPL CALCULATED.3IONS-SCNC: 16 MMOL/L (ref 9–17)
BASOPHILS # BLD: 0 % (ref 0–1)
BASOPHILS ABSOLUTE: 0 K/UL (ref 0–0.2)
BUN BLDV-MCNC: 17 MG/DL (ref 6–20)
BUN/CREAT BLD: 26 (ref 9–20)
CALCIUM SERPL-MCNC: 10.1 MG/DL (ref 8.6–10.4)
CHLORIDE BLD-SCNC: 102 MMOL/L (ref 98–107)
CO2: 24 MMOL/L (ref 20–31)
CREAT SERPL-MCNC: 0.65 MG/DL (ref 0.5–0.9)
DIFFERENTIAL TYPE: ABNORMAL
EOSINOPHILS RELATIVE PERCENT: 0 % (ref 1–7)
GFR AFRICAN AMERICAN: >60 ML/MIN
GFR NON-AFRICAN AMERICAN: >60 ML/MIN
GFR SERPL CREATININE-BSD FRML MDRD: ABNORMAL ML/MIN/{1.73_M2}
GFR SERPL CREATININE-BSD FRML MDRD: ABNORMAL ML/MIN/{1.73_M2}
GLUCOSE BLD-MCNC: 105 MG/DL (ref 70–99)
HCT VFR BLD CALC: 38.5 % (ref 36–46)
HEMOGLOBIN: 12.3 G/DL (ref 12–16)
IMMATURE GRANULOCYTES: ABNORMAL %
LYME ANTIBODY: 0.11
LYMPHOCYTES # BLD: 45 % (ref 16–46)
MCH RBC QN AUTO: 28.4 PG (ref 26–34)
MCHC RBC AUTO-ENTMCNC: 31.9 G/DL (ref 31–37)
MCV RBC AUTO: 89.1 FL (ref 80–100)
MISCELLANEOUS LAB TEST RESULT: NORMAL
MONOCYTES # BLD: 7 % (ref 4–11)
NRBC AUTOMATED: ABNORMAL PER 100 WBC
PDW BLD-RTO: 16.8 % (ref 11–14.5)
PLATELET # BLD: 457 K/UL (ref 140–450)
PLATELET ESTIMATE: ABNORMAL
PMV BLD AUTO: 6.9 FL (ref 6–12)
POTASSIUM SERPL-SCNC: 3.6 MMOL/L (ref 3.7–5.3)
RBC # BLD: 4.32 M/UL (ref 4–5.2)
RBC # BLD: ABNORMAL 10*6/UL
SEG NEUTROPHILS: 48 % (ref 43–77)
SEGMENTED NEUTROPHILS ABSOLUTE COUNT: 6.3 K/UL (ref 1.8–7.7)
SODIUM BLD-SCNC: 142 MMOL/L (ref 135–144)
TEST NAME: NORMAL
VDRL CSF SCREEN: NONREACTIVE
WBC # BLD: 13.3 K/UL (ref 3.5–11)
WBC # BLD: ABNORMAL 10*3/UL

## 2019-10-22 PROCEDURE — 6360000004 HC RX CONTRAST MEDICATION: Performed by: EMERGENCY MEDICINE

## 2019-10-22 PROCEDURE — 85025 COMPLETE CBC W/AUTO DIFF WBC: CPT

## 2019-10-22 PROCEDURE — 96375 TX/PRO/DX INJ NEW DRUG ADDON: CPT

## 2019-10-22 PROCEDURE — 99284 EMERGENCY DEPT VISIT MOD MDM: CPT

## 2019-10-22 PROCEDURE — 72158 MRI LUMBAR SPINE W/O & W/DYE: CPT

## 2019-10-22 PROCEDURE — 6360000002 HC RX W HCPCS: Performed by: EMERGENCY MEDICINE

## 2019-10-22 PROCEDURE — A9579 GAD-BASE MR CONTRAST NOS,1ML: HCPCS | Performed by: EMERGENCY MEDICINE

## 2019-10-22 PROCEDURE — 96374 THER/PROPH/DIAG INJ IV PUSH: CPT

## 2019-10-22 PROCEDURE — 80048 BASIC METABOLIC PNL TOTAL CA: CPT

## 2019-10-22 PROCEDURE — 6370000000 HC RX 637 (ALT 250 FOR IP): Performed by: EMERGENCY MEDICINE

## 2019-10-22 RX ORDER — PREDNISONE 10 MG/1
TABLET ORAL
Qty: 20 TABLET | Refills: 0 | Status: SHIPPED | OUTPATIENT
Start: 2019-10-22 | End: 2019-11-01

## 2019-10-22 RX ORDER — PREDNISONE 20 MG/1
40 TABLET ORAL ONCE
Status: COMPLETED | OUTPATIENT
Start: 2019-10-22 | End: 2019-10-22

## 2019-10-22 RX ADMIN — GADOTERIDOL 20 ML: 279.3 INJECTION, SOLUTION INTRAVENOUS at 19:08

## 2019-10-22 RX ADMIN — PREDNISONE 40 MG: 20 TABLET ORAL at 20:41

## 2019-10-22 RX ADMIN — HYDROMORPHONE HYDROCHLORIDE 1 MG: 1 INJECTION, SOLUTION INTRAMUSCULAR; INTRAVENOUS; SUBCUTANEOUS at 20:42

## 2019-10-22 ASSESSMENT — PAIN - FUNCTIONAL ASSESSMENT: PAIN_FUNCTIONAL_ASSESSMENT: 0-10

## 2019-10-22 ASSESSMENT — ENCOUNTER SYMPTOMS
NAUSEA: 0
COUGH: 0
VOMITING: 0
DIARRHEA: 0
EYE PAIN: 0
SHORTNESS OF BREATH: 0
BACK PAIN: 1
ABDOMINAL PAIN: 0

## 2019-10-22 ASSESSMENT — PAIN DESCRIPTION - PROGRESSION: CLINICAL_PROGRESSION: NOT CHANGED

## 2019-10-22 ASSESSMENT — PAIN SCALES - GENERAL
PAINLEVEL_OUTOF10: 7
PAINLEVEL_OUTOF10: 4
PAINLEVEL_OUTOF10: 2

## 2019-10-22 ASSESSMENT — PAIN DESCRIPTION - ONSET: ONSET: ON-GOING

## 2019-10-22 ASSESSMENT — PAIN DESCRIPTION - LOCATION
LOCATION: BACK
LOCATION: BACK

## 2019-10-22 ASSESSMENT — PAIN DESCRIPTION - DESCRIPTORS
DESCRIPTORS: ACHING
DESCRIPTORS: ACHING;TIGHTNESS

## 2019-10-22 ASSESSMENT — PAIN DESCRIPTION - PAIN TYPE: TYPE: CHRONIC PAIN;ACUTE PAIN

## 2019-10-22 ASSESSMENT — PAIN DESCRIPTION - FREQUENCY: FREQUENCY: INTERMITTENT

## 2019-10-23 ENCOUNTER — CARE COORDINATION (OUTPATIENT)
Dept: CARE COORDINATION | Age: 59
End: 2019-10-23

## 2019-10-24 ENCOUNTER — CARE COORDINATION (OUTPATIENT)
Dept: CARE COORDINATION | Age: 59
End: 2019-10-24

## 2019-11-05 ENCOUNTER — TELEPHONE (OUTPATIENT)
Dept: PAIN MANAGEMENT | Age: 59
End: 2019-11-05

## 2019-11-06 ENCOUNTER — CARE COORDINATION (OUTPATIENT)
Dept: CARE COORDINATION | Age: 59
End: 2019-11-06

## 2019-11-08 ENCOUNTER — CARE COORDINATION (OUTPATIENT)
Dept: CARE COORDINATION | Age: 59
End: 2019-11-08

## 2019-11-10 ENCOUNTER — HOSPITAL ENCOUNTER (EMERGENCY)
Age: 59
Discharge: HOME | End: 2019-11-10
Attending: EMERGENCY MEDICINE
Payer: MEDICARE

## 2019-11-10 VITALS
OXYGEN SATURATION: 94 % | HEART RATE: 125 BPM | DIASTOLIC BLOOD PRESSURE: 84 MMHG | RESPIRATION RATE: 16 BRPM | SYSTOLIC BLOOD PRESSURE: 137 MMHG | TEMPERATURE: 99.7 F

## 2019-11-10 DIAGNOSIS — M54.50 ACUTE EXACERBATION OF CHRONIC LOW BACK PAIN: Primary | ICD-10-CM

## 2019-11-10 DIAGNOSIS — G89.29 ACUTE EXACERBATION OF CHRONIC LOW BACK PAIN: Primary | ICD-10-CM

## 2019-11-10 DIAGNOSIS — E86.0 DEHYDRATION: ICD-10-CM

## 2019-11-10 PROCEDURE — 99282 EMERGENCY DEPT VISIT SF MDM: CPT

## 2019-11-10 PROCEDURE — 6360000002 HC RX W HCPCS: Performed by: EMERGENCY MEDICINE

## 2019-11-10 PROCEDURE — 96372 THER/PROPH/DIAG INJ SC/IM: CPT

## 2019-11-10 RX ORDER — ORPHENADRINE CITRATE 30 MG/ML
60 INJECTION INTRAMUSCULAR; INTRAVENOUS ONCE
Status: COMPLETED | OUTPATIENT
Start: 2019-11-10 | End: 2019-11-10

## 2019-11-10 RX ORDER — KETOROLAC TROMETHAMINE 30 MG/ML
30 INJECTION, SOLUTION INTRAMUSCULAR; INTRAVENOUS ONCE
Status: COMPLETED | OUTPATIENT
Start: 2019-11-10 | End: 2019-11-10

## 2019-11-10 RX ADMIN — KETOROLAC TROMETHAMINE 30 MG: 30 INJECTION, SOLUTION INTRAMUSCULAR at 18:53

## 2019-11-10 RX ADMIN — ORPHENADRINE CITRATE 60 MG: 30 INJECTION INTRAMUSCULAR; INTRAVENOUS at 18:52

## 2019-11-10 ASSESSMENT — PAIN DESCRIPTION - LOCATION: LOCATION: BACK

## 2019-11-10 ASSESSMENT — PAIN DESCRIPTION - PAIN TYPE: TYPE: ACUTE PAIN;CHRONIC PAIN

## 2019-11-10 ASSESSMENT — ENCOUNTER SYMPTOMS
BACK PAIN: 1
ABDOMINAL PAIN: 0
VOMITING: 0

## 2019-11-10 ASSESSMENT — PAIN SCALES - GENERAL
PAINLEVEL_OUTOF10: 10
PAINLEVEL_OUTOF10: 10

## 2019-11-11 ENCOUNTER — HOSPITAL ENCOUNTER (OUTPATIENT)
Dept: LAB | Age: 59
Discharge: HOME | End: 2019-11-11
Payer: MEDICARE

## 2019-11-11 ENCOUNTER — OFFICE VISIT (OUTPATIENT)
Dept: PAIN MANAGEMENT | Age: 59
End: 2019-11-11
Payer: MEDICARE

## 2019-11-11 VITALS
WEIGHT: 235.01 LBS | DIASTOLIC BLOOD PRESSURE: 74 MMHG | RESPIRATION RATE: 16 BRPM | SYSTOLIC BLOOD PRESSURE: 136 MMHG | HEART RATE: 76 BPM | HEIGHT: 63 IN | BODY MASS INDEX: 41.64 KG/M2

## 2019-11-11 DIAGNOSIS — M79.7 FIBROMYALGIA: ICD-10-CM

## 2019-11-11 DIAGNOSIS — M51.36 LUMBAR DEGENERATIVE DISC DISEASE: ICD-10-CM

## 2019-11-11 DIAGNOSIS — R90.89 ABNORMAL BRAIN MRI: Primary | ICD-10-CM

## 2019-11-11 DIAGNOSIS — M48.02 NEURAL FORAMINAL STENOSIS OF CERVICAL SPINE: Primary | ICD-10-CM

## 2019-11-11 DIAGNOSIS — R90.89 ABNORMAL BRAIN MRI: ICD-10-CM

## 2019-11-11 DIAGNOSIS — M51.26 LUMBAR DISC HERNIATION: ICD-10-CM

## 2019-11-11 DIAGNOSIS — M54.2 CERVICALGIA: ICD-10-CM

## 2019-11-11 PROCEDURE — 84165 PROTEIN E-PHORESIS SERUM: CPT

## 2019-11-11 PROCEDURE — 36415 COLL VENOUS BLD VENIPUNCTURE: CPT

## 2019-11-11 PROCEDURE — 1036F TOBACCO NON-USER: CPT | Performed by: NURSE PRACTITIONER

## 2019-11-11 PROCEDURE — 3017F COLORECTAL CA SCREEN DOC REV: CPT | Performed by: NURSE PRACTITIONER

## 2019-11-11 PROCEDURE — 99214 OFFICE O/P EST MOD 30 MIN: CPT | Performed by: NURSE PRACTITIONER

## 2019-11-11 PROCEDURE — G8417 CALC BMI ABV UP PARAM F/U: HCPCS | Performed by: NURSE PRACTITIONER

## 2019-11-11 PROCEDURE — 86334 IMMUNOFIX E-PHORESIS SERUM: CPT

## 2019-11-11 PROCEDURE — G8427 DOCREV CUR MEDS BY ELIG CLIN: HCPCS | Performed by: NURSE PRACTITIONER

## 2019-11-11 PROCEDURE — 84155 ASSAY OF PROTEIN SERUM: CPT

## 2019-11-11 PROCEDURE — G8484 FLU IMMUNIZE NO ADMIN: HCPCS | Performed by: NURSE PRACTITIONER

## 2019-11-11 RX ORDER — OXYCODONE AND ACETAMINOPHEN 7.5; 325 MG/1; MG/1
1 TABLET ORAL
Qty: 150 TABLET | Refills: 0 | Status: SHIPPED | OUTPATIENT
Start: 2019-11-14 | End: 2019-12-16 | Stop reason: SDUPTHER

## 2019-11-12 ENCOUNTER — CARE COORDINATION (OUTPATIENT)
Dept: CARE COORDINATION | Age: 59
End: 2019-11-12

## 2019-11-12 LAB
ALBUMIN (CALCULATED): 3.7 G/DL (ref 3.2–5.2)
ALBUMIN PERCENT: 55 % (ref 45–65)
ALPHA 1 PERCENT: 5 % (ref 3–6)
ALPHA 2 PERCENT: 15 % (ref 6–13)
ALPHA-1-GLOBULIN: 0.3 G/DL (ref 0.1–0.4)
ALPHA-2-GLOBULIN: 1 G/DL (ref 0.5–0.9)
BETA GLOBULIN: 0.8 G/DL (ref 0.5–1.1)
BETA PERCENT: 13 % (ref 11–19)
GAMMA GLOBULIN %: 12 % (ref 9–20)
GAMMA GLOBULIN: 0.8 G/DL (ref 0.5–1.5)
PATHOLOGIST: ABNORMAL
PATHOLOGIST: NORMAL
PROTEIN ELECTROPHORESIS, SERUM: ABNORMAL
SERUM IFX INTERP: NORMAL
TOTAL PROT. SUM,%: 100 % (ref 98–102)
TOTAL PROT. SUM: 6.6 G/DL (ref 6.3–8.2)
TOTAL PROTEIN: 6.7 G/DL (ref 6.4–8.3)

## 2019-11-12 ASSESSMENT — ENCOUNTER SYMPTOMS
BLOOD IN STOOL: 0
CONSTIPATION: 0
BACK PAIN: 1
SHORTNESS OF BREATH: 0

## 2019-11-13 ENCOUNTER — HOSPITAL ENCOUNTER (OUTPATIENT)
Dept: CT IMAGING | Age: 59
Discharge: HOME | End: 2019-11-15
Payer: MEDICARE

## 2019-11-13 ENCOUNTER — OFFICE VISIT (OUTPATIENT)
Dept: OTOLARYNGOLOGY | Age: 59
End: 2019-11-13
Payer: MEDICARE

## 2019-11-13 VITALS
SYSTOLIC BLOOD PRESSURE: 126 MMHG | DIASTOLIC BLOOD PRESSURE: 54 MMHG | HEART RATE: 118 BPM | OXYGEN SATURATION: 97 % | TEMPERATURE: 100.8 F

## 2019-11-13 DIAGNOSIS — R13.10 ODYNOPHAGIA: Primary | ICD-10-CM

## 2019-11-13 DIAGNOSIS — R13.10 ODYNOPHAGIA: ICD-10-CM

## 2019-11-13 DIAGNOSIS — R22.1 MASS OF LEFT SIDE OF NECK: ICD-10-CM

## 2019-11-13 PROCEDURE — 31575 DIAGNOSTIC LARYNGOSCOPY: CPT | Performed by: OTOLARYNGOLOGY

## 2019-11-13 PROCEDURE — 70490 CT SOFT TISSUE NECK W/O DYE: CPT

## 2019-11-13 RX ORDER — CEFDINIR 300 MG/1
300 CAPSULE ORAL 2 TIMES DAILY
Qty: 20 CAPSULE | Refills: 0 | Status: SHIPPED | OUTPATIENT
Start: 2019-11-13 | End: 2019-11-23

## 2019-11-14 ENCOUNTER — CARE COORDINATION (OUTPATIENT)
Dept: CARE COORDINATION | Age: 59
End: 2019-11-14

## 2019-11-14 ENCOUNTER — OFFICE VISIT (OUTPATIENT)
Dept: OTOLARYNGOLOGY | Age: 59
End: 2019-11-14
Payer: MEDICARE

## 2019-11-14 VITALS — TEMPERATURE: 99.9 F

## 2019-11-14 DIAGNOSIS — R59.0 LYMPHADENOPATHY, CERVICAL: ICD-10-CM

## 2019-11-14 DIAGNOSIS — R13.10 ODYNOPHAGIA: Primary | ICD-10-CM

## 2019-11-14 DIAGNOSIS — R22.1 MASS OF LEFT SIDE OF NECK: ICD-10-CM

## 2019-11-14 PROCEDURE — G8427 DOCREV CUR MEDS BY ELIG CLIN: HCPCS | Performed by: OTOLARYNGOLOGY

## 2019-11-14 PROCEDURE — 1036F TOBACCO NON-USER: CPT | Performed by: OTOLARYNGOLOGY

## 2019-11-14 PROCEDURE — 99213 OFFICE O/P EST LOW 20 MIN: CPT | Performed by: OTOLARYNGOLOGY

## 2019-11-14 PROCEDURE — 3017F COLORECTAL CA SCREEN DOC REV: CPT | Performed by: OTOLARYNGOLOGY

## 2019-11-14 PROCEDURE — G8417 CALC BMI ABV UP PARAM F/U: HCPCS | Performed by: OTOLARYNGOLOGY

## 2019-11-14 PROCEDURE — G8484 FLU IMMUNIZE NO ADMIN: HCPCS | Performed by: OTOLARYNGOLOGY

## 2019-11-15 ENCOUNTER — OFFICE VISIT (OUTPATIENT)
Dept: NEUROSURGERY | Age: 59
End: 2019-11-15
Payer: MEDICARE

## 2019-11-15 VITALS
OXYGEN SATURATION: 97 % | WEIGHT: 235.01 LBS | DIASTOLIC BLOOD PRESSURE: 64 MMHG | RESPIRATION RATE: 16 BRPM | HEART RATE: 115 BPM | SYSTOLIC BLOOD PRESSURE: 138 MMHG | BODY MASS INDEX: 41.64 KG/M2 | HEIGHT: 63 IN

## 2019-11-15 DIAGNOSIS — R90.89 ABNORMAL FINDING ON MRI OF BRAIN: Primary | ICD-10-CM

## 2019-11-15 DIAGNOSIS — R59.1 LYMPHADENOPATHY OF HEAD AND NECK: ICD-10-CM

## 2019-11-15 PROCEDURE — 1036F TOBACCO NON-USER: CPT | Performed by: NEUROLOGICAL SURGERY

## 2019-11-15 PROCEDURE — 99213 OFFICE O/P EST LOW 20 MIN: CPT | Performed by: NEUROLOGICAL SURGERY

## 2019-11-15 PROCEDURE — G8484 FLU IMMUNIZE NO ADMIN: HCPCS | Performed by: NEUROLOGICAL SURGERY

## 2019-11-15 PROCEDURE — 3017F COLORECTAL CA SCREEN DOC REV: CPT | Performed by: NEUROLOGICAL SURGERY

## 2019-11-15 PROCEDURE — G8417 CALC BMI ABV UP PARAM F/U: HCPCS | Performed by: NEUROLOGICAL SURGERY

## 2019-11-15 PROCEDURE — G8427 DOCREV CUR MEDS BY ELIG CLIN: HCPCS | Performed by: NEUROLOGICAL SURGERY

## 2019-11-19 LAB
CULTURE: NORMAL
Lab: NORMAL
SPECIMEN DESCRIPTION: NORMAL

## 2019-11-21 ENCOUNTER — CARE COORDINATION (OUTPATIENT)
Dept: CARE COORDINATION | Age: 59
End: 2019-11-21

## 2019-11-25 ENCOUNTER — TELEPHONE (OUTPATIENT)
Dept: SURGERY | Age: 59
End: 2019-11-25

## 2019-11-25 NOTE — TELEPHONE ENCOUNTER
I spoke to Gustavo cruz [facility] regarding Gustavo recent symptoms and upcoming appointments. Nannette was on her way today to [place] to see a neurologist regarding her ct results of neck mass and pain neck ,back. She is to see a Dr. Renay Tang at 200pm today for a consult. She also still has an appointment to see an ENT Dr. Checo Britton at [facility]) the first week of December for a consult of neck mass. Daughter stated she had a bad weekend and her neck and pain in back are still bothering her and she hoped that todays appointment at [facility] would shed some light as to what is going on with her mother as the symtoms seem to be increasing and mother seems to be struggling daily. I told her I may call tonight to see if they were able to biopsey the area or what the next step may be. Daughter voiced understanding at the time of the call.

## 2019-11-29 ENCOUNTER — HOSPITAL ENCOUNTER (EMERGENCY)
Age: 59
Discharge: HOME | End: 2019-11-29
Attending: EMERGENCY MEDICINE
Payer: MEDICARE

## 2019-11-29 ENCOUNTER — APPOINTMENT (OUTPATIENT)
Dept: CT IMAGING | Age: 59
End: 2019-11-29
Payer: MEDICARE

## 2019-11-29 VITALS
BODY MASS INDEX: 41.64 KG/M2 | SYSTOLIC BLOOD PRESSURE: 149 MMHG | RESPIRATION RATE: 12 BRPM | OXYGEN SATURATION: 94 % | HEART RATE: 99 BPM | TEMPERATURE: 98.6 F | WEIGHT: 235 LBS | DIASTOLIC BLOOD PRESSURE: 65 MMHG

## 2019-11-29 DIAGNOSIS — R10.31 RIGHT LOWER QUADRANT ABDOMINAL PAIN: ICD-10-CM

## 2019-11-29 DIAGNOSIS — N39.0 URINARY TRACT INFECTION WITHOUT HEMATURIA, SITE UNSPECIFIED: Primary | ICD-10-CM

## 2019-11-29 LAB
-: ABNORMAL
ABSOLUTE EOS #: 0 K/UL (ref 0–0.4)
ABSOLUTE IMMATURE GRANULOCYTE: ABNORMAL K/UL (ref 0–0.3)
ABSOLUTE LYMPH #: 3.5 K/UL (ref 1–4.8)
ABSOLUTE MONO #: 0.7 K/UL (ref 0.1–1.2)
ALBUMIN SERPL-MCNC: 3.9 G/DL (ref 3.5–5.2)
ALBUMIN/GLOBULIN RATIO: 1.4 (ref 1–2.5)
ALP BLD-CCNC: 164 U/L (ref 35–104)
ALT SERPL-CCNC: 28 U/L (ref 5–33)
AMORPHOUS: ABNORMAL
AMYLASE: 33 U/L (ref 28–100)
ANION GAP SERPL CALCULATED.3IONS-SCNC: 19 MMOL/L (ref 9–17)
AST SERPL-CCNC: 52 U/L
BACTERIA: ABNORMAL
BASOPHILS # BLD: 1 % (ref 0–1)
BASOPHILS ABSOLUTE: 0 K/UL (ref 0–0.2)
BILIRUB SERPL-MCNC: 0.32 MG/DL (ref 0.3–1.2)
BILIRUBIN DIRECT: 0.12 MG/DL
BILIRUBIN URINE: NEGATIVE
BILIRUBIN, INDIRECT: 0.2 MG/DL (ref 0–1)
BUN BLDV-MCNC: 11 MG/DL (ref 6–20)
BUN/CREAT BLD: 14 (ref 9–20)
CALCIUM SERPL-MCNC: 9.9 MG/DL (ref 8.6–10.4)
CASTS UA: ABNORMAL /LPF (ref 0–2)
CASTS UA: ABNORMAL /LPF (ref 0–2)
CHLORIDE BLD-SCNC: 98 MMOL/L (ref 98–107)
CO2: 24 MMOL/L (ref 20–31)
COLOR: ABNORMAL
COMMENT UA: ABNORMAL
CREAT SERPL-MCNC: 0.78 MG/DL (ref 0.5–0.9)
CRYSTALS, UA: ABNORMAL /HPF
DIFFERENTIAL TYPE: ABNORMAL
EOSINOPHILS RELATIVE PERCENT: 0 % (ref 1–7)
EPITHELIAL CELLS UA: ABNORMAL /HPF (ref 0–5)
GFR AFRICAN AMERICAN: >60 ML/MIN
GFR NON-AFRICAN AMERICAN: >60 ML/MIN
GFR SERPL CREATININE-BSD FRML MDRD: ABNORMAL ML/MIN/{1.73_M2}
GFR SERPL CREATININE-BSD FRML MDRD: ABNORMAL ML/MIN/{1.73_M2}
GLOBULIN: 2.8 G/DL (ref 1.5–3.8)
GLUCOSE BLD-MCNC: 120 MG/DL (ref 70–99)
GLUCOSE URINE: NEGATIVE
HCT VFR BLD CALC: 38.9 % (ref 36–46)
HEMOGLOBIN: 12.5 G/DL (ref 12–16)
IMMATURE GRANULOCYTES: ABNORMAL %
KETONES, URINE: NEGATIVE
LEUKOCYTE ESTERASE, URINE: NEGATIVE
LIPASE: 27 U/L (ref 13–60)
LYMPHOCYTES # BLD: 42 % (ref 16–46)
MCH RBC QN AUTO: 28 PG (ref 26–34)
MCHC RBC AUTO-ENTMCNC: 32.2 G/DL (ref 31–37)
MCV RBC AUTO: 87 FL (ref 80–100)
MONOCYTES # BLD: 9 % (ref 4–11)
MUCUS: ABNORMAL
NITRITE, URINE: POSITIVE
NRBC AUTOMATED: ABNORMAL PER 100 WBC
OTHER OBSERVATIONS UA: ABNORMAL
PDW BLD-RTO: 17.7 % (ref 11–14.5)
PH UA: 6 (ref 5–6)
PLATELET # BLD: 446 K/UL (ref 140–450)
PLATELET ESTIMATE: ABNORMAL
PMV BLD AUTO: 7.2 FL (ref 6–12)
POTASSIUM SERPL-SCNC: 3.7 MMOL/L (ref 3.7–5.3)
PROTEIN UA: NEGATIVE
RBC # BLD: 4.47 M/UL (ref 4–5.2)
RBC # BLD: ABNORMAL 10*6/UL
RBC UA: ABNORMAL /HPF (ref 0–4)
RENAL EPITHELIAL, UA: ABNORMAL /HPF
SEG NEUTROPHILS: 48 % (ref 43–77)
SEGMENTED NEUTROPHILS ABSOLUTE COUNT: 4.1 K/UL (ref 1.8–7.7)
SODIUM BLD-SCNC: 141 MMOL/L (ref 135–144)
SPECIFIC GRAVITY UA: 1.02 (ref 1.01–1.02)
TOTAL PROTEIN: 6.7 G/DL (ref 6.4–8.3)
TRICHOMONAS: ABNORMAL
TURBIDITY: ABNORMAL
URINE HGB: NEGATIVE
UROBILINOGEN, URINE: NORMAL
WBC # BLD: 8.3 K/UL (ref 3.5–11)
WBC # BLD: ABNORMAL 10*3/UL
WBC UA: >50 /HPF (ref 0–4)
YEAST: ABNORMAL

## 2019-11-29 PROCEDURE — 6360000004 HC RX CONTRAST MEDICATION: Performed by: EMERGENCY MEDICINE

## 2019-11-29 PROCEDURE — 80076 HEPATIC FUNCTION PANEL: CPT

## 2019-11-29 PROCEDURE — 2580000003 HC RX 258: Performed by: EMERGENCY MEDICINE

## 2019-11-29 PROCEDURE — 96376 TX/PRO/DX INJ SAME DRUG ADON: CPT

## 2019-11-29 PROCEDURE — 87086 URINE CULTURE/COLONY COUNT: CPT

## 2019-11-29 PROCEDURE — 81001 URINALYSIS AUTO W/SCOPE: CPT

## 2019-11-29 PROCEDURE — 96374 THER/PROPH/DIAG INJ IV PUSH: CPT

## 2019-11-29 PROCEDURE — 6360000002 HC RX W HCPCS: Performed by: EMERGENCY MEDICINE

## 2019-11-29 PROCEDURE — 83690 ASSAY OF LIPASE: CPT

## 2019-11-29 PROCEDURE — 96375 TX/PRO/DX INJ NEW DRUG ADDON: CPT

## 2019-11-29 PROCEDURE — 85025 COMPLETE CBC W/AUTO DIFF WBC: CPT

## 2019-11-29 PROCEDURE — 6370000000 HC RX 637 (ALT 250 FOR IP): Performed by: EMERGENCY MEDICINE

## 2019-11-29 PROCEDURE — 87088 URINE BACTERIA CULTURE: CPT

## 2019-11-29 PROCEDURE — 82150 ASSAY OF AMYLASE: CPT

## 2019-11-29 PROCEDURE — 80048 BASIC METABOLIC PNL TOTAL CA: CPT

## 2019-11-29 PROCEDURE — 99284 EMERGENCY DEPT VISIT MOD MDM: CPT

## 2019-11-29 PROCEDURE — 87186 SC STD MICRODIL/AGAR DIL: CPT

## 2019-11-29 PROCEDURE — 74177 CT ABD & PELVIS W/CONTRAST: CPT

## 2019-11-29 RX ORDER — ONDANSETRON 4 MG/1
4 TABLET, ORALLY DISINTEGRATING ORAL EVERY 8 HOURS PRN
Qty: 20 TABLET | Refills: 0 | Status: SHIPPED | OUTPATIENT
Start: 2019-11-29 | End: 2020-09-04

## 2019-11-29 RX ORDER — 0.9 % SODIUM CHLORIDE 0.9 %
1000 INTRAVENOUS SOLUTION INTRAVENOUS ONCE
Status: COMPLETED | OUTPATIENT
Start: 2019-11-29 | End: 2019-11-29

## 2019-11-29 RX ORDER — ONDANSETRON 2 MG/ML
4 INJECTION INTRAMUSCULAR; INTRAVENOUS ONCE
Status: COMPLETED | OUTPATIENT
Start: 2019-11-29 | End: 2019-11-29

## 2019-11-29 RX ORDER — MORPHINE SULFATE 4 MG/ML
4 INJECTION, SOLUTION INTRAMUSCULAR; INTRAVENOUS ONCE
Status: COMPLETED | OUTPATIENT
Start: 2019-11-29 | End: 2019-11-29

## 2019-11-29 RX ORDER — CEPHALEXIN 250 MG/1
500 CAPSULE ORAL ONCE
Status: COMPLETED | OUTPATIENT
Start: 2019-11-29 | End: 2019-11-29

## 2019-11-29 RX ORDER — CEPHALEXIN 500 MG/1
500 CAPSULE ORAL 4 TIMES DAILY
Qty: 28 CAPSULE | Refills: 0 | Status: SHIPPED | OUTPATIENT
Start: 2019-11-29 | End: 2019-12-06

## 2019-11-29 RX ADMIN — ONDANSETRON 4 MG: 2 INJECTION INTRAMUSCULAR; INTRAVENOUS at 17:22

## 2019-11-29 RX ADMIN — CEPHALEXIN 500 MG: 250 CAPSULE ORAL at 18:55

## 2019-11-29 RX ADMIN — MORPHINE SULFATE 4 MG: 4 INJECTION, SOLUTION INTRAMUSCULAR; INTRAVENOUS at 17:22

## 2019-11-29 RX ADMIN — MORPHINE SULFATE 4 MG: 4 INJECTION, SOLUTION INTRAMUSCULAR; INTRAVENOUS at 18:28

## 2019-11-29 RX ADMIN — IOPAMIDOL 100 ML: 755 INJECTION, SOLUTION INTRAVENOUS at 17:51

## 2019-11-29 RX ADMIN — SODIUM CHLORIDE 1000 ML: 9 INJECTION, SOLUTION INTRAVENOUS at 17:22

## 2019-11-29 ASSESSMENT — PAIN SCALES - GENERAL
PAINLEVEL_OUTOF10: 6
PAINLEVEL_OUTOF10: 9
PAINLEVEL_OUTOF10: 4
PAINLEVEL_OUTOF10: 9
PAINLEVEL_OUTOF10: 9

## 2019-11-29 ASSESSMENT — PAIN - FUNCTIONAL ASSESSMENT
PAIN_FUNCTIONAL_ASSESSMENT: 0-10
PAIN_FUNCTIONAL_ASSESSMENT: PREVENTS OR INTERFERES SOME ACTIVE ACTIVITIES AND ADLS

## 2019-11-29 ASSESSMENT — PAIN DESCRIPTION - FREQUENCY: FREQUENCY: CONTINUOUS

## 2019-11-29 ASSESSMENT — PAIN DESCRIPTION - PROGRESSION: CLINICAL_PROGRESSION: NOT CHANGED

## 2019-11-29 ASSESSMENT — PAIN DESCRIPTION - ORIENTATION: ORIENTATION: RIGHT;LOWER

## 2019-11-29 ASSESSMENT — PAIN DESCRIPTION - DESCRIPTORS: DESCRIPTORS: SHARP

## 2019-11-29 ASSESSMENT — PAIN DESCRIPTION - ONSET: ONSET: SUDDEN

## 2019-11-29 ASSESSMENT — PAIN DESCRIPTION - PAIN TYPE: TYPE: ACUTE PAIN

## 2019-11-29 ASSESSMENT — PAIN DESCRIPTION - LOCATION: LOCATION: ABDOMEN

## 2019-12-02 ENCOUNTER — TELEPHONE (OUTPATIENT)
Dept: OTOLARYNGOLOGY | Age: 59
End: 2019-12-02

## 2019-12-02 ENCOUNTER — CARE COORDINATION (OUTPATIENT)
Dept: CARE COORDINATION | Age: 59
End: 2019-12-02

## 2019-12-02 LAB
CULTURE: ABNORMAL
Lab: ABNORMAL
SPECIMEN DESCRIPTION: ABNORMAL

## 2019-12-04 ENCOUNTER — OFFICE VISIT (OUTPATIENT)
Dept: FAMILY MEDICINE CLINIC | Age: 59
End: 2019-12-04
Payer: MEDICARE

## 2019-12-04 ENCOUNTER — CARE COORDINATION (OUTPATIENT)
Dept: CARE COORDINATION | Age: 59
End: 2019-12-04

## 2019-12-04 VITALS
SYSTOLIC BLOOD PRESSURE: 124 MMHG | DIASTOLIC BLOOD PRESSURE: 80 MMHG | BODY MASS INDEX: 41.64 KG/M2 | HEIGHT: 63 IN | HEART RATE: 91 BPM | TEMPERATURE: 98.2 F | OXYGEN SATURATION: 98 %

## 2019-12-04 DIAGNOSIS — F41.1 GENERALIZED ANXIETY DISORDER: ICD-10-CM

## 2019-12-04 DIAGNOSIS — R59.9 ENLARGED LYMPH NODES: Primary | ICD-10-CM

## 2019-12-04 PROCEDURE — G8417 CALC BMI ABV UP PARAM F/U: HCPCS | Performed by: FAMILY MEDICINE

## 2019-12-04 PROCEDURE — G8427 DOCREV CUR MEDS BY ELIG CLIN: HCPCS | Performed by: FAMILY MEDICINE

## 2019-12-04 PROCEDURE — 1036F TOBACCO NON-USER: CPT | Performed by: FAMILY MEDICINE

## 2019-12-04 PROCEDURE — 3017F COLORECTAL CA SCREEN DOC REV: CPT | Performed by: FAMILY MEDICINE

## 2019-12-04 PROCEDURE — G8484 FLU IMMUNIZE NO ADMIN: HCPCS | Performed by: FAMILY MEDICINE

## 2019-12-04 PROCEDURE — 99213 OFFICE O/P EST LOW 20 MIN: CPT | Performed by: FAMILY MEDICINE

## 2019-12-04 RX ORDER — ALPRAZOLAM 1 MG/1
TABLET ORAL
Qty: 90 TABLET | Refills: 0 | Status: SHIPPED | OUTPATIENT
Start: 2019-12-04 | End: 2020-01-09 | Stop reason: SDUPTHER

## 2019-12-06 ENCOUNTER — CARE COORDINATION (OUTPATIENT)
Dept: CARE COORDINATION | Age: 59
End: 2019-12-06

## 2019-12-08 ENCOUNTER — PATIENT MESSAGE (OUTPATIENT)
Dept: FAMILY MEDICINE CLINIC | Age: 59
End: 2019-12-08

## 2019-12-12 ENCOUNTER — CARE COORDINATION (OUTPATIENT)
Dept: CARE COORDINATION | Age: 59
End: 2019-12-12

## 2019-12-13 DIAGNOSIS — M79.7 FIBROMYALGIA: ICD-10-CM

## 2019-12-13 RX ORDER — OXYCODONE AND ACETAMINOPHEN 7.5; 325 MG/1; MG/1
1 TABLET ORAL
Qty: 150 TABLET | Refills: 0 | Status: CANCELLED | OUTPATIENT
Start: 2019-12-14 | End: 2020-01-13

## 2019-12-16 ENCOUNTER — OFFICE VISIT (OUTPATIENT)
Dept: PAIN MANAGEMENT | Age: 59
End: 2019-12-16
Payer: MEDICARE

## 2019-12-16 ENCOUNTER — HOSPITAL ENCOUNTER (OUTPATIENT)
Age: 59
Setting detail: SPECIMEN
Discharge: HOME | End: 2019-12-16
Payer: MEDICARE

## 2019-12-16 VITALS
HEIGHT: 63 IN | RESPIRATION RATE: 16 BRPM | SYSTOLIC BLOOD PRESSURE: 130 MMHG | DIASTOLIC BLOOD PRESSURE: 89 MMHG | BODY MASS INDEX: 39.51 KG/M2 | WEIGHT: 223 LBS

## 2019-12-16 DIAGNOSIS — Z02.83 ENCOUNTER FOR DRUG SCREENING: ICD-10-CM

## 2019-12-16 DIAGNOSIS — Z79.891 ENCOUNTER FOR LONG-TERM OPIATE ANALGESIC USE: ICD-10-CM

## 2019-12-16 DIAGNOSIS — M79.7 FIBROMYALGIA: ICD-10-CM

## 2019-12-16 DIAGNOSIS — M47.22 CERVICAL RADICULOPATHY DUE TO DEGENERATIVE JOINT DISEASE OF SPINE: ICD-10-CM

## 2019-12-16 DIAGNOSIS — M47.22 CERVICAL RADICULOPATHY DUE TO DEGENERATIVE JOINT DISEASE OF SPINE: Primary | ICD-10-CM

## 2019-12-16 PROCEDURE — 1036F TOBACCO NON-USER: CPT | Performed by: NURSE PRACTITIONER

## 2019-12-16 PROCEDURE — G8427 DOCREV CUR MEDS BY ELIG CLIN: HCPCS | Performed by: NURSE PRACTITIONER

## 2019-12-16 PROCEDURE — 80307 DRUG TEST PRSMV CHEM ANLYZR: CPT

## 2019-12-16 PROCEDURE — 99214 OFFICE O/P EST MOD 30 MIN: CPT | Performed by: NURSE PRACTITIONER

## 2019-12-16 PROCEDURE — G8417 CALC BMI ABV UP PARAM F/U: HCPCS | Performed by: NURSE PRACTITIONER

## 2019-12-16 PROCEDURE — 3017F COLORECTAL CA SCREEN DOC REV: CPT | Performed by: NURSE PRACTITIONER

## 2019-12-16 PROCEDURE — G8484 FLU IMMUNIZE NO ADMIN: HCPCS | Performed by: NURSE PRACTITIONER

## 2019-12-16 RX ORDER — OXYCODONE AND ACETAMINOPHEN 7.5; 325 MG/1; MG/1
1 TABLET ORAL
Qty: 150 TABLET | Refills: 0 | Status: SHIPPED | OUTPATIENT
Start: 2019-12-16 | End: 2020-01-09 | Stop reason: SDUPTHER

## 2019-12-16 ASSESSMENT — ENCOUNTER SYMPTOMS
BACK PAIN: 1
BLOOD IN STOOL: 0
SHORTNESS OF BREATH: 0
CONSTIPATION: 0

## 2019-12-18 ENCOUNTER — PATIENT MESSAGE (OUTPATIENT)
Dept: FAMILY MEDICINE CLINIC | Age: 59
End: 2019-12-18

## 2019-12-20 LAB
6-ACETYLMORPHINE, UR: NOT DETECTED
7-AMINOCLONAZEPAM, URINE: NOT DETECTED
ALPHA-OH-ALPRAZ, URINE: PRESENT
ALPRAZOLAM, URINE: NOT DETECTED
AMPHETAMINES, URINE: NOT DETECTED
BARBITURATES, URINE: NOT DETECTED
BENZOYLECGONINE, UR: NOT DETECTED
BUPRENORPHINE URINE: NOT DETECTED
CARISOPRODOL, UR: NOT DETECTED
CLONAZEPAM, URINE: NOT DETECTED
CODEINE, URINE: NOT DETECTED
CREATININE URINE: 208.6 MG/DL (ref 20–400)
DIAZEPAM, URINE: NOT DETECTED
EER PAIN MGT DRUG PANEL, HIGH RES/EMIT U: NORMAL
ETHYL GLUCURONIDE UR: NOT DETECTED
FENTANYL URINE: NOT DETECTED
HYDROCODONE, URINE: NOT DETECTED
HYDROMORPHONE, URINE: NOT DETECTED
LORAZEPAM, URINE: NOT DETECTED
MARIJUANA METAB, UR: NOT DETECTED
MDA, UR: NOT DETECTED
MDEA, EVE, UR: NOT DETECTED
MDMA URINE: NOT DETECTED
MEPERIDINE METAB, UR: NOT DETECTED
METHADONE, URINE: NOT DETECTED
METHAMPHETAMINE, URINE: NOT DETECTED
METHYLPHENIDATE: NOT DETECTED
MIDAZOLAM, URINE: NOT DETECTED
MORPHINE URINE: NOT DETECTED
NORBUPRENORPHINE, URINE: NOT DETECTED
NORDIAZEPAM, URINE: NOT DETECTED
NORFENTANYL, URINE: NOT DETECTED
NORHYDROCODONE, URINE: NOT DETECTED
NOROXYCODONE, URINE: PRESENT
NOROXYMORPHONE, URINE: PRESENT
OXAZEPAM, URINE: NOT DETECTED
OXYCODONE URINE: PRESENT
OXYMORPHONE, URINE: NOT DETECTED
PAIN MGT DRUG PANEL, HI RES, UR: NORMAL
PCP,URINE: NOT DETECTED
PHENTERMINE, UR: NOT DETECTED
PROPOXYPHENE, URINE: NOT DETECTED
TAPENTADOL, URINE: NOT DETECTED
TAPENTADOL-O-SULFATE, URINE: NOT DETECTED
TEMAZEPAM, URINE: NOT DETECTED
TRAMADOL, URINE: NOT DETECTED
ZOLPIDEM, URINE: NOT DETECTED

## 2019-12-23 ENCOUNTER — CARE COORDINATION (OUTPATIENT)
Dept: CARE COORDINATION | Age: 59
End: 2019-12-23

## 2020-01-02 RX ORDER — ALPRAZOLAM 1 MG/1
TABLET ORAL
Qty: 90 TABLET | Refills: 0 | OUTPATIENT
Start: 2020-01-02

## 2020-01-07 ENCOUNTER — CARE COORDINATION (OUTPATIENT)
Dept: CARE COORDINATION | Age: 60
End: 2020-01-07

## 2020-01-07 NOTE — CARE COORDINATION
Noted.  Electronically signed by Mamta Menjivar MD on 1/7/2020 at 11:28 AM.
Interventions:  receives HEAP, PIPP, Food [place]. Declines Food Pantries. Goals Addressed                 This Visit's Progress       Patient Stated     Conditions and Symptoms (pt-stated)   On track     I will schedule office visits, as directed by my provider. I will keep my appointment or reschedule if I have to cancel. I will notify my provider of any barriers to my plan of care. I will notify my provider of any symptoms that indicate a worsening of my condition. Barriers: lack of support and lack of education  Plan for overcoming my barriers: Care Coordination Intervention  Confidence: 9/10  Anticipated Goal Completion Date: 1/16/2020              Prior to Admission medications    Medication Sig Start Date End Date Taking? Authorizing Provider   metoprolol tartrate (LOPRESSOR) 25 MG tablet take 1 tablet by mouth twice a day 1/7/20   David Joaquin MD   oxyCODONE-acetaminophen (PERCOCET) 7.5-325 MG per tablet Take 1 tablet by mouth 5 times daily for 30 days.  12/16/19 1/15/20  TITO Martinez - CNP   ALPRAZolam [address]) 1 MG tablet take 1 tablet by mouth three times a day if needed for anxiety 12/4/19 3/4/20  Wilfredo Hernandez MD   ondansetron (ZOFRAN ODT) 4 MG disintegrating tablet Take 1 tablet by mouth every 8 hours as needed for Nausea 11/29/19   Mony Pitts MD   olopatadine (PATADAY) 0.2 % SOLN ophthalmic solution insert 1 drop into both eyes once daily if needed 8/28/19   David Joaquin MD   fluticasone (FLONASE) 50 MCG/ACT nasal spray spray 2 sprays INTO EACH NOSTRIL ONCE DAILY 8/28/19   Wilfredo Hernandez MD   albuterol sulfate HFA (VENTOLIN HFA) 108 (90 Base) MCG/ACT inhaler inhale 2 puffs every 4 hours if needed 8/28/19   David Joaquin MD   Sodium Chloride-Sodium Bicarb (NETI POT SINUS [address]) 2300-700 MG KIT 1 spray by Nasal route daily 7/22/19   Mony Pitts MD   omeprazole (PRILOSEC) 20 MG delayed release capsule take 1 capsule by mouth twice a day 7/17/19   David Joaquin MD

## 2020-01-09 ENCOUNTER — PATIENT MESSAGE (OUTPATIENT)
Dept: FAMILY MEDICINE CLINIC | Age: 60
End: 2020-01-09

## 2020-01-09 RX ORDER — ALPRAZOLAM 1 MG/1
TABLET ORAL
Qty: 90 TABLET | Refills: 0 | Status: SHIPPED | OUTPATIENT
Start: 2020-01-09 | End: 2020-02-24 | Stop reason: SDUPTHER

## 2020-01-09 NOTE — TELEPHONE ENCOUNTER
Perla Jamison called requesting a refill of the below medication which has been pended for you: [place] from [place], [place] reviewed. Alprazolam 1 mg last filled 12/09/19 #90/30days. Report available for your review.      Requested Prescriptions     Pending Prescriptions Disp Refills    ALPRAZolam (XANAX) 1 MG tablet 90 tablet 0     Sig: take 1 tablet by mouth three times a day if needed for anxiety       Last Appointment Date: 12/4/2019  Next Appointment Date: 3/4/2020    Allergies   Allergen Reactions    Latex Rash    Fentanyl Other (See Comments)     sleepwalking    Seasonal

## 2020-01-14 PROBLEM — C85.11 B-CELL LYMPHOMA OF LYMPH NODES OF NECK (HCC): Status: ACTIVE | Noted: 2020-01-09

## 2020-01-14 PROBLEM — Z98.890 S/P AV NODAL ABLATION: Status: ACTIVE | Noted: 2020-01-14

## 2020-01-14 PROBLEM — C83.30 DLBCL (DIFFUSE LARGE B CELL LYMPHOMA) (HCC): Status: ACTIVE | Noted: 2020-01-09

## 2020-01-14 PROBLEM — E79.0 HYPERURICEMIA: Status: ACTIVE | Noted: 2020-01-09

## 2020-01-15 ENCOUNTER — TELEPHONE (OUTPATIENT)
Dept: PAIN MANAGEMENT | Age: 60
End: 2020-01-15

## 2020-01-20 ENCOUNTER — CARE COORDINATION (OUTPATIENT)
Dept: CARE COORDINATION | Age: 60
End: 2020-01-20

## 2020-01-20 NOTE — CARE COORDINATION
None         Prior to Admission medications    Medication Sig Start Date End Date Taking? Authorizing Provider   oxyCODONE-acetaminophen (PERCOCET) 7.5-325 MG per tablet Take 1 tablet by mouth 5 times daily for 30 days.  1/15/20 2/14/20  Huey Spann, TITO - SUPA   ALPRAZolam [address] half-way) 1 MG tablet take 1 tablet by mouth three times a day if needed for anxiety 20  Leonie Hernandez MD   metoprolol tartrate (LOPRESSOR) 25 MG tablet take 1 tablet by mouth twice a day 20   Taryn Sunshine MD   ondansetron (ZOFRAN ODT) 4 MG disintegrating tablet Take 1 tablet by mouth every 8 hours as needed for Nausea 19   Reymundo Harris MD   olopatadine (PATADAY) 0.2 % SOLN ophthalmic solution insert 1 drop into both eyes once daily if needed 19   Taryn Sunshine MD   fluticasone (FLONASE) 50 MCG/ACT nasal spray spray 2 sprays INTO EACH NOSTRIL ONCE DAILY 19   Leonie Hernandez MD   albuterol sulfate HFA (VENTOLIN HFA) 108 (90 Base) MCG/ACT inhaler inhale 2 puffs every 4 hours if needed 19   Leonie Hernandez MD   Sodium Chloride-Sodium Bicarb (NETI POT SINUS [address]) 2300-700 MG KIT 1 spray by Nasal route daily 19   Reymundo Harris MD   omeprazole (PRILOSEC) 20 MG delayed release capsule take 1 capsule by mouth twice a day 19   Leonie Hernandez MD   cyclobenzaprine (FLEXERIL) 10 MG tablet take 1 tablet by mouth every 8 hours if needed for MSUCLE SPASMS 3/11/19   Huey Spann, APRBEAU - CNP   Blood Pressure KIT 1 kit by Does not apply route 2 times daily as needed (BP) 10/15/18   TITO Juan CNP   rizatriptan (MAXALT-MLT) 10 MG disintegrating tablet Take 1 tablet by mouth once as needed for Migraine May repeat in 2 hours if needed 18  Alonzo Arroyo MD   Handicap Placard MISC by Does not apply route 7/3/18   Alonzo Arroyo MD       Future Appointments   Date Time Provider Osvaldo Olivarez   2020  2:30 PM Kipp Spatz, APRN - CNP  [facility]   2020  4:00 PM LIO VARGAS ROOM [facility] [place]   3/4/2020  2:40 PM John Lares MD [facility]

## 2020-01-27 ENCOUNTER — TELEPHONE (OUTPATIENT)
Dept: FAMILY MEDICINE CLINIC | Age: 60
End: 2020-01-27

## 2020-01-28 NOTE — TELEPHONE ENCOUNTER
[facility] Transitions Initial Follow Up Call    Outreach made within 2 business days of discharge: Yes    Patient: Shaw Gallego   Patient : 1960   MRN: [id number]    Reason for Admission: Adverse effect of chemotherapy- NSTEMI  Discharge Date: 19       Spoke with: Uli Gilliam     Discharge [facility]/fac[facility]     TCM Interactive Patient Contact:  Was patient able to fill all prescriptions: Yes  Was patient instructed to bring all medications to the follow-up visit: Yes  Is patient taking all medications as directed in the discharge summary?  Yes  Does patient understand their discharge instructions: Yes  Does patient have questions or concerns that need addressed prior to 7-14 day follow up office visit: no    Scheduled appointment with PCP within 7-14 days    Follow Up  Future Appointments   Date Time Provider Osvaldo Olivarez   2020  9:15 AM TITO Orona - CNP [facility]   2020 10:00 AM MD RAVEN Sutton [facility]   3/4/2020  2:40 PM [facility]       Lawson Lawrence LPN

## 2020-02-03 RX ORDER — ALPRAZOLAM 1 MG/1
TABLET ORAL
Qty: 90 TABLET | OUTPATIENT
Start: 2020-02-03

## 2020-02-04 ENCOUNTER — OFFICE VISIT (OUTPATIENT)
Dept: FAMILY MEDICINE CLINIC | Age: 60
End: 2020-02-04
Payer: MEDICARE

## 2020-02-04 ENCOUNTER — OFFICE VISIT (OUTPATIENT)
Dept: PAIN MANAGEMENT | Age: 60
End: 2020-02-04
Payer: MEDICARE

## 2020-02-04 ENCOUNTER — CARE COORDINATION (OUTPATIENT)
Dept: CARE COORDINATION | Age: 60
End: 2020-02-04

## 2020-02-04 VITALS
HEIGHT: 63 IN | BODY MASS INDEX: 41.29 KG/M2 | DIASTOLIC BLOOD PRESSURE: 60 MMHG | WEIGHT: 233 LBS | SYSTOLIC BLOOD PRESSURE: 110 MMHG

## 2020-02-04 VITALS
SYSTOLIC BLOOD PRESSURE: 98 MMHG | RESPIRATION RATE: 16 BRPM | DIASTOLIC BLOOD PRESSURE: 64 MMHG | WEIGHT: 233 LBS | HEIGHT: 62 IN | HEART RATE: 62 BPM | BODY MASS INDEX: 42.88 KG/M2

## 2020-02-04 PROCEDURE — 99495 TRANSJ CARE MGMT MOD F2F 14D: CPT | Performed by: FAMILY MEDICINE

## 2020-02-04 PROCEDURE — 99213 OFFICE O/P EST LOW 20 MIN: CPT | Performed by: FAMILY MEDICINE

## 2020-02-04 PROCEDURE — 1111F DSCHRG MED/CURRENT MED MERGE: CPT | Performed by: FAMILY MEDICINE

## 2020-02-04 PROCEDURE — G8417 CALC BMI ABV UP PARAM F/U: HCPCS | Performed by: NURSE PRACTITIONER

## 2020-02-04 PROCEDURE — 99214 OFFICE O/P EST MOD 30 MIN: CPT | Performed by: NURSE PRACTITIONER

## 2020-02-04 PROCEDURE — 3017F COLORECTAL CA SCREEN DOC REV: CPT | Performed by: NURSE PRACTITIONER

## 2020-02-04 PROCEDURE — G8427 DOCREV CUR MEDS BY ELIG CLIN: HCPCS | Performed by: NURSE PRACTITIONER

## 2020-02-04 PROCEDURE — G8484 FLU IMMUNIZE NO ADMIN: HCPCS | Performed by: NURSE PRACTITIONER

## 2020-02-04 PROCEDURE — 99214 OFFICE O/P EST MOD 30 MIN: CPT

## 2020-02-04 PROCEDURE — 1036F TOBACCO NON-USER: CPT | Performed by: NURSE PRACTITIONER

## 2020-02-04 RX ORDER — ASPIRIN 81 MG/1
81 TABLET, CHEWABLE ORAL DAILY
COMMUNITY
Start: 2020-01-27

## 2020-02-04 RX ORDER — PROCHLORPERAZINE MALEATE 10 MG
10 TABLET ORAL PRN
COMMUNITY
Start: 2020-01-09 | End: 2020-08-10

## 2020-02-04 RX ORDER — NITROGLYCERIN 0.4 MG/1
0.4 TABLET SUBLINGUAL PRN
COMMUNITY
Start: 2020-01-26 | End: 2021-07-21

## 2020-02-04 RX ORDER — ATORVASTATIN CALCIUM 40 MG/1
40 TABLET, FILM COATED ORAL DAILY
COMMUNITY
Start: 2020-01-26 | End: 2020-06-09 | Stop reason: ALTCHOICE

## 2020-02-04 RX ORDER — DIGOXIN 125 MCG
125 TABLET ORAL DAILY
COMMUNITY
Start: 2020-01-27 | End: 2020-04-09

## 2020-02-04 RX ORDER — VILAZODONE HYDROCHLORIDE 40 MG/1
40 TABLET ORAL DAILY
COMMUNITY
Start: 2020-01-31 | End: 2021-02-23

## 2020-02-04 RX ORDER — OXYCODONE AND ACETAMINOPHEN 7.5; 325 MG/1; MG/1
1 TABLET ORAL
Qty: 150 TABLET | Refills: 0 | Status: SHIPPED | OUTPATIENT
Start: 2020-02-14 | End: 2020-03-10 | Stop reason: SDUPTHER

## 2020-02-04 RX ORDER — LISINOPRIL 2.5 MG/1
2.5 TABLET ORAL DAILY
COMMUNITY
Start: 2020-01-26 | End: 2020-04-09 | Stop reason: CLARIF

## 2020-02-04 RX ORDER — CARVEDILOL 3.12 MG/1
3.12 TABLET ORAL 2 TIMES DAILY
COMMUNITY
Start: 2020-01-13 | End: 2021-07-21

## 2020-02-04 RX ORDER — ALLOPURINOL 300 MG/1
300 TABLET ORAL DAILY
COMMUNITY
Start: 2020-01-09 | End: 2020-08-10

## 2020-02-04 RX ORDER — FUROSEMIDE 20 MG/1
20 TABLET ORAL DAILY
Qty: 30 TABLET | Refills: 0 | Status: SHIPPED | OUTPATIENT
Start: 2020-02-04 | End: 2020-02-26

## 2020-02-04 ASSESSMENT — ENCOUNTER SYMPTOMS
BACK PAIN: 1
SHORTNESS OF BREATH: 0
BLOOD IN STOOL: 0
CONSTIPATION: 0

## 2020-02-04 NOTE — CARE COORDINATION
Ambulatory Care Coordination Note  2/4/2020  CM Risk Score: 6  Charlson 10 Year Mortality Risk Score: 23%     ACC: Estuardo Campos, RN    Summary Note: Prasanna Thompson was referred for ACC by PCP- Dr. Mandy Josue is also on report.      She has Fibromyalgia, Spinal OA, HTN, Anxiety, Congenital hip dis[plasia, Asthma, Paroxysmal Supraventricular Tachycardia- previous ablation- 2006,  Lumabr Disc disease, Obesity, Non Hodgkin's Lymphoma     S/P bilateral knee replacements     She follows with Pain Management, Ortho, Neurology, Cardiology, Neurosurgeon and oncologist/ hematologist.      10/22/2019 [facility] ER- back pain  11/10/2019- [facility] ER- back pain  11/29/2019 [facility] ER- UTI, abd pain, nausea  2/4/2020 [facility] 1/20-26/2020 NSTEMI    She uses CPAP at . She has [address] of Care : Continue assessments, education, and support. Paperwork given (2/4/2020) after discussion for Advanced Care/Living Will. She is aware that if she needs assistance to call this writer. She is aware that copy needs placed in                                   EMR.                           She is following at [address] for her infusions. She is scheduled with cardiology for F/U.                                          Discussed Advanced Directives/ Living Will. She was aware and voiced understanding on need to complete and paperwork given. She is aware to reach out to this writer for                                               assistance if needed. She is aware that copy needs placed in EMR. She now has [address] [facility] 2 x week.                           Assist with  updates- needs flu vaccine- on hold.                           [facility] Team Updated.   [facility] [facility], [address], Same Day Appts, and [facility]- 10/24/2019.  [address]- receives HEAP, PIPP, Food [place]- declines Food Pantries.      Met with Frantz Gardner while she was here to see PCP.    She stated they did Handicap Placard MISC by Does not apply route 7/3/18   Bryant Hodges MD       Future Appointments   Date Time Provider [facility] [place]   3/4/2020  2:40 PM [facility]   4/8/2020 10:30 AM TITO Escamilla - [facility]

## 2020-02-04 NOTE — PROGRESS NOTES
[address], [facility] [address]                        Telephone [phone]             Fax [phone]     Diana Vegas  1960  MRN:  [id number]  Date of visit:  2/4/2020    Subjective:    Diana Vegas is a 64 y.o. {Race/ethnicity:10322} female who presents to [facility] today (2/4/2020) for follow up/evaluation of***:  Follow-Up from Hospital      ***  Chief Complaint   Patient presents with    Follow-Up from Hospital        She has the following problem list:  Patient Active Problem List   Diagnosis    Fibromyalgia    Spinal osteoarthritis    Hypertension    Generalized anxiety disorder    Congenital hip dysplasia    Kidney stone    Cervical herniated disc    Dysfunctional uterine bleeding    Seasonal allergic rhinitis    Asthma    Vitamin D deficiency    Supraventricular tachycardia, paroxysmal (HCC)    Lumbar disc herniation    Obesity    History of tobacco use    Dyspnea    Fatigue    Grief at loss of child    Blindness of left eye    Primary osteoarthritis of left knee    Hip pain, bilateral    Shoulder pain, right    Shoulder pain, left    Failed total right knee replacement ([address]    Encounter for chronic pain management    Moderate major depression ([address]    Lumbar degenerative disc disease    Low back pain    Cervical radiculopathy due to degenerative joint disease of spine    History of tobacco abuse    Cervicalgia    Osteoarthritis of left knee    Status post left knee replacement    Chronic pain of left knee    Cervical radiculopathy    Family history of diabetes mellitus    Neural foraminal stenosis of cervical spine    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    She {Misc; is/is not/not currently/has never been:36515} a smoker. She  reports that she quit smoking about 12 years ago. Her smoking use included cigarettes. She has a 8.75 pack-year smoking history. She quit smokeless tobacco use about 4 years ago. Objective:    Vitals:    02/04/20 1028   BP: 98/64   Site: Left Upper Arm   Position: Sitting   Cuff Size: Large Adult   Pulse: 62   Resp: 16   Weight: 233 lb (105.7 kg)   Height: 5' 2\" (1.575 m)     Body mass index is 42.62 kg/m². {Blank single:19197::\"Well-nourished, well-developed\",\"Thin\",\"Overweight\",\"Obese\",\"Extremely obese\"}{Blank single:19197::\" elderly\"} {Race/ethnicity:73404} female, {description:36778::\"healthy-appearing\",\"alert\",\"cooperative\",\"in no distress\"}. {rr neck exam:26484::\"Neck supple. \",\"No adenopathy. \",\"Thyroid symmetric, normal size. \"}  Chest:  {RESPIRATORY SYSTEM EXAM:64101::\"Normal expansion. Clear to auscultation. No rales, rhonchi, or wheezing. \"}  {CARDIAC EXAM:23199::\"Heart sounds are normal.  Regular rate and rhythm without murmur, gallop or rub. \"}  Lower extremities have {edema:00060::\"no\"} edema. Labs done *** were reviewed with the patient:   No visits with results within 1 Month(s) from this visit.    Latest known visit with results is:   Hospital Outpatient Visit on 12/16/2019   Component Date Value Ref Range Status    6-Acetylmorphine, Ur 12/16/2019 Not Detected   Final    7-Aminoclonazepam, Urine 12/16/2019 Not Detected   Final    Alpha-OH-Alpraz, Urine 12/16/2019 Present   Final    Alprazolam, Urine 12/16/2019 Not Detected   Final    Amphetamines, urine 12/16/2019 Not Detected   Final    Barbiturates, Ur 12/16/2019 Not Detected   Final    Benzoylecgonine, Ur 12/16/2019 Not Detected Final    Buprenorphine Urine 12/16/2019 Not Detected   Final    Carisoprodol, Ur 12/16/2019 Not Detected   Final    Comment: (NOTE)  The carisoprodol immunoassay has cross-reactivity to carisoprodol   and meprobamate.       Clonazepam, Urine 12/16/2019 Not Detected   Final    Codeine, Urine 12/16/2019 Not Detected   Final    MDA, Ur 12/16/2019 Not Detected   Final    Diazepam, Urine 12/16/2019 Not Detected   Final    Ethyl Glucuronide Ur 12/16/2019 Not Detected   Final    Fentanyl, Ur 12/16/2019 Not Detected   Final    Hydrocodone, Urine 12/16/2019 Not Detected   Final    Hydromorphone, Urine 12/16/2019 Not Detected   Final    Lorazepam, Urine 12/16/2019 Not Detected   Final    Marijuana Metab, Ur 12/16/2019 Not Detected   Final    MDEA, AMBROSIO, Ur 12/16/2019 Not Detected   Final    MDMA, Urine 12/16/2019 Not Detected   Final    Meperidine Metab, Ur 12/16/2019 Not Detected   Final    Methadone, Urine 12/16/2019 Not Detected   Final    Methamphetamine, Urine 12/16/2019 Not Detected   Final    Methylphenidate 12/16/2019 Not Detected   Final    Midazolam, Urine 12/16/2019 Not Detected   Final    Morphine Urine 12/16/2019 Not Detected   Final    Norbuprenorphine, Urine 12/16/2019 Not Detected   Final    Nordiazepam, Urine 12/16/2019 Not Detected   Final    Norfentanyl, Urine 12/16/2019 Not Detected   Final    NORHYDROCODONE, URINE 12/16/2019 Not Detected   Final    Noroxycodone, Urine 12/16/2019 Present   Final    NOROXYMORPHONE, URINE 12/16/2019 Present   Final    Oxazepam, Urine 12/16/2019 Not Detected   Final    Oxycodone Urine 12/16/2019 Present   Final    Oxymorphone, Urine 12/16/2019 Not Detected   Final    PCP, Urine 12/16/2019 Not Detected   Final    Phentermine, Ur 12/16/2019 Not Detected   Final    Propoxyphene, Urine 12/16/2019 Not Detected   Final    Tapentadol-O-Sulfate, Urine 12/16/2019 Not Detected   Final    Tapentadol, Urine 12/16/2019 Not Detected   Final    Temazepam, Urine 12/16/2019 Not Detected   Final    Tramadol, Urine 12/16/2019 Not Detected   Final    Zolpidem, Urine 12/16/2019 Not Detected   Final    Creatinine, Ur 12/16/2019 208.6  20.0 - 400.0 mg/dL Final    Pain Mgt Drug Panel, Hi Res, Ur 12/16/2019 See Below   Final    Comment: (NOTE)  Methodology: Qualitative Enzyme Immunoassay and Qualitative Liquid   Chromatography-Time of Flight-Mass Spectrometry or Tandem Mass   Spectrometry, Quantitative Spectrophotometry  The absence of expected drug(s) and/or drug metabolite(s) may   indicate non-compliance, inappropriate timing of specimen   collection relative to drug administration, poor drug absorption,   diluted/adulterated urine, or limitations of testing. The   concentration must be greater than or equal to the cutoff to be   reported as present. If specific drug concentrations are   required, contact the laboratory within two weeks of specimen   collection to request quantification by a second analytical   technique. Interpretive questions should be directed to the   laboratory. Results based on immunoassay detection that do not match clinical   expectations should be  interpreted with caution. Confirmatory testing by mass   spectrometry for immunoassay-based results is available, if   ordered within two wee                           ks of specimen collection. Additional   charges apply. For medical purposes only; not valid for forensic use. This test was developed and its performance characteristics   determined by [facility]. The U.S. Food and Drug   Administration has not approved or cleared this test; however, FDA   clearance or approval is not currently required for clinical use. The results are not intended to be used as the sole means for   clinical diagnosis or patient management decisions.       [facility] Pain Mgt Drug Panel, High Res/* 12/16/2019 See Note   Final    Comment: (NOTE)  Access [facility] Enhanced Report using either link below:  -Direct

## 2020-02-04 NOTE — PROGRESS NOTES
TITO Torres - CNP   Medication Sig Dispense Refill    aspirin 81 MG chewable tablet Take 81 mg by mouth daily      atorvastatin (LIPITOR) 40 MG tablet Take 40 mg by mouth daily      digoxin (LANOXIN) 125 MCG tablet Take 125 mcg by mouth daily      ivabradine (CORLANOR) 5 MG TABS tablet Take 5 mg by mouth daily      lisinopril (PRINIVIL;ZESTRIL) 2.5 MG tablet Take 2.5 mg by mouth daily      nitroGLYCERIN (NITROSTAT) 0.4 MG SL tablet Place 0.4 mg under the tongue as needed      prochlorperazine (COMPAZINE) 10 MG tablet Take 10 mg by mouth as needed      vilazodone HCl (VIIBRYD) 40 MG TABS Take 40 mg by mouth      [START ON 2/14/2020] oxyCODONE-acetaminophen (PERCOCET) 7.5-325 MG per tablet Take 1 tablet by mouth 5 times daily for 30 days.  150 tablet 0    ALPRAZolam (XANAX) 1 MG tablet take 1 tablet by mouth three times a day if needed for anxiety 90 tablet 0    ondansetron (ZOFRAN ODT) 4 MG disintegrating tablet Take 1 tablet by mouth every 8 hours as needed for Nausea 20 tablet 0    olopatadine (PATADAY) 0.2 % SOLN ophthalmic solution insert 1 drop into both eyes once daily if needed 2.5 mL 0    fluticasone (FLONASE) 50 MCG/ACT nasal spray spray 2 sprays INTO EACH NOSTRIL ONCE DAILY 16 g 0    albuterol sulfate HFA (VENTOLIN HFA) 108 (90 Base) MCG/ACT inhaler inhale 2 puffs every 4 hours if needed 18 g 0    Sodium Chloride-Sodium Bicarb (NETI POT SINUS WASH) 2300-700 MG KIT 1 spray by Nasal route daily 1 kit 0    omeprazole (PRILOSEC) 20 MG delayed release capsule take 1 capsule by mouth twice a day 60 capsule 5    cyclobenzaprine (FLEXERIL) 10 MG tablet take 1 tablet by mouth every 8 hours if needed for MSUCLE SPASMS 90 tablet 11    Blood Pressure KIT 1 kit by Does not apply route 2 times daily as needed (BP) 1 kit 0    Handicap Placard MISC by Does not apply route 2 each 0       Past Medical History:   Diagnosis Date    Asthma     Blindness of left eye     Cervical herniated disc     C5-C6, stents.  Diabetes Mother     Heart Disease Father     Coronary Art Dis Father         Myocardial infarction at age 54 and .  COPD Father     Arthritis Brother     COPD Brother     Heart Disease Brother         Congenital heart defect -  at 10 wks of age.  Cancer Other     Diabetes Maternal Grandmother        Social History     Socioeconomic History    Marital status:      Spouse name: None    Number of children: 3    Years of education: 8    Highest education level: GED or equivalent   Occupational History    Occupation: disabled [employer]   Social Needs    Financial resource strain: Not hard at all   Rob-Sera insecurity:     Worry: Never true     Inability: Never true    Transportation needs:     Medical: No     Non-medical: No   Tobacco Use    Smoking status: Former Smoker     Packs/day: 0.25     Years: 35.00     Pack years: 8.75     Types: Cigarettes     Last attempt to quit:      Years since quittin.1    Smokeless tobacco: Former User     Quit date: 2015    Tobacco comment: 6 cigarettes per day since age 15 or so. Substance and Sexual Activity    Alcohol use: Yes     Alcohol/week: 1.0 standard drinks     Types: 1 Standard drinks or equivalent per week     Frequency: Monthly or less     Drinks per session: 1 or 2     Binge frequency: Never     Comment: rarely    Drug use: No    Sexual activity: Not Currently     Partners: Male   Lifestyle    Physical activity:     Days per week: 0 days     Minutes per session: 0 min    Stress:  To some extent   Relationships    Social connections:     Talks on phone: More than three times a week     Gets together: More than three times a week     Attends [employer] service: 1 to 4 times per year     Active member of club or organization: No     Attends meetings of clubs or organizations: Never     Relationship status:     Intimate partner violence:     Fear of current or ex partner: None     Emotionally abused: None Physically abused: None     Forced sexual activity: None   Other Topics Concern    None   Social History Narrative    10/16/2019- She receives HEAP, PIPP, Food Raphine- No other SDOH needs identified. Review of Systems   Constitutional: Negative for activity change. HENT: Negative. Respiratory: Negative for shortness of breath. Cardiovascular: Negative for chest pain. Gastrointestinal: Negative for blood in stool and constipation. Genitourinary: Positive for flank pain. Musculoskeletal: Positive for arthralgias, back pain, myalgias and neck pain. Neurological: Positive for numbness (tingling in hands, facial numbness on the left.) and headaches (more migraines than normal, unsure cause. possible stress versus allergy related). Psychiatric/Behavioral: Negative for sleep disturbance. The patient is nervous/anxious (anxiety). Objective:   Physical Exam  Vitals signs reviewed. Constitutional:       General: She is not in acute distress. Appearance: Normal appearance. She is well-developed. She is not diaphoretic. Interventions: She is not intubated. HENT:      Head: Normocephalic and atraumatic. Right Ear: External ear normal.      Left Ear: External ear normal.      Nose: Nose normal.   Eyes:      General: Lids are normal.      Conjunctiva/sclera: Conjunctivae normal.   Neck:      Musculoskeletal: Decreased range of motion. Muscular tenderness present. Cardiovascular:      Pulses: Normal pulses. Pulmonary:      Effort: Pulmonary effort is normal. No tachypnea, bradypnea, accessory muscle usage or respiratory distress. She is not intubated. Musculoskeletal:      Comments: Ambulates with single cane   Skin:     General: Skin is warm and dry. Nails: There is no clubbing. Neurological:      Mental Status: She is alert and oriented to person, place, and time. Cranial Nerves: No cranial nerve deficit.    Psychiatric:         Speech: Speech normal.

## 2020-02-05 NOTE — PROGRESS NOTES
[address], [facility] [address]                        Telephone [phone]             Fax [phone]     Johnathan Armando  1960  MRN:  [id number]  Date of visit:  2/4/2020    Subjective:    Johnathan Armando is a 64 y.o. {Race/ethnicity:09153} female who presents to [facility] today (2/4/2020) for follow up/evaluation of***:  Follow-Up from Hospital      ***  Chief Complaint   Patient presents with    Follow-Up from Hospital        She has the following problem list:  Patient Active Problem List   Diagnosis    Fibromyalgia    Spinal osteoarthritis    Hypertension    Generalized anxiety disorder    Congenital hip dysplasia    Kidney stone    Cervical herniated disc    Dysfunctional uterine bleeding    Seasonal allergic rhinitis    Asthma    Vitamin D deficiency    Supraventricular tachycardia, paroxysmal (HCC)    Lumbar disc herniation    Obesity    History of tobacco use    Dyspnea    Fatigue    Grief at loss of child    Blindness of left eye    Primary osteoarthritis of left knee    Hip pain, bilateral    Shoulder pain, right    Shoulder pain, left    Failed total right knee replacement ([address]    Encounter for chronic pain management    Moderate major depression ([address]    Lumbar degenerative disc disease    Low back pain    Cervical radiculopathy due to degenerative joint disease of spine    History of tobacco abuse    Cervicalgia    Osteoarthritis of left knee    Status post left knee replacement    Chronic pain of left knee    Cervical radiculopathy    Family history of diabetes mellitus    Neural foraminal stenosis of cervical spine    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)    DLBCL (diffuse large B cell lymphoma) (HCC)    Hyperuricemia    S/P AV pawel ablation        Current medications are:  Outpatient Medications Marked as Taking for the 2/4/20 encounter (Office Visit) with April Aceves, MD   Medication Sig Dispense Refill    aspirin 81 MG chewable tablet Take 81 mg by mouth daily      atorvastatin (LIPITOR) 40 MG tablet Take 40 mg by mouth daily      carvedilol (COREG) 3.125 MG tablet Take 3.125 mg by mouth 2 times daily      digoxin (LANOXIN) 125 MCG tablet Take 125 mcg by mouth daily      ivabradine (CORLANOR) 5 MG TABS tablet Take 5 mg by mouth daily      lisinopril (PRINIVIL;ZESTRIL) 2.5 MG tablet Take 2.5 mg by mouth daily      nitroGLYCERIN (NITROSTAT) 0.4 MG SL tablet Place 0.4 mg under the tongue as needed      prochlorperazine (COMPAZINE) 10 MG tablet Take 10 mg by mouth as needed      vilazodone HCl (VIIBRYD) 40 MG TABS Take 40 mg by mouth      [START ON 2/14/2020] oxyCODONE-acetaminophen (PERCOCET) 7.5-325 MG per tablet Take 1 tablet by mouth 5 times daily for 30 days.  150 tablet 0    furosemide (LASIX) 20 MG tablet Take 1 tablet by mouth daily for 3 days 30 tablet 0    ALPRAZolam (XANAX) 1 MG tablet take 1 tablet by mouth three times a day if needed for anxiety 90 tablet 0    ondansetron (ZOFRAN ODT) 4 MG disintegrating tablet Take 1 tablet by mouth every 8 hours as needed for Nausea 20 tablet 0    olopatadine (PATADAY) 0.2 % SOLN ophthalmic solution insert 1 drop into both eyes once daily if needed 2.5 mL 0    fluticasone (FLONASE) 50 MCG/ACT nasal spray spray 2 sprays INTO EACH NOSTRIL ONCE DAILY 16 g 0    albuterol sulfate HFA (VENTOLIN HFA) 108 (90 Base) MCG/ACT inhaler inhale 2 puffs every 4 hours if needed 18 g 0    Sodium Chloride-Sodium Bicarb (NETI POT SINUS WASH) 2300-700 MG KIT 1 spray by Nasal route daily 1 kit 0    omeprazole (PRILOSEC) 20 MG delayed release capsule take 1 capsule by mouth twice a day 60 capsule 5    cyclobenzaprine (FLEXERIL) 10 MG tablet take 1 tablet by mouth every 8 hours if needed for MSUCLE SPASMS 90 tablet 11    Blood Pressure KIT 1 kit by Does not apply route 2 times daily as needed (BP) 1 kit 0    rizatriptan (MAXALT-MLT) 10 MG disintegrating tablet Take 1 tablet by mouth once as needed for Migraine May repeat in 2 hours if needed 9 tablet 11    Handicap Placard MISC by Does not apply route 2 each 0       She is allergic to latex; rituximab; fentanyl; gabapentin; and seasonal.    She {Misc; is/is not/not currently/has never been:78103} a smoker. She  reports that she quit smoking about 12 years ago. Her smoking use included cigarettes. She has a 8.75 pack-year smoking history. She quit smokeless tobacco use about 4 years ago. Objective:    Vitals:    02/04/20 1028   BP: 98/64   Site: Left Upper Arm   Position: Sitting   Cuff Size: Large Adult   Pulse: 62   Resp: 16   Weight: 233 lb (105.7 kg)   Height: 5' 2\" (1.575 m)     Body mass index is 42.62 kg/m². {Blank single:62912::\"Well-nourished, well-developed\",\"Thin\",\"Overweight\",\"Obese\",\"Extremely obese\"}{Blank single:19197::\" elderly\"} {Race/ethnicity:19165} female, {description:74483::\"healthy-appearing\",\"alert\",\"cooperative\",\"in no distress\"}. {rr neck exam:86090::\"Neck supple. \",\"No adenopathy. \",\"Thyroid symmetric, normal size. \"}  Chest:  {RESPIRATORY SYSTEM EXAM:18590::\"Normal expansion. Clear to auscultation. No rales, rhonchi, or wheezing. \"}  {CARDIAC EXAM:00618::\"Heart sounds are normal.  Regular rate and rhythm without murmur, gallop or rub. \"}  Lower extremities have {edema:52232::\"no\"} edema. Labs done *** were reviewed with the patient:   No visits with results within 1 Month(s) from this visit.    Latest known visit with results is:   Hospital Outpatient Visit on 12/16/2019   Component Date Value Ref Range Status    6-Acetylmorphine, Ur 12/16/2019 Not Detected   Final    7-Aminoclonazepam, Urine 12/16/2019 Not Detected   Final    Alpha-OH-Alpraz, Urine 12/16/2019 Present   Final    Alprazolam, Urine 12/16/2019 Not Detected   Final    Amphetamines, urine 12/16/2019 Not Detected   Final    Barbiturates, Ur Detected   Final    Tapentadol, Urine 12/16/2019 Not Detected   Final    Temazepam, Urine 12/16/2019 Not Detected   Final    Tramadol, Urine 12/16/2019 Not Detected   Final    Zolpidem, Urine 12/16/2019 Not Detected   Final    Creatinine, Ur 12/16/2019 208.6  20.0 - 400.0 mg/dL Final    Pain Mgt Drug Panel, Hi Res, Ur 12/16/2019 See Below   Final    Comment: (NOTE)  Methodology: Qualitative Enzyme Immunoassay and Qualitative Liquid   Chromatography-Time of Flight-Mass Spectrometry or Tandem Mass   Spectrometry, Quantitative Spectrophotometry  The absence of expected drug(s) and/or drug metabolite(s) may   indicate non-compliance, inappropriate timing of specimen   collection relative to drug administration, poor drug absorption,   diluted/adulterated urine, or limitations of testing. The   concentration must be greater than or equal to the cutoff to be   reported as present. If specific drug concentrations are   required, contact the laboratory within two weeks of specimen   collection to request quantification by a second analytical   technique. Interpretive questions should be directed to the   laboratory. Results based on immunoassay detection that do not match clinical   expectations should be  interpreted with caution. Confirmatory testing by mass   spectrometry for immunoassay-based results is available, if   ordered within two wee                           ks of specimen collection. Additional   charges apply. For medical purposes only; not valid for forensic use. This test was developed and its performance characteristics   determined by [employer]. The U.S. Food and Drug   Administration has not approved or cleared this test; however, FDA   clearance or approval is not currently required for clinical use. The results are not intended to be used as the sole means for   clinical diagnosis or patient management decisions.       [facility]ER Pain Mgt Drug Panel, High Res/* 12/16/2019 See Note   Final Comment: (NOTE)  Access [employer] Enhanced Report using either link below:  -Direct access: [email or web address]  -Enter Username, Password: [email or web address]  Username: [email or web address]  Password: [email or web address]  Performed by [facility]  [place] [address], [address] [phone]  www. Alexandra John MD, Lab. Director           Assessment and Plan:    1. Bilateral lower extremity edema  ***  - furosemide (LASIX) 20 MG tablet; Take 1 tablet by mouth daily for 3 days  Dispense: 30 tablet; Refill: 0    2. Non-ST elevated myocardial infarction (non-STEMI) (HCC)  ***  - [place] DISCHARGE MEDS RECONCILED W/ CURRENT OUTPATIENT MED LIST      ***  Health Maintenance Due   Topic Date Due    Shingles Vaccine (1 of 2) 02/10/2012    Pneumococcal 0-64 years Vaccine (3 of 3 - PPSV23) 03/04/2016    Breast cancer screen  09/29/2018    Flu vaccine (1) 09/01/2019    Lipid screen  11/08/2019      ***. Routine health maintenance  Health maintenance was reviewed with the patient. *** {Blank single:19197::\"Lipid panel and fasting glucose were recommended and ordered. \",\"Lipid panel and fasting comprehensive metabolic panel were recommended and ordered. \",\"Lipid panel was recommended and ordered. \"}  {Blank single:19197::\"Annual influenza vaccine was recommended. \",\"Annual influenza vaccine was recommended and ordered. \",\"Annual influenza vaccine was recommended and declined. \",\"She has received an influenza vaccine this influenza season. \"}  {Blank single:19197::\"Colonoscopy was recommended. \"}  {Blank single:19197::\"A referral was made to general surgery. \",\"She will not agree to schedule a colonoscopy, but she does agree to complete a hemoccult kit, and a kit was sent home with her. \",\"She will not agree to schedule a colonoscopy, but she is interested in fecal DNA testing, and a request for a kit was faxed to [facility]. \" \"She declined.   Other methods of screening for colon cancer including FIT testing and fecal DNA testing were discussed. The patient declined any screening for colon cancer. \"}  {Blank single:19197::\"Hepatitis C and HIV\",\"HIV\",\"Hepatitis C\"} {Blank single:19197::\"screenings were\",\"screening was\"} recommended and {Blank single:19197::\"ordered. \",\"declined. \"}  All other*** health maintenance, including {Blank single:19197::\"Pneumovax, Prevnar-13, and Tdap\",\"Tdap\",\"Tdap and Shingrix\",\"Tdap and Pneumovax\",\"Prevnar-13, Tdap, and Shingrix\",\"Tdap and Prevnar-13\",\"Pneumovax and Prevnar-13\",\"Shingrix, Pneumovax, and Prevnar-13\",\"Shingrix, Pneumovax, and Tdap\"}, is up to date at this time. There is no indication for {Blank single:60366::\"Pneumovax\",\"Prevnar-13\",\"Pneumovax or Prevnar-13\"} at this time.      (Please note that portions of this note were completed with a voice-recognition program. Efforts were made to edit the dictation but occasionally words are mis-transcribed.)

## 2020-02-17 RX ORDER — CYCLOBENZAPRINE HCL 10 MG
TABLET ORAL
Qty: 90 TABLET | Refills: 11 | Status: SHIPPED | OUTPATIENT
Start: 2020-02-17 | End: 2020-03-09

## 2020-02-17 NOTE — TELEPHONE ENCOUNTER
Last Appt:  2/4/2020  Next Appt:   3/4/2020  Med verified in [facility]    Per Pharmacy request pt needs a refill on her Flexeril 10 to be sent to [facility]

## 2020-02-19 ENCOUNTER — CARE COORDINATION (OUTPATIENT)
Dept: CARE COORDINATION | Age: 60
End: 2020-02-19

## 2020-02-19 NOTE — CARE COORDINATION
Noted.  Electronically signed by Reather Lesches, MD on 2/19/20 at 11:17 AM. [Dear  ___] : Dear  [unfilled], [Consult Letter:] : I had the pleasure of evaluating your patient, [unfilled]. [Please see my note below.] : Please see my note below. [Consult Closing:] : Thank you very much for allowing me to participate in the care of this patient.  If you have any questions, please do not hesitate to contact me. [Sincerely,] : Sincerely, [FreeTextEntry3] : Maria E Smith MD\par , [employer] [facility]\par Department of Pediatric Neurology\par Concussion Specialist\par [facility] \par [facility]\par [address]\par [facility], [address]\par Tel: [phone]\par Fax: [phone]\par \par \par

## 2020-02-21 ENCOUNTER — PATIENT MESSAGE (OUTPATIENT)
Dept: FAMILY MEDICINE CLINIC | Age: 60
End: 2020-02-21

## 2020-02-24 NOTE — TELEPHONE ENCOUNTER
Uli Gilliam called requesting a refill of the below medication which has been pended for you: last filled 1/9/2020 #90    Requested Prescriptions     Pending Prescriptions Disp Refills    ALPRAZolam (XANAX) 1 MG tablet 90 tablet 0     Sig: take 1 tablet by mouth three times a day if needed for anxiety       Last Appointment Date: 2/4/2020  Next Appointment Date: 3/4/2020    Allergies   Allergen Reactions    Latex Rash    Rituximab Palpitations     Severe tachycardia    Fentanyl Other (See Comments)     sleepwalking    Gabapentin     Seasonal

## 2020-02-25 RX ORDER — ALPRAZOLAM 1 MG/1
TABLET ORAL
Qty: 90 TABLET | Refills: 0 | Status: SHIPPED | OUTPATIENT
Start: 2020-02-25 | End: 2020-03-25 | Stop reason: SDUPTHER

## 2020-02-25 NOTE — TELEPHONE ENCOUNTER
Controlled substances monitoring: No signs of potential drug abuse or diversion identified when the [place] from [address] [place] [address], [place], and [place] was reviewed today. The activity on the report was consistent with the treatment plan.

## 2020-02-26 RX ORDER — FUROSEMIDE 20 MG/1
TABLET ORAL
Qty: 30 TABLET | Refills: 0 | Status: SHIPPED | OUTPATIENT
Start: 2020-02-26 | End: 2020-03-27

## 2020-03-04 ENCOUNTER — CARE COORDINATION (OUTPATIENT)
Dept: CARE COORDINATION | Age: 60
End: 2020-03-04

## 2020-03-04 NOTE — CARE COORDINATION
Historical Provider, MD   nitroGLYCERIN (NITROSTAT) 0.4 MG SL tablet Place 0.4 mg under the tongue as needed 1/26/20   Historical Provider, MD   prochlorperazine (COMPAZINE) 10 MG tablet Take 10 mg by mouth as needed 1/9/20   Historical Provider, MD   vilazodone HCl (VIIBRYD) 40 MG TABS Take 40 mg by mouth 1/31/20   Historical Provider, MD   oxyCODONE-acetaminophen (PERCOCET) 7.5-325 MG per tablet Take 1 tablet by mouth 5 times daily for 30 days.  2/14/20 3/15/20  TITO Acosta CNP   ondansetron (ZOFRAN ODT) 4 MG disintegrating tablet Take 1 tablet by mouth every 8 hours as needed for Nausea 11/29/19   Charmaine Trent MD   olopatadine (PATADAY) 0.2 % SOLN ophthalmic solution insert 1 drop into both eyes once daily if needed 8/28/19   Alexus Holguin MD   fluticasone (FLONASE) 50 MCG/ACT nasal spray spray 2 sprays INTO EACH NOSTRIL ONCE DAILY 8/28/19   Marleni Hernandez MD   albuterol sulfate HFA (VENTOLIN HFA) 108 (90 Base) MCG/ACT inhaler inhale 2 puffs every 4 hours if needed 8/28/19   Marleni Hernandez MD   Sodium Chloride-Sodium Bicarb (NETI POT SINUS [address]) 2300-700 MG KIT 1 spray by Nasal route daily 7/22/19   Charmaine Trent MD   omeprazole (PRILOSEC) 20 MG delayed release capsule take 1 capsule by mouth twice a day 7/17/19   Alexus Holguin MD   Blood Pressure KIT 1 kit by Does not apply route 2 times daily as needed (BP) 10/15/18   TITO Voss CNP   rizatriptan (MAXALT-MLT) 10 MG disintegrating tablet Take 1 tablet by mouth once as needed for Migraine May repeat in 2 hours if needed 8/30/18 2/4/20  Jb Albrecht MD   Handicap Placard MISC by Does not apply route 7/3/18   Jb Albrecht MD       Future Appointments   Date Time Provider Osvaldo Gonzalezi   4/8/2020 10:30 AM TITO Acosta - CNP [facility]   4/17/2020 12:00 PM MD RAVEN Su [facility]

## 2020-03-09 RX ORDER — CYCLOBENZAPRINE HCL 10 MG
TABLET ORAL
Qty: 90 TABLET | Refills: 0 | Status: SHIPPED | OUTPATIENT
Start: 2020-03-09 | End: 2020-06-09 | Stop reason: ALTCHOICE

## 2020-03-10 ENCOUNTER — CARE COORDINATION (OUTPATIENT)
Dept: CARE COORDINATION | Age: 60
End: 2020-03-10

## 2020-03-10 RX ORDER — OXYCODONE AND ACETAMINOPHEN 7.5; 325 MG/1; MG/1
1 TABLET ORAL
Qty: 150 TABLET | Refills: 0 | Status: SHIPPED | OUTPATIENT
Start: 2020-03-15 | End: 2020-04-09 | Stop reason: SDUPTHER

## 2020-03-10 NOTE — CARE COORDINATION
Ambulatory Care Coordination Note  3/11/2020  CM Risk Score: 6  Ambulatory Care Coordination Note  3/11/2020  CM Risk Score: 6  Charlson 10 Year Mortality Risk Score: 23%     ACC: Kota Brooks RN    Summary Note: Liat Arnold was referred for [facility] by PCP- Dr. Mary Machador is also on report.      She has Fibromyalgia, Spinal OA, HTN, Anxiety, Congenital hip dis[plasia, Asthma, Paroxysmal Supraventricular Tachycardia- previous ablation- 2006,  Lumabr Disc disease, Obesity, Non Hodgkin's Lymphoma     S/P bilateral knee replacements     She follows with Pain Management, Ortho, Neurology, Cardiology, Neurosurgeon and oncologist/ hematologist.      10/22/2019 [facility] ER- back pain  11/10/2019- [facility] ER- back pain  11/29/2019 [facility] ER- UTI, abd pain, nausea  2/4/2020 TTH 1/20-26/2020 NSTEMI     She uses CPAP at .   She has [facility] completion     Per chart review- Liat Arnold was admitted to start a different chemotherapy medication and she tolerated it. TTH 2/20-23/2020.      3/4/2020- 3:24 pm LM requesting return call. 3/5/2020- 10:46 am LM requesting return call.     3/10/2020- 2:21 pm line busy. 2:48 pm- line busy. 3/11/2020- 11:32 am Spoke with daughter Aspen Ramirez. See ER F/U note below. Liat Arnold has been in [facility] since 10/2019. She has scheduled appointments with PCP, Pain Management and Oncologist. She is receiving Infusions. Education completed. Daughter in agreement to completion of ACC with the understanding to call if with any concerns/issues. She is aware that this writer can re-enroll at that time. She voiced understanding. They are following closely with oncologist and infusion staff. Spoke with PCP- in agreement to completion of ACC. Patient to continue to follow with oncology. PCP did not need ER F/U appt with Patient d/t illnesses here in clinic and Patient is already scheduled tomorrow with oncology and daughter reaching out to them to by phone.         Care Coordination Interventions    Program Enrollment:  Complex Care  Referral from Primary Care Provider:  No  Suggested Interventions and [address]:  Completed (Comment: Abeba Meron)  Hospice:  Declined (Comment: Belinda Nelson stated they did have hospice consult her and she declined 1/2020)  Other Services or Interventions:  receives HEAP, PIPP, Food [place]. Declines Food Pantries. She is following at [facility]. She has CPAP at night. Goals Addressed                 This Visit's Progress       Patient Stated     Conditions and Symptoms (pt-stated)   On track     I will schedule office visits, as directed by my provider. I will keep my appointment or reschedule if I have to cancel. I will notify my provider of any barriers to my plan of care. I will notify my provider of any symptoms that indicate a worsening of my condition. Barriers: lack of support and lack of education  Plan for overcoming my barriers: Care Coordination Intervention and [facility]  Confidence: 9/10  Anticipated Goal Completion Date: 5/3/2020              Prior to Admission medications    Medication Sig Start Date End Date Taking? Authorizing Provider   oxyCODONE-acetaminophen (PERCOCET) 7.5-325 MG per tablet Take 1 tablet by mouth 5 times daily for 30 days.  3/15/20 4/14/20  Maria Isabel Szymanski MD   cyclobenzaprine (FLEXERIL) 10 MG tablet take 1 tablet by mouth every 8 hours if needed for muscle spasm 3/9/20   TITO Tao - CNP   furosemide (LASIX) 20 MG tablet take 1 tablet by mouth daily 2/26/20   Dimitri Cantor MD   ALPRAZolam Kaitlin Pillion) 1 MG tablet take 1 tablet by mouth three times a day if needed for anxiety 2/25/20 5/25/20  Dimitri Cantor MD   allopurinol (ZYLOPRIM) 300 MG tablet Take 300 mg by mouth daily 1/9/20   Historical Provider, MD   aspirin 81 MG chewable tablet Take 81 mg by mouth daily 1/27/20   Historical Provider, MD   atorvastatin (LIPITOR) 40 MG tablet Take 40 mg by mouth daily

## 2020-03-10 NOTE — TELEPHONE ENCOUNTER
Last Appt:  2/4/2020  Next Appt:   4/8/2020  Med verified in Epic    Pt called the refill line for her Percocet 7.5-325 to be sent to [facility] checked for [place], [place], and [place]: 2/14/20 Percocet 7.5-325 #150. Due 3.15.20.

## 2020-03-17 ENCOUNTER — TELEPHONE (OUTPATIENT)
Dept: OBGYN | Age: 60
End: 2020-03-17

## 2020-03-19 NOTE — TELEPHONE ENCOUNTER
Writer spoke with patient. She does not need a refill at this time-states she has enough until next week.

## 2020-03-20 RX ORDER — ALPRAZOLAM 1 MG/1
1 TABLET ORAL 3 TIMES DAILY PRN
Qty: 90 TABLET | Refills: 0 | OUTPATIENT
Start: 2020-03-20 | End: 2020-04-19

## 2020-03-25 DIAGNOSIS — F41.1 GENERALIZED ANXIETY DISORDER: ICD-10-CM

## 2020-03-25 RX ORDER — ALPRAZOLAM 0.5 MG/1
TABLET ORAL
Qty: 60 TABLET | Refills: 0 | Status: SHIPPED | OUTPATIENT
Start: 2020-03-25 | End: 2020-06-09 | Stop reason: ALTCHOICE

## 2020-03-25 NOTE — TELEPHONE ENCOUNTER
Notify patient that it is no longer advisable for patients to be on benzodiazepines like xanax and narcotic therapy (percocet). I can refill her script, but would give her a lower dose in an effort to titrate down her dose. Can start her on buspar in conjunction with what she is on if she feels that she would not do as well with the lower dose of xanax, but ultimately will have to go off of this medication totally if going to continue to need chronic narcotic pain medication. Let me know if she would also like me to send a script for buspar in as well. She needs to start titrating down on the frequency that she takes her xanax as well.

## 2020-03-25 NOTE — TELEPHONE ENCOUNTER
Stefan Odom called requesting a refill of the below medication which has been pended for you: OAR[place] from [place], and [place] reviewed. Alprazolam 1 mg tablet last filled 2/25/20 #90/30 days. Report available for your review.      Requested Prescriptions     Pending Prescriptions Disp Refills    ALPRAZolam (XANAX) 1 MG tablet 90 tablet 0     Sig: take 1 tablet by mouth three times a day if needed for anxiety       Last Appointment Date: 2/4/2020  Next Appointment Date: 4/17/2020    Allergies   Allergen Reactions    Latex Rash    Rituximab Palpitations     Severe tachycardia    Fentanyl Other (See Comments)     sleepwalking    Gabapentin     Seasonal

## 2020-04-01 ENCOUNTER — TELEPHONE (OUTPATIENT)
Dept: FAMILY MEDICINE CLINIC | Age: 60
End: 2020-04-01

## 2020-04-01 NOTE — TELEPHONE ENCOUNTER
See note from 03/25/2020. Patient does not want to start Buspar since she is currently taking Vilazodone 40 mg unless Dr Umm Sahu feels this would be better. She does see a psychiatrist at [facility] but does not have an appointment with her until May. Please advise.

## 2020-04-02 RX ORDER — BUSPIRONE HYDROCHLORIDE 10 MG/1
10 TABLET ORAL 3 TIMES DAILY
Qty: 90 TABLET | Refills: 0 | Status: SHIPPED | OUTPATIENT
Start: 2020-04-02 | End: 2020-04-30

## 2020-04-02 NOTE — TELEPHONE ENCOUNTER
Patient is willing to start Buspar but she is concerned about her Xanax. Will she be able to continue low dose Xanax while on new medication? She is worried since her current script will need refilled around 04/06/2020. [facility].

## 2020-04-02 NOTE — TELEPHONE ENCOUNTER
Buspar was sent to [facility] in [place]:  - busPIRone (BUSPAR) 10 MG tablet; Take 1 tablet by mouth 3 times daily  Dispense: 90 tablet;  Refill: 0

## 2020-04-09 ENCOUNTER — OFFICE VISIT (OUTPATIENT)
Dept: PAIN MANAGEMENT | Age: 60
End: 2020-04-09
Payer: MEDICARE

## 2020-04-09 VITALS — BODY MASS INDEX: 37.36 KG/M2 | WEIGHT: 203 LBS | HEIGHT: 62 IN | RESPIRATION RATE: 16 BRPM

## 2020-04-09 PROCEDURE — 3017F COLORECTAL CA SCREEN DOC REV: CPT | Performed by: NURSE PRACTITIONER

## 2020-04-09 PROCEDURE — 99214 OFFICE O/P EST MOD 30 MIN: CPT | Performed by: NURSE PRACTITIONER

## 2020-04-09 PROCEDURE — 1036F TOBACCO NON-USER: CPT | Performed by: NURSE PRACTITIONER

## 2020-04-09 PROCEDURE — G8427 DOCREV CUR MEDS BY ELIG CLIN: HCPCS | Performed by: NURSE PRACTITIONER

## 2020-04-09 PROCEDURE — G8417 CALC BMI ABV UP PARAM F/U: HCPCS | Performed by: NURSE PRACTITIONER

## 2020-04-09 RX ORDER — OXYCODONE AND ACETAMINOPHEN 7.5; 325 MG/1; MG/1
1 TABLET ORAL
Qty: 150 TABLET | Refills: 0 | Status: SHIPPED | OUTPATIENT
Start: 2020-04-14 | End: 2020-05-12 | Stop reason: SDUPTHER

## 2020-04-09 RX ORDER — SACUBITRIL AND VALSARTAN 24; 26 MG/1; MG/1
1 TABLET, FILM COATED ORAL 2 TIMES DAILY
COMMUNITY
Start: 2020-03-23

## 2020-04-09 ASSESSMENT — ENCOUNTER SYMPTOMS
CONSTIPATION: 0
BLOOD IN STOOL: 0
BACK PAIN: 1
SHORTNESS OF BREATH: 0

## 2020-04-09 NOTE — PROGRESS NOTES
Subjective:      Patient ID: Mason Villa is a 64 y.o. female. Chief Complaint   Patient presents with    Chronic Pain     2 month follow up     Back Pain   Associated symptoms include headaches (more migraines than normal, unsure cause. possible stress versus allergy related) and numbness (tingling in hands, facial numbness on the left. ). Pertinent negatives include no chest pain. Neck Pain    Associated symptoms include headaches (more migraines than normal, unsure cause. possible stress versus allergy related) and numbness (tingling in hands, facial numbness on the left. ). Pertinent negatives include no chest pain. Other   Associated symptoms include arthralgias, headaches (more migraines than normal, unsure cause. possible stress versus allergy related), myalgias, neck pain and numbness (tingling in hands, facial numbness on the left. ). Pertinent negatives include no chest pain. Hip Pain    Associated symptoms include numbness (tingling in hands, facial numbness on the left.). Here today for routine pain clinic recheck. Percocet working well for chronic pain control, no complaints, denies side effects.  Undergoing treatment for lymphoma    Pain Assessment  Location of Pain: (fibromyalgia)  Severity of Pain: 4  Quality of Pain: Throbbing, Sharp, Dull, Aching  Duration of Pain: Persistent  Frequency of Pain: Constant  Aggravating Factors: Bending, Stretching, Straightening, Kneeling, Exercise, Squatting, Standing, Walking, Stairs  Limiting Behavior: Yes  Relieving Factors: Heat, Rest    Allergies   Allergen Reactions    Latex Rash    Rituximab Palpitations     Severe tachycardia    Fentanyl Other (See Comments)     sleepwalking    Gabapentin     Seasonal        Outpatient Medications Marked as Taking for the 4/9/20 encounter (Office Visit) with TITO Hebert CNP   Medication Sig Dispense Refill    [START ON 4/14/2020] oxyCODONE-acetaminophen (PERCOCET) 7.5-325 MG per tablet Take 1 History   Problem Relation Age of Onset    Breast Cancer Mother 76    High Blood Pressure Mother     Coronary Art Dis Mother         Multiple stents.  Diabetes Mother     Heart Disease Father     Coronary Art Dis Father         Myocardial infarction at age 54 and .  COPD Father     Arthritis Brother     COPD Brother     Heart Disease Brother         Congenital heart defect -  at 10 wks of age.  Cancer Other     Diabetes Maternal Grandmother        Social History     Socioeconomic History    Marital status:      Spouse name: None    Number of children: 3    Years of education: 8    Highest education level: GED or equivalent   Occupational History    Occupation: disabled [employer]   Social Needs    Financial resource strain: Not hard at all   [employer] insecurity     Worry: Never true     Inability: Never true    Transportation needs     Medical: No     Non-medical: No   Tobacco Use    Smoking status: Former Smoker     Packs/day: 0.25     Years: 35.00     Pack years: 8.75     Types: Cigarettes     Last attempt to quit:      Years since quittin.2    Smokeless tobacco: Former User     Quit date: 2015    Tobacco comment: 6 cigarettes per day since age 15 or so. Substance and Sexual Activity    Alcohol use: Not Currently     Alcohol/week: 1.0 standard drinks     Types: 1 Standard drinks or equivalent per week     Frequency: Monthly or less     Drinks per session: 1 or 2     Binge frequency: Never     Comment: rarely    Drug use: No    Sexual activity: Not Currently     Partners: Male   Lifestyle    Physical activity     Days per week: 0 days     Minutes per session: 0 min    Stress:  To some extent   Relationships    Social connections     Talks on phone: More than three times a week     Gets together: More than three times a week     Attends Zoroastrian service: 1 to 4 times per year     Active member of club or organization: No     Attends meetings of clubs or organizations: Never     Relationship status:     Intimate partner violence     Fear of current or ex partner: None     Emotionally abused: None     Physically abused: None     Forced sexual activity: None   Other Topics Concern    None   Social History Narrative    10/16/2019- She receives HEAP, PIPP, Food [place]- No other SDOH needs identified. Review of Systems   Constitutional: Negative for activity change. HENT: Negative. Respiratory: Negative for shortness of breath. Cardiovascular: Negative for chest pain. Gastrointestinal: Negative for blood in stool and constipation. Genitourinary: Positive for flank pain. Musculoskeletal: Positive for arthralgias, back pain, myalgias and neck pain. Neurological: Positive for numbness (tingling in hands, facial numbness on the left.) and headaches (more migraines than normal, unsure cause. possible stress versus allergy related). Psychiatric/Behavioral: Negative for sleep disturbance. The patient is nervous/anxious (anxiety). Objective:   Physical Exam  Vitals signs reviewed. Constitutional:       General: She is not in acute distress. Appearance: Normal appearance. She is well-developed. She is not diaphoretic. Interventions: She is not intubated. HENT:      Head: Normocephalic and atraumatic. Right Ear: External ear normal.      Left Ear: External ear normal.      Nose: Nose normal.   Eyes:      General: Lids are normal.      Conjunctiva/sclera: Conjunctivae normal.   Neck:      Musculoskeletal: Decreased range of motion. Muscular tenderness present. Cardiovascular:      Pulses: Normal pulses. Pulmonary:      Effort: Pulmonary effort is normal. No tachypnea, bradypnea, accessory muscle usage or respiratory distress. She is not intubated. Musculoskeletal:      Comments: Sitting in wheelchair   Skin:     General: Skin is warm and dry. Nails: There is no clubbing.      Neurological:      Mental Status: She is alert and oriented to person, place, and time. Cranial Nerves: No cranial nerve deficit. Psychiatric:         Speech: Speech normal.         Behavior: Behavior normal. Behavior is cooperative. Assessment:      1. Lumbar disc herniation    2. Fibromyalgia    3. Neural foraminal stenosis of cervical spine    4. Cervical radiculopathy    5. Chronic pain of left knee    6. Cervicalgia          Plan:      Chronic pain diagnoses such as   1. Lumbar disc herniation    2. Fibromyalgia    3. Neural foraminal stenosis of cervical spine    4. Cervical radiculopathy    5. Chronic pain of left knee    6. Cervicalgia     controlled on current medication regime, wll continue current pain medications to improve quality of life and function. Controlled Substance Monitoring:    Acute and Chronic Pain Monitoring:   RX Monitoring 4/9/2020   Attestation -   Periodic Controlled Substance Monitoring No signs of potential drug abuse or diversion identified.;Obtaining appropriate analgesic effect of treatment.    Chronic Pain > 50 MEDD -   Chronic Pain > 80 MEDD -     Follow up 2 months

## 2020-05-12 RX ORDER — OXYCODONE AND ACETAMINOPHEN 7.5; 325 MG/1; MG/1
1 TABLET ORAL
Qty: 150 TABLET | Refills: 0 | Status: SHIPPED | OUTPATIENT
Start: 2020-05-14 | End: 2020-06-09 | Stop reason: SDUPTHER

## 2020-06-09 ENCOUNTER — HOSPITAL ENCOUNTER (OUTPATIENT)
Age: 60
Setting detail: SPECIMEN
Discharge: HOME | End: 2020-06-09
Payer: MEDICARE

## 2020-06-09 ENCOUNTER — OFFICE VISIT (OUTPATIENT)
Dept: PAIN MANAGEMENT | Age: 60
End: 2020-06-09
Payer: MEDICARE

## 2020-06-09 VITALS
BODY MASS INDEX: 38.62 KG/M2 | DIASTOLIC BLOOD PRESSURE: 70 MMHG | WEIGHT: 218 LBS | SYSTOLIC BLOOD PRESSURE: 126 MMHG | HEIGHT: 63 IN

## 2020-06-09 PROCEDURE — 1036F TOBACCO NON-USER: CPT | Performed by: NURSE PRACTITIONER

## 2020-06-09 PROCEDURE — G8427 DOCREV CUR MEDS BY ELIG CLIN: HCPCS | Performed by: NURSE PRACTITIONER

## 2020-06-09 PROCEDURE — 80307 DRUG TEST PRSMV CHEM ANLYZR: CPT

## 2020-06-09 PROCEDURE — G8417 CALC BMI ABV UP PARAM F/U: HCPCS | Performed by: NURSE PRACTITIONER

## 2020-06-09 PROCEDURE — 3017F COLORECTAL CA SCREEN DOC REV: CPT | Performed by: NURSE PRACTITIONER

## 2020-06-09 PROCEDURE — 99214 OFFICE O/P EST MOD 30 MIN: CPT | Performed by: NURSE PRACTITIONER

## 2020-06-09 PROCEDURE — 99214 OFFICE O/P EST MOD 30 MIN: CPT

## 2020-06-09 RX ORDER — OXYCODONE AND ACETAMINOPHEN 7.5; 325 MG/1; MG/1
1 TABLET ORAL
Qty: 150 TABLET | Refills: 0 | Status: SHIPPED | OUTPATIENT
Start: 2020-06-13 | End: 2020-07-08 | Stop reason: SDUPTHER

## 2020-06-09 ASSESSMENT — ENCOUNTER SYMPTOMS
BLOOD IN STOOL: 0
BACK PAIN: 1
CONSTIPATION: 0
SHORTNESS OF BREATH: 0

## 2020-06-09 NOTE — PROGRESS NOTES
disease     (Right knee) -     Depression     Dysfunctional uterine bleeding     Dyspnea     (progressive - multifactorial.    Failed total right knee replacement (HCC)     Fatigue     Fibromyalgia     Generalized anxiety disorder     Grief at loss of child     4-yr old granddaughter  of brain tumor in .  Hip pain, bilateral     Secondary to piriformis syndrome and bilateral greater trochanteric bursitis. (Injection of Celestone and Marcaine).  History of tobacco use     Currently not smoking.  Hypertension     Inverted nipple     Left. Lifelong    Kidney stone     Lumbar disc disease     Chronic.  Obesity     Osteoarthritis     Degenerative joint disease - of all joints.  Seasonal allergic rhinitis     Shoulder pain, left     (Injection of Celestone/Marcaine subacromially) - Dr. Brandon Santoyo - 10/2008.  Shoulder pain, right     (Injection of Celestone/Marcaine subacromially). Dr. Brandon Santoyo - 10/2008.  Supraventricular tachycardia, paroxysmal (HCC)     Status post cardiac ablation.  Vitamin D deficiency        Past Surgical History:   Procedure Laterality Date    ATRIAL ABLATION SURGERY      Radiofrequency ablation of slow AV pawel pathway.  CARDIAC CATHETERIZATION  2006    no blockages    CARDIAC CATHETERIZATION  2020    normal coronary arteries,anteroapical hypokinesis consistent with cardiomyopathy    CHOLECYSTECTOMY, LAPAROSCOPIC  2011    (Dr. Patton [facility])    JOINT REPLACEMENT Right     FAILED KNEE RE;PLAC    KNEE ARTHROPLASTY Right     OTHER SURGICAL HISTORY  2009    Epidural steroids to cervical spine.    [address] HISTORY  2014    ECT therapy, one session only and she refused any further    OK TOTAL KNEE ARTHROPLASTY Left 2018    Left Total Knee Arthroplasty performed by Britney Castro MD at [facility]       Family History   Problem Relation Age of Onset   Bernie Beatty Breast Cancer Mother 76    High Blood Pressure Mother    

## 2020-06-11 ENCOUNTER — TELEPHONE (OUTPATIENT)
Dept: FAMILY MEDICINE CLINIC | Age: 60
End: 2020-06-11

## 2020-06-11 NOTE — TELEPHONE ENCOUNTER
Left message for pateint to return call.  Facundo wants to know if patient is agreeable to see psychiatrist at [facility]

## 2020-06-12 LAB
6-ACETYLMORPHINE, UR: NOT DETECTED
7-AMINOCLONAZEPAM, URINE: NOT DETECTED
ALPHA-OH-ALPRAZ, URINE: NOT DETECTED
ALPRAZOLAM, URINE: NOT DETECTED
AMPHETAMINES, URINE: NOT DETECTED
BARBITURATES, URINE: NOT DETECTED
BENZOYLECGONINE, UR: NOT DETECTED
BUPRENORPHINE URINE: NOT DETECTED
CARISOPRODOL, UR: NOT DETECTED
CLONAZEPAM, URINE: NOT DETECTED
CODEINE, URINE: NOT DETECTED
CREATININE URINE: 99.8 MG/DL (ref 20–400)
DIAZEPAM, URINE: NOT DETECTED
EER PAIN MGT DRUG PANEL, HIGH RES/EMIT U: NORMAL
ETHYL GLUCURONIDE UR: NOT DETECTED
FENTANYL URINE: NOT DETECTED
HYDROCODONE, URINE: NOT DETECTED
HYDROMORPHONE, URINE: NOT DETECTED
LORAZEPAM, URINE: PRESENT
MARIJUANA METAB, UR: NOT DETECTED
MDA, UR: NOT DETECTED
MDEA, EVE, UR: NOT DETECTED
MDMA URINE: NOT DETECTED
MEPERIDINE METAB, UR: NOT DETECTED
METHADONE, URINE: NOT DETECTED
METHAMPHETAMINE, URINE: NOT DETECTED
METHYLPHENIDATE: NOT DETECTED
MIDAZOLAM, URINE: NOT DETECTED
MORPHINE URINE: NOT DETECTED
NORBUPRENORPHINE, URINE: NOT DETECTED
NORDIAZEPAM, URINE: NOT DETECTED
NORFENTANYL, URINE: NOT DETECTED
NORHYDROCODONE, URINE: NOT DETECTED
NOROXYCODONE, URINE: PRESENT
NOROXYMORPHONE, URINE: NOT DETECTED
OXAZEPAM, URINE: NOT DETECTED
OXYCODONE URINE: PRESENT
OXYMORPHONE, URINE: NOT DETECTED
PAIN MGT DRUG PANEL, HI RES, UR: NORMAL
PCP,URINE: NOT DETECTED
PHENTERMINE, UR: NOT DETECTED
PROPOXYPHENE, URINE: NOT DETECTED
TAPENTADOL, URINE: NOT DETECTED
TAPENTADOL-O-SULFATE, URINE: NOT DETECTED
TEMAZEPAM, URINE: NOT DETECTED
TRAMADOL, URINE: NOT DETECTED
ZOLPIDEM, URINE: NOT DETECTED

## 2020-06-29 NOTE — TELEPHONE ENCOUNTER
Renard Calix called requesting a refill of the below medication which has been pended for you: When do you want to see patient back? You last seen for transitional in Feb and gave the Lasix then. Do you want her to continue this?     Requested Prescriptions     Pending Prescriptions Disp Refills    furosemide (LASIX) 20 MG tablet [Pharmacy Med Name: FUROSEMIDE 20 MG TABLET] 90 tablet      Sig: take 1 tablet by mouth daily       Last Appointment Date: 2/4/2020  Next Appointment Date: Visit date not found    Allergies   Allergen Reactions    Latex Rash    Rituximab Palpitations     Severe tachycardia    Fentanyl Other (See Comments)     sleepwalking    Gabapentin     Seasonal

## 2020-06-30 RX ORDER — FUROSEMIDE 20 MG/1
TABLET ORAL
Qty: 30 TABLET | Refills: 1 | Status: SHIPPED | OUTPATIENT
Start: 2020-06-30 | End: 2020-08-10

## 2020-07-08 RX ORDER — OXYCODONE AND ACETAMINOPHEN 7.5; 325 MG/1; MG/1
1 TABLET ORAL
Qty: 150 TABLET | Refills: 0 | Status: SHIPPED | OUTPATIENT
Start: 2020-07-14 | End: 2020-08-10 | Stop reason: SDUPTHER

## 2020-07-08 NOTE — TELEPHONE ENCOUNTER
OARRS Report checked for [place], and [place]: 6/14/2020 percocet 7.5-325mg #150. Due 7/14/2020.

## 2020-08-10 ENCOUNTER — OFFICE VISIT (OUTPATIENT)
Dept: PAIN MANAGEMENT | Age: 60
End: 2020-08-10
Payer: MEDICARE

## 2020-08-10 VITALS
BODY MASS INDEX: 40.57 KG/M2 | DIASTOLIC BLOOD PRESSURE: 80 MMHG | HEIGHT: 63 IN | SYSTOLIC BLOOD PRESSURE: 115 MMHG | WEIGHT: 229 LBS | RESPIRATION RATE: 14 BRPM

## 2020-08-10 PROCEDURE — G8417 CALC BMI ABV UP PARAM F/U: HCPCS | Performed by: NURSE PRACTITIONER

## 2020-08-10 PROCEDURE — 99212 OFFICE O/P EST SF 10 MIN: CPT | Performed by: NURSE PRACTITIONER

## 2020-08-10 PROCEDURE — 3017F COLORECTAL CA SCREEN DOC REV: CPT | Performed by: NURSE PRACTITIONER

## 2020-08-10 PROCEDURE — 99214 OFFICE O/P EST MOD 30 MIN: CPT | Performed by: NURSE PRACTITIONER

## 2020-08-10 PROCEDURE — 1036F TOBACCO NON-USER: CPT | Performed by: NURSE PRACTITIONER

## 2020-08-10 PROCEDURE — G8427 DOCREV CUR MEDS BY ELIG CLIN: HCPCS | Performed by: NURSE PRACTITIONER

## 2020-08-10 RX ORDER — OXYCODONE AND ACETAMINOPHEN 7.5; 325 MG/1; MG/1
1 TABLET ORAL
Qty: 150 TABLET | Refills: 0 | Status: SHIPPED | OUTPATIENT
Start: 2020-08-13 | End: 2020-09-11 | Stop reason: SDUPTHER

## 2020-08-10 ASSESSMENT — ENCOUNTER SYMPTOMS
BLOOD IN STOOL: 0
CONSTIPATION: 0
BACK PAIN: 1
SHORTNESS OF BREATH: 0

## 2020-08-10 NOTE — PROGRESS NOTES
Subjective:      Patient ID: Aidee James is a 64 y.o. female. Chief Complaint   Patient presents with    Lower Back Pain     2 month follow up    Shoulder Pain     shoulders seem to hurt worse since she had her tx done. Left arm is painful and can barely use it     Back Pain   Associated symptoms include headaches (more migraines than normal, unsure cause. possible stress versus allergy related), numbness (tingling in hands, facial numbness on the left.) and weakness. Pertinent negatives include no chest pain. Neck Pain    Associated symptoms include headaches (more migraines than normal, unsure cause. possible stress versus allergy related), numbness (tingling in hands, facial numbness on the left.) and weakness. Pertinent negatives include no chest pain. Other   Associated symptoms include arthralgias, fatigue, headaches (more migraines than normal, unsure cause. possible stress versus allergy related), myalgias, neck pain, numbness (tingling in hands, facial numbness on the left.) and weakness. Pertinent negatives include no chest pain. Hip Pain    Associated symptoms include numbness (tingling in hands, facial numbness on the left. ). Shoulder Pain    Associated symptoms include numbness (tingling in hands, facial numbness on the left.). Here today for routine pain clinic recheck. Percocet working well for chronic pain control, no complaints, denies side effects. Underwent treatment for lymphoma, told she is now cancer free.      Pain Assessment  Location of Pain: (shoulder - left/left arm and back)  Severity of Pain: 6  Quality of Pain: (burning)  Duration of Pain: Persistent  Frequency of Pain: Constant  Aggravating Factors: Bending, Stretching, Straightening, Exercise, Standing, Walking, Stairs, Squatting, Kneeling  Limiting Behavior: Yes  Relieving Factors: Heat, Rest    Allergies   Allergen Reactions    Latex Rash    Rituximab Palpitations     Severe tachycardia    Fentanyl Other (See Comments)     sleepwalking    Gabapentin     Seasonal        Outpatient Medications Marked as Taking for the 8/10/20 encounter (Office Visit) with TITO Larson - CNP   Medication Sig Dispense Refill    [START ON 2020] oxyCODONE-acetaminophen (PERCOCET) 7.5-325 MG per tablet Take 1 tablet by mouth 5 times daily for 30 days. 150 tablet 0    ENTRESTO 24-26 MG per tablet Take 1 tablet by mouth 2 times daily      aspirin 81 MG chewable tablet Take 81 mg by mouth daily      carvedilol (COREG) 3.125 MG tablet Take 3.125 mg by mouth 2 times daily      vilazodone HCl (VIIBRYD) 40 MG TABS Take 40 mg by mouth      ondansetron (ZOFRAN ODT) 4 MG disintegrating tablet Take 1 tablet by mouth every 8 hours as needed for Nausea 20 tablet 0    olopatadine (PATADAY) 0.2 % SOLN ophthalmic solution insert 1 drop into both eyes once daily if needed 2.5 mL 0    fluticasone (FLONASE) 50 MCG/ACT nasal spray spray 2 sprays INTO EACH NOSTRIL ONCE DAILY 16 g 0    Sodium Chloride-Sodium Bicarb (NETI POT SINUS WASH) 2300-700 MG KIT 1 spray by Nasal route daily 1 kit 0    omeprazole (PRILOSEC) 20 MG delayed release capsule take 1 capsule by mouth twice a day 60 capsule 5    Blood Pressure KIT 1 kit by Does not apply route 2 times daily as needed (BP) 1 kit 0    Handicap Placard MISC by Does not apply route 2 each 0       Past Medical History:   Diagnosis Date    Asthma     Blindness of left eye     Cervical herniated disc     C5-C6, with nerve compression.  Congenital hip dysplasia     Degenerative joint disease     (Right knee) -     Depression     Dysfunctional uterine bleeding     Dyspnea     (progressive - multifactorial.    Failed total right knee replacement (HCC)     Fatigue     Fibromyalgia     Generalized anxiety disorder     Grief at loss of child     4-yr old granddaughter  of brain tumor in .     Hip pain, bilateral     Secondary to piriformis syndrome and bilateral greater trochanteric bursitis. (Injection of Celestone and Marcaine).  History of tobacco use     Currently not smoking.  Hypertension     Inverted nipple     Left. Lifelong    Kidney stone     Lumbar disc disease     Chronic.  Obesity     Osteoarthritis     Degenerative joint disease - of all joints.  Seasonal allergic rhinitis     Shoulder pain, left     (Injection of Celestone/Marcaine subacromially) - Dr. Lewis Lewis - 10/2008.  Shoulder pain, right     (Injection of Celestone/Marcaine subacromially). Dr. Lewis Lewis - 10/2008.  Supraventricular tachycardia, paroxysmal (HCC)     Status post cardiac ablation.  Vitamin D deficiency        Past Surgical History:   Procedure Laterality Date    ATRIAL ABLATION SURGERY      Radiofrequency ablation of slow AV pawel pathway.  CARDIAC CATHETERIZATION      no blockages    CARDIAC CATHETERIZATION  2020    normal coronary arteries,anteroapical hypokinesis consistent with cardiomyopathy    CHOLECYSTECTOMY, LAPAROSCOPIC  2011    (Dr. Sheridan Ruth)    JOINT REPLACEMENT Right     FAILED KNEE RE;PLAC    KNEE ARTHROPLASTY Right     OTHER SURGICAL HISTORY      Epidural steroids to cervical spine. [address] HISTORY      ECT therapy, one session only and she refused any further    NM TOTAL KNEE ARTHROPLASTY Left 2018    Left Total Knee Arthroplasty [facility]       Family History   Problem Relation Age of Onset    Breast Cancer Mother 76    High Blood Pressure Mother     Coronary Art Dis Mother         Multiple stents.  Diabetes Mother     Heart Disease Father     Coronary Art Dis Father         Myocardial infarction at age 54 and .  COPD Father     Arthritis Brother     COPD Brother     Heart Disease Brother         Congenital heart defect -  at 10 wks of age.     Cancer Other     Diabetes Maternal Grandmother        Social History     Socioeconomic History    Marital status:      Spouse name: None    Number of children: 3    Years of education: 8    Highest education level: GED or equivalent   Occupational History    Occupation: disabled [employer]   Social Needs    Financial resource strain: Not hard at all   Adrian-Sera insecurity     Worry: Never true     Inability: Never true   [employer] needs     Medical: No     Non-medical: No   Tobacco Use    Smoking status: Former Smoker     Packs/day: 0.25     Years: 35.00     Pack years: 8.75     Types: Cigarettes     Last attempt to quit:      Years since quittin.6    Smokeless tobacco: Former User     Quit date: 2015    Tobacco comment: 6 cigarettes per day since age 15 or so. Substance and Sexual Activity    Alcohol use: Not Currently     Alcohol/week: 1.0 standard drinks     Types: 1 Standard drinks or equivalent per week     Frequency: Monthly or less     Drinks per session: 1 or 2     Binge frequency: Never     Comment: rarely    Drug use: No    Sexual activity: Not Currently     Partners: Male   Lifestyle    Physical activity     Days per week: 0 days     Minutes per session: 0 min    Stress: To some extent   Relationships    Social connections     Talks on phone: More than three times a week     Gets together: More than three times a week     Attends Buddhism service: 1 to 4 times per year     Active member of club or organization: No     Attends meetings of clubs or organizations: Never     Relationship status:     Intimate partner violence     Fear of current or ex partner: None     Emotionally abused: None     Physically abused: None     Forced sexual activity: None   Other Topics Concern    None   Social History Narrative    10/16/2019- She receives HEAP, PIPP, Food [employer]- No other SDOH needs identified. Review of Systems   Constitutional: Positive for fatigue. Negative for activity change. HENT: Negative.     Respiratory: Negative for shortness of breath. Cardiovascular: Negative for chest pain. Gastrointestinal: Negative for blood in stool and constipation. Genitourinary: Positive for flank pain. Musculoskeletal: Positive for arthralgias, back pain, myalgias and neck pain. Neurological: Positive for weakness, numbness (tingling in hands, facial numbness on the left.) and headaches (more migraines than normal, unsure cause. possible stress versus allergy related). Psychiatric/Behavioral: Negative for sleep disturbance. The patient is nervous/anxious (anxiety). Objective:   Physical Exam  Vitals signs reviewed. Constitutional:       General: She is not in acute distress. Appearance: Normal appearance. She is well-developed. She is not diaphoretic. Interventions: She is not intubated. HENT:      Head: Normocephalic and atraumatic. Right Ear: External ear normal.      Left Ear: External ear normal.      Nose: Nose normal.   Eyes:      General: Lids are normal.      Conjunctiva/sclera: Conjunctivae normal.   Cardiovascular:      Pulses: Normal pulses. Pulmonary:      Effort: Pulmonary effort is normal. No tachypnea, bradypnea, accessory muscle usage or respiratory distress. She is not intubated. Musculoskeletal:      Comments: Sitting in wheelchair   Skin:     General: Skin is warm and dry. Nails: There is no clubbing. Neurological:      Mental Status: She is alert and oriented to person, place, and time. Cranial Nerves: No cranial nerve deficit. Psychiatric:         Speech: Speech normal.         Behavior: Behavior normal. Behavior is cooperative. Assessment:      1. Neural foraminal stenosis of cervical spine    2. Fibromyalgia    3. Spondylosis of cervical region without myelopathy or radiculopathy    4. Lumbar disc herniation    5. Cervicalgia    6. Chronic pain of left knee    7. Cervical radiculopathy          Plan:      Chronic pain diagnoses such as   1.  Neural foraminal stenosis of cervical spine    2. Fibromyalgia    3. Spondylosis of cervical region without myelopathy or radiculopathy    4. Lumbar disc herniation    5. Cervicalgia    6. Chronic pain of left knee    7. Cervical radiculopathy     controlled on current medication regime, wll continue current pain medications to improve quality of life and function. Controlled Substance Monitoring:    Acute and Chronic Pain Monitoring:   RX Monitoring 8/10/2020   Attestation -   Periodic Controlled Substance Monitoring No signs of potential drug abuse or diversion identified.;Obtaining appropriate analgesic effect of treatment.    Chronic Pain > 50 MEDD -   Chronic Pain > 80 MEDD -     Follow up 2 months

## 2020-08-10 NOTE — PATIENT INSTRUCTIONS
Pain Management Plan:  1. CONTINUE CURRENT MEDICATIONS    Pain clinic phone numbers  Refills  - Call [phone]. Please leave your name, a working phone number, date of birth, the name, dose, quantity, and last refill date of the prescription, and the pharmacy. Medication or Procedure Questions - Call [phone]. This is the direct line to the [facility] Pain Management Nurses. Appointment or General Questions - Call  [phone]. This is the direct line the  [address] [place] [address] can also use [employer]. Is your [employer] Activated? How to dispose of unused pain medications safely:  · Flush all unused pain medications. This is the FDA's recommended way of disposing these medications. · All other medications can be disposed in the trash mixed in undesirable contents (used coffee grounds, used [employer], etc).

## 2020-08-21 RX ORDER — OMEPRAZOLE 20 MG/1
CAPSULE, DELAYED RELEASE ORAL
Qty: 60 CAPSULE | Refills: 0 | Status: SHIPPED | OUTPATIENT
Start: 2020-08-21 | End: 2020-09-04 | Stop reason: SDUPTHER

## 2020-08-21 NOTE — TELEPHONE ENCOUNTER
Requested Prescriptions     Pending Prescriptions Disp Refills    omeprazole (PRILOSEC) 20 MG delayed release capsule 60 capsule 5     Sig: take 1 capsule by mouth twice a day     Last Appt:  8/21/2020  Next Appt:   9/4/2020  Med verified in [address] [place] [address] Rd

## 2020-09-04 ENCOUNTER — VIRTUAL VISIT (OUTPATIENT)
Dept: FAMILY MEDICINE CLINIC | Age: 60
End: 2020-09-04
Payer: MEDICARE

## 2020-09-04 PROCEDURE — 99442 PR PHYS/QHP TELEPHONE EVALUATION 11-20 MIN: CPT | Performed by: FAMILY MEDICINE

## 2020-09-04 RX ORDER — OMEPRAZOLE 20 MG/1
20 CAPSULE, DELAYED RELEASE ORAL DAILY
Qty: 60 CAPSULE | Refills: 5 | Status: SHIPPED | OUTPATIENT
Start: 2020-09-04 | End: 2021-06-01 | Stop reason: SDUPTHER

## 2020-09-04 RX ORDER — HYDROXYZINE HYDROCHLORIDE 10 MG/1
10 TABLET, FILM COATED ORAL 4 TIMES DAILY PRN
COMMUNITY
Start: 2020-08-10 | End: 2021-02-23

## 2020-09-04 ASSESSMENT — PATIENT HEALTH QUESTIONNAIRE - PHQ9
2. FEELING DOWN, DEPRESSED OR HOPELESS: 0
SUM OF ALL RESPONSES TO PHQ9 QUESTIONS 1 & 2: 0
1. LITTLE INTEREST OR PLEASURE IN DOING THINGS: 0
SUM OF ALL RESPONSES TO PHQ QUESTIONS 1-9: 0
SUM OF ALL RESPONSES TO PHQ QUESTIONS 1-9: 0

## 2020-09-04 NOTE — PROGRESS NOTES
2020    TELEHEALTH EVALUATION -- Audio/Visual (During [place]-34 public health emergency)    HPI:    Asael Rhodes (:  1960) has requested an audio/video evaluation for the following concern(s):    She states that, overall, she is feeling well. She recently completed chemotherapy, and she had a PET scan that showed that she is in remission from [facility]. She states that her energy level and endurance have not returned to her previous levels. She is tolerating her medications well. She had labs ordered by her oncologist earlier this summer. Review of Systems    Prior to Visit Medications    Medication Sig Taking? Authorizing Provider   hydrOXYzine (ATARAX) 10 MG tablet Take 10 mg by mouth 4 times daily as needed Yes Historical Provider, MD   omeprazole (PRILOSEC) 20 MG delayed release capsule take 1 capsule by mouth twice a day Yes Gilford Lily, MD   oxyCODONE-acetaminophen (PERCOCET) 7.5-325 MG per tablet Take 1 tablet by mouth 5 times daily for 30 days.  Yes TITO Kaminski - CNP   ENTRESTO 24-26 MG per tablet Take 1 tablet by mouth 2 times daily Yes Historical Provider, MD   aspirin 81 MG chewable tablet Take 81 mg by mouth daily Yes Historical Provider, MD   carvedilol (COREG) 3.125 MG tablet Take 3.125 mg by mouth 2 times daily Yes Historical Provider, MD   vilazodone HCl (VIIBRYD) 40 MG TABS Take 40 mg by mouth daily  Yes Historical Provider, MD   olopatadine (PATADAY) 0.2 % SOLN ophthalmic solution insert 1 drop into both eyes once daily if needed Yes Gilford Lily, MD   fluticasone (FLONASE) 50 MCG/ACT nasal spray spray 2 sprays INTO EACH NOSTRIL ONCE DAILY Yes Gilford Lily, MD   albuterol sulfate HFA (VENTOLIN HFA) 108 (90 Base) MCG/ACT inhaler inhale 2 puffs every 4 hours if needed Yes Gilford Lily, MD   rizatriptan (MAXALT-MLT) 10 MG disintegrating tablet Take 1 tablet by mouth once as needed for Migraine May repeat in 2 hours if needed Yes Linder Sandifer, MD Handicap Placard [address] [place] [address] by Does not apply route Yes Joleen Palacios MD   nitroGLYCERIN (NITROSTAT) 0.4 MG SL tablet Place 0.4 mg under the tongue as needed  Historical Provider, MD   Blood Pressure KIT 1 kit by Does not apply route 2 times daily as needed (BP)  Patient not taking: Reported on 2020  Leda Rice APRN - CNP       Social History     Tobacco Use    Smoking status: Former Smoker     Packs/day: 0.25     Years: 35.00     Pack years: 8.75     Types: Cigarettes     Last attempt to quit:      Years since quittin.6    Smokeless tobacco: Former User     Quit date: 2015    Tobacco comment: 6 cigarettes per day since age 15 or so. Substance Use Topics    Alcohol use: Not Currently     Alcohol/week: 1.0 standard drinks     Types: 1 Standard drinks or equivalent per week     Frequency: Monthly or less     Drinks per session: 1 or 2     Binge frequency: Never     Comment: rarely    Drug use: No        She has the following allergies:  Latex; Rituximab;  Fentanyl; Gabapentin; and Seasonal         She has the following problem list:  Patient Active Problem List   Diagnosis    Fibromyalgia    Spinal osteoarthritis    Hypertension    Generalized anxiety disorder    Congenital hip dysplasia    Kidney stone    Cervical herniated disc    Dysfunctional uterine bleeding    Seasonal allergic rhinitis    Asthma    Vitamin D deficiency    Supraventricular tachycardia, paroxysmal (HCC)    Lumbar disc herniation    Obesity    History of tobacco use    Dyspnea    Fatigue    Grief at loss of child    Blindness of left eye    Primary osteoarthritis of left knee    Hip pain, bilateral    Shoulder pain, right    Shoulder pain, left    Failed total right knee replacement ([facility] [address]    Encounter for chronic pain management    Moderate major depression (HCC)    Lumbar degenerative disc disease    Low back pain    Cervical radiculopathy due to degenerative joint disease of spine    History of tobacco abuse  Cervicalgia    Osteoarthritis of left knee    Status post left knee replacement    Chronic pain of left knee    Cervical radiculopathy    Family history of diabetes mellitus    Neural foraminal stenosis of cervical spine    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)    DLBCL (diffuse large B cell lymphoma) (HCC)    Hyperuricemia    S/P AV pawel ablation         PHYSICAL EXAMINATION:    Vital Signs: (As obtained by patient/caregiver or practitioner observation)    Patient-Reported Vitals 9/4/2020   Patient-Reported Weight 230lb   Patient-Reported Height 5'3\"                          Psychiatric:  Affect is normal.         Other pertinent observable physical exam findings:  She responds to questions appropriately. Speech is clear. There is no observed dyspnea with conversation. Results of labs done at [facility] 8/5/2020 were reviewed with the patient:   Comprehensive metabolic panelResulted: 1/3/5970 3:06 PM  [address] [facility] [address]  Component Name Value Ref Range   Sodium 138 134 - 146 mmol/L   Potassium, Bld 4.4 3.5 - 5 mmol/L   Chloride 103 98 - 109 mmol/L   CO2 27 22 - 32 mmol/L   Anion gap 8   Comment: NEW REFERENCE RANGE 5 - 15 mmol/L   BUN 12 5 - 23 mg/dL   Creatinine 0.49   Comment: METHOD TRACEABLE TO IDMS STANDARD 0.4 - 1 mg/dL   Glucose 109 (H) 65 - 99 mg/dL   Calcium 8.8 8.5 - 10.5 mg/dL   Total Protein 6.4 6 - 8 g/dL   Albumin 3.8 3.2 - 5.3 g/dL   Alkaline Phosphatase 177 (H) 39 - 130 U/L   AST 20 0 - 41 U/L   ALT 23 0 - 31 U/L   Total bilirubin 0.4 0.3 - 1.2 mg/dL   GFR MDRD Non Af Amer >60 >59 ml/min/1.73sq. m   GFR MDRD Af Amer >60 >59 ml/min/1.73sq. m   Specimen Collected on   Serum 8/5/2020 2:40 PM             ASSESSMENT/PLAN:  1. Essential hypertension  I have no recent blood pressure readings to review. She is tolerating her medications well. She states that she does not need a refill today.    Labs were ordered to be done prior to her return visit in 4 months:  - Comprehensive Metabolic Panel; Future  - Lipid Panel; Future    2. Elevated glucose  Her glucose was elevated on her recent labs. - Hemoglobin A1C; Future prior to her return visit in 4 months. 3. Vitamin D deficiency  Her 25-hydroxy Vitamin D level was within normal limits (39.6 ng/mL) on 9/27/2019. She was advised to continue with her current dose of Vitamin D.        - Vitamin D 25 Hydroxy; Future prior to her return visit in 4 months. 4.  Diffuse large B-cell lymphoma of lymph nodes of neck ([facility] [address]  As above, her recent PET scan was consistent with remission. She continues to follow up with oncology. I recommended physical therapy to help improve her endurance. She is concerned about Covid-19 risk. She declined. 5.  Encounter for screening mammogram for malignant neoplasm of breast  Screening mammogram was recommended and ordered:  - PAVAN RED DIGITAL SCREEN BILATERAL; Future        I will have staff call her to schedule a follow up visit in 3-4 months. No follow-ups on file. Robert Maddox is a 64 y.o. female being evaluated by a Virtual Visit (video visit) encounter to address concerns as mentioned above. A caregiver was present when appropriate. Due to this being a TeleHealth encounter (During [facility] public health emergency), evaluation of the following organ systems was limited: Vitals/Constitutional/EENT/Resp/CV/GI//MS/Neuro/Skin/Heme-Lymph-Imm. Pursuant to the emergency declaration under the [facility], [address] [place] [address] authority and the [employer] and [employer] Act, this Virtual Visit was conducted with patient's (and/or legal guardian's) consent, to reduce the patient's risk of exposure to COVID-19 and provide necessary medical care.   The patient (and/or legal guardian) has also been advised to contact this office for worsening conditions or problems, and seek emergency medical treatment and/or call 911 if deemed necessary. Patient identification was verified at the start of the visit: Yes    Total time spent on this encounter:  15 minutes    Services were provided through a video synchronous discussion virtually to substitute for in-person clinic visit. Patient and provider were located at their individual homes. --Rosanna Goss MD on 9/4/2020 at 12:39 PM    An electronic signature was used to authenticate this note.

## 2020-09-04 NOTE — Clinical Note
Please mail lab orders to the patient. Please schedule her for a Medicare Wellness Visit in 3-4 months. Thank you.

## 2020-09-04 NOTE — PROGRESS NOTES
Discussed vaccines-patient is willing to get pneumonia and flu if enough time has passed since completing chemo.

## 2020-09-11 RX ORDER — OXYCODONE AND ACETAMINOPHEN 7.5; 325 MG/1; MG/1
1 TABLET ORAL
Qty: 150 TABLET | Refills: 0 | Status: SHIPPED | OUTPATIENT
Start: 2020-09-12 | End: 2020-10-08 | Stop reason: SDUPTHER

## 2020-09-11 NOTE — TELEPHONE ENCOUNTER
Patient called refill line for her Percocet 7.5 to RA in [place]. Last Appt:  8/10/2020  Next Appt:   10/8/2020  Med verified in [facility] checked for [place], and [place]: 8/13/20 oxycodone acetaminophen 7.5-325mg #150. Due 9/12/20.

## 2020-10-06 ENCOUNTER — PATIENT MESSAGE (OUTPATIENT)
Dept: FAMILY MEDICINE CLINIC | Age: 60
End: 2020-10-06

## 2020-10-07 NOTE — TELEPHONE ENCOUNTER
From: Yesika Renee  To: Estuardo Antonio MD  Sent: 10/6/2020 2:12 PM EDT  Subject: Non-Urgent Medical Question    I will be in the building this week . can you put in the order for my flu shot and pneumonia shot if it is ok to get both at the same time.  Thanks   Neil Babinski

## 2020-10-08 ENCOUNTER — NURSE ONLY (OUTPATIENT)
Dept: LAB | Age: 60
End: 2020-10-08
Payer: MEDICARE

## 2020-10-08 ENCOUNTER — OFFICE VISIT (OUTPATIENT)
Dept: PAIN MANAGEMENT | Age: 60
End: 2020-10-08
Payer: MEDICARE

## 2020-10-08 VITALS
DIASTOLIC BLOOD PRESSURE: 84 MMHG | TEMPERATURE: 96 F | SYSTOLIC BLOOD PRESSURE: 136 MMHG | WEIGHT: 237 LBS | HEART RATE: 80 BPM | BODY MASS INDEX: 41.99 KG/M2 | HEIGHT: 63 IN | OXYGEN SATURATION: 95 %

## 2020-10-08 PROCEDURE — G8484 FLU IMMUNIZE NO ADMIN: HCPCS | Performed by: NURSE PRACTITIONER

## 2020-10-08 PROCEDURE — 90686 IIV4 VACC NO PRSV 0.5 ML IM: CPT

## 2020-10-08 PROCEDURE — 99214 OFFICE O/P EST MOD 30 MIN: CPT | Performed by: NURSE PRACTITIONER

## 2020-10-08 PROCEDURE — 3017F COLORECTAL CA SCREEN DOC REV: CPT | Performed by: NURSE PRACTITIONER

## 2020-10-08 PROCEDURE — 99213 OFFICE O/P EST LOW 20 MIN: CPT

## 2020-10-08 PROCEDURE — G8417 CALC BMI ABV UP PARAM F/U: HCPCS | Performed by: NURSE PRACTITIONER

## 2020-10-08 PROCEDURE — G8428 CUR MEDS NOT DOCUMENT: HCPCS | Performed by: NURSE PRACTITIONER

## 2020-10-08 PROCEDURE — 90732 PPSV23 VACC 2 YRS+ SUBQ/IM: CPT

## 2020-10-08 PROCEDURE — 1036F TOBACCO NON-USER: CPT | Performed by: NURSE PRACTITIONER

## 2020-10-08 RX ORDER — OXYCODONE AND ACETAMINOPHEN 7.5; 325 MG/1; MG/1
1 TABLET ORAL
Qty: 150 TABLET | Refills: 0 | Status: SHIPPED | OUTPATIENT
Start: 2020-10-12 | End: 2020-11-09 | Stop reason: SDUPTHER

## 2020-10-08 ASSESSMENT — ENCOUNTER SYMPTOMS
BACK PAIN: 1
CONSTIPATION: 0
BLOOD IN STOOL: 0
SHORTNESS OF BREATH: 0

## 2020-10-08 NOTE — PROGRESS NOTES
Subjective:      Patient ID: Jayson Elizondo is a 64 y.o. female. Chief Complaint   Patient presents with    Follow-up     2 month f/u      Shoulder Pain    Associated symptoms include numbness (tingling in hands, facial numbness on the left. ). Back Pain   Associated symptoms include headaches (more migraines than normal, unsure cause. possible stress versus allergy related), numbness (tingling in hands, facial numbness on the left.) and weakness. Pertinent negatives include no chest pain. Neck Pain    Associated symptoms include headaches (more migraines than normal, unsure cause. possible stress versus allergy related), numbness (tingling in hands, facial numbness on the left.) and weakness. Pertinent negatives include no chest pain. Other   Associated symptoms include arthralgias, fatigue, headaches (more migraines than normal, unsure cause. possible stress versus allergy related), myalgias, neck pain, numbness (tingling in hands, facial numbness on the left.) and weakness. Pertinent negatives include no chest pain. Hip Pain    Associated symptoms include numbness (tingling in hands, facial numbness on the left.). Here today for routine pain clinic recheck. Percocet working well for chronic pain control, no complaints, denies side effects. Underwent treatment for lymphoma, told she is now cancer free. Activity increased, walking on treadmill.      Pain Assessment  Location of Pain: Shoulder  Location Modifiers: Left  Severity of Pain: 6  Quality of Pain: Other (Comment)  Duration of Pain: Persistent(burning)  Frequency of Pain: Constant  Date Pain First Started: (approximately 1975)  Aggravating Factors: Bending, Stretching, Straightening, Exercise, Kneeling, Squatting, Standing, Walking  Limiting Behavior: Yes  Relieving Factors: Rest, Heat  Result of Injury: No  Work-Related Injury: No  Are there other pain locations you wish to document?: No    Allergies   Allergen Reactions    Latex Rash    Rituximab Palpitations     Severe tachycardia    Fentanyl Other (See Comments)     sleepwalking    Gabapentin     Seasonal        Outpatient Medications Marked as Taking for the 10/8/20 encounter (Office Visit) with TITO Mendosa - CNP   Medication Sig Dispense Refill    [START ON 10/12/2020] oxyCODONE-acetaminophen (PERCOCET) 7.5-325 MG per tablet Take 1 tablet by mouth 5 times daily for 30 days. 150 tablet 0       Past Medical History:   Diagnosis Date    Asthma     Blindness of left eye     Cervical herniated disc     C5-C6, with nerve compression.  Congenital hip dysplasia     Degenerative joint disease     (Right knee) -     Depression     Dysfunctional uterine bleeding     Dyspnea     (progressive - multifactorial.    Failed total right knee replacement (HCC)     Fatigue     Fibromyalgia     Generalized anxiety disorder     Grief at loss of child     4-yr old granddaughter  of brain tumor in .  Hip pain, bilateral     Secondary to piriformis syndrome and bilateral greater trochanteric bursitis. (Injection of Celestone and Marcaine).  History of tobacco use     Currently not smoking.  Hypertension     Inverted nipple     Left. Lifelong    Kidney stone     Lumbar disc disease     Chronic.  Obesity     Osteoarthritis     Degenerative joint disease - of all joints.  Seasonal allergic rhinitis     Shoulder pain, left     (Injection of Celestone/Marcaine subacromially) - Dr. Adriana Thayer - 10/2008.  Shoulder pain, right     (Injection of Celestone/Marcaine subacromially). Dr. Adriana Thayer - 10/2008.  Supraventricular tachycardia, paroxysmal (HCC)     Status post cardiac ablation.  Vitamin D deficiency        Past Surgical History:   Procedure Laterality Date    ATRIAL ABLATION SURGERY      Radiofrequency ablation of slow AV pawel pathway.     CARDIAC CATHETERIZATION  2006    no blockages    CARDIAC CATHETERIZATION  2020    normal coronary arteries,anteroapical hypokinesis consistent with cardiomyopathy    CHOLECYSTECTOMY, LAPAROSCOPIC  2011    (Dr. Luiza Song)   [address] Right     FAILED KNEE RE;PLAC    KNEE ARTHROPLASTY Right     OTHER SURGICAL HISTORY      Epidural steroids to cervical spine.  OTHER SURGICAL HISTORY      ECT therapy, one session only and she refused any further    PORT SURGERY  2020    [facility] IJ [facility], Hamilton Ziegler MD    [place] TOTAL KNEE ARTHROPLASTY Left 2018    Left Total Knee Arthroplasty [facility] by [facility]gs [facility] at [facility]       Family History   Problem Relation Age of Onset    Breast Cancer Mother 76    High Blood Pressure Mother     Coronary Art Dis Mother         Multiple stents.  Diabetes Mother     Heart Disease Father     Coronary Art Dis Father         Myocardial infarction at age 54 and .  COPD Father     Arthritis Brother     COPD Brother     Heart Disease Brother         Congenital heart defect -  at 10 wks of age.  Cancer Other     Diabetes Maternal Grandmother        Social History     Socioeconomic History    Marital status:      Spouse name: None    Number of children: 3    Years of education: 8    Highest education level: GED or equivalent   Occupational History    Occupation: disabled [employer]   Social Needs    Financial resource strain: Not hard at all   Rob-Sera insecurity     Worry: Never true     Inability: Never true    Transportation needs     Medical: No     Non-medical: No   Tobacco Use    Smoking status: Former Smoker     Packs/day: 0.25     Years: 35.00     Pack years: 8.75     Types: Cigarettes     Last attempt to quit:      Years since quittin.7    Smokeless tobacco: Former User     Quit date: 2015    Tobacco comment: 6 cigarettes per day since age 15 or so.    Substance and Sexual Activity    Alcohol use: Not Currently     Alcohol/week: Normocephalic and atraumatic. Right Ear: External ear normal.      Left Ear: External ear normal.      Nose: Nose normal.   Eyes:      General: Lids are normal.      Conjunctiva/sclera: Conjunctivae normal.   Cardiovascular:      Pulses: Normal pulses. Pulmonary:      Effort: Pulmonary effort is normal. No tachypnea, bradypnea, accessory muscle usage or respiratory distress. She is not intubated. Musculoskeletal:      Comments: Sitting in wheelchair   Skin:     General: Skin is warm and dry. Nails: There is no clubbing. Neurological:      Mental Status: She is alert and oriented to person, place, and time. Cranial Nerves: No cranial nerve deficit. Psychiatric:         Speech: Speech normal.         Behavior: Behavior normal. Behavior is cooperative. Assessment:      1. Lumbar disc herniation    2. Fibromyalgia    3. Neural foraminal stenosis of cervical spine    4. Cervical radiculopathy due to degenerative joint disease of spine    5. Lumbar degenerative disc disease          Plan:      Chronic pain diagnoses such as   1. Lumbar disc herniation    2. Fibromyalgia    3. Neural foraminal stenosis of cervical spine    4. Cervical radiculopathy due to degenerative joint disease of spine    5. Lumbar degenerative disc disease     controlled on current medication regime, wll continue current pain medications to improve quality of life and function. Stephanie Bui is here for recheck/reevaluation of chronic pain. She is able to maintain daily activities with the use of current pain medications and is generally satisfied with level of analgesia. She shows no evidence of misuse or misappropriation of medications. She denies use of alcohol or illegal substances.  UDS have been Appropriate with most recent screen     Controlled Substance Monitoring:    Acute and Chronic Pain Monitoring:   RX Monitoring 10/8/2020   Attestation -   Periodic Controlled Substance Monitoring No signs of potential drug abuse or diversion identified.;Obtaining appropriate analgesic effect of treatment.    Chronic Pain > 50 MEDD -   Chronic Pain > 80 MEDD -     Follow up 2 months

## 2020-11-09 NOTE — TELEPHONE ENCOUNTER
Pt called refill line for her Percocet. Last Appt:  10/8/2020  Next Appt:   12/8/2020  Med verified in [address] checked for [facility] , [place], and [place]: 10/12/20 oxycodone acetaminophen 7.5-325mg #150. Due 11/11/20.

## 2020-11-10 RX ORDER — OXYCODONE AND ACETAMINOPHEN 7.5; 325 MG/1; MG/1
1 TABLET ORAL
Qty: 150 TABLET | Refills: 0 | Status: SHIPPED | OUTPATIENT
Start: 2020-11-11 | End: 2020-12-09 | Stop reason: SDUPTHER

## 2020-12-09 ENCOUNTER — VIRTUAL VISIT (OUTPATIENT)
Dept: PAIN MANAGEMENT | Age: 60
End: 2020-12-09
Payer: MEDICARE

## 2020-12-09 PROCEDURE — 99214 OFFICE O/P EST MOD 30 MIN: CPT | Performed by: NURSE PRACTITIONER

## 2020-12-09 PROCEDURE — 99211 OFF/OP EST MAY X REQ PHY/QHP: CPT

## 2020-12-09 PROCEDURE — 3017F COLORECTAL CA SCREEN DOC REV: CPT | Performed by: NURSE PRACTITIONER

## 2020-12-09 PROCEDURE — G8427 DOCREV CUR MEDS BY ELIG CLIN: HCPCS | Performed by: NURSE PRACTITIONER

## 2020-12-09 RX ORDER — OXYCODONE AND ACETAMINOPHEN 7.5; 325 MG/1; MG/1
1 TABLET ORAL
Qty: 150 TABLET | Refills: 0 | Status: SHIPPED | OUTPATIENT
Start: 2020-12-11 | End: 2021-01-11 | Stop reason: SDUPTHER

## 2020-12-09 ASSESSMENT — ENCOUNTER SYMPTOMS
BLOOD IN STOOL: 0
SHORTNESS OF BREATH: 0
BACK PAIN: 1
CONSTIPATION: 0

## 2020-12-09 NOTE — PROGRESS NOTES
2020    TELEHEALTH EVALUATION -- Audio/Visual (During OVDEH-24 public health emergency)    HPI:    Katya Lewis (:  1960) has requested an audio/video evaluation for the following concern(s):    Left shoulder pain, needs surgery. Losing ROM. States she does exercises at home. Needs refill    Review of Systems   Constitutional: Positive for fatigue. Negative for activity change. HENT: Negative. Respiratory: Negative for shortness of breath. Cardiovascular: Negative for chest pain. Gastrointestinal: Negative for blood in stool and constipation. Genitourinary: Positive for flank pain. Musculoskeletal: Positive for arthralgias, back pain, myalgias and neck pain. Neurological: Positive for weakness, numbness (tingling in hands, facial numbness on the left.) and headaches. Psychiatric/Behavioral: Negative for sleep disturbance. The patient is nervous/anxious (anxiety). Prior to Visit Medications    Medication Sig Taking? Authorizing Provider   oxyCODONE-acetaminophen (PERCOCET) 7.5-325 MG per tablet Take 1 tablet by mouth 5 times daily for 30 days.  Yes TITO Rust - CNP   furosemide (LASIX) 20 MG tablet take 1 tablet by mouth daily  James Gardner MD   hydrOXYzine (ATARAX) 10 MG tablet Take 10 mg by mouth 4 times daily as needed  Historical Provider, MD   omeprazole (PRILOSEC) 20 MG delayed release capsule Take 1 capsule by mouth Daily  James Gardner MD   ENTRESTO 24-26 MG per tablet Take 1 tablet by mouth 2 times daily  Historical Provider, MD   aspirin 81 MG chewable tablet Take 81 mg by mouth daily  Historical Provider, MD   carvedilol (COREG) 3.125 MG tablet Take 3.125 mg by mouth 2 times daily  Historical Provider, MD   nitroGLYCERIN (NITROSTAT) 0.4 MG SL tablet Place 0.4 mg under the tongue as needed  Historical Provider, MD   vilazodone HCl (VIIBRYD) 40 MG TABS Take 40 mg by mouth daily   Historical Provider, MD   olopatadine (PATADAY) 0.2 % SOLN ophthalmic solution insert 1 drop into both eyes once daily if needed  Roque López MD   fluticasone (FLONASE) 50 MCG/ACT nasal spray spray 2 sprays INTO EACH NOSTRIL ONCE DAILY  Roxana Hernandez MD   albuterol sulfate HFA (VENTOLIN HFA) 108 (90 Base) MCG/ACT inhaler inhale 2 puffs every 4 hours if needed  Roque López MD   Blood Pressure KIT 1 kit by Does not apply route 2 times daily as needed (BP)  Patient not taking: Reported on 2020  Paulina Rice, APRN - CNP   rizatriptan (MAXALT-MLT) 10 MG disintegrating tablet Take 1 tablet by mouth once as needed for Migraine May repeat in 2 hours if needed  Erum Davis MD   [facility] [address] [facility] by Does not apply route  Erum Davis MD       Social History     Tobacco Use    Smoking status: Former Smoker     Packs/day: 0.25     Years: 35.00     Pack years: 8.75     Types: Cigarettes     Last attempt to quit:      Years since quittin.9    Smokeless tobacco: Former User     Quit date: 2015    Tobacco comment: 6 cigarettes per day since age 15 or so. Substance Use Topics    Alcohol use: Not Currently     Alcohol/week: 1.0 standard drinks     Types: 1 Standard drinks or equivalent per week     Frequency: Monthly or less     Drinks per session: 1 or 2     Binge frequency: Never     Comment: rarely    Drug use: No        Allergies   Allergen Reactions    Latex Rash    Rituximab Palpitations     Severe tachycardia    Fentanyl Other (See Comments)     sleepwalking    Gabapentin     Seasonal    ,   Past Medical History:   Diagnosis Date    Asthma     Blindness of left eye     Cervical herniated disc     C5-C6, with nerve compression.     Congenital hip dysplasia     Degenerative joint disease     (Right knee) -     Depression     Dysfunctional uterine bleeding     Dyspnea     (progressive - multifactorial.    Failed total right knee replacement (HCC)     Fatigue     Fibromyalgia     Generalized anxiety disorder     Grief at loss of child     4-yr old granddaughter  of brain tumor in .  Hip pain, bilateral     Secondary to piriformis syndrome and bilateral greater trochanteric bursitis. (Injection of Celestone and Marcaine).  History of tobacco use     Currently not smoking.  Hypertension     Inverted nipple     Left. Lifelong    Kidney stone     Lumbar disc disease     Chronic.  Obesity     Osteoarthritis     Degenerative joint disease - of all joints.  Seasonal allergic rhinitis     Shoulder pain, left     (Injection of Celestone/Marcaine subacromially) - Dr. Oliva  - 10/2008.  Shoulder pain, right     (Injection of Celestone/Marcaine subacromially). Dr. Oliva Go - 10/2008.  Supraventricular tachycardia, paroxysmal (HCC)     Status post cardiac ablation.  Vitamin D deficiency    ,   Past Surgical History:   Procedure Laterality Date    ATRIAL ABLATION SURGERY      Radiofrequency ablation of slow AV pawel pathway.  CARDIAC CATHETERIZATION      no blockages    CARDIAC CATHETERIZATION  2020    normal coronary arteries,anteroapical hypokinesis consistent with cardiomyopathy    CHOLECYSTECTOMY, LAPAROSCOPIC  2011    (Dr. Audi Eubanks)    JOINT REPLACEMENT Right     FAILED KNEE RE;PLAC    KNEE ARTHROPLASTY Right     OTHER SURGICAL HISTORY      Epidural steroids to cervical spine.     OTHER SURGICAL HISTORY      ECT therapy, one session only and she refused any further    PORT SURGERY  2020    right IJ port removal, [facility], Brandi Wilkins MD    [place] TOTAL KNEE ARTHROPLASTY Left 2018    Left [facility] K[facility] Arthroplasty [facility]   ,   Social History     Tobacco Use    Smoking status: Former Smoker     Packs/day: 0.25     Years: 35.00     Pack years: 8.75     Types: Cigarettes     Last attempt to quit:      Years since quittin.9    Smokeless tobacco: Former User     Quit date: 2015    Tobacco comment: 6 cigarettes per day since age 15 or so. Substance Use Topics    Alcohol use: Not Currently     Alcohol/week: 1.0 standard drinks     Types: 1 Standard drinks or equivalent per week     Frequency: Monthly or less     Drinks per session: 1 or 2     Binge frequency: Never     Comment: rarely    Drug use: No   ,   Family History   Problem Relation Age of Onset    Breast Cancer Mother 76    High Blood Pressure Mother     Coronary Art Dis Mother         Multiple stents.  Diabetes Mother     Heart Disease Father     Coronary Art Dis Father         Myocardial infarction at age 54 and .  COPD Father     Arthritis Brother     COPD Brother     Heart Disease Brother         Congenital heart defect -  at 10 wks of age.     Cancer Other     Diabetes Maternal Grandmother    ,   Immunization History   Administered Date(s) Administered    Influenza Virus Vaccine 2009, 10/07/2010, 2012, 01/15/2014, 2014, 2015    Influenza, Helen Herb, IM, PF (6 mo and older Fluzone, Flulaval, Fluarix, and 3 yrs and older Afluria) 10/28/2016, 2017, 10/15/2018, 10/08/2020    Pneumococcal Conjugate 13-valent ([id number]) 2015    Pneumococcal Polysaccharide ([id number]) 2011, 10/08/2020    Tdap (Boostrix, Adacel) 2011   ,   Health Maintenance   Topic Date Due    Shingles Vaccine (1 of 2) 02/10/2012    Breast cancer screen  2018    Annual Wellness Visit (AWV)  2019    Cervical cancer screen  2020    Potassium monitoring  2020    Creatinine monitoring  2020    DTaP/Tdap/Td vaccine (2 - Td) 2021    Diabetes screen  2022    Colon cancer screen colonoscopy  2023    Lipid screen  2023    Flu vaccine  Completed    Pneumococcal 0-64 years Vaccine  Completed    Hepatitis C screen  Addressed    HIV screen  Addressed    Hepatitis A vaccine  Aged Out    Hepatitis B vaccine  Aged Out    Hib vaccine  Aged C/ Brandon [address] 19 Meningococcal (ACWY) vaccine  Aged Out       PHYSICAL EXAMINATION:  [ INSTRUCTIONS:  \"[x]\" Indicates a positive item  \"[]\" Indicates a negative item  -- DELETE ALL ITEMS NOT EXAMINED]  Vital Signs: (As obtained by patient/caregiver or practitioner observation)    Blood pressure-  Heart rate-    Respiratory rate-    Temperature-  Pulse oximetry-     Constitutional: [x] Appears well-developed and well-nourished [x] No apparent distress      [] Abnormal-   Mental status  [x] Alert and awake  [x] Oriented to person/place/time [x]Able to follow commands      Eyes:  EOM    [x]  Normal  [] Abnormal-  Sclera  [x]  Normal  [] Abnormal -         Discharge [x]  None visible  [] Abnormal -    HENT:   [x] Normocephalic, atraumatic. [] Abnormal   [x] Mouth/Throat: Mucous membranes are moist.     External Ears [x] Normal  [] Abnormal-     Neck: [x] No visualized mass     Pulmonary/Chest: [x] Respiratory effort normal.  [x] No visualized signs of difficulty breathing or respiratory distress        [] Abnormal-      Musculoskeletal:   [] Normal gait with no signs of ataxia         [] Normal range of motion of neck        [x] Abnormal- limited ROM left shoulder    Neurological:        [x] No Facial Asymmetry (Cranial nerve 7 motor function) (limited exam to video visit)          [x] No gaze palsy        [] Abnormal-         Skin:        [x] No significant exanthematous lesions or discoloration noted on facial skin         [] Abnormal-            Psychiatric:       [x] Normal Affect [x] No Hallucinations        [] Abnormal-     Other pertinent observable physical exam findings-     ASSESSMENT/PLAN:   Diagnosis Orders   1. Spondylosis of cervical region without myelopathy or radiculopathy     2. Fibromyalgia  oxyCODONE-acetaminophen (PERCOCET) 7.5-325 MG per tablet   3. Neural foraminal stenosis of cervical spine  oxyCODONE-acetaminophen (PERCOCET) 7.5-325 MG per tablet   4. Lumbar disc herniation     5.  Primary osteoarthritis of left knee     6. Lumbar degenerative disc disease     7. Cervical radiculopathy due to degenerative joint disease of spine           Declined PT due to pandemic    Chronic pain diagnoses such as   1. Spondylosis of cervical region without myelopathy or radiculopathy    2. Fibromyalgia    3. Neural foraminal stenosis of cervical spine    4. Lumbar disc herniation    5. Primary osteoarthritis of left knee    6. Lumbar degenerative disc disease    7. Cervical radiculopathy due to degenerative joint disease of spine     controlled on current medication regime, wll continue current pain medications to improve quality of life and function. Controlled Substance Monitoring:    Acute and Chronic Pain Monitoring:   RX Monitoring 12/9/2020   Attestation -   Periodic Controlled Substance Monitoring No signs of potential drug abuse or diversion identified.;Obtaining appropriate analgesic effect of treatment. Chronic Pain > 50 MEDD -   Chronic Pain > 80 MEDD -         No follow-ups on file. Izzy Gaviria is a 64 y.o. female being evaluated by a Virtual Visit (video visit) encounter to address concerns as mentioned above. A caregiver was present when appropriate. Due to this being a TeleHealth encounter (During [facility] emergency), evaluation of the following organ systems was limited: Vitals/Constitutional/EENT/Resp/CV/GI//MS/Neuro/Skin/Heme-Lymph-Imm. Pursuant to the emergency declaration under the [address] [place] [address] authority and the [employer] Act, this Virtual Visit was conducted with patient's (and/or legal guardian's) consent, to reduce the patient's risk of exposure to COVID-19 and provide necessary medical care. The patient (and/or legal guardian) has also been advised to contact this office for worsening conditions or problems, and seek emergency medical treatment and/or call 911 if deemed necessary. Patient identification was verified at the start of the visit: Yes    Total time spent on this encounter: 11 minutes    Services were provided through a video synchronous discussion virtually to substitute for in-person clinic visit. Patient and provider were located at their individual homes. --TITO Watts CNP on 12/9/2020 at 9:39 AM    An electronic signature was used to authenticate this note.

## 2020-12-15 ENCOUNTER — VIRTUAL VISIT (OUTPATIENT)
Dept: FAMILY MEDICINE CLINIC | Age: 60
End: 2020-12-15
Payer: MEDICARE

## 2020-12-15 PROCEDURE — 3017F COLORECTAL CA SCREEN DOC REV: CPT | Performed by: FAMILY MEDICINE

## 2020-12-15 PROCEDURE — 99211 OFF/OP EST MAY X REQ PHY/QHP: CPT

## 2020-12-15 PROCEDURE — G0439 PPPS, SUBSEQ VISIT: HCPCS | Performed by: FAMILY MEDICINE

## 2020-12-15 RX ORDER — BUSPIRONE HYDROCHLORIDE 10 MG/1
TABLET ORAL
COMMUNITY
Start: 2020-10-19 | End: 2021-05-20 | Stop reason: SDUPTHER

## 2020-12-15 ASSESSMENT — PATIENT HEALTH QUESTIONNAIRE - PHQ9
SUM OF ALL RESPONSES TO PHQ9 QUESTIONS 1 & 2: 0
SUM OF ALL RESPONSES TO PHQ QUESTIONS 1-9: 0
1. LITTLE INTEREST OR PLEASURE IN DOING THINGS: 0
2. FEELING DOWN, DEPRESSED OR HOPELESS: 0
SUM OF ALL RESPONSES TO PHQ QUESTIONS 1-9: 0
SUM OF ALL RESPONSES TO PHQ QUESTIONS 1-9: 0

## 2020-12-15 ASSESSMENT — LIFESTYLE VARIABLES: HOW OFTEN DO YOU HAVE A DRINK CONTAINING ALCOHOL: 0

## 2020-12-15 NOTE — Clinical Note
Please mail the after visit summary to the patient, and schedule her for a visit in 6 months. Thank you.

## 2020-12-15 NOTE — PROGRESS NOTES
12/15/2020    TELEHEALTH EVALUATION -- Audio/Visual (During OLRKL-11 public health emergency)    HPI:    Jayson Elizondo (:  1960) has requested an audio/video evaluation for the following concern(s):    She states that she has been feeling well. She has completed chemotherapy for B-cell Lymphoma. She is seen by psychiatry at [facility] for management of anxiety. She is tolerating her medications well. She denies any new soaps, lotions, laundry detergents, fabric softeners, etc.     Review of Systems    Prior to Visit Medications    Medication Sig Taking? Authorizing Provider   busPIRone (BUSPAR) 10 MG tablet take 1 tablet by mouth twice a day Yes Historical Provider, MD   oxyCODONE-acetaminophen (PERCOCET) 7.5-325 MG per tablet Take 1 tablet by mouth 5 times daily for 30 days.  Yes TITO Avila - CNP   furosemide (LASIX) 20 MG tablet take 1 tablet by mouth daily Yes Alicia Castañeda MD   omeprazole (PRILOSEC) 20 MG delayed release capsule Take 1 capsule by mouth Daily Yes Alicia Castañeda MD   ENTRESTO 24-26 MG per tablet Take 1 tablet by mouth 2 times daily Yes Historical Provider, MD   aspirin 81 MG chewable tablet Take 81 mg by mouth daily Yes Historical Provider, MD   carvedilol (COREG) 3.125 MG tablet Take 3.125 mg by mouth 2 times daily Yes Historical Provider, MD   nitroGLYCERIN (NITROSTAT) 0.4 MG SL tablet Place 0.4 mg under the tongue as needed Yes Historical Provider, MD   vilazodone HCl (VIIBRYD) 40 MG TABS Take 40 mg by mouth daily  Yes Historical Provider, MD   olopatadine (PATADAY) 0.2 % SOLN ophthalmic solution insert 1 drop into both eyes once daily if needed Yes Alicia Castañeda MD   fluticasone (FLONASE) 50 MCG/ACT nasal spray spray 2 sprays INTO EACH NOSTRIL ONCE DAILY Yes Alicia Castañeda MD   albuterol sulfate HFA (VENTOLIN HFA) 108 (90 Base) MCG/ACT inhaler inhale 2 puffs every 4 hours if needed Yes Alicia Castañeda MD

## 2020-12-15 NOTE — PROGRESS NOTES
Medicare Annual Wellness Visit  Are Name: Rolan Campos Date: 12/15/2020   MRN: [id number] Sex: Female   Age: 64 y.o. Ethnicity: Non-/Non    : 1960 Race: Dusty Or is here for Medicare AWV, Hypertension (follow up), and Other (Elevated fasting glucose)    Screenings for behavioral, psychosocial and functional/safety risks, and cognitive dysfunction are all negative except as indicated below. These results, as well as other patient data from the [address] form, are documented in Flowsheets linked to this Encounter. Allergies   Allergen Reactions    Latex Rash    Rituximab Palpitations     Severe tachycardia    Fentanyl Other (See Comments)     sleepwalking    Gabapentin     Seasonal        Prior to Visit Medications    Medication Sig Taking? Authorizing Provider   busPIRone (BUSPAR) 10 MG tablet take 1 tablet by mouth twice a day Yes Historical Provider, MD   oxyCODONE-acetaminophen (PERCOCET) 7.5-325 MG per tablet Take 1 tablet by mouth 5 times daily for 30 days.  Yes TITO Rm - CNP   furosemide (LASIX) 20 MG tablet take 1 tablet by mouth daily Yes Ban Romeo MD   omeprazole (PRILOSEC) 20 MG delayed release capsule Take 1 capsule by mouth Daily Yes Ban Romeo MD   ENTRESTO 24-26 MG per tablet Take 1 tablet by mouth 2 times daily Yes Historical Provider, MD   aspirin 81 MG chewable tablet Take 81 mg by mouth daily Yes Historical Provider, MD   carvedilol (COREG) 3.125 MG tablet Take 3.125 mg by mouth 2 times daily Yes Historical Provider, MD   nitroGLYCERIN (NITROSTAT) 0.4 MG SL tablet Place 0.4 mg under the tongue as needed Yes Historical Provider, MD   vilazodone HCl (VIIBRYD) 40 MG TABS Take 40 mg by mouth daily  Yes Historical Provider, MD   olopatadine (PATADAY) 0.2 % SOLN ophthalmic solution insert 1 drop into both eyes once daily if needed Yes Ban Romeo MD fluticasone (FLONASE) 50 MCG/ACT nasal spray spray 2 sprays INTO EACH NOSTRIL ONCE DAILY Yes Roxana Hernandez MD   albuterol sulfate HFA (VENTOLIN HFA) 108 (90 Base) MCG/ACT inhaler inhale 2 puffs every 4 hours if needed Yes Rut Nunez MD   Blood Pressure KIT 1 kit by Does not apply route 2 times daily as needed (BP) Yes Paulina Rice, APRN - CNP   rizatriptan (MAXALT-MLT) 10 MG disintegrating tablet Take 1 tablet by mouth once as needed for Migraine May repeat in 2 hours if needed Yes Henrik Saavedra MD   Handicap Placard MISC by Does not apply route Yes Henrik Saavedra MD   hydrOXYzine (ATARAX) 10 MG tablet Take 10 mg by mouth 4 times daily as needed  Historical Provider, MD       Past Medical History:   Diagnosis Date    Asthma     Blindness of left eye     Cervical herniated disc     C5-C6, with nerve compression.  Congenital hip dysplasia     Degenerative joint disease     (Right knee) -     Depression     Dysfunctional uterine bleeding     Dyspnea     (progressive - multifactorial.    Failed total right knee replacement (HCC)     Fatigue     Fibromyalgia     Generalized anxiety disorder     Grief at loss of child     4-yr old granddaughter  of brain tumor in .  Hip pain, bilateral     Secondary to piriformis syndrome and bilateral greater trochanteric bursitis. (Injection of Celestone and Marcaine).  History of tobacco use     Currently not smoking.  Hypertension     Inverted nipple     Left. Lifelong    Kidney stone     Lumbar disc disease     Chronic.  Obesity     Osteoarthritis     Degenerative joint disease - of all joints.  Seasonal allergic rhinitis     Shoulder pain, left     (Injection of Celestone/Marcaine subacromially) - Dr. Jose M Hurst - 10/2008.  Shoulder pain, right     (Injection of Celestone/Marcaine subacromially). Dr. Jose M Hurst - 10/2008.  Supraventricular tachycardia, paroxysmal (HCC)     Status post cardiac ablation.     Vitamin D deficiency Past Surgical History:   Procedure Laterality Date    ATRIAL ABLATION SURGERY      Radiofrequency ablation of slow AV pawel pathway.  CARDIAC CATHETERIZATION  2006    no blockages    CARDIAC CATHETERIZATION  2020    normal coronary arteries,anteroapical hypokinesis consistent with cardiomyopathy    CHOLECYSTECTOMY, LAPAROSCOPIC  2011    (Dr. Jina Cardoso)    JOINT REPLACEMENT Right     FAILED KNEE RE;PLAC    KNEE ARTHROPLASTY Right     OTHER SURGICAL HISTORY      Epidural steroids to cervical spine.  OTHER SURGICAL HISTORY      ECT therapy, one session only and she refused any further    PORT SURGERY  2020    right IJ port removal, [facility], Charisma Hardin MD    [place] TOTAL KNEE ARTHROPLASTY Left 2018    Left Total Knee Arthroplasty performed by [facility]illen [facility]       Family History   Problem Relation Age of Onset    Breast Cancer Mother 76    High Blood Pressure Mother     Coronary Art Dis Mother         Multiple stents.  Diabetes Mother     Heart Disease Father     Coronary Art Dis Father         Myocardial infarction at age 54 and .  COPD Father     Arthritis Brother     COPD Brother     Heart Disease Brother         Congenital heart defect -  at 10 wks of age.     Cancer Other     Diabetes Maternal Grandmother        CareTeam (Including outside providers/suppliers regularly involved in providing care):   Patient Care Team:  Alicia Castañeda MD as PCP - General (Family Medicine)  Alicia Castañeda [facility]  Opal Banegas MD as Consulting Physician (Neurology)  Brigida Prescott DO as Surgeon (Neurosurgery)  TITO Avila - CNP (Nurse Practitioner)  Sandra Leblanc MD as Consulting Physician (Otolaryngology)  Adryan Lucio DO as Consulting Physician (Cardiology)  Manas Hill MD as Surgeon (Otolaryngology)  Jorje Anaya MD (Hematology and Oncology) Lilibeth Jacob MD as Consulting Physician (Cardiology)    Wt Readings from Last 3 Encounters:   10/08/20 237 lb (107.5 kg)   08/10/20 229 lb (103.9 kg)   06/09/20 218 lb (98.9 kg)      Patient-Reported Vitals 12/15/2020   Patient-Reported Weight 240lbs   Patient-Reported Height 5 3   Patient-Reported Systolic 780   Patient-Reported Diastolic 83   Patient-Reported Pulse 70   Patient-Reported Temperature 97.6      There is no height or weight on file to calculate BMI. Based upon direct observation of the patient, evaluation of cognition reveals recent and remote memory intact. Patient's complete Health Risk Assessment and screening values have been reviewed and are found in Flowsheets. The following problems were reviewed today and where indicated follow up appointments were made and/or referrals ordered. Positive Risk Factor Screenings with Interventions:          General Health and ACP:  General  In general, how would you say your health is?: Good  In the past 7 days, have you experienced any of the following? New or Increased Pain, New or Increased Fatigue, Loneliness, Social Isolation, Stress or Anger?: None of These  Do you get the social and emotional support that you need?: Yes  Do you have a Living Will?: (!) No  Advance Directives     Power of [address] [place] [address] Will ACP-Advance Directive ACP-Power of     Not on File Not on File Not on File Not on File      General Health Risk Interventions:  · No Living Will: Information regarding living lemon will be sent to the patient with her after visit summary.     Health Habits/Nutrition:  Health Habits/Nutrition  Do you exercise for at least 20 minutes 2-3 times per week?: (!) No  Have you lost any weight without trying in the past 3 months?: No  Do you eat fewer than 2 meals per day?: No  Have you seen a dentist within the past year?: (!) No     Health Habits/Nutrition Interventions: · Inadequate physical activity:  educational materials provided to promote increased physical activity  · Dental exam overdue:  patient encouraged to make appointment with his/her dentist    Hearing/Vision:  No exam data present  Hearing/Vision  Do you or your family notice any trouble with your hearing?: No  Do you have difficulty driving, watching TV, or doing any of your daily activities because of your eyesight?: No  Have you had an eye exam within the past year?: (!) No  Hearing/Vision Interventions:  · Vision concerns:  patient encouraged to make appointment with his/her eye specialist      Personalized Preventive Plan   Current Health Maintenance Status  Immunization History   Administered Date(s) Administered    Influenza Virus Vaccine 12/14/2009, 10/07/2010, 11/09/2012, 01/15/2014, 11/04/2014, 12/07/2015    Influenza, Quadv, IM, PF (6 mo and older Fluzone, Flulaval, Fluarix, and 3 yrs and older Afluria) 10/28/2016, 09/29/2017, 10/15/2018, 10/08/2020    Pneumococcal Conjugate 13-valent ([id number]) 05/12/2015    Pneumococcal Polysaccharide ([id number]) 03/04/2011, 10/08/2020    Tdap (Boostrix, Adacel) 01/01/2011        Health Maintenance   Topic Date Due    Shingles Vaccine (1 of 2) 02/10/2012    Breast cancer screen  09/29/2018    Annual Wellness Visit (AWV)  05/29/2019    Cervical cancer screen  11/08/2020    Potassium monitoring  11/29/2020    Creatinine monitoring  11/29/2020    DTaP/Tdap/Td vaccine (2 - Td) 01/01/2021    Diabetes screen  07/17/2022    Colon cancer screen colonoscopy  08/14/2023    Lipid screen  11/08/2023    Flu vaccine  Completed    Pneumococcal 0-64 years Vaccine  Completed    HIV screen  Completed    Hepatitis C screen  Addressed    Hepatitis A vaccine  Aged Out    Hepatitis B vaccine  Aged Out    Hib vaccine  Aged Out    Meningococcal (ACWY) vaccine  Aged Out     Recommendations for [facility] Due: see orders and patient instructions/AVS.  . Recommended screening schedule for the next 5-10 years is provided to the patient in written form: see Patient Instructions/AVS.    There are no diagnoses linked to this encounter. Stephanie Bui is a 64 y.o. female being evaluated by a Virtual Visit (phone) encounter to address concerns as mentioned above. A caregiver was present when appropriate. Due to this being a TeleHealth encounter (During YRJA-28 public [facility] emergency), evaluation of the following organ systems was limited: Vitals/Constitutional/EENT/Resp/CV/GI//MS/Neuro/Skin/Heme-Lymph-Imm. Pursuant to the emergency declaration under the [address] [place] [address], [address] [place] [address] authority and the [employer] Act, this Virtual Visit was conducted with patient's (and/or legal guardian's) consent, to reduce the patient's risk of exposure to COVID-19 and provide necessary medical care. The patient (and/or legal guardian) has also been advised to contact this office for worsening conditions or problems, and seek emergency medical treatment and/or call 911 if deemed necessary. Patient identification was verified at the start of the visit: Yes    Services were provided through phone to substitute for in-person clinic visit. Patient and provider were located at their individual homes. --Ervin Méndez MD on 12/15/2020 at 2:44 PM    An electronic signature was used to authenticate this note.

## 2020-12-15 NOTE — PATIENT INSTRUCTIONS
Personalized Preventive Plan for France Castro - 12/15/2020  Medicare offers a range of preventive health benefits. Some of the tests and screenings are paid in full while other may be subject to a deductible, co-insurance, and/or copay. Some of these benefits include a comprehensive review of your medical history including lifestyle, illnesses that may run in your family, and various assessments and screenings as appropriate. After reviewing your medical record and screening and assessments performed today your provider may have ordered immunizations, labs, imaging, and/or referrals for you. A list of these orders (if applicable) as well as your Preventive Care list are included within your After Visit Summary for your review. Other Preventive Recommendations:    · A preventive eye exam performed by an eye specialist is recommended every 1-2 years to screen for glaucoma; cataracts, macular degeneration, and other eye disorders. · A preventive dental visit is recommended every 6 months. · Try to get at least 150 minutes of exercise per week or 10,000 steps per day on a pedometer . · Order or download the FREE \"Exercise & Physical Activity: Your Everyday Guide\" from [employer]. Call 6-455.647.8107 or search [employer] online. · You need 0269-2211 mg of calcium and 9578-8902 IU of vitamin D per day. It is possible to meet your calcium requirement with diet alone, but a vitamin D supplement is usually necessary to meet this goal.  · When exposed to the sun, use a sunscreen that protects against both UVA and UVB radiation with an SPF of 30 or greater. Reapply every 2 to 3 hours or after sweating, drying off with a towel, or swimming. · Always wear a seat belt when traveling in a car. Always wear a helmet when riding a bicycle or motorcycle.     Keeping Home a [facility] As we get older, changes in balance, gait, strength, vision, hearing, and cognition make even the most youthful senior more prone to accidents. Falls are one of the leading health risks for older people. This increased risk of falling is related to:   Aging process (eg, decreased muscle strength, slowed reflexes)   Higher incidence of chronic health problems (eg, arthritis, diabetes) that may limit mobility, agility or sensory awareness   Side effects of medicine (eg, dizziness, blurred vision)especially medicines like prescription pain medicines and drugs used to treat mental health conditions   Depending on the brittleness of your bones, the consequences of a fall can be serious and long lasting. Home Life   Research by the Association of Aging [facility]) shows that some home accidents among older adults can be prevented by making simple lifestyle changes and basic modifications and repairs to the home environment. Here are some lifestyle changes that experts recommend:   Have your hearing and vision checked regularly. Be sure to wear prescription glasses that are right for you. Speak to your doctor or pharmacist about the possible side effects of your medicines. A number of medicines can cause dizziness. If you have problems with sleep, talk to your doctor. Limit your intake of alcohol. If necessary, use a cane or walker to help maintain your balance. Wear supportive, rubber-soled shoes, even at home. If you live in a region that gets wintry weather, you may want to put special cleats on your shoes to prevent you from slipping on the snow and ice. Exercise regularly to help maintain muscle tone, agility, and balance. Always hold the banister when going up or down stairs. Also, use  bars when getting in or out of the bath or shower, or using the toilet. To avoid dizziness, get up slowly from a lying down position. Sit up first, dangling your legs for a minute or two before rising to a standing position. Overall Home Safety Check   According to the Consumer Product Safety Commision's \"Older Consumer Home Safety Checklist,\" it is important to check for potential hazards in each room. And remember, proper lighting is an essential factor in home safety. If you cannot see clearly, you are more likely to fall. Important questions to ask yourself include:   Are lamp, electric, extension, and telephone cords placed out of the flow of traffic and maintained in good condition? Have frayed cords been replaced? Are all small rugs and runners slip resistant? If not, you can secure them to the floor with a special double-sided carpet tape. Are smoke detectors properly locatedone on every floor of your home and one outside of every sleeping area? Are they in good working order? Are batteries replaced at least once a year? Do you have a well-maintained carbon monoxide detector outside every sleeping are in your home? Does your furniture layout leave plenty of space to maneuver between and around chairs, tables, beds, and sofas? Are hallways, stairs and passages between rooms well lit? Can you reach a lamp without getting out of bed? Are floor surfaces well maintained? Shag rugs, high-pile carpeting, tile floors, and polished wood floors can be particularly slippery. Stairs should always have handrails and be carpeted or fitted with a non-skid tread. Is your telephone easily reachable. Is the cord safely tucked away? Room by Room   According to the Association of Aging, bathrooms and tejas are the two most potentially hazardous rooms in your home. In the Kitchen    Be sure your stove is in proper working order and always make sure burners and the oven are off before you go out or go to sleep. Keep pots on the back burners, turn handles away from the front of the stove, and keep stove clean and free of grease build-up. Kitchen ventilation systems and range exhausts should be working properly. Keep flammable objects such as towels and pot holders away from the cooking area except when in use. Make sure kitchen curtains are tied back. Move cords and appliances away from the sink and hot surfaces. If extension cords are needed, install wiring guides so they do not hang over the sink, range, or working areas. Look for coffee pots, kettles and toaster ovens with automatic shut-offs. Keep a mop handy in the kitchen so you can wipe up spills instantly. You should also have a small fire extinguisher. Arrange your kitchen with frequently used items on lower shelves to avoid the need to stand on a stepstool to reach them. Make sure countertops are well-lit to avoid injuries while cutting and preparing food. In the Bathroom    Use a non-slip mat or decals in the tub and shower, since wet, soapy tile or porcelain surfaces are extremely slippery. Make sure bathroom rugs are non-skid or tape them firmly to the floor. Bathtubs should have at least one, preferably two, grab bars, firmly attached to structural supports in the wall. (Do not use built-in soap holders or glass shower doors as grab bars.)    Tub seats fitted with non-slip material on the legs allow you to wash sitting down. For people with limited mobility, bathtub transfer benches allow you to slide safely into the tub. Raised toilet seats and toilet safety rails are helpful for those with knee or hip problems. In the [facility]    Make sure you use a nightlight and that the area around your bed is clear of potential obstacles. Be careful with electric blankets and never go to sleep with a heating pad, which can cause serious burns even if on a low setting. Use fire-resistant mattress covers and pillows, and NEVER smoke in bed. Keep a phone next to the bed that is programmed to dial 911 at the push of a button. If you have a chronic condition, you may want to sign on with an automatic call-in service. Typically the system includes a small pendant that connects directly to an emergency medical voice-response system. You should also make arrangements to stay in contact with someonefriend, neighbor, family memberon a regular schedule. Fire Prevention   According to the [employer]. (Smoke Alarms for Every) [address] [place] [address], senior citizens are one of the two highest risk groups for death and serious injuries due to residential fires. When cooking, wear short-sleeved items, never a bulky long-sleeved robe. The [facility]'s Safety Checklist for Older Consumers emphasizes the importance of checking basements, garages, workshops and storage areas for fire hazards, such as volatile liquids, piles of old rags or clothing and overloaded circuits. Never smoke in bed or when lying down on a couch or recliner chair. Small portable electric or kerosene heaters are responsible for many home fires and should be used cautiously if at all. If you do use one, be sure to keep them away from flammable materials. In case of fire, make sure you have a pre-established emergency exit plan. Have a professional check your fireplace and other fuel-burning appliances yearly.     Helping [employer] Baby boomers entering the syed years will continue to see the development of new products to help older adults live safely and independently in spite of age-related changes. Making Life More Livable  , by Westley Godinez, lists over 1,000 products for \"living well in the mature years,\" such as bathing and mobility aids, household security devices, ergonomically designed knives and peelers, and faucet valves and knobs for temperature control. Medical supply stores and organizations are good sources of information about products that improve your quality of life and insure your safety. Last Reviewed: November 2009 Jory Grande MD   Updated: 3/7/2011     ·        Learning About Living [place]  What is a living will? A living will, also called a declaration, is a legal form. It tells your family and your doctor your wishes when you can't speak for yourself. It's used by the health professionals who will treat you as you near the end of your life or if you get seriously hurt or ill. If you put your wishes in writing, your loved ones and others will know what kind of care you want. They won't need to guess. This can ease your mind and be helpful to others. And you can change or cancel your living will at any time. A living will is not the same as an estate or property will. An estate will explains what you want to happen with your money and property after you die. How do you use it? A living will is used to describe the kinds of treatment or life support you want as you near the end of your life or if you get seriously hurt or ill. Keep these facts in mind about living lemon. Your living will is used only if you can't speak or make decisions for yourself. Most often, one or more doctors must certify that you can't speak or decide for yourself before your living will takes effect. If you get better and can speak for yourself again, you can accept or refuse any treatment. It doesn't matter what you said in your living will. Some states may limit your right to refuse treatment in certain cases. For example, you may need to clearly state in your living will that you don't want artificial hydration and nutrition, such as being fed through a tube. Is a living will a legal document? A living will is a legal document. Each state has its own laws about living lemon. And a living will may be called something else in your state. Here are some things to know about living lemon. You don't need an  to complete a living will. But legal advice can be helpful if your state's laws are unclear. It can also help if your health history is complicated or your family can't agree on what should be in your living will. You can change your living will at any time. Some people find that their wishes about end-of-life care change as their health changes. If you make big changes to your living will, complete a new form. If you move to another state, make sure that your living will is legal in the state where you now live. In most cases, doctors will respect your wishes even if you have a form from a different state. You might use a universal form that has been approved by many states. This kind of form can sometimes be filled out and stored online. Your digital copy will then be available wherever you have a connection to the internet. The doctors and nurses who need to treat you can find it right away. Your state may offer an online registry. This is another place where you can store your living will online. It's a good idea to get your living will notarized. This means using a person called a  to watch two people sign, or witness, your living will. What should you know when you create a living will?   Here are some questions to ask yourself as you make your living will: Do you know enough about life support methods that might be used? If not, talk to your doctor so you know what might be done if you can't breathe on your own, your heart stops, or you can't swallow. What things would you still want to be able to do after you receive life-support methods? Would you want to be able to walk? To speak? To eat on your own? To live without the help of machines? Do you want certain Hoahaoism practices performed if you become very ill? If you have a choice, where do you want to be cared for? In your home? At a hospital or nursing home? If you have a choice at the end of your life, where would you prefer to die? At home? In a hospital or nursing home? Somewhere else? Would you prefer to be buried or cremated? Do you want your organs to be donated after you die? What should you do with your living will? Make sure that your family members and your health care agent have copies of your living will (also called a declaration). Give your doctor a copy of your living will. Ask him or her to keep it as part of your medical record. If you have more than one doctor, make sure that each one has a copy. Put a copy of your living will where it can be easily found. For example, some people may put a copy on their refrigerator door. If you are using a digital copy, be sure your doctor, family members, and health care agent know how to find and access it. Where can you learn more? Go to https://chpegaurangewdevyn.[employer]. org and sign in to your [employer] account. Enter Y498 in the [facility] box to learn more about \"Learning About Living Jackson Ortiz. \"     If you do not have an account, please click on the \"Sign Up Now\" link. Current as of: December 9, 2019               Content Version: 12.6  © 8119-4935 [employer]. Care instructions adapted under license by [facility] ([facility]). If you have questions about a medical condition or this instruction, always ask your healthcare professional. [address] any warranty or liability for your use of this information. Patient Education        Learning About Physical Activity  What is physical activity? Physical activity is any kind of activity that gets your body moving. The types of physical activity that can help you get fit and stay healthy include:  · Aerobic or \"cardio\" activities that make your heart beat faster and make you breathe harder, such as brisk walking, riding a bike, or running. Aerobic activities strengthen your heart and lungs and build up your endurance. · Strength training activities that make your muscles work against, or \"resist,\" something, such as lifting weights or doing push-ups. These activities help tone and strengthen your muscles. · Stretches that allow you to move your joints and muscles through their full range of motion. Stretching helps you be more flexible and avoid injury. What are the benefits of physical activity? Being active is one of the best things you can do for your health. It helps you to:  · Feel stronger and have more energy to do all the things you like to do. · Focus better at school or work. · Feel, think, and sleep better. · Reach and stay at a healthy weight. · Lose fat and build lean muscle. · Lower your risk for serious health problems. · Keep your bones, muscles, and joints strong. How can you make physical activity part of your life? Get at least 30 minutes of exercise on most days of the week. Walking is a good choice. You also may want to do other activities, such as running, swimming, cycling, or playing tennis or team sports. Pick activities that you likeones that make your heart beat faster, your muscles stronger, and your muscles and joints more flexible. If you find more than one thing you like doing, do them all. You don't have to do the same thing every day. Get your heart pumping every day. Any activity that makes your heart beat faster and keeps it at that rate for a while counts. Here are some great ways to get your heart beating faster:  · Go for a brisk walk, run, or bike ride. · Go for a hike or swim. · Go in-line skating. · Play a game of touch football, basketball, or soccer. · Ride a bike. · Play tennis or racquetball. · Climb stairs. Even some household chores can be aerobicjust do them at a faster pace. Vacuuming, raking or mowing the lawn, sweeping the garage, and washing and waxing the car all can help get your heart rate up. Strengthen your muscles during the week. You don't have to lift heavy weights or grow big, bulky muscles to get stronger. Doing a few simple activities that make your muscles work against, or \"resist,\" something can help you get stronger. For example, you can:  · Do push-ups or sit-ups, which use your own body weight as resistance. · Lift weights or dumbbells or use stretch bands at home or in a gym or community center. Stretch your muscles often. Stretching will help you as you become more active. It can help you stay flexible, loosen tight muscles, and avoid injury. It can also help improve your balance and posture and can be a great way to relax. Be sure to stretch the muscles you'll be using when you work out. It's best to warm your muscles slightly before you stretch them. Walk or do some other light aerobic activity for a few minutes, and then start stretching. When you stretch your muscles:  · Do it slowly. Stretching is not about going fast or making sudden movements. · Don't push or bounce during a stretch. · Hold each stretch for at least 15 to 30 seconds, if you can. You should feel a stretch in the muscle, but not pain. · Breathe out as you do the stretch. Then breathe in as you hold the stretch. Don't hold your breath. If you're worried about how more activity might affect your health, have a checkup before you start. Follow any special advice your doctor gives you for getting a smart start. Where can you learn more? Go to https://"[employer]. org and sign in to your tu.nr account. Enter D974 in the [employer] box to learn more about \"Learning About Physical Activity. \"     If you do not have an account, please click on the \"Sign Up Now\" link. Current as of: January 16, 2020               Content Version: 12.6  © 1601-2529 [employer]. Care instructions adapted under license by [facility] ([facility]). If you have questions about a medical condition or this instruction, always ask your healthcare professional. [address] any warranty or liability for your use of this information.

## 2021-01-11 DIAGNOSIS — M79.7 FIBROMYALGIA: ICD-10-CM

## 2021-01-11 DIAGNOSIS — M48.02 NEURAL FORAMINAL STENOSIS OF CERVICAL SPINE: ICD-10-CM

## 2021-01-11 RX ORDER — OXYCODONE AND ACETAMINOPHEN 7.5; 325 MG/1; MG/1
1 TABLET ORAL
Qty: 150 TABLET | Refills: 0 | Status: SHIPPED | OUTPATIENT
Start: 2021-01-11 | End: 2021-02-09 | Stop reason: SDUPTHER

## 2021-01-11 NOTE — TELEPHONE ENCOUNTER
Last Appt:  12/9/2020  Next Appt:   2/9/2021  Med verified in [address] [place] for [place] and [place]: 12/12/20 Percocet 7.5/325 #150. Due 01/11/21 (now).

## 2021-02-09 DIAGNOSIS — M79.7 FIBROMYALGIA: ICD-10-CM

## 2021-02-09 DIAGNOSIS — M48.02 NEURAL FORAMINAL STENOSIS OF CERVICAL SPINE: ICD-10-CM

## 2021-02-09 RX ORDER — OXYCODONE AND ACETAMINOPHEN 7.5; 325 MG/1; MG/1
1 TABLET ORAL
Qty: 150 TABLET | Refills: 0 | Status: SHIPPED | OUTPATIENT
Start: 2021-02-10 | End: 2021-03-09 | Stop reason: SDUPTHER

## 2021-02-09 NOTE — TELEPHONE ENCOUNTER
Last Appt:  12/9/2020  Next Appt:   2/23/2021  Med verified in [address] checked for [place], and [place]: Percocet 7.5/325 mg #150. Due 2/10/21.

## 2021-02-11 LAB
ALBUMIN SERPL-MCNC: 4 G/DL
ALP BLD-CCNC: 144 U/L
ALT SERPL-CCNC: 27 U/L
ANION GAP SERPL CALCULATED.3IONS-SCNC: 10 MMOL/L
AST SERPL-CCNC: 26 U/L
BILIRUB SERPL-MCNC: 0.5 MG/DL (ref 0.1–1.4)
BUN BLDV-MCNC: 16 MG/DL
CALCIUM SERPL-MCNC: 8.9 MG/DL
CHLORIDE BLD-SCNC: 101 MMOL/L
CO2: 25 MMOL/L
CREAT SERPL-MCNC: 0.55 MG/DL
GFR CALCULATED: >60
GLUCOSE BLD-MCNC: 111 MG/DL
POTASSIUM SERPL-SCNC: 4.3 MMOL/L
SODIUM BLD-SCNC: 136 MMOL/L
TOTAL PROTEIN: 7

## 2021-02-17 DIAGNOSIS — R73.09 ELEVATED GLUCOSE: ICD-10-CM

## 2021-02-17 DIAGNOSIS — E55.9 VITAMIN D DEFICIENCY: ICD-10-CM

## 2021-02-17 DIAGNOSIS — I10 ESSENTIAL HYPERTENSION: ICD-10-CM

## 2021-02-17 DIAGNOSIS — I10 ESSENTIAL HYPERTENSION: Primary | ICD-10-CM

## 2021-02-23 ENCOUNTER — OFFICE VISIT (OUTPATIENT)
Dept: PAIN MANAGEMENT | Age: 61
End: 2021-02-23
Payer: MEDICARE

## 2021-02-23 ENCOUNTER — HOSPITAL ENCOUNTER (OUTPATIENT)
Age: 61
Setting detail: SPECIMEN
Discharge: HOME | End: 2021-02-23
Payer: MEDICARE

## 2021-02-23 VITALS
SYSTOLIC BLOOD PRESSURE: 132 MMHG | WEIGHT: 250 LBS | BODY MASS INDEX: 44.3 KG/M2 | HEIGHT: 63 IN | DIASTOLIC BLOOD PRESSURE: 88 MMHG | HEART RATE: 96 BPM

## 2021-02-23 DIAGNOSIS — M51.36 LUMBAR DEGENERATIVE DISC DISEASE: ICD-10-CM

## 2021-02-23 DIAGNOSIS — Z02.83 ENCOUNTER FOR DRUG SCREENING: ICD-10-CM

## 2021-02-23 DIAGNOSIS — G89.29 CHRONIC LEFT SHOULDER PAIN: Primary | ICD-10-CM

## 2021-02-23 DIAGNOSIS — G89.29 CHRONIC BILATERAL LOW BACK PAIN WITHOUT SCIATICA: ICD-10-CM

## 2021-02-23 DIAGNOSIS — M48.02 NEURAL FORAMINAL STENOSIS OF CERVICAL SPINE: ICD-10-CM

## 2021-02-23 DIAGNOSIS — Z79.891 ENCOUNTER FOR LONG-TERM OPIATE ANALGESIC USE: ICD-10-CM

## 2021-02-23 DIAGNOSIS — M25.512 CHRONIC LEFT SHOULDER PAIN: Primary | ICD-10-CM

## 2021-02-23 DIAGNOSIS — M54.50 CHRONIC BILATERAL LOW BACK PAIN WITHOUT SCIATICA: ICD-10-CM

## 2021-02-23 DIAGNOSIS — M54.12 CERVICAL RADICULOPATHY: ICD-10-CM

## 2021-02-23 PROCEDURE — G8427 DOCREV CUR MEDS BY ELIG CLIN: HCPCS | Performed by: NURSE PRACTITIONER

## 2021-02-23 PROCEDURE — G8482 FLU IMMUNIZE ORDER/ADMIN: HCPCS | Performed by: NURSE PRACTITIONER

## 2021-02-23 PROCEDURE — 99214 OFFICE O/P EST MOD 30 MIN: CPT | Performed by: NURSE PRACTITIONER

## 2021-02-23 PROCEDURE — G8417 CALC BMI ABV UP PARAM F/U: HCPCS | Performed by: NURSE PRACTITIONER

## 2021-02-23 PROCEDURE — 3017F COLORECTAL CA SCREEN DOC REV: CPT | Performed by: NURSE PRACTITIONER

## 2021-02-23 PROCEDURE — 1036F TOBACCO NON-USER: CPT | Performed by: NURSE PRACTITIONER

## 2021-02-23 PROCEDURE — 80307 DRUG TEST PRSMV CHEM ANLYZR: CPT

## 2021-02-23 PROCEDURE — 99214 OFFICE O/P EST MOD 30 MIN: CPT

## 2021-02-23 ASSESSMENT — ENCOUNTER SYMPTOMS
BLOOD IN STOOL: 0
SHORTNESS OF BREATH: 0
BACK PAIN: 1
CONSTIPATION: 0

## 2021-02-23 NOTE — PROGRESS NOTES
Marcaine).  History of tobacco use     Currently not smoking.  Hypertension     Inverted nipple     Left. Lifelong    Kidney stone     Lumbar disc disease     Chronic.  Obesity     Osteoarthritis     Degenerative joint disease - of all joints.  Seasonal allergic rhinitis     Shoulder pain, left     (Injection of Celestone/Marcaine subacromially) - Dr. Halima Pinto - 10/2008.  Shoulder pain, right     (Injection of Celestone/Marcaine subacromially). Dr. Halima Pinto - 10/2008.  Supraventricular tachycardia, paroxysmal (HCC)     Status post cardiac ablation.  Vitamin D deficiency        Past Surgical History:   Procedure Laterality Date    ATRIAL ABLATION SURGERY      Radiofrequency ablation of slow AV pawel pathway.  CARDIAC CATHETERIZATION      no blockages    CARDIAC CATHETERIZATION  2020    normal coronary arteries,anteroapical hypokinesis consistent with cardiomyopathy    CHOLECYSTECTOMY, LAPAROSCOPIC  2011    (Dr. Vani Mcneal)    JOINT REPLACEMENT Right     FAILED KNEE RE;PLAC    KNEE ARTHROPLASTY Right     OTHER SURGICAL HISTORY      Epidural steroids to cervical spine.  OTHER SURGICAL HISTORY      ECT therapy, one session only and she refused any further    PORT SURGERY  2020    right IJ port removal, [facility], Nicole Hannon MD    CT TOTAL KNEE ARTHROPLASTY Left 2018    Left Total Knee Arthroplasty [facility] by Rocky Mota [facility] at [facility]       Family History   Problem Relation Age of Onset    Breast Cancer Mother 76    High Blood Pressure Mother     Coronary Art Dis Mother         Multiple stents.  Diabetes Mother     Heart Disease Father     Coronary Art Dis Father         Myocardial infarction at age 54 and .  COPD Father     Arthritis Brother     COPD Brother     Heart Disease Brother         Congenital heart defect -  at 10 wks of age.     Cancer Other     Diabetes Maternal Grandmother        Social History     Socioeconomic History    Marital status:      Spouse name: None    Number of children: 4    Years of education: 8    Highest education level: GED or equivalent   Occupational History    Occupation: disabled [employer]   Social Needs    Financial resource strain: Not hard at all   Rob-Sera insecurity     Worry: Never true     Inability: Never true    Transportation needs     Medical: No     Non-medical: No   Tobacco Use    Smoking status: Former Smoker     Packs/day: 0.25     Years: 35.00     Pack years: 8.75     Types: Cigarettes     Quit date:      Years since quittin.1    Smokeless tobacco: Former User     Quit date: 2015    Tobacco comment: 6 cigarettes per day since age 15 or so. Substance and Sexual Activity    Alcohol use: Not Currently     Alcohol/week: 1.0 standard drinks     Types: 1 Standard drinks or equivalent per week     Frequency: Monthly or less     Drinks per session: 1 or 2     Binge frequency: Never     Comment: rarely    Drug use: No    Sexual activity: Not Currently     Partners: Male   Lifestyle    Physical activity     Days per week: 0 days     Minutes per session: 0 min    Stress: To some extent   Relationships    Social connections     Talks on phone: More than three times a week     Gets together: More than three times a week     Attends Yarsanism service: 1 to 4 times per year     Active member of club or organization: No     Attends meetings of clubs or organizations: Never     Relationship status:     Intimate partner violence     Fear of current or ex partner: None     Emotionally abused: None     Physically abused: None     Forced sexual activity: None   Other Topics Concern    None   Social History Narrative    10/16/2019- She receives HEAP, PIPP, [employer] [place]- No other SDOH needs identified. Review of Systems   Constitutional: Positive for fatigue. Negative for activity change. HENT: Negative. Respiratory: Negative for shortness of breath. Cardiovascular: Negative for chest pain. Gastrointestinal: Negative for blood in stool and constipation. Genitourinary: Positive for flank pain. Musculoskeletal: Positive for arthralgias, back pain, myalgias and neck pain. Neurological: Positive for weakness, numbness (tingling in hands, facial numbness on the left.) and headaches (more migraines than normal, unsure cause. possible stress versus allergy related). Psychiatric/Behavioral: Negative for sleep disturbance. The patient is nervous/anxious (anxiety). Objective:   Physical Exam  Vitals signs reviewed. Constitutional:       General: She is not in acute distress. Appearance: Normal appearance. She is well-developed. She is not diaphoretic. Interventions: She is not intubated. HENT:      Head: Normocephalic and atraumatic. Right Ear: External ear normal.      Left Ear: External ear normal.      Nose: Nose normal.   Eyes:      General: Lids are normal.      Conjunctiva/sclera: Conjunctivae normal.   Cardiovascular:      Pulses: Normal pulses. Pulmonary:      Effort: Pulmonary effort is normal. No tachypnea, bradypnea, accessory muscle usage or respiratory distress. She is not intubated. Musculoskeletal:      Comments: Sitting in wheelchair   Skin:     General: Skin is warm and dry. Nails: There is no clubbing. Neurological:      Mental Status: She is alert and oriented to person, place, and time. Cranial Nerves: No cranial nerve deficit. Psychiatric:         Speech: Speech normal.         Behavior: Behavior normal. Behavior is cooperative. Assessment:      1. Encounter for long-term opiate analgesic use    2. Encounter for drug screening    3. Chronic left shoulder pain    4. Neural foraminal stenosis of cervical spine    5. Cervical radiculopathy    6. Lumbar degenerative disc disease    7.  Chronic bilateral low back pain without sciatica

## 2021-02-28 LAB
6-ACETYLMORPHINE, UR: NOT DETECTED
7-AMINOCLONAZEPAM, URINE: NOT DETECTED
ALPHA-OH-ALPRAZ, URINE: NOT DETECTED
ALPHA-OH-MIDAZOLAM, URINE: NOT DETECTED
ALPRAZOLAM, URINE: NOT DETECTED
AMPHETAMINES, URINE: NOT DETECTED
BARBITURATES, URINE: NOT DETECTED
BENZOYLECGONINE, UR: NOT DETECTED
BUPRENORPHINE URINE: NOT DETECTED
CARISOPRODOL, UR: NOT DETECTED
CLONAZEPAM, URINE: NOT DETECTED
CODEINE, URINE: NOT DETECTED
CREATININE URINE: 73.9 MG/DL (ref 20–400)
DIAZEPAM, URINE: NOT DETECTED
EER PAIN MGT DRUG PANEL, HIGH RES/EMIT U: NORMAL
ETHYL GLUCURONIDE UR: NOT DETECTED
FENTANYL URINE: NOT DETECTED
GABAPENTIN: NOT DETECTED
HYDROCODONE, URINE: NOT DETECTED
HYDROMORPHONE, URINE: NOT DETECTED
LORAZEPAM, URINE: NOT DETECTED
MARIJUANA METAB, UR: NOT DETECTED
MDA, UR: NOT DETECTED
MDEA, EVE, UR: NOT DETECTED
MDMA URINE: NOT DETECTED
MEPERIDINE METAB, UR: NOT DETECTED
METHADONE, URINE: NOT DETECTED
METHAMPHETAMINE, URINE: NOT DETECTED
METHYLPHENIDATE: NOT DETECTED
MIDAZOLAM, URINE: NOT DETECTED
MORPHINE URINE: NOT DETECTED
NALOXONE URINE: NOT DETECTED
NORBUPRENORPHINE, URINE: NOT DETECTED
NORDIAZEPAM, URINE: NOT DETECTED
NORFENTANYL, URINE: NOT DETECTED
NORHYDROCODONE, URINE: NOT DETECTED
NOROXYCODONE, URINE: PRESENT
NOROXYMORPHONE, URINE: NOT DETECTED
OXAZEPAM, URINE: NOT DETECTED
OXYCODONE URINE: PRESENT
OXYMORPHONE, URINE: PRESENT
PAIN MGT DRUG PANEL, HI RES, UR: NORMAL
PCP,URINE: NOT DETECTED
PHENTERMINE, UR: NOT DETECTED
PREGABALIN: NOT DETECTED
PROPOXYPHENE, URINE: NORMAL
TAPENTADOL, URINE: NOT DETECTED
TAPENTADOL-O-SULFATE, URINE: NOT DETECTED
TEMAZEPAM, URINE: NOT DETECTED
TRAMADOL, URINE: NOT DETECTED
ZOLPIDEM METABOLITE (ZCA), URINE: NOT DETECTED
ZOLPIDEM, URINE: NOT DETECTED

## 2021-03-09 DIAGNOSIS — M79.7 FIBROMYALGIA: ICD-10-CM

## 2021-03-09 DIAGNOSIS — M48.02 NEURAL FORAMINAL STENOSIS OF CERVICAL SPINE: ICD-10-CM

## 2021-03-09 RX ORDER — OXYCODONE AND ACETAMINOPHEN 7.5; 325 MG/1; MG/1
1 TABLET ORAL
Qty: 150 TABLET | Refills: 0 | Status: SHIPPED | OUTPATIENT
Start: 2021-03-12 | End: 2021-04-06 | Stop reason: SDUPTHER

## 2021-03-09 NOTE — TELEPHONE ENCOUNTER
Last Appt:  2/23/2021  Next Appt:   4/6/2021  Med verified in [facility] checked for [place], and [place]: 02/10/21 Percocet 7.5/325 #150. Due 03/12/21.

## 2021-04-06 ENCOUNTER — OFFICE VISIT (OUTPATIENT)
Dept: PAIN MANAGEMENT | Age: 61
End: 2021-04-06
Payer: MEDICARE

## 2021-04-06 VITALS
WEIGHT: 257 LBS | DIASTOLIC BLOOD PRESSURE: 86 MMHG | RESPIRATION RATE: 14 BRPM | SYSTOLIC BLOOD PRESSURE: 124 MMHG | HEIGHT: 63 IN | BODY MASS INDEX: 45.54 KG/M2

## 2021-04-06 DIAGNOSIS — M48.02 NEURAL FORAMINAL STENOSIS OF CERVICAL SPINE: ICD-10-CM

## 2021-04-06 DIAGNOSIS — M79.7 FIBROMYALGIA: ICD-10-CM

## 2021-04-06 PROCEDURE — G8427 DOCREV CUR MEDS BY ELIG CLIN: HCPCS | Performed by: NURSE PRACTITIONER

## 2021-04-06 PROCEDURE — 99213 OFFICE O/P EST LOW 20 MIN: CPT | Performed by: NURSE PRACTITIONER

## 2021-04-06 PROCEDURE — 1036F TOBACCO NON-USER: CPT | Performed by: NURSE PRACTITIONER

## 2021-04-06 PROCEDURE — 3017F COLORECTAL CA SCREEN DOC REV: CPT | Performed by: NURSE PRACTITIONER

## 2021-04-06 PROCEDURE — G8417 CALC BMI ABV UP PARAM F/U: HCPCS | Performed by: NURSE PRACTITIONER

## 2021-04-06 PROCEDURE — 99214 OFFICE O/P EST MOD 30 MIN: CPT | Performed by: NURSE PRACTITIONER

## 2021-04-06 RX ORDER — OXYCODONE AND ACETAMINOPHEN 7.5; 325 MG/1; MG/1
1 TABLET ORAL
Qty: 150 TABLET | Refills: 0 | Status: SHIPPED | OUTPATIENT
Start: 2021-04-11 | End: 2021-05-06 | Stop reason: SDUPTHER

## 2021-04-06 ASSESSMENT — ENCOUNTER SYMPTOMS
CONSTIPATION: 0
SHORTNESS OF BREATH: 0
BLOOD IN STOOL: 0
BACK PAIN: 1

## 2021-04-06 NOTE — PROGRESS NOTES
Subjective:      Patient ID: Lety Collins is a 63 y.o. female. Chief Complaint   Patient presents with    Medication Management    Shoulder Pain     left -- needs to have surgery on it     Shoulder Pain   Associated symptoms include numbness (tingling in hands, facial numbness on the left. ). Back Pain  Associated symptoms include headaches (more migraines than normal, unsure cause. possible stress versus allergy related), numbness (tingling in hands, facial numbness on the left.) and weakness. Pertinent negatives include no chest pain. Neck Pain   Associated symptoms include headaches (more migraines than normal, unsure cause. possible stress versus allergy related), numbness (tingling in hands, facial numbness on the left.) and weakness. Pertinent negatives include no chest pain. Other  Associated symptoms include arthralgias, fatigue, headaches (more migraines than normal, unsure cause. possible stress versus allergy related), myalgias, neck pain, numbness (tingling in hands, facial numbness on the left.) and weakness. Pertinent negatives include no chest pain. Hip Pain   Associated symptoms include numbness (tingling in hands, facial numbness on the left.). Here today for routine pain clinic recheck. Percocet working well for chronic pain control, no complaints, denies side effects. Underwent treatment for lymphoma, told she is now cancer free. Activity increased, walking on treadmill.      Pain Assessment  Location of Pain: Shoulder  Location Modifiers: Left  Severity of Pain: 8  Quality of Pain: Sharp  Duration of Pain: Persistent  Frequency of Pain: Constant(worse at night)  Aggravating Factors: (ADL's, House hold chores, sleeping)  Limiting Behavior: Yes  Relieving Factors: Heat    Allergies   Allergen Reactions    Latex Rash    Rituximab Palpitations     Severe tachycardia    Fentanyl Other (See Comments)     sleepwalking    Gabapentin     Seasonal        Outpatient Medications Marked as Taking for the 21 encounter (Office Visit) with TITO Quintana - CNP   Medication Sig Dispense Refill    [START ON 2021] oxyCODONE-acetaminophen (PERCOCET) 7.5-325 MG per tablet Take 1 tablet by mouth 5 times daily for 30 days. 150 tablet 0    Multiple Vitamin (CALCIUM COMPLEX PO) Take 1 tablet by mouth daily      Multiple Vitamins-Minerals (CENTRUM MULTI + OMEGA 3 PO) Take 1 tablet by mouth daily      busPIRone (BUSPAR) 10 MG tablet take 1 tablet by mouth twice a day      omeprazole (PRILOSEC) 20 MG delayed release capsule Take 1 capsule by mouth Daily 60 capsule 5    ENTRESTO 24-26 MG per tablet Take 1 tablet by mouth 2 times daily      aspirin 81 MG chewable tablet Take 81 mg by mouth daily      carvedilol (COREG) 3.125 MG tablet Take 3.125 mg by mouth 2 times daily      olopatadine (PATADAY) 0.2 % SOLN ophthalmic solution insert 1 drop into both eyes once daily if needed 2.5 mL 0    fluticasone (FLONASE) 50 MCG/ACT nasal spray spray 2 sprays INTO EACH NOSTRIL ONCE DAILY 16 g 0    albuterol sulfate HFA (VENTOLIN HFA) 108 (90 Base) MCG/ACT inhaler inhale 2 puffs every 4 hours if needed 18 g 0       Past Medical History:   Diagnosis Date    Asthma     Blindness of left eye     Cervical herniated disc     C5-C6, with nerve compression.  Congenital hip dysplasia     Degenerative joint disease     (Right knee) -     Depression     Dysfunctional uterine bleeding     Dyspnea     (progressive - multifactorial.    Failed total right knee replacement (HCC)     Fatigue     Fibromyalgia     Generalized anxiety disorder     Grief at loss of child     4-yr old granddaughter  of brain tumor in .  Hip pain, bilateral     Secondary to piriformis syndrome and bilateral greater trochanteric bursitis. (Injection of Celestone and Marcaine).  History of tobacco use     Currently not smoking.  Hypertension     Inverted nipple     Left.   Lifelong    Kidney stone     Lumbar disc disease     Chronic.  Obesity     Osteoarthritis     Degenerative joint disease - of all joints.  Seasonal allergic rhinitis     Shoulder pain, left     (Injection of Celestone/Marcaine subacromially) - Dr. Lonnie Muller - 10/2008.  Shoulder pain, right     (Injection of Celestone/Marcaine subacromially). Dr. Lonnie Muller - 10/2008.  Supraventricular tachycardia, paroxysmal (HCC)     Status post cardiac ablation.  Vitamin D deficiency        Past Surgical History:   Procedure Laterality Date    ATRIAL ABLATION SURGERY      Radiofrequency ablation of slow AV pawel pathway.  CARDIAC CATHETERIZATION      no blockages    CARDIAC CATHETERIZATION  2020    normal coronary arteries,anteroapical hypokinesis consistent with cardiomyopathy    CHOLECYSTECTOMY, LAPAROSCOPIC  2011    (Dr. Jetty Councilman)    JOINT REPLACEMENT Right     FAILED KNEE RE;PLAC    KNEE ARTHROPLASTY Right     OTHER SURGICAL HISTORY      Epidural steroids to cervical spine.  OTHER SURGICAL HISTORY      ECT therapy, one session only and she refused any further    PORT SURGERY  2020    right IJ port removal, [facility], Marlyn Silva MD    MD TOTAL KNEE ARTHROPLASTY Left 2018    Left Total Knee Arthroplasty performed by Verner Harder, [facility] at [facility]       Family History   Problem Relation Age of Onset    Breast Cancer Mother 76    High Blood Pressure Mother     Coronary Art Dis Mother         Multiple stents.  Diabetes Mother     Heart Disease Father     Coronary Art Dis Father         Myocardial infarction at age 54 and .  COPD Father     Arthritis Brother     COPD Brother     Heart Disease Brother         Congenital heart defect -  at 10 wks of age.     Cancer Other     Diabetes Maternal Grandmother        Social History     Socioeconomic History    Marital status:      Spouse name: None    Number of children: 4    Years of education: 8    Highest education level: GED or equivalent   Occupational History    Occupation: disabled [employer]   Social Needs    Financial resource strain: Not hard at all   Rob-[employer]era insecurity     Worry: Never true     Inability: Never true   [employer] needs     Medical: No     Non-medical: No   Tobacco Use    Smoking status: Former Smoker     Packs/day: 0.25     Years: 35.00     Pack years: 8.75     Types: Cigarettes     Quit date:      Years since quittin.2    Smokeless tobacco: Former User     Quit date: 2015    Tobacco comment: 6 cigarettes per day since age 15 or so. Substance and Sexual Activity    Alcohol use: Not Currently     Alcohol/week: 1.0 standard drinks     Types: 1 Standard drinks or equivalent per week     Frequency: Monthly or less     Drinks per session: 1 or 2     Binge frequency: Never     Comment: rarely    Drug use: No    Sexual activity: Not Currently     Partners: Male   Lifestyle    Physical activity     Days per week: 0 days     Minutes per session: 0 min    Stress: To some extent   Relationships    Social connections     Talks on phone: More than three times a week     Gets together: More than three times a week     Attends Caodaism service: 1 to 4 times per year     Active member of club or organization: No     Attends meetings of clubs or organizations: Never     Relationship status:     Intimate partner violence     Fear of current or ex partner: None     Emotionally abused: None     Physically abused: None     Forced sexual activity: None   Other Topics Concern    None   Social History Narrative    10/16/2019- She receives HEAP, PIPP, [employer] [place]- No other SDOH needs identified. Review of Systems   Constitutional: Positive for fatigue. Negative for activity change. HENT: Negative. Respiratory: Negative for shortness of breath. Cardiovascular: Negative for chest pain.    Gastrointestinal: Negative for blood in stool and

## 2021-05-06 DIAGNOSIS — M48.02 NEURAL FORAMINAL STENOSIS OF CERVICAL SPINE: ICD-10-CM

## 2021-05-06 DIAGNOSIS — M79.7 FIBROMYALGIA: ICD-10-CM

## 2021-05-06 NOTE — TELEPHONE ENCOUNTER
Last Appt:  4/6/2021  Next Appt:   6/14/2021  Med verified in [address] checked for [place], and [place]: Percocet 5 x a day prn #150. Due 5/11/21.       Santa

## 2021-05-07 RX ORDER — OXYCODONE AND ACETAMINOPHEN 7.5; 325 MG/1; MG/1
1 TABLET ORAL
Qty: 150 TABLET | Refills: 0 | Status: SHIPPED | OUTPATIENT
Start: 2021-05-11 | End: 2021-06-08 | Stop reason: SDUPTHER

## 2021-05-20 ENCOUNTER — OFFICE VISIT (OUTPATIENT)
Dept: FAMILY MEDICINE CLINIC | Age: 61
End: 2021-05-20
Payer: MEDICARE

## 2021-05-20 VITALS
OXYGEN SATURATION: 96 % | DIASTOLIC BLOOD PRESSURE: 84 MMHG | HEIGHT: 63 IN | SYSTOLIC BLOOD PRESSURE: 126 MMHG | TEMPERATURE: 98.2 F | WEIGHT: 262 LBS | BODY MASS INDEX: 46.42 KG/M2 | HEART RATE: 90 BPM

## 2021-05-20 DIAGNOSIS — R73.09 ELEVATED GLUCOSE: ICD-10-CM

## 2021-05-20 DIAGNOSIS — I10 ESSENTIAL (PRIMARY) HYPERTENSION: ICD-10-CM

## 2021-05-20 DIAGNOSIS — Z12.31 ENCOUNTER FOR SCREENING MAMMOGRAM FOR BREAST CANCER: ICD-10-CM

## 2021-05-20 DIAGNOSIS — C83.31 DIFFUSE LARGE B-CELL LYMPHOMA OF LYMPH NODES OF NECK (HCC): ICD-10-CM

## 2021-05-20 DIAGNOSIS — F41.9 ANXIETY: ICD-10-CM

## 2021-05-20 DIAGNOSIS — Z01.818 PRE-OP EVALUATION: Primary | ICD-10-CM

## 2021-05-20 DIAGNOSIS — F32.1 MODERATE MAJOR DEPRESSION (HCC): ICD-10-CM

## 2021-05-20 DIAGNOSIS — I47.1 SUPRAVENTRICULAR TACHYCARDIA, PAROXYSMAL (HCC): ICD-10-CM

## 2021-05-20 DIAGNOSIS — E66.01 MORBID OBESITY WITH BMI OF 40.0-44.9, ADULT (HCC): ICD-10-CM

## 2021-05-20 PROBLEM — I47.10 SUPRAVENTRICULAR TACHYCARDIA, PAROXYSMAL: Status: ACTIVE | Noted: 2021-05-20

## 2021-05-20 PROCEDURE — 3017F COLORECTAL CA SCREEN DOC REV: CPT | Performed by: FAMILY MEDICINE

## 2021-05-20 PROCEDURE — G8427 DOCREV CUR MEDS BY ELIG CLIN: HCPCS | Performed by: FAMILY MEDICINE

## 2021-05-20 PROCEDURE — 1036F TOBACCO NON-USER: CPT | Performed by: FAMILY MEDICINE

## 2021-05-20 PROCEDURE — G8417 CALC BMI ABV UP PARAM F/U: HCPCS | Performed by: FAMILY MEDICINE

## 2021-05-20 PROCEDURE — 99214 OFFICE O/P EST MOD 30 MIN: CPT | Performed by: FAMILY MEDICINE

## 2021-05-20 PROCEDURE — 99213 OFFICE O/P EST LOW 20 MIN: CPT

## 2021-05-20 RX ORDER — FERROUS SULFATE 325(65) MG
325 TABLET ORAL
COMMUNITY
Start: 2021-05-10 | End: 2021-07-21

## 2021-05-20 RX ORDER — BUSPIRONE HYDROCHLORIDE 10 MG/1
10 TABLET ORAL 2 TIMES DAILY
Qty: 60 TABLET | Refills: 3 | Status: SHIPPED | OUTPATIENT
Start: 2021-05-20 | End: 2021-06-22 | Stop reason: DRUGHIGH

## 2021-05-20 ASSESSMENT — ENCOUNTER SYMPTOMS
SINUS PAIN: 0
EYE DISCHARGE: 0
DIARRHEA: 0
SINUS PRESSURE: 0
COUGH: 0
SHORTNESS OF BREATH: 0
BACK PAIN: 1
NAUSEA: 0
CONSTIPATION: 0
VOMITING: 0

## 2021-05-20 ASSESSMENT — PATIENT HEALTH QUESTIONNAIRE - PHQ9
SUM OF ALL RESPONSES TO PHQ QUESTIONS 1-9: 0
SUM OF ALL RESPONSES TO PHQ QUESTIONS 1-9: 0
SUM OF ALL RESPONSES TO PHQ9 QUESTIONS 1 & 2: 0

## 2021-05-20 NOTE — PROGRESS NOTES
knee replacement ([facility] [address]    Encounter for chronic pain management    History of depression    Lumbar degenerative disc disease    Low back pain    Cervical radiculopathy due to degenerative joint disease of spine    History of tobacco abuse    Cervicalgia    Osteoarthritis of left knee    Status post left knee replacement    Chronic pain of left knee    Cervical radiculopathy    Family history of diabetes mellitus    Neural foraminal stenosis of cervical spine    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)    DLBCL (diffuse large B cell lymphoma) (HCC)    Hyperuricemia    S/P AV pawel ablation        Past Surgical History:   Procedure Laterality Date    ATRIAL ABLATION SURGERY  2006    Radiofrequency ablation of slow AV pawel pathway.  CARDIAC CATHETERIZATION  2006    no blockages    CARDIAC CATHETERIZATION  01/24/2020    normal coronary arteries,anteroapical hypokinesis consistent with cardiomyopathy    CHOLECYSTECTOMY, LAPAROSCOPIC  03-    (Dr. Noris Rodriguez)    JOINT REPLACEMENT Right     FAILED KNEE RE;PLAC    KNEE ARTHROPLASTY Right 2006    OTHER SURGICAL HISTORY  2009    Epidural steroids to cervical spine.  OTHER SURGICAL HISTORY  2014    ECT therapy, one session only and she refused any further    PORT SURGERY  08/17/2020    right IJ port removal, [facility], Paul Centeno MD    PA TOTAL KNEE ARTHROPLASTY Left 6/5/2018    Left Total Knee Arthroplasty performed by Jannie Morel MD at [facility]       Current Outpatient Medications   Medication Sig Dispense Refill    oxyCODONE-acetaminophen (PERCOCET) 7.5-325 MG per tablet Take 1 tablet by mouth 5 times daily for 30 days.  150 tablet 0    Multiple Vitamin (CALCIUM COMPLEX PO) Take 1 tablet by mouth daily      Multiple Vitamins-Minerals (CENTRUM MULTI + OMEGA 3 PO) Take 1 tablet by mouth daily      busPIRone (BUSPAR) 10 MG tablet take 1 tablet by mouth twice a day      omeprazole (PRILOSEC) 20 MG delayed release capsule Take 1 capsule by mouth Daily 60 capsule 5    ENTRESTO 24-26 MG per tablet Take 1 tablet by mouth 2 times daily      aspirin 81 MG chewable tablet Take 81 mg by mouth daily      carvedilol (COREG) 3.125 MG tablet Take 3.125 mg by mouth 2 times daily      nitroGLYCERIN (NITROSTAT) 0.4 MG SL tablet Place 0.4 mg under the tongue as needed      olopatadine (PATADAY) 0.2 % SOLN ophthalmic solution insert 1 drop into both eyes once daily if needed 2.5 mL 0    fluticasone (FLONASE) 50 MCG/ACT nasal spray spray 2 sprays INTO EACH NOSTRIL ONCE DAILY 16 g 0    albuterol sulfate HFA (VENTOLIN HFA) 108 (90 Base) MCG/ACT inhaler inhale 2 puffs every 4 hours if needed 18 g 0    Blood Pressure KIT 1 kit by Does not apply route 2 times daily as needed (BP) 1 kit 0    Handicap Placard MISC by Does not apply route 2 each 0    ferrous sulfate (IRON 325) 325 (65 Fe) MG tablet Take 325 mg by mouth three times a week (Patient not taking: Reported on 5/20/2021)      rizatriptan (MAXALT-MLT) 10 MG disintegrating tablet Take 1 tablet by mouth once as needed for Migraine May repeat in 2 hours if needed 9 tablet 11     No current facility-administered medications for this visit. She is allergic to latex, rituximab, fentanyl, gabapentin, and seasonal.    She  reports that she quit smoking about 13 years ago. Her smoking use included cigarettes. She has a 8.75 pack-year smoking history. She quit smokeless tobacco use about 5 years ago. .    Review of Systems:  Review of Systems   Constitutional: Negative for chills and fever. HENT: Negative for nosebleeds, sinus pressure and sinus pain. Eyes: Negative for discharge and visual disturbance. Respiratory: Negative for cough and shortness of breath. Cardiovascular: Negative for chest pain and palpitations. Gastrointestinal: Negative for constipation, diarrhea, nausea and vomiting.    Endocrine: Negative for polydipsia, polyphagia and polyuria. Genitourinary: Negative for dysuria and frequency. Musculoskeletal: Positive for arthralgias, back pain and gait problem. Skin: Positive for rash. Negative for wound. Rash right forearm x 2 weeks. Allergic/Immunologic: Positive for immunocompromised state. Recent cancer, status post chemotherapy. Neurological: Positive for dizziness. Negative for headaches. Occasional dizziness if she moves too fast.   Hematological: Negative for adenopathy. Bruises/bleeds easily. Easy bruising and bleeding since beginning anticoagulant medication. Psychiatric/Behavioral: Positive for sleep disturbance. Negative for dysphoric mood. The patient is not nervous/anxious. She reports difficulty sleeping due to shoulder pain. Objective:    Vitals:    05/20/21 1352   BP: 126/84   Pulse: 90   Temp: 98.2 °F (36.8 °C)   SpO2: 96%     Body mass index is 46.41 kg/m². Extremely obese female  healthy-appearing, alert, cooperative and in no acute distress. Neck is supple and has no masses. Chest is clear to auscultation, no wheezes, rales, or rhonchi. Heart sounds are regular rate and rhythm, no murmurs  Abdomen soft, non-tender. No masses or organomegaly. Lower extremities have no edema. Affect is bright. Thought processes are are logical. There is no evidence of paranoia or delusions. Responses to questions are appropriate. Dress and grooming are appropriate. Assessment and Plan:    1. Pre-op evaluation  Pat Kumari is a low-risk candidate for the planned surgery from my perspective. She needs evaluation from cardiology and oncology prior to surgery also. 2. Supraventricular tachycardia, paroxysmal ([address]  She has had no recent symptoms. As above, she has cardiology evaluation scheduled prior to surgery. 3. Diffuse large B-cell lymphoma of lymph nodes of neck ([address]  As above, her lymphoma is in remission. She will get oncology recommendations prior to surgery. 4. Anxiety  5. Moderate major depression (HCC)  Her symptoms are improved. She has been able to wean off Xanax. Buspar was refilled:  - busPIRone (BUSPAR) 10 MG tablet; Take 1 tablet by mouth 2 times daily  Dispense: 60 tablet; Refill: 3    Labs were ordered to be done prior to her return visit in 3 months:   - TSH without Reflex; Future  - T4, Free; Future    6. Essential (primary) hypertension   Her blood pressure is adequately-controlled today. (BP: 126/84)   She was advised to continue current medications. She states that she does not need a refill today. - Lipid Panel; Future prior to her return visit in 3 months. 7. Elevated glucose  Labs were ordered to be done prior to her return visit in 3 months:   - Comprehensive Metabolic Panel; Future  - Lipid Panel; Future  - Hemoglobin A1C; Future    8. Morbid obesity with BMI of 40.0-44.9, adult (HCC)  - Vitamin D 25 Hydroxy; Future was ordered to be done prior to her return visit in 3 months. 9.  Routine health maintenance  Health maintenance was reviewed with the patient. She has received the [employer] Covid-19 vaccine. Screening mammogram was recommended and ordered. She was advised that she is due for a Pap test.  She was advised to schedule this once she has recovered from the planned shoulder surgery. Tdap and Shingrix were recommended and declined. All other health maintenance, including Pneumovax and Prevnar, is up to date at this time. This note was created using voice-recognition software, and may contain inaccuracies of transcription which were inadvertently overlooked prior to signature. For any questions, please contact me.

## 2021-06-01 ENCOUNTER — TELEPHONE (OUTPATIENT)
Dept: FAMILY MEDICINE CLINIC | Age: 61
End: 2021-06-01

## 2021-06-01 RX ORDER — OMEPRAZOLE 20 MG/1
20 CAPSULE, DELAYED RELEASE ORAL 2 TIMES DAILY
Qty: 60 CAPSULE | Refills: 5 | Status: SHIPPED | OUTPATIENT
Start: 2021-06-01

## 2021-06-01 NOTE — TELEPHONE ENCOUNTER
Pt her dose of Prilosec was changed at [facility] a long time ago to bid, so that is how pt was taking, please send new script to [facility] as bid, any questions please call pt.

## 2021-06-08 DIAGNOSIS — M79.7 FIBROMYALGIA: ICD-10-CM

## 2021-06-08 DIAGNOSIS — M48.02 NEURAL FORAMINAL STENOSIS OF CERVICAL SPINE: ICD-10-CM

## 2021-06-08 NOTE — TELEPHONE ENCOUNTER
Last Appt:  4/6/2021  Next Appt:   6/14/2021  Med verified in [facility] checked for [place], and [place]: 05/11/21 Percocet 7.5/325 #150. Due 06/10/21.

## 2021-06-09 RX ORDER — OXYCODONE AND ACETAMINOPHEN 7.5; 325 MG/1; MG/1
1 TABLET ORAL
Qty: 150 TABLET | Refills: 0 | Status: SHIPPED | OUTPATIENT
Start: 2021-06-10 | End: 2021-07-02 | Stop reason: SDUPTHER

## 2021-06-22 ENCOUNTER — TELEPHONE (OUTPATIENT)
Dept: FAMILY MEDICINE CLINIC | Age: 61
End: 2021-06-22

## 2021-06-22 RX ORDER — BUSPIRONE HYDROCHLORIDE 15 MG/1
15 TABLET ORAL 2 TIMES DAILY
Qty: 60 TABLET | Refills: 0 | Status: ON HOLD | OUTPATIENT
Start: 2021-06-22 | End: 2021-12-17

## 2021-06-22 NOTE — TELEPHONE ENCOUNTER
At her last ov 05/20/2021 she had weaned herself off of Xanax,she had stopped seeing a psychiatrist at [facility] because she was changing her medications so frequently. She has been taking Buspar 10 mg bid but is not helping with the anxiety attacks. She tried to get in with [facility] but would take weeks before she could see a psychiatrist to prescribe medication.

## 2021-06-22 NOTE — TELEPHONE ENCOUNTER
Pt calling states having an issue today and feels it's anxiety related, states her BP has been all over the place and pt contacted cardio and they told pt to contact PCP. Pt also states she's had the squad to her house twice for chest pain and both times it was ruled to not be cardiac related and pt feels she's having panic attacks and feels the Buspar is not working, do you want to call in something else, pt uses loaded pharmacy, please advise at above number.

## 2021-06-22 NOTE — TELEPHONE ENCOUNTER
I do not recommend re-starting Xanax. I recommend follow up with psychiatry. I can also refer her to a counselor. She can increase Buspar to 15 mg twice a day. She can take one and a half of the 10 mg tablets. Does she need a new prescription for Buspar?

## 2021-07-02 ENCOUNTER — TELEPHONE (OUTPATIENT)
Dept: FAMILY MEDICINE CLINIC | Age: 61
End: 2021-07-02

## 2021-07-02 ENCOUNTER — TELEMEDICINE (OUTPATIENT)
Dept: PAIN MANAGEMENT | Age: 61
End: 2021-07-02
Payer: MEDICARE

## 2021-07-02 DIAGNOSIS — M54.12 CERVICAL RADICULOPATHY: ICD-10-CM

## 2021-07-02 DIAGNOSIS — M48.02 NEURAL FORAMINAL STENOSIS OF CERVICAL SPINE: ICD-10-CM

## 2021-07-02 DIAGNOSIS — M79.7 FIBROMYALGIA: ICD-10-CM

## 2021-07-02 DIAGNOSIS — M47.22 CERVICAL RADICULOPATHY DUE TO DEGENERATIVE JOINT DISEASE OF SPINE: Primary | ICD-10-CM

## 2021-07-02 DIAGNOSIS — M51.36 LUMBAR DEGENERATIVE DISC DISEASE: ICD-10-CM

## 2021-07-02 PROCEDURE — G8427 DOCREV CUR MEDS BY ELIG CLIN: HCPCS | Performed by: NURSE PRACTITIONER

## 2021-07-02 PROCEDURE — 3017F COLORECTAL CA SCREEN DOC REV: CPT | Performed by: NURSE PRACTITIONER

## 2021-07-02 PROCEDURE — 99214 OFFICE O/P EST MOD 30 MIN: CPT | Performed by: NURSE PRACTITIONER

## 2021-07-02 PROCEDURE — 99211 OFF/OP EST MAY X REQ PHY/QHP: CPT | Performed by: NURSE PRACTITIONER

## 2021-07-02 RX ORDER — OXYCODONE AND ACETAMINOPHEN 7.5; 325 MG/1; MG/1
1 TABLET ORAL
Qty: 150 TABLET | Refills: 0 | Status: SHIPPED | OUTPATIENT
Start: 2021-07-10 | End: 2021-08-10 | Stop reason: SDUPTHER

## 2021-07-02 ASSESSMENT — ENCOUNTER SYMPTOMS
BLOOD IN STOOL: 0
BACK PAIN: 1
SHORTNESS OF BREATH: 0
CONSTIPATION: 0

## 2021-07-02 NOTE — PROGRESS NOTES
2021    TELEHEALTH EVALUATION -- Audio/Visual (During MYPSL-00 public health emergency)    HPI:    Darron Sanchez (:  1960) has requested an audio/video evaluation for the following concern(s):    Percocet working well for chronic pain control, no complaints, denies side effects. Review of Systems   Constitutional: Negative for activity change. HENT: Negative. Respiratory: Negative for shortness of breath. Cardiovascular: Negative for chest pain. Gastrointestinal: Negative for blood in stool and constipation. Genitourinary: Positive for flank pain. Musculoskeletal: Positive for arthralgias, back pain, myalgias and neck pain. Neurological: Positive for weakness, numbness (tingling in hands, facial numbness on the left.) and headaches. Psychiatric/Behavioral: Negative for sleep disturbance. The patient is nervous/anxious (anxiety). Prior to Visit Medications    Medication Sig Taking? Authorizing Provider   busPIRone (BUSPAR) 15 MG tablet Take 15 mg by mouth 2 times daily  Jerad Ivan MD   oxyCODONE-acetaminophen (PERCOCET) 7.5-325 MG per tablet Take 1 tablet by mouth 5 times daily for 30 days.   Jazz Hall MD   omeprazole (PRILOSEC) 20 MG delayed release capsule Take 1 capsule by mouth 2 times daily  Jerad Ivan MD   ferrous sulfate (IRON 325) 325 (65 Fe) MG tablet Take 325 mg by mouth three times a week  Patient not taking: Reported on 2021  Historical Provider, MD   Multiple Vitamin (CALCIUM COMPLEX PO) Take 1 tablet by mouth daily  Historical Provider, MD   Multiple Vitamins-Minerals (CENTRUM MULTI + OMEGA 3 PO) Take 1 tablet by mouth daily  Historical Provider, MD   ENTRESTO 24-26 MG per tablet Take 1 tablet by mouth 2 times daily  Historical Provider, MD   aspirin 81 MG chewable tablet Take 81 mg by mouth daily  Historical Provider, MD   carvedilol (COREG) 3.125 MG tablet Take 3.125 mg by mouth 2 times daily  Historical Provider, MD   nitroGLYCERIN (NITROSTAT) 0.4 MG SL tablet Place 0.4 mg under the tongue as needed  Historical Provider, MD   olopatadine (PATADAY) 0.2 % SOLN ophthalmic solution insert 1 drop into both eyes once daily if needed  Verónica Castro MD   fluticasone (FLONASE) 50 MCG/ACT nasal spray spray 2 sprays INTO EACH NOSTRIL ONCE DAILY  Roxana Hernandez MD   albuterol sulfate HFA (VENTOLIN HFA) 108 (90 Base) MCG/ACT inhaler inhale 2 puffs every 4 hours if needed  Verónica Castro MD   Blood Pressure KIT 1 kit by Does not apply route 2 times daily as needed (BP)  Paulina Rice, APRN - CNP   rizatriptan (MAXALT-MLT) 10 MG disintegrating tablet Take 1 tablet by mouth once as needed for Migraine May repeat in 2 hours if needed  Wilma Fowler MD   [facility] [address] [facility] by Does not apply route  Wilma Fowler MD       Social History     Tobacco Use    Smoking status: Former Smoker     Packs/day: 0.25     Years: 35.00     Pack years: 8.75     Types: Cigarettes     Quit date:      Years since quittin.5    Smokeless tobacco: Former User     Quit date: 2015    Tobacco comment: 6 cigarettes per day since age 15 or so. Vaping Use    Vaping Use: Never used   Substance Use Topics    Alcohol use: Not Currently     Alcohol/week: 1.0 standard drinks     Types: 1 Standard drinks or equivalent per week     Comment: rarely    Drug use: No        Allergies   Allergen Reactions    Latex Rash    Rituximab Palpitations     Severe tachycardia    Fentanyl Other (See Comments)     sleepwalking    Gabapentin     Seasonal    ,   Past Medical History:   Diagnosis Date    Asthma     Blindness of left eye     Cervical herniated disc     C5-C6, with nerve compression.     Congenital hip dysplasia     Degenerative joint disease     (Right knee) -     Depression     Dysfunctional uterine bleeding     Dyspnea     (progressive - multifactorial.    Failed total right knee replacement (HCC)     Fatigue     Fibromyalgia     Generalized anxiety disorder     Grief at loss of child     4-yr old granddaughter  of brain tumor in .  Hip pain, bilateral     Secondary to piriformis syndrome and bilateral greater trochanteric bursitis. (Injection of Celestone and Marcaine).  History of tobacco use     Currently not smoking.  Hypertension     Inverted nipple     Left. Lifelong    Kidney stone     Lumbar disc disease     Chronic.  Obesity     Osteoarthritis     Degenerative joint disease - of all joints.  Seasonal allergic rhinitis     Shoulder pain, left     (Injection of Celestone/Marcaine subacromially) - Dr. Jordan Crowley - 10/2008.  Shoulder pain, right     (Injection of Celestone/Marcaine subacromially). Dr. Jordan Crowley - 10/2008.  Supraventricular tachycardia, paroxysmal (HCC)     Status post cardiac ablation.  Vitamin D deficiency    ,   Past Surgical History:   Procedure Laterality Date    ATRIAL ABLATION SURGERY      Radiofrequency ablation of slow AV pawel pathway.  CARDIAC CATHETERIZATION  2006    no blockages    CARDIAC CATHETERIZATION  2020    normal coronary arteries,anteroapical hypokinesis consistent with cardiomyopathy    CHOLECYSTECTOMY, LAPAROSCOPIC  2011    (Dr. Rosanna Peace)    JOINT REPLACEMENT Right     FAILED KNEE RE;PLAC    KNEE ARTHROPLASTY Right     OTHER SURGICAL HISTORY  2009    Epidural steroids to cervical spine.    [address] HISTORY  2014    ECT therapy, one session only and she refused any further    PORT SURGERY  2020    right IJ port removal, [facility], Annie Smith MD    [place] TOTAL KNEE ARTHROPLASTY Left 2018    Left Total Knee Arthroplasty performed by Fernando Romero MD at [address] ECHOCARDIOGRAM  2020    grade 1 diastolic dysfunction, EF 49-60%    TUBAL LIGATION     ,   Social History     Tobacco Use    Smoking status: Former Smoker     Packs/day: 0.25     Years: 35.00     Pack years: 8.75     Types: Cigarettes  Hepatitis A vaccine  Aged Out    Hepatitis B vaccine  Aged Out    Hib vaccine  Aged Out    Meningococcal (ACWY) vaccine  Aged Out       PHYSICAL EXAMINATION:  [ INSTRUCTIONS:  \"[x]\" Indicates a positive item  \"[]\" Indicates a negative item  -- DELETE ALL ITEMS NOT EXAMINED]  Vital Signs: (As obtained by patient/caregiver or practitioner observation)    Blood pressure-  Heart rate-    Respiratory rate-    Temperature-  Pulse oximetry-     Constitutional: [x] Appears well-developed and well-nourished [x] No apparent distress      [] Abnormal-   Mental status  [x] Alert and awake  [x] Oriented to person/place/time [x]Able to follow commands      Eyes:  EOM    [x]  Normal  [] Abnormal-  Sclera  [x]  Normal  [] Abnormal -         Discharge [x]  None visible  [] Abnormal -    HENT:   [x] Normocephalic, atraumatic. [] Abnormal   [x] Mouth/Throat: Mucous membranes are moist.     External Ears [x] Normal  [] Abnormal-     Neck: [x] No visualized mass     Pulmonary/Chest: [x] Respiratory effort normal.  [x] No visualized signs of difficulty breathing or respiratory distress        [] Abnormal-      Musculoskeletal:   [] Normal gait with no signs of ataxia         [] Normal range of motion of neck        [x] Abnormal- limited ROM left shoulder    Neurological:        [x] No Facial Asymmetry (Cranial nerve 7 motor function) (limited exam to video visit)          [x] No gaze palsy        [] Abnormal-         Skin:        [x] No significant exanthematous lesions or discoloration noted on facial skin         [] Abnormal-            Psychiatric:       [x] Normal Affect [x] No Hallucinations        [] Abnormal-     Other pertinent observable physical exam findings-     ASSESSMENT/PLAN:   Diagnosis Orders   1. Cervical radiculopathy due to degenerative joint disease of spine     2. Neural foraminal stenosis of cervical spine     3. Cervical radiculopathy     4.  Lumbar degenerative disc disease         Chronic pain diagnoses medical treatment and/or call 911 if deemed necessary. Patient identification was verified at the start of the visit: Yes    Services were provided through a video synchronous discussion virtually to substitute for in-person clinic visit. Patient and provider were located at their individual homes. --TITO Fong CNP on 7/2/2021 at 9:07 AM    An electronic signature was used to authenticate this note.

## 2021-07-02 NOTE — TELEPHONE ENCOUNTER
Patient was seen for a pre op visit 2021. Patient's surgery was postponed. She now needs pre op clearance again as hers . Patient is questioning on if she can have her old pre op clearance renewed or if she needs to be seen again and reevaluated. Please advise.

## 2021-07-02 NOTE — TELEPHONE ENCOUNTER
----- Message from [place] sent at 7/2/2021  9:33 AM EDT -----  Subject: Message to Provider    QUESTIONS  Information for Provider? Patient stated that she has been seen for   surgical clearance for the left shoulder. Her surgery was postponed and   now she needs clearance again. She is wondering can it be renewed without   being seen again? Please advise.  ---------------------------------------------------------------------------  --------------  CALL BACK INFO  What is the best way for the office to contact you? OK to leave message on   voicemail  Preferred Call Back Phone Number? [phone]  ---------------------------------------------------------------------------  --------------  SCRIPT ANSWERS  Relationship to Patient? Self  Appointment reason? Symptomatic  Select script based on patient symptoms? Adult Pre-Op  Do you have questions for your provider that need to be answered prior to   scheduling your pre-op appointment?  Yes

## 2021-07-21 ENCOUNTER — OFFICE VISIT (OUTPATIENT)
Dept: FAMILY MEDICINE CLINIC | Age: 61
End: 2021-07-21
Payer: MEDICARE

## 2021-07-21 VITALS
DIASTOLIC BLOOD PRESSURE: 86 MMHG | BODY MASS INDEX: 44.65 KG/M2 | RESPIRATION RATE: 18 BRPM | OXYGEN SATURATION: 94 % | TEMPERATURE: 96.7 F | SYSTOLIC BLOOD PRESSURE: 114 MMHG | HEIGHT: 63 IN | WEIGHT: 252 LBS | HEART RATE: 79 BPM

## 2021-07-21 DIAGNOSIS — E66.01 SEVERE OBESITY (BMI >= 40) (HCC): ICD-10-CM

## 2021-07-21 DIAGNOSIS — I10 ESSENTIAL HYPERTENSION: ICD-10-CM

## 2021-07-21 DIAGNOSIS — F41.1 GENERALIZED ANXIETY DISORDER: ICD-10-CM

## 2021-07-21 DIAGNOSIS — E55.9 VITAMIN D DEFICIENCY: ICD-10-CM

## 2021-07-21 DIAGNOSIS — R73.01 ELEVATED FASTING GLUCOSE: ICD-10-CM

## 2021-07-21 DIAGNOSIS — E66.01 CLASS 3 SEVERE OBESITY WITH BODY MASS INDEX (BMI) OF 40.0 TO 44.9 IN ADULT, UNSPECIFIED OBESITY TYPE, UNSPECIFIED WHETHER SERIOUS COMORBIDITY PRESENT (HCC): ICD-10-CM

## 2021-07-21 DIAGNOSIS — Z13.31 POSITIVE DEPRESSION SCREENING: ICD-10-CM

## 2021-07-21 DIAGNOSIS — Z01.818 PRE-OP EVALUATION: Primary | ICD-10-CM

## 2021-07-21 PROCEDURE — 99212 OFFICE O/P EST SF 10 MIN: CPT

## 2021-07-21 PROCEDURE — 3017F COLORECTAL CA SCREEN DOC REV: CPT | Performed by: FAMILY MEDICINE

## 2021-07-21 PROCEDURE — G8427 DOCREV CUR MEDS BY ELIG CLIN: HCPCS | Performed by: FAMILY MEDICINE

## 2021-07-21 PROCEDURE — 99214 OFFICE O/P EST MOD 30 MIN: CPT | Performed by: FAMILY MEDICINE

## 2021-07-21 PROCEDURE — 1036F TOBACCO NON-USER: CPT | Performed by: FAMILY MEDICINE

## 2021-07-21 PROCEDURE — G8417 CALC BMI ABV UP PARAM F/U: HCPCS | Performed by: FAMILY MEDICINE

## 2021-07-21 PROCEDURE — G8431 POS CLIN DEPRES SCRN F/U DOC: HCPCS | Performed by: FAMILY MEDICINE

## 2021-07-21 RX ORDER — CARVEDILOL 6.25 MG/1
TABLET ORAL
COMMUNITY
Start: 2021-06-01 | End: 2021-07-21

## 2021-07-21 RX ORDER — CARVEDILOL 3.12 MG/1
3.12 TABLET ORAL 2 TIMES DAILY
COMMUNITY
Start: 2021-06-04

## 2021-07-21 RX ORDER — HYDROXYZINE HYDROCHLORIDE 25 MG/1
TABLET, FILM COATED ORAL
COMMUNITY
Start: 2021-06-25 | End: 2021-07-21

## 2021-07-21 RX ORDER — RIZATRIPTAN BENZOATE 10 MG/1
10 TABLET ORAL
COMMUNITY
End: 2021-07-21 | Stop reason: SDUPTHER

## 2021-07-21 RX ORDER — RIZATRIPTAN BENZOATE 10 MG/1
10 TABLET, ORALLY DISINTEGRATING ORAL
Qty: 9 TABLET | Refills: 11 | Status: CANCELLED | OUTPATIENT
Start: 2021-07-21 | End: 2021-07-21

## 2021-07-21 RX ORDER — BUSPIRONE HYDROCHLORIDE 15 MG/1
15 TABLET ORAL 2 TIMES DAILY
Qty: 60 TABLET | Refills: 0 | Status: CANCELLED | OUTPATIENT
Start: 2021-07-21

## 2021-07-21 RX ORDER — ALPRAZOLAM 0.5 MG/1
.25-.5 TABLET ORAL 3 TIMES DAILY PRN
Qty: 30 TABLET | Refills: 0 | Status: SHIPPED | OUTPATIENT
Start: 2021-07-21 | End: 2021-08-05

## 2021-07-21 ASSESSMENT — PATIENT HEALTH QUESTIONNAIRE - PHQ9
9. THOUGHTS THAT YOU WOULD BE BETTER OFF DEAD, OR OF HURTING YOURSELF: 0
4. FEELING TIRED OR HAVING LITTLE ENERGY: 3
SUM OF ALL RESPONSES TO PHQ QUESTIONS 1-9: 15
10. IF YOU CHECKED OFF ANY PROBLEMS, HOW DIFFICULT HAVE THESE PROBLEMS MADE IT FOR YOU TO DO YOUR WORK, TAKE CARE OF THINGS AT HOME, OR GET ALONG WITH OTHER PEOPLE: 1
2. FEELING DOWN, DEPRESSED OR HOPELESS: 3
SUM OF ALL RESPONSES TO PHQ9 QUESTIONS 1 & 2: 6
5. POOR APPETITE OR OVEREATING: 0
SUM OF ALL RESPONSES TO PHQ QUESTIONS 1-9: 15
1. LITTLE INTEREST OR PLEASURE IN DOING THINGS: 3
6. FEELING BAD ABOUT YOURSELF - OR THAT YOU ARE A FAILURE OR HAVE LET YOURSELF OR YOUR FAMILY DOWN: 0
SUM OF ALL RESPONSES TO PHQ QUESTIONS 1-9: 15
3. TROUBLE FALLING OR STAYING ASLEEP: 3
8. MOVING OR SPEAKING SO SLOWLY THAT OTHER PEOPLE COULD HAVE NOTICED. OR THE OPPOSITE, BEING SO FIGETY OR RESTLESS THAT YOU HAVE BEEN MOVING AROUND A LOT MORE THAN USUAL: 0
7. TROUBLE CONCENTRATING ON THINGS, SUCH AS READING THE NEWSPAPER OR WATCHING TELEVISION: 3

## 2021-07-21 ASSESSMENT — ENCOUNTER SYMPTOMS
ABDOMINAL PAIN: 1
VOMITING: 0
SHORTNESS OF BREATH: 0
EYE ITCHING: 0
SINUS PRESSURE: 1
SINUS PAIN: 1
CHEST TIGHTNESS: 1
NAUSEA: 0
EYE DISCHARGE: 0

## 2021-07-21 ASSESSMENT — COLUMBIA-SUICIDE SEVERITY RATING SCALE - C-SSRS
1. WITHIN THE PAST MONTH, HAVE YOU WISHED YOU WERE DEAD OR WISHED YOU COULD GO TO SLEEP AND NOT WAKE UP?: NO
6. HAVE YOU EVER DONE ANYTHING, STARTED TO DO ANYTHING, OR PREPARED TO DO ANYTHING TO END YOUR LIFE?: NO
2. HAVE YOU ACTUALLY HAD ANY THOUGHTS OF KILLING YOURSELF?: NO

## 2021-07-21 NOTE — PROGRESS NOTES
pre op exam. surgery 8/9/2021. reverse left shoulder replacement. patient does not have any paperwork to be filled out. Had lab work done 7/13/2021 for pre op.    does not think that the buspar is working well for her. having panic attacks, was seen in ER 3 times in June for panic attacks. also having insomnia.

## 2021-07-21 NOTE — PROGRESS NOTES
[address], [address] [place] [address] Drive                        Telephone [phone]             Fax [phone]      Ruth Artis  1960  MRN:  [id number]  Date of visit:  7/21/2021     Subjective:    Ruth Artis is a 63 y.o. female who presents to [facility] today (7/21/2021) for a pre-operative evaluation. Chief Complaint   Patient presents with    Pre-op Exam     pre op exam. surgery 8/9/2021. reverse left shoulder replacement. patient does not have any paperwork to be filled out.  Discuss Medications     does not think that the buspar is working well for her. having panic attacks, was seen in ER 3 times in June for panic attacks. also having insomnia. She is scheduled for left shoulder surgery on 8/8/2021. She had a pre-operative evaluation with me on 5/20/2021. Her surgery was re-scheduled, and she now needs a new evaluation. She states that her cardiologist has released her for surgery. She reports that she is having a lot of anxiety. She requests a prescription for Xanax. She states that the psychiatric nurse practitioner that she was seeing did not want to prescribe Xanax for her. She states that Buspar is not working for her. She is waiting for an appointment with a psychiatrist.  She also requests a refill of Maxalt.      She has the following problem list:  Patient Active Problem List   Diagnosis    Chronic left shoulder pain    Spinal osteoarthritis    Hypertension    Generalized anxiety disorder    Congenital hip dysplasia    Kidney stone    Cervical herniated disc    Dysfunctional uterine bleeding    Seasonal allergic rhinitis    Asthma    Vitamin D deficiency    History of supraventricular tachycardia    Lumbar disc herniation    Obesity    History of tobacco use    Dyspnea    Fatigue    Grief at loss of child    Blindness of left eye    Primary osteoarthritis of left knee    Hip pain, bilateral    Shoulder pain, right    Shoulder pain, left    Failed total right knee replacement ([address]    Encounter for chronic pain management    History of depression    Lumbar degenerative disc disease    Low back pain    Cervical radiculopathy due to degenerative joint disease of spine    History of tobacco abuse    Cervicalgia    Osteoarthritis of left knee    Status post left knee replacement    Chronic pain of left knee    Cervical radiculopathy    Family history of diabetes mellitus    Neural foraminal stenosis of cervical spine    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  CARDIAC CATHETERIZATION  2006    no blockages    CARDIAC CATHETERIZATION  01/24/2020    normal coronary arteries,anteroapical hypokinesis consistent with cardiomyopathy    CHOLECYSTECTOMY, LAPAROSCOPIC  03/05/2011    (Dr. Kimberly Bey)    JOINT REPLACEMENT Right     FAILED KNEE RE;PLAC    KNEE ARTHROPLASTY Right 2006    OTHER SURGICAL HISTORY  2009    Epidural steroids to cervical spine.     OTHER SURGICAL HISTORY  2014    ECT therapy, one session only and she refused any further    PORT SURGERY  08/17/2020    right IJ port removal, [facility], Marty Stock MD    [place] TOTAL KNEE ARTHROPLASTY Left 06/05/2018    Left Total Knee Arthroplasty performed by Sabina Wilson MD at [address] [facility] ECHOCARDIOGRAM  08/20/2020    grade 1 diastolic dysfunction, EF 15-72%    TUBAL LIGATION  1996       Current Outpatient Medications   Medication Sig Dispense Refill    Cardiovascular: Positive for chest pain and palpitations. She reports chest pain and palpitations during panic attacks. Gastrointestinal: Positive for abdominal pain. Negative for nausea and vomiting. She reports \"phantom pains\" related to her recent cancer diagnosis and treatment. Endocrine: Negative for polydipsia and polyphagia. Genitourinary: Negative for difficulty urinating and frequency. Musculoskeletal: Positive for arthralgias and gait problem. Skin: Negative for rash and wound. Allergic/Immunologic: Positive for immunocompromised state. Negative for food allergies. Recent cancer treatment. Neurological: Positive for dizziness and headaches. She blames Buspar for the dizziness. She has a history of migraine headaches. Hematological: Negative for adenopathy. Does not bruise/bleed easily. Psychiatric/Behavioral: Positive for sleep disturbance. The patient is nervous/anxious. Objective:    Vitals:    07/21/21 1044   BP: 114/86   Pulse: 79   Resp: 18   Temp: 96.7 °F (35.9 °C)   SpO2: 94%     Body mass index is 44.64 kg/m². Extremely obese female  alert, cooperative and in no acute distress. Neck is supple. Chest is clear to auscultation, no wheezes, rales, or rhonchi. Heart sounds are regular rate and rhythm, no murmurs  Abdomen soft, non-tender. No masses or organomegaly. Lower extremities have no edema. Affect is anxious. Thought processes are are logical. There is no evidence of paranoia or delusions. Responses to questions are appropriate.   Dress and grooming are appropriate.     Mateusz Zamarripa of labs done at [facility] 7/13/2021 were reviewed with the patient:      Ref Range & Units 8 d ago   White Blood Cells 4.0 - 11.0 X10E9/L 7.4        RBC count 3.80 - 5.20 X10E12/L 5.17        Hemoglobin 11.7 - 15.5 g/dL 14.2        Hematocrit 35 - 47 % 43.4    /    MCV 80.0 - 100.0 fL 84        MCH 27.0 - 34.0 pg 27.4        MCHC 32 - 36 g/dL 32.7 fasting glucose  Labs were ordered to be done prior to her next visit with me:  - Comprehensive Metabolic Panel  - Lipid Panel; Future  - Hemoglobin A1C; Future    6. Vitamin D deficiency  - Vitamin D 25 Hydroxy; Future was ordered to be done prior to her next appointment. 7. Severe obesity (BMI >= 40) ([facility])  Labs were ordered to be done prior to her next appointment:  - TSH without Reflex; Future  - T4, Free; Future    8. Class 3 severe obesity with body mass index (BMI) of 40.0 to 44.9 in adult, unspecified obesity type, unspecified whether serious comorbidity present ([facility] [address]   - Lipid Panel; Future prior to her return visit in 3 months. This note was created using voice-recognition software, and may contain inaccuracies of transcription which were inadvertently overlooked prior to signature. For any questions, please contact me.

## 2021-07-21 NOTE — PATIENT INSTRUCTIONS
Warning:  One or more of the medications that you have been prescribed may cause drowsiness and impair your ability to operate vehicles or machinery. Do not drive or operate machinery while taking Xanax. Do not use in combination with alcohol.

## 2021-07-31 ENCOUNTER — PATIENT MESSAGE (OUTPATIENT)
Dept: FAMILY MEDICINE CLINIC | Age: 61
End: 2021-07-31

## 2021-08-02 NOTE — TELEPHONE ENCOUNTER
Spoke with patient regarding medication issue. Patient states that issue has been resolved. Pharmacy had an error and had not taken out the previous order for once a day. Patient is to be taking medication twice a day and they are aware. Patient did receive medication.

## 2021-08-10 DIAGNOSIS — M48.02 NEURAL FORAMINAL STENOSIS OF CERVICAL SPINE: ICD-10-CM

## 2021-08-10 DIAGNOSIS — M79.7 FIBROMYALGIA: ICD-10-CM

## 2021-08-10 RX ORDER — OXYCODONE AND ACETAMINOPHEN 7.5; 325 MG/1; MG/1
1 TABLET ORAL
Qty: 150 TABLET | Refills: 0 | Status: SHIPPED | OUTPATIENT
Start: 2021-08-10 | End: 2021-09-01 | Stop reason: SDUPTHER

## 2021-08-10 NOTE — TELEPHONE ENCOUNTER
Last Appt:  7/2/2021  Next Appt:   9/1/2021  Med verified in [facility] checked for [place], and [place]: Percocet 7.5 #150. Due Now.  LF 7/10.   FANNY Dunne

## 2021-09-01 ENCOUNTER — OFFICE VISIT (OUTPATIENT)
Dept: PAIN MANAGEMENT | Age: 61
End: 2021-09-01
Payer: MEDICARE

## 2021-09-01 VITALS
WEIGHT: 250 LBS | DIASTOLIC BLOOD PRESSURE: 80 MMHG | BODY MASS INDEX: 44.3 KG/M2 | OXYGEN SATURATION: 93 % | HEART RATE: 86 BPM | HEIGHT: 63 IN | SYSTOLIC BLOOD PRESSURE: 116 MMHG

## 2021-09-01 DIAGNOSIS — M54.12 CERVICAL RADICULOPATHY: ICD-10-CM

## 2021-09-01 DIAGNOSIS — G89.29 CHRONIC LEFT SHOULDER PAIN: ICD-10-CM

## 2021-09-01 DIAGNOSIS — M79.7 FIBROMYALGIA: ICD-10-CM

## 2021-09-01 DIAGNOSIS — M51.26 LUMBAR DISC HERNIATION: Primary | ICD-10-CM

## 2021-09-01 DIAGNOSIS — M48.02 NEURAL FORAMINAL STENOSIS OF CERVICAL SPINE: ICD-10-CM

## 2021-09-01 DIAGNOSIS — M25.512 CHRONIC LEFT SHOULDER PAIN: ICD-10-CM

## 2021-09-01 DIAGNOSIS — M54.2 CERVICALGIA: ICD-10-CM

## 2021-09-01 PROCEDURE — 1036F TOBACCO NON-USER: CPT | Performed by: NURSE PRACTITIONER

## 2021-09-01 PROCEDURE — 99213 OFFICE O/P EST LOW 20 MIN: CPT

## 2021-09-01 PROCEDURE — 3017F COLORECTAL CA SCREEN DOC REV: CPT | Performed by: NURSE PRACTITIONER

## 2021-09-01 PROCEDURE — 99214 OFFICE O/P EST MOD 30 MIN: CPT | Performed by: NURSE PRACTITIONER

## 2021-09-01 PROCEDURE — G8417 CALC BMI ABV UP PARAM F/U: HCPCS | Performed by: NURSE PRACTITIONER

## 2021-09-01 PROCEDURE — G8427 DOCREV CUR MEDS BY ELIG CLIN: HCPCS | Performed by: NURSE PRACTITIONER

## 2021-09-01 RX ORDER — NALOXONE HYDROCHLORIDE 4 MG/.1ML
SPRAY NASAL
COMMUNITY
Start: 2021-08-03

## 2021-09-01 RX ORDER — OXYCODONE AND ACETAMINOPHEN 7.5; 325 MG/1; MG/1
1 TABLET ORAL
Qty: 150 TABLET | Refills: 0 | Status: SHIPPED | OUTPATIENT
Start: 2021-09-09 | End: 2021-10-05 | Stop reason: SDUPTHER

## 2021-09-01 RX ORDER — PSEUDOEPHEDRINE HCL 30 MG
100 TABLET ORAL 2 TIMES DAILY
COMMUNITY
Start: 2021-08-09 | End: 2021-09-08

## 2021-09-01 RX ORDER — ALPRAZOLAM 0.5 MG/1
1 TABLET ORAL 3 TIMES DAILY PRN
COMMUNITY
Start: 2021-07-21 | End: 2022-04-07

## 2021-09-01 ASSESSMENT — ENCOUNTER SYMPTOMS
BACK PAIN: 1
SHORTNESS OF BREATH: 0
BLOOD IN STOOL: 0
CONSTIPATION: 0

## 2021-09-01 NOTE — PROGRESS NOTES
Subjective:      Patient ID: Jaye Linton is a 63 y.o. female. Chief Complaint   Patient presents with    Follow-up     2 month - shoulder, neck, bilateral hips, left knee,  back pain     Shoulder Pain   Associated symptoms include numbness (tingling in hands, facial numbness on the left. ). Back Pain  Associated symptoms include headaches (more migraines than normal, unsure cause. possible stress versus allergy related), numbness (tingling in hands, facial numbness on the left.) and weakness. Pertinent negatives include no chest pain. Neck Pain   Associated symptoms include headaches (more migraines than normal, unsure cause. possible stress versus allergy related), numbness (tingling in hands, facial numbness on the left.) and weakness. Pertinent negatives include no chest pain. Other  Associated symptoms include arthralgias, fatigue, headaches (more migraines than normal, unsure cause. possible stress versus allergy related), myalgias, neck pain, numbness (tingling in hands, facial numbness on the left.) and weakness. Pertinent negatives include no chest pain. Hip Pain   Associated symptoms include numbness (tingling in hands, facial numbness on the left.). Here today for routine pain clinic recheck. Percocet working well for chronic pain control, no complaints, denies side effects. Recovering from left shoulder replacement.  Planning physical therapy soon    Pain Assessment  Location of Pain: Other (Comment) (hips- bilateral)  Location Modifiers: Left, Right (bilater)  Severity of Pain: 7  Quality of Pain: Throbbing, Sharp, Dull, Aching  Duration of Pain: Persistent  Frequency of Pain: Constant  Aggravating Factors: Bending, Stretching, Straightening, Exercise, Kneeling, Squatting, Standing, Walking, Stairs  Limiting Behavior: Yes  Relieving Factors: Rest, Heat  Result of Injury: No  Work-Related Injury: No  Are there other pain locations you wish to document?:  (being a nurses aid for years)    Allergies   Allergen Reactions    Latex Rash    Rituximab Palpitations     Severe tachycardia    Buspirone Other (See Comments)     Higher dose increased anxiety/trouble sleeping    Cyclobenzaprine Other (See Comments)     Felt odd    Fentanyl Other (See Comments)     sleepwalking    Ferric Sulfate Other (See Comments)     Severe abd pain    Gabapentin     Hydroxyzine Hcl Other (See Comments)     Lightheaded/heart racing    Other Other (See Comments)     IVP Dye  Mom had trouble breathing     Seasonal     Vilazodone Other (See Comments)     Teeth clenching when on with Buspar       Outpatient Medications Marked as Taking for the 9/1/21 encounter (Office Visit) with TITO Yepez CNP   Medication Sig Dispense Refill    ALPRAZolam (XANAX) 0.5 MG tablet Take 1 tablet by mouth 3 times daily as needed.  docusate (COLACE, DULCOLAX) 100 MG CAPS Take 100 mg by mouth 2 times daily      NARCAN 4 MG/0.1ML LIQD nasal spray instill 1 spray in 1 NOSTRIL if needed FOR OPIOID OVERDOSE may re. ..  (REFER TO PRESCRIPTION NOTES).  [START ON 9/9/2021] oxyCODONE-acetaminophen (PERCOCET) 7.5-325 MG per tablet Take 1 tablet by mouth 5 times daily for 30 days.  150 tablet 0    carvedilol (COREG) 3.125 MG tablet Take 3.125 mg by mouth 2 times daily      busPIRone (BUSPAR) 15 MG tablet Take 15 mg by mouth 2 times daily 60 tablet 0    omeprazole (PRILOSEC) 20 MG delayed release capsule Take 1 capsule by mouth 2 times daily 60 capsule 5    Multiple Vitamins-Minerals (CENTRUM MULTI + OMEGA 3 PO) Take 1 tablet by mouth daily      ENTRESTO 24-26 MG per tablet Take 1 tablet by mouth 2 times daily      aspirin 81 MG chewable tablet Take 81 mg by mouth daily      olopatadine (PATADAY) 0.2 % SOLN ophthalmic solution insert 1 drop into both eyes once daily if needed 2.5 mL 0    fluticasone (FLONASE) 50 MCG/ACT nasal spray spray 2 sprays INTO EACH NOSTRIL ONCE DAILY 16 g 0    albuterol sulfate HFA (VENTOLIN HFA) 108 (90 Base) MCG/ACT inhaler inhale 2 puffs every 4 hours if needed 18 g 0    Blood Pressure KIT 1 kit by Does not apply route 2 times daily as needed (BP) 1 kit 0    rizatriptan (MAXALT-MLT) 10 MG disintegrating tablet Take 1 tablet by mouth once as needed for Migraine May repeat in 2 hours if needed 9 tablet 11    Handicap Placard MISC by Does not apply route 2 each 0       Past Medical History:   Diagnosis Date    Asthma     Blindness of left eye     Cervical herniated disc     C5-C6, with nerve compression.  Congenital hip dysplasia     Degenerative joint disease     (Right knee) -     Depression     Dysfunctional uterine bleeding     Dyspnea     (progressive - multifactorial.    Failed total right knee replacement (HCC)     Fatigue     Fibromyalgia     Generalized anxiety disorder     Grief at loss of child     4-yr old granddaughter  of brain tumor in .  Hip pain, bilateral     Secondary to piriformis syndrome and bilateral greater trochanteric bursitis. (Injection of Celestone and Marcaine).  History of tobacco use     Currently not smoking.  Hypertension     Inverted nipple     Left. Lifelong    Kidney stone     Lumbar disc disease     Chronic.  Obesity     Osteoarthritis     Degenerative joint disease - of all joints.  Seasonal allergic rhinitis     Shoulder pain, left     (Injection of Celestone/Marcaine subacromially) - Dr. Dickson Duggan - 10/2008.  Shoulder pain, right     (Injection of Celestone/Marcaine subacromially). Dr. Dickson Duggan - 10/2008.  Supraventricular tachycardia, paroxysmal (HCC)     Status post cardiac ablation.  Vitamin D deficiency        Past Surgical History:   Procedure Laterality Date    ATRIAL ABLATION SURGERY      Radiofrequency ablation of slow AV pawel pathway.     CARDIAC CATHETERIZATION  2006    no blockages    CARDIAC CATHETERIZATION  2020    normal coronary arteries,anteroapical hypokinesis consistent with cardiomyopathy    CHOLECYSTECTOMY, LAPAROSCOPIC  2011    (Dr. Gisella Monzon)    JOINT REPLACEMENT Right     FAILED KNEE RE;PLAC    KNEE ARTHROPLASTY Right     OTHER SURGICAL HISTORY      Epidural steroids to cervical spine.  OTHER SURGICAL HISTORY      ECT therapy, one session only and she refused any further    PORT SURGERY  2020    right IJ port removal, 30 BEKAH Greene MD    [place] TOTAL KNEE ARTHROPLASTY Left 2018    Left Total Knee Arthroplasty performed by Jimi Vasquez [facility] at [facility] [address] Left 2021    S/P reverse left total shoulder per Dr Burke Pugh at [facility] ECHOCARDIOGRAM  2020    grade 1 diastolic dysfunction, EF 01-99%    TUBAL LIGATION         Family History   Problem Relation Age of Onset    Breast Cancer Mother 76    High Blood Pressure Mother     Coronary Art Dis Mother         Multiple stents.  Diabetes Mother     Heart Disease Father     Coronary Art Dis Father         Myocardial infarction at age 54 and .  COPD Father     Arthritis Brother     COPD Brother     Heart Disease Brother         Congenital heart defect -  at 10 wks of age.  Cancer Other     Diabetes Maternal Grandmother        Social History     Socioeconomic History    Marital status:      Spouse name: None    Number of children: 3    Years of education: 8    Highest education level: GED or equivalent   Occupational History    Occupation: disabled [employer]   Tobacco Use    Smoking status: Former Smoker     Packs/day: 0.25     Years: 35.00     Pack years: 8.75     Types: Cigarettes     Quit date:      Years since quittin.6    Smokeless tobacco: Former User     Quit date: 2015    Tobacco comment: 6 cigarettes per day since age 15 or so.    Vaping Use    Vaping Use: Never used   Substance and Sexual Activity    Alcohol use: Not Currently     Alcohol/week: Objective:   Physical Exam  Vitals reviewed. Constitutional:       General: She is not in acute distress. Appearance: Normal appearance. She is well-developed. She is not diaphoretic. Interventions: She is not intubated. HENT:      Head: Normocephalic and atraumatic. Right Ear: External ear normal.      Left Ear: External ear normal.      Nose: Nose normal.   Eyes:      General: Lids are normal.      Conjunctiva/sclera: Conjunctivae normal.   Cardiovascular:      Pulses: Normal pulses. Pulmonary:      Effort: Pulmonary effort is normal. No tachypnea, bradypnea, accessory muscle usage or respiratory distress. She is not intubated. Musculoskeletal:      Comments: Sitting in wheelchair   Skin:     General: Skin is warm and dry. Nails: There is no clubbing. Neurological:      Mental Status: She is alert and oriented to person, place, and time. Cranial Nerves: No cranial nerve deficit. Psychiatric:         Speech: Speech normal.         Behavior: Behavior normal. Behavior is cooperative. Assessment:      1. Lumbar disc herniation    2. Neural foraminal stenosis of cervical spine    3. Fibromyalgia    4. Chronic left shoulder pain    5. Cervical radiculopathy    6. Cervicalgia          Plan:      Chronic pain diagnoses such as   1. Lumbar disc herniation    2. Neural foraminal stenosis of cervical spine    3. Fibromyalgia    4. Chronic left shoulder pain    5. Cervical radiculopathy    6. Cervicalgia     controlled on current medication regime, wll continue current pain medications to improve quality of life and function. Jimena Colindres is here for recheck/reevaluation of chronic pain. She is able to maintain daily activities with the use of current pain medications and is generally satisfied with level of analgesia. She shows no evidence of misuse or misappropriation of medications. She denies use of alcohol or illegal substances.  UDS have been Appropriate with most recent screen     Controlled Substance Monitoring:    Acute and Chronic Pain Monitoring:   RX Monitoring 9/1/2021   Attestation -   Periodic Controlled Substance Monitoring No signs of potential drug abuse or diversion identified.;Obtaining appropriate analgesic effect of treatment.    Chronic Pain > 50 MEDD -   Chronic Pain > 80 MEDD -     Follow up 2 months

## 2021-09-10 LAB
ALBUMIN SERPL-MCNC: 3.4 G/DL
ALP BLD-CCNC: 153 U/L
ALT SERPL-CCNC: 30 U/L
ANION GAP SERPL CALCULATED.3IONS-SCNC: 10.9 MMOL/L
AST SERPL-CCNC: 20 U/L
BASOPHILS ABSOLUTE: 0 /ΜL
BASOPHILS RELATIVE PERCENT: 0.4 %
BILIRUB SERPL-MCNC: 0.3 MG/DL (ref 0.1–1.4)
BUN BLDV-MCNC: 11 MG/DL
CALCIUM SERPL-MCNC: 9.1 MG/DL
CHLORIDE BLD-SCNC: 106 MMOL/L
CHOLESTEROL, TOTAL: 126 MG/DL
CHOLESTEROL/HDL RATIO: 1.8
CO2: 30.1 MMOL/L
CREAT SERPL-MCNC: 0.7 MG/DL
EOSINOPHILS ABSOLUTE: 0.2 /ΜL
EOSINOPHILS RELATIVE PERCENT: 2.9 %
GFR CALCULATED: >60
GLUCOSE BLD-MCNC: 107 MG/DL
HCT VFR BLD CALC: 40.2 % (ref 36–46)
HDLC SERPL-MCNC: 70 MG/DL (ref 35–70)
HEMOGLOBIN: 13.2 G/DL (ref 12–16)
LDL CHOLESTEROL CALCULATED: 52 MG/DL (ref 0–160)
LYMPHOCYTES ABSOLUTE: 1.6 /ΜL
LYMPHOCYTES RELATIVE PERCENT: 21.8 %
MCH RBC QN AUTO: 27.7 PG
MCHC RBC AUTO-ENTMCNC: 32.8 G/DL
MCV RBC AUTO: 84.3 FL
MONOCYTES ABSOLUTE: 0.6 /ΜL
MONOCYTES RELATIVE PERCENT: 8.3 %
NEUTROPHILS ABSOLUTE: 4.8 /ΜL
NEUTROPHILS RELATIVE PERCENT: 66.5 %
NONHDLC SERPL-MCNC: NORMAL MG/DL
PLATELET # BLD: 311 K/ΜL
PMV BLD AUTO: 9.5 FL
POTASSIUM SERPL-SCNC: 4 MMOL/L
RBC # BLD: 4.77 10^6/ΜL
SODIUM BLD-SCNC: 143 MMOL/L
TOTAL PROTEIN: 6.4
TRIGL SERPL-MCNC: 49 MG/DL
URIC ACID: 4.9
VITAMIN D 25-HYDROXY: 27.1
VITAMIN D2, 25 HYDROXY: NORMAL
VITAMIN D3,25 HYDROXY: NORMAL
VLDLC SERPL CALC-MCNC: NORMAL MG/DL
WBC # BLD: 7.2 10^3/ML

## 2021-10-05 DIAGNOSIS — M48.02 NEURAL FORAMINAL STENOSIS OF CERVICAL SPINE: ICD-10-CM

## 2021-10-05 DIAGNOSIS — M79.7 FIBROMYALGIA: ICD-10-CM

## 2021-10-05 NOTE — TELEPHONE ENCOUNTER
Last Appt:  9/1/2021  Next Appt:   11/1/2021  Med verified in [facility] checked for [place], [place], and [place]:  Percocet 7.5/325 mg  9/9/21   #150  . Due 10/9/21  .

## 2021-10-06 RX ORDER — OXYCODONE AND ACETAMINOPHEN 7.5; 325 MG/1; MG/1
1 TABLET ORAL
Qty: 150 TABLET | Refills: 0 | Status: SHIPPED | OUTPATIENT
Start: 2021-10-09 | End: 2021-11-08 | Stop reason: SDUPTHER

## 2021-11-08 DIAGNOSIS — M79.7 FIBROMYALGIA: ICD-10-CM

## 2021-11-08 DIAGNOSIS — M48.02 NEURAL FORAMINAL STENOSIS OF CERVICAL SPINE: ICD-10-CM

## 2021-11-08 RX ORDER — OXYCODONE AND ACETAMINOPHEN 7.5; 325 MG/1; MG/1
1 TABLET ORAL
Qty: 150 TABLET | Refills: 0 | Status: SHIPPED | OUTPATIENT
Start: 2021-11-08 | End: 2021-12-07 | Stop reason: SDUPTHER

## 2021-11-08 NOTE — TELEPHONE ENCOUNTER
OARRS Report checked for [address] [place] [address], [place], and [place]: 10/9/21 percocet 7.5-325mg #150. Due NOW.     Last Appt:  9/1/2021  Next Appt:   11/11/2021  Med verified in Epic

## 2021-11-11 ENCOUNTER — OFFICE VISIT (OUTPATIENT)
Dept: PAIN MANAGEMENT | Age: 61
End: 2021-11-11
Payer: MEDICARE

## 2021-11-11 ENCOUNTER — HOSPITAL ENCOUNTER (OUTPATIENT)
Age: 61
Setting detail: SPECIMEN
Discharge: HOME | End: 2021-11-11
Payer: MEDICARE

## 2021-11-11 ENCOUNTER — HOSPITAL ENCOUNTER (OUTPATIENT)
Dept: GENERAL RADIOLOGY | Age: 61
Discharge: HOME | End: 2021-11-13
Payer: MEDICARE

## 2021-11-11 VITALS
SYSTOLIC BLOOD PRESSURE: 132 MMHG | HEIGHT: 63 IN | HEART RATE: 79 BPM | BODY MASS INDEX: 45.32 KG/M2 | OXYGEN SATURATION: 97 % | DIASTOLIC BLOOD PRESSURE: 84 MMHG | WEIGHT: 255.8 LBS

## 2021-11-11 DIAGNOSIS — M51.36 LUMBAR DEGENERATIVE DISC DISEASE: ICD-10-CM

## 2021-11-11 DIAGNOSIS — Z02.83 ENCOUNTER FOR DRUG SCREENING: ICD-10-CM

## 2021-11-11 DIAGNOSIS — Z79.891 ENCOUNTER FOR LONG-TERM OPIATE ANALGESIC USE: ICD-10-CM

## 2021-11-11 DIAGNOSIS — G89.29 CHRONIC BILATERAL LOW BACK PAIN WITHOUT SCIATICA: ICD-10-CM

## 2021-11-11 DIAGNOSIS — M48.02 NEURAL FORAMINAL STENOSIS OF CERVICAL SPINE: Primary | ICD-10-CM

## 2021-11-11 DIAGNOSIS — M16.11 PRIMARY OSTEOARTHRITIS OF RIGHT HIP: ICD-10-CM

## 2021-11-11 DIAGNOSIS — M54.50 CHRONIC BILATERAL LOW BACK PAIN WITHOUT SCIATICA: ICD-10-CM

## 2021-11-11 DIAGNOSIS — M48.02 NEURAL FORAMINAL STENOSIS OF CERVICAL SPINE: ICD-10-CM

## 2021-11-11 PROCEDURE — G8427 DOCREV CUR MEDS BY ELIG CLIN: HCPCS | Performed by: NURSE PRACTITIONER

## 2021-11-11 PROCEDURE — 73502 X-RAY EXAM HIP UNI 2-3 VIEWS: CPT

## 2021-11-11 PROCEDURE — 99213 OFFICE O/P EST LOW 20 MIN: CPT | Performed by: NURSE PRACTITIONER

## 2021-11-11 PROCEDURE — 3017F COLORECTAL CA SCREEN DOC REV: CPT | Performed by: NURSE PRACTITIONER

## 2021-11-11 PROCEDURE — 80307 DRUG TEST PRSMV CHEM ANLYZR: CPT

## 2021-11-11 PROCEDURE — 72100 X-RAY EXAM L-S SPINE 2/3 VWS: CPT

## 2021-11-11 PROCEDURE — 99214 OFFICE O/P EST MOD 30 MIN: CPT | Performed by: NURSE PRACTITIONER

## 2021-11-11 PROCEDURE — G8484 FLU IMMUNIZE NO ADMIN: HCPCS | Performed by: NURSE PRACTITIONER

## 2021-11-11 PROCEDURE — 1036F TOBACCO NON-USER: CPT | Performed by: NURSE PRACTITIONER

## 2021-11-11 PROCEDURE — G8417 CALC BMI ABV UP PARAM F/U: HCPCS | Performed by: NURSE PRACTITIONER

## 2021-11-11 ASSESSMENT — ENCOUNTER SYMPTOMS
BACK PAIN: 1
SHORTNESS OF BREATH: 0
CONSTIPATION: 0
BLOOD IN STOOL: 0

## 2021-11-11 NOTE — PROGRESS NOTES
Subjective:      Patient ID: Eileen Glez is a 63 y.o. female. Chief Complaint   Patient presents with    Follow-up     2 month f/u -DS done and PW completed - lumbar and bilateral hips - right one is worse     Shoulder Pain   Associated symptoms include numbness (tingling in hands, facial numbness on the left. ). Back Pain  Associated symptoms include headaches (more migraines than normal, unsure cause. possible stress versus allergy related), numbness (tingling in hands, facial numbness on the left.) and weakness. Pertinent negatives include no chest pain. Neck Pain   Associated symptoms include headaches (more migraines than normal, unsure cause. possible stress versus allergy related), numbness (tingling in hands, facial numbness on the left.) and weakness. Pertinent negatives include no chest pain. Other  Associated symptoms include arthralgias, fatigue, headaches (more migraines than normal, unsure cause. possible stress versus allergy related), myalgias, neck pain, numbness (tingling in hands, facial numbness on the left.) and weakness. Pertinent negatives include no chest pain. Hip Pain   Associated symptoms include numbness (tingling in hands, facial numbness on the left.). Here today for routine pain clinic recheck. Worsening pain right hip and lumbar spine, evaluated in ER.  Prescribed steroids with relief    Pain Assessment  Location of Pain:  (hip)  Location Modifiers: Left, Right  Severity of Pain: 9  Quality of Pain: Sharp  Duration of Pain: Persistent  Frequency of Pain: Constant  Aggravating Factors: Stairs, Walking, Exercise  Limiting Behavior: Yes  Relieving Factors: Rest  Result of Injury: No  Work-Related Injury: No    Allergies   Allergen Reactions    Latex Rash    Rituximab Palpitations     Severe tachycardia    Buspirone Other (See Comments)     Higher dose increased anxiety/trouble sleeping    Cyclobenzaprine Other (See Comments)     Felt odd    Fentanyl Other (See with nerve compression.  Congenital hip dysplasia     Degenerative joint disease     (Right knee) -     Depression     Dysfunctional uterine bleeding     Dyspnea     (progressive - multifactorial.    Failed total right knee replacement (HCC)     Fatigue     Fibromyalgia     Generalized anxiety disorder     Grief at loss of child     4-yr old granddaughter  of brain tumor in .  Hip pain, bilateral     Secondary to piriformis syndrome and bilateral greater trochanteric bursitis. (Injection of Celestone and Marcaine).  History of tobacco use     Currently not smoking.  Hypertension     Inverted nipple     Left. Lifelong    Kidney stone     Lumbar disc disease     Chronic.  Obesity     Osteoarthritis     Degenerative joint disease - of all joints.  Seasonal allergic rhinitis     Shoulder pain, left     (Injection of Celestone/Marcaine subacromially) - Dr. Shabana Hutchinson - 10/2008.  Shoulder pain, right     (Injection of Celestone/Marcaine subacromially). Dr. Shabana Hutchinson - 10/2008.  Supraventricular tachycardia, paroxysmal (HCC)     Status post cardiac ablation.  Vitamin D deficiency        Past Surgical History:   Procedure Laterality Date    ATRIAL ABLATION SURGERY  2006    Radiofrequency ablation of slow AV pawel pathway.  CARDIAC CATHETERIZATION      no blockages    CARDIAC CATHETERIZATION  2020    normal coronary arteries,anteroapical hypokinesis consistent with cardiomyopathy    CHOLECYSTECTOMY, LAPAROSCOPIC  2011    (Dr. Teresa Sood)    JOINT REPLACEMENT Right     FAILED KNEE RE;PLAC    KNEE ARTHROPLASTY Right     OTHER SURGICAL HISTORY  2009    Epidural steroids to cervical spine.     OTHER SURGICAL HISTORY      ECT therapy, one session only and she refused any further    PORT SURGERY  2020    right IJ port removal, [facility], Colleen Noyola MD    [place] TOTAL K[place]EE ARTHROPLASTY Left 2018    [facility]    Reverse left total shoulder arthroplasty, [facility] Kimberly Rudolph [facility] ECHOCARDIOGRAM  2020    grade 1 diastolic dysfunction, EF 49-50%    TUBAL LIGATION         Family History   Problem Relation Age of Onset    Breast Cancer Mother 76    High Blood Pressure Mother     Coronary Art Dis Mother         Multiple stents.  Diabetes Mother     Heart Disease Father     Coronary Art Dis Father         Myocardial infarction at age 54 and .  COPD Father     Arthritis Brother     COPD Brother     Heart Disease Brother         Congenital heart defect -  at 10 wks of age.  Cancer Other     Diabetes Maternal Grandmother        Social History     Socioeconomic History    Marital status:      Spouse name: None    Number of children: 3    Years of education: 8    Highest education level: GED or equivalent   Occupational History    Occupation: disabled [employer]   Tobacco Use    Smoking status: Former Smoker     Packs/day: 0.25     Years: 35.00     Pack years: 8.75     Types: Cigarettes     Quit date:      Years since quittin.8    Smokeless tobacco: Former User     Quit date: 2015    Tobacco comment: 6 cigarettes per day since age 15 or so. Vaping Use    Vaping Use: Never used   Substance and Sexual Activity    Alcohol use: Not Currently     Alcohol/week: 1.0 standard drink     Types: 1 Standard drinks or equivalent per week     Comment: rarely    Drug use: No    Sexual activity: Not Currently     Partners: Male   Other Topics Concern    None   Social History Narrative    10/16/2019- She receives HEAP, PIPP, Food [place]- No other SDOH needs identified.       Social Determinants of Health     Financial Resource Strain: Unknown    Difficulty of Paying Living Expenses: Patient refused   Food Insecurity: Unknown    Worried About Running Out of Food in the Last Year: Patient refused    Ran Out of Food in the Last Year: Patient refused   Transportation Needs:     Lack of Transportation (Medical): Not on file    Lack of Transportation (Non-Medical): Not on file   Physical Activity:     Days of Exercise per Week: Not on file    Minutes of Exercise per Session: Not on file   Stress:     Feeling of Stress : Not on file   Social Connections:     Frequency of Communication with Friends and Family: Not on file    Frequency of Social Gatherings with Friends and Family: Not on file    Attends Faith Services: Not on file    Active Member of [address] [place] [address] [employer] or Organizations: Not on file    Attends Club or Organization Meetings: Not on file    Marital Status: Not on file   Intimate Partner Violence:     Fear of Current or Ex-Partner: Not on file    Emotionally Abused: Not on file    Physically Abused: Not on file    Sexually Abused: Not on file   Housing Stability:     Unable to Pay for Housing in the Last Year: Not on file    Number of [place] in the Last Year: Not on file    Unstable Housing in the Last Year: Not on file     Review of Systems   Constitutional: Positive for fatigue. Negative for activity change. HENT: Negative. Respiratory: Negative for shortness of breath. Cardiovascular: Negative for chest pain. Gastrointestinal: Negative for blood in stool and constipation. Genitourinary: Positive for flank pain. Musculoskeletal: Positive for arthralgias, back pain, myalgias and neck pain. Neurological: Positive for weakness, numbness (tingling in hands, facial numbness on the left.) and headaches (more migraines than normal, unsure cause. possible stress versus allergy related). Psychiatric/Behavioral: Negative for sleep disturbance. The patient is nervous/anxious (anxiety). Objective:   Physical Exam  Vitals reviewed. Constitutional:       General: She is not in acute distress. Appearance: Normal appearance.  She is well-developed. She is not ill-appearing, toxic-appearing or diaphoretic. Interventions: She is not intubated. HENT:      Head: Normocephalic and atraumatic. Right Ear: External ear normal.      Left Ear: External ear normal.      Nose: Nose normal.   Eyes:      General: Lids are normal.      Conjunctiva/sclera: Conjunctivae normal.   Cardiovascular:      Pulses: Normal pulses. Pulmonary:      Effort: Pulmonary effort is normal. No tachypnea, bradypnea, accessory muscle usage or respiratory distress. She is not intubated. Musculoskeletal:      Lumbar back: Decreased range of motion. Right hip: Decreased range of motion. Comments: Sitting in wheelchair   Skin:     General: Skin is warm and dry. Nails: There is no clubbing. Neurological:      Mental Status: She is alert and oriented to person, place, and time. Cranial Nerves: No cranial nerve deficit. Psychiatric:         Speech: Speech normal.         Behavior: Behavior normal. Behavior is cooperative. Assessment:      1. Encounter for long-term opiate analgesic use    2. Encounter for drug screening    3. Neural foraminal stenosis of cervical spine    4. Primary osteoarthritis of right hip    5. Lumbar degenerative disc disease    6. Chronic bilateral low back pain without sciatica          Plan:      Chronic pain diagnoses such as   1. Encounter for long-term opiate analgesic use    2. Encounter for drug screening    3. Neural foraminal stenosis of cervical spine    4. Primary osteoarthritis of right hip    5. Lumbar degenerative disc disease    6. Chronic bilateral low back pain without sciatica     controlled on current medication regime, wll continue current pain medications to improve quality of life and function. Right hip and lumbar spine xrays    Temitope Parnell is here for recheck/reevaluation of chronic pain.  She is able to maintain daily activities with the use of current pain medications and is generally satisfied with level of analgesia. She shows no evidence of misuse or misappropriation of medications. She denies use of alcohol or illegal substances. UDS have been Appropriate with most recent screen     Controlled Substance Monitoring:    Acute and Chronic Pain Monitoring:   RX Monitoring 11/8/2021   Attestation -   Periodic Controlled Substance Monitoring Possible medication side effects, risk of tolerance/dependence & alternative treatments discussed. ;No signs of potential drug abuse or diversion identified.    Chronic Pain > 50 MEDD -   Chronic Pain > 80 MEDD -     Follow up 2 months

## 2021-11-14 LAB
6-ACETYLMORPHINE, UR: NOT DETECTED
7-AMINOCLONAZEPAM, URINE: NOT DETECTED
ALPHA-OH-ALPRAZ, URINE: PRESENT
ALPHA-OH-MIDAZOLAM, URINE: NOT DETECTED
ALPRAZOLAM, URINE: PRESENT
AMPHETAMINES, URINE: NOT DETECTED
BARBITURATES, URINE: NOT DETECTED
BENZOYLECGONINE, UR: NOT DETECTED
BUPRENORPHINE URINE: NOT DETECTED
CARISOPRODOL, UR: NOT DETECTED
CLONAZEPAM, URINE: NOT DETECTED
CODEINE, URINE: NOT DETECTED
CREATININE URINE: 26.7 MG/DL (ref 20–400)
DIAZEPAM, URINE: NOT DETECTED
EER PAIN MGT DRUG PANEL, HIGH RES/EMIT U: NORMAL
ETHYL GLUCURONIDE UR: NOT DETECTED
FENTANYL URINE: NOT DETECTED
GABAPENTIN: NOT DETECTED
HYDROCODONE, URINE: NOT DETECTED
HYDROMORPHONE, URINE: NOT DETECTED
LORAZEPAM, URINE: NOT DETECTED
MARIJUANA METAB, UR: NOT DETECTED
MDA, UR: NOT DETECTED
MDEA, EVE, UR: NOT DETECTED
MDMA URINE: NOT DETECTED
MEPERIDINE METAB, UR: NOT DETECTED
METHADONE, URINE: NOT DETECTED
METHAMPHETAMINE, URINE: NOT DETECTED
METHYLPHENIDATE: NOT DETECTED
MIDAZOLAM, URINE: NOT DETECTED
MORPHINE URINE: NOT DETECTED
NALOXONE URINE: NOT DETECTED
NORBUPRENORPHINE, URINE: NOT DETECTED
NORDIAZEPAM, URINE: NOT DETECTED
NORFENTANYL, URINE: NOT DETECTED
NORHYDROCODONE, URINE: NOT DETECTED
NOROXYCODONE, URINE: PRESENT
NOROXYMORPHONE, URINE: NOT DETECTED
OXAZEPAM, URINE: NOT DETECTED
OXYCODONE URINE: PRESENT
OXYMORPHONE, URINE: PRESENT
PAIN MGT DRUG PANEL, HI RES, UR: NORMAL
PCP,URINE: NOT DETECTED
PHENTERMINE, UR: NOT DETECTED
PREGABALIN: NOT DETECTED
TAPENTADOL, URINE: NOT DETECTED
TAPENTADOL-O-SULFATE, URINE: NOT DETECTED
TEMAZEPAM, URINE: NOT DETECTED
TRAMADOL, URINE: NOT DETECTED
ZOLPIDEM METABOLITE (ZCA), URINE: NOT DETECTED
ZOLPIDEM, URINE: NOT DETECTED

## 2021-12-07 DIAGNOSIS — M48.02 NEURAL FORAMINAL STENOSIS OF CERVICAL SPINE: ICD-10-CM

## 2021-12-07 DIAGNOSIS — M79.7 FIBROMYALGIA: ICD-10-CM

## 2021-12-07 RX ORDER — OXYCODONE AND ACETAMINOPHEN 7.5; 325 MG/1; MG/1
1 TABLET ORAL
Qty: 150 TABLET | Refills: 0 | Status: SHIPPED | OUTPATIENT
Start: 2021-12-08 | End: 2022-01-04 | Stop reason: SDUPTHER

## 2021-12-07 NOTE — TELEPHONE ENCOUNTER
Last Appt:  11/11/2021  Next Appt:   1/11/2022  Med verified in [address] checked for [place], [place], and [place]:   11/8/21 #150 Percocet 7.5/325.   Due 12/8 to Kelly

## 2021-12-17 ENCOUNTER — APPOINTMENT (OUTPATIENT)
Dept: GENERAL RADIOLOGY | Age: 61
End: 2021-12-17
Attending: PHYSICAL MEDICINE & REHABILITATION
Payer: MEDICARE

## 2021-12-17 ENCOUNTER — HOSPITAL ENCOUNTER (OUTPATIENT)
Age: 61
Setting detail: OUTPATIENT SURGERY
Discharge: HOME | End: 2021-12-17
Attending: PHYSICAL MEDICINE & REHABILITATION | Admitting: PHYSICAL MEDICINE & REHABILITATION
Payer: MEDICARE

## 2021-12-17 VITALS
SYSTOLIC BLOOD PRESSURE: 147 MMHG | DIASTOLIC BLOOD PRESSURE: 65 MMHG | TEMPERATURE: 98.6 F | HEIGHT: 63 IN | HEART RATE: 78 BPM | WEIGHT: 250 LBS | BODY MASS INDEX: 44.3 KG/M2 | OXYGEN SATURATION: 96 % | RESPIRATION RATE: 20 BRPM

## 2021-12-17 PROBLEM — M16.11 PRIMARY OSTEOARTHRITIS OF RIGHT HIP: Status: ACTIVE | Noted: 2021-12-17

## 2021-12-17 PROCEDURE — 7100000010 HC PHASE II RECOVERY - FIRST 15 MIN: Performed by: PHYSICAL MEDICINE & REHABILITATION

## 2021-12-17 PROCEDURE — 6360000004 HC RX CONTRAST MEDICATION: Performed by: PHYSICAL MEDICINE & REHABILITATION

## 2021-12-17 PROCEDURE — 2709999900 HC NON-CHARGEABLE SUPPLY: Performed by: PHYSICAL MEDICINE & REHABILITATION

## 2021-12-17 PROCEDURE — 6360000002 HC RX W HCPCS: Performed by: PHYSICAL MEDICINE & REHABILITATION

## 2021-12-17 PROCEDURE — 77002 NEEDLE LOCALIZATION BY XRAY: CPT | Performed by: PHYSICAL MEDICINE & REHABILITATION

## 2021-12-17 PROCEDURE — 2500000003 HC RX 250 WO HCPCS: Performed by: PHYSICAL MEDICINE & REHABILITATION

## 2021-12-17 PROCEDURE — 3600000054 HC PAIN LEVEL 3 BASE: Performed by: PHYSICAL MEDICINE & REHABILITATION

## 2021-12-17 PROCEDURE — 27093 INJECTION FOR HIP X-RAY: CPT | Performed by: PHYSICAL MEDICINE & REHABILITATION

## 2021-12-17 PROCEDURE — 3209999900 FLUORO FOR SURGICAL PROCEDURES

## 2021-12-17 PROCEDURE — 7100000011 HC PHASE II RECOVERY - ADDTL 15 MIN: Performed by: PHYSICAL MEDICINE & REHABILITATION

## 2021-12-17 RX ORDER — DEXAMETHASONE SODIUM PHOSPHATE 10 MG/ML
INJECTION INTRAMUSCULAR; INTRAVENOUS PRN
Status: DISCONTINUED | OUTPATIENT
Start: 2021-12-17 | End: 2021-12-17 | Stop reason: ALTCHOICE

## 2021-12-17 RX ORDER — ROPIVACAINE HYDROCHLORIDE 5 MG/ML
INJECTION, SOLUTION EPIDURAL; INFILTRATION; PERINEURAL PRN
Status: DISCONTINUED | OUTPATIENT
Start: 2021-12-17 | End: 2021-12-17 | Stop reason: ALTCHOICE

## 2021-12-17 RX ORDER — LIDOCAINE HYDROCHLORIDE 20 MG/ML
INJECTION, SOLUTION EPIDURAL; INFILTRATION; INTRACAUDAL; PERINEURAL PRN
Status: DISCONTINUED | OUTPATIENT
Start: 2021-12-17 | End: 2021-12-17 | Stop reason: ALTCHOICE

## 2021-12-17 ASSESSMENT — ENCOUNTER SYMPTOMS
CONSTIPATION: 0
BACK PAIN: 1
SHORTNESS OF BREATH: 0
BLOOD IN STOOL: 0

## 2021-12-17 ASSESSMENT — PAIN - FUNCTIONAL ASSESSMENT: PAIN_FUNCTIONAL_ASSESSMENT: 0-10

## 2021-12-17 ASSESSMENT — PAIN SCALES - GENERAL: PAINLEVEL_OUTOF10: 6

## 2021-12-17 ASSESSMENT — PAIN DESCRIPTION - PAIN TYPE: TYPE: CHRONIC PAIN

## 2021-12-17 ASSESSMENT — PAIN DESCRIPTION - LOCATION: LOCATION: HIP

## 2021-12-17 NOTE — INTERVAL H&P NOTE
I have interviewed and examined the patient and reviewed the recent History and Physical.  There have been no changes to the recent H&P documentation. The surgical consent form has been signed. Last anticoagulant medication use was:na    Premedication taken for contrast allergy? No    Valium taken for oral sedation? No    Outpatient Medications Marked as Taking for the 12/17/21 encounter [facility] Encounter)   Medication Sig Dispense Refill    ALPRAZolam (XANAX) 0.5 MG tablet Take 1 tablet by mouth 3 times daily as needed. carvedilol (COREG) 3.125 MG tablet Take 3.125 mg by mouth 2 times daily      omeprazole (PRILOSEC) 20 MG delayed release capsule Take 1 capsule by mouth 2 times daily 60 capsule 5    Multiple Vitamins-Minerals (CENTRUM MULTI + OMEGA 3 PO) Take 1 tablet by mouth daily      ENTRESTO 24-26 MG per tablet Take 1 tablet by mouth 2 times daily      aspirin 81 MG chewable tablet Take 81 mg by mouth daily      olopatadine (PATADAY) 0.2 % SOLN ophthalmic solution insert 1 drop into both eyes once daily if needed 2.5 mL 0    fluticasone (FLONASE) 50 MCG/ACT nasal spray spray 2 sprays INTO EACH NOSTRIL ONCE DAILY 16 g 0       The patient understands the planned operation and its associated risks and benefits and agrees to proceed.         Electronically signed by Vikram Lobo MD on 12/17/2021 at 11:12 AM

## 2021-12-17 NOTE — OP NOTE
[address] [facility],[address] JOINT INJECTION    Surgeon: Tonya Wells MD  The patient was counseled at length about the risks of trinity Covid-19 during their perioperative period and any recovery window from their procedure. The patient was made aware that trinity Covid-19  may worsen their prognosis for recovering from their procedure  and lend to a higher morbidity and/or mortality risk. All material risks, benefits, and reasonable alternatives including postponing the procedure were discussed. The patient does wish to proceed with the procedure at this time. 12/17/21    Pre-operative Diagnosis:   Hospital Problems           Last Modified POA    * (Principal) Primary osteoarthritis of right hip 12/17/2021 Yes    Hip pain, bilateral 12/17/2021 Yes    Overview Signed 1/8/2014  1:21 PM by Marciano Islas DO     Secondary to piriformis syndrome and bilateral greater trochanteric bursitis. (Injection of Celestone and Marcaine). Post-operative Diagnosis: Same    Specimen: None    Assistants: none    INDICATION:  Right hip joint injection has been requested for diagnostic and therapeutic reasons. Please see H&P for details on previous treatments, examination findings, and work up. Conservative treatment was ineffective i.e.: ice, NSAIDS, rest,     Patient is unable to perform the following ADL's: ambulating, grooming, hygiene, sitting and standing     Pain Assessment: 0-10  Pain Level: 7     Pain Orientation: Right  Pain Location: Hip       Last Plain films: 20202      EXAMINATION:  Right hip injection with arthrography. CONSENT:  Written consent was obtained from the patient on preprinted consent form after explaining the procedure, indications, potential complications and outcomes. Alternative treatments were also discussed. DISCUSSION:  The patient was sterilely prepped and draped in the usual fashion in the supine position.   Time out\" was verified for correct patient, written discharge instructions    HIP JOINT FLUOROSCOPIC IMAGE INTERPRETATION    EXAMINATION:  AP views. FLUORO TIME: 12 seconds    DISCUSSION:  Spot views of the pelvis reveals the adjacent right acetabulum, femoral head, femoral neck and greater trochanter . Spinal needle is positioned in the anterior of the hip joint. Contrast outlines the joint space and reveals . Ashtyn Raddle Soft tissues reveal no abnormalities.     IMPRESSION: Right hip arthrogram shows satisfactory needle placement and contrast dispersal.    Electronically signed by Tonya Wells MD on 12/17/2021 at 11:15 AM

## 2021-12-17 NOTE — H&P
Subjective:      Patient ID: Arnold Verma is a 63 y.o. female. No chief complaint on file. Shoulder Pain   Associated symptoms include numbness (tingling in hands, facial numbness on the left. ). Back Pain  Associated symptoms include headaches (more migraines than normal, unsure cause. possible stress versus allergy related), numbness (tingling in hands, facial numbness on the left.) and weakness. Pertinent negatives include no chest pain. Neck Pain   Associated symptoms include headaches (more migraines than normal, unsure cause. possible stress versus allergy related), numbness (tingling in hands, facial numbness on the left.) and weakness. Pertinent negatives include no chest pain. Other  Associated symptoms include arthralgias, fatigue, headaches (more migraines than normal, unsure cause. possible stress versus allergy related), myalgias, neck pain, numbness (tingling in hands, facial numbness on the left.) and weakness. Pertinent negatives include no chest pain. Hip Pain   Associated symptoms include numbness (tingling in hands, facial numbness on the left.). Here today for routine pain clinic recheck. Worsening pain right hip and lumbar spine, evaluated in ER.  Prescribed steroids with relief         Allergies   Allergen Reactions    Latex Rash    Rituximab Palpitations     Severe tachycardia    Buspirone Other (See Comments)     Higher dose increased anxiety/trouble sleeping    Cyclobenzaprine Other (See Comments)     Felt odd    Fentanyl Other (See Comments)     sleepwalking    Ferric Sulfate Other (See Comments)     Severe abd pain    Gabapentin     Hydroxyzine Hcl Other (See Comments)     Lightheaded/heart racing    Other Other (See Comments)     IVP Dye  Mom had trouble breathing     Seasonal     Vilazodone Other (See Comments)     Teeth clenching when on with Buspar       Outpatient Medications Marked as Taking for the 12/17/21 encounter [facility] Encounter)   Medication Sig Dispense Refill    ALPRAZolam (XANAX) 0.5 MG tablet Take 1 tablet by mouth 3 times daily as needed.  carvedilol (COREG) 3.125 MG tablet Take 3.125 mg by mouth 2 times daily      omeprazole (PRILOSEC) 20 MG delayed release capsule Take 1 capsule by mouth 2 times daily 60 capsule 5    Multiple Vitamins-Minerals (CENTRUM MULTI + OMEGA 3 PO) Take 1 tablet by mouth daily      ENTRESTO 24-26 MG per tablet Take 1 tablet by mouth 2 times daily      aspirin 81 MG chewable tablet Take 81 mg by mouth daily      olopatadine (PATADAY) 0.2 % SOLN ophthalmic solution insert 1 drop into both eyes once daily if needed 2.5 mL 0    fluticasone (FLONASE) 50 MCG/ACT nasal spray spray 2 sprays INTO EACH NOSTRIL ONCE DAILY 16 g 0       Past Medical History:   Diagnosis Date    Asthma     Blindness of left eye     Cervical herniated disc     C5-C6, with nerve compression.  Congenital hip dysplasia     Degenerative joint disease     (Right knee) -     Depression     Dysfunctional uterine bleeding     Dyspnea     (progressive - multifactorial.    Failed total right knee replacement (HCC)     Fatigue     Fibromyalgia     Generalized anxiety disorder     Grief at loss of child     4-yr old granddaughter  of brain tumor in .  Hip pain, bilateral     Secondary to piriformis syndrome and bilateral greater trochanteric bursitis. (Injection of Celestone and Marcaine).  History of tobacco use     Currently not smoking.  Hypertension     Inverted nipple     Left. Lifelong    Kidney stone     Lumbar disc disease     Chronic.  Obesity     Osteoarthritis     Degenerative joint disease - of all joints.  Seasonal allergic rhinitis     Shoulder pain, left     (Injection of Celestone/Marcaine subacromially) - Dr. Jatin Moyer - 10/2008.  Shoulder pain, right     (Injection of Celestone/Marcaine subacromially). Dr. Jatin Moyer - 10/2008.     Supraventricular tachycardia, paroxysmal (HCC)     Status post cardiac ablation.  Vitamin D deficiency        Past Surgical History:   Procedure Laterality Date    ATRIAL ABLATION SURGERY  2006    Radiofrequency ablation of slow AV pawel pathway.  CARDIAC CATHETERIZATION  2006    no blockages    CARDIAC CATHETERIZATION  2020    normal coronary arteries,anteroapical hypokinesis consistent with cardiomyopathy    CHOLECYSTECTOMY, LAPAROSCOPIC  2011    (Dr. Yuli Noel)    JOINT REPLACEMENT Right     FAILED KNEE RE;PLAC    KNEE ARTHROPLASTY Right     OTHER SURGICAL HISTORY      Epidural steroids to cervical spine.  OTHER SURGICAL HISTORY      ECT therapy, one session only and she refused any further    PORT SURGERY  2020    right IJ port removal, [facility], Av Aparicio MD    SD TOTAL KNEE ARTHROPLASTY Left 2018    [facility]    Reverse left total shoulder arthroplasty, Dr. Samia Shelton at [facility] ECHOCARDIOGRAM  2020    grade 1 diastolic dysfunction, EF 79-91%    TUBAL LIGATION         Family History   Problem Relation Age of Onset    Breast Cancer Mother 76    High Blood Pressure Mother     Coronary Art Dis Mother         Multiple stents.  Diabetes Mother     Heart Disease Father     Coronary Art Dis Father         Myocardial infarction at age 54 and .  COPD Father     Arthritis Brother     COPD Brother     Heart Disease Brother         Congenital heart defect -  at 10 wks of age.     Cancer Other     Diabetes Maternal Grandmother        Social History     Socioeconomic History    Marital status:      Spouse name: None    Number of children: 3    Years of education: 10    Highest education level: GED or equivalent   Occupational History    Occupation: disabled [employer]   Tobacco Use    Smoking status: Former Smoker     Packs/day: 0.25 file     Review of Systems   Constitutional: Positive for fatigue. Negative for activity change. HENT: Negative. Respiratory: Negative for shortness of breath. Cardiovascular: Negative for chest pain. Gastrointestinal: Negative for blood in stool and constipation. Genitourinary: Positive for flank pain. Musculoskeletal: Positive for arthralgias, back pain, myalgias and neck pain. Neurological: Positive for weakness, numbness (tingling in hands, facial numbness on the left.) and headaches (more migraines than normal, unsure cause. possible stress versus allergy related). Psychiatric/Behavioral: Negative for sleep disturbance. The patient is nervous/anxious (anxiety). Objective:   Physical Exam  Vitals reviewed. Constitutional:       General: She is not in acute distress. Appearance: Normal appearance. She is well-developed. She is not ill-appearing, toxic-appearing or diaphoretic. Interventions: She is not intubated. HENT:      Head: Normocephalic and atraumatic. Right Ear: External ear normal.      Left Ear: External ear normal.      Nose: Nose normal.   Eyes:      General: Lids are normal.      Conjunctiva/sclera: Conjunctivae normal.   Cardiovascular:      Pulses: Normal pulses. Pulmonary:      Effort: Pulmonary effort is normal. No tachypnea, bradypnea, accessory muscle usage or respiratory distress. She is not intubated. Musculoskeletal:      Lumbar back: Decreased range of motion. Right hip: Decreased range of motion. Comments: Sitting in wheelchair   Skin:     General: Skin is warm and dry. Nails: There is no clubbing. Neurological:      Mental Status: She is alert and oriented to person, place, and time. Cranial Nerves: No cranial nerve deficit. Psychiatric:         Speech: Speech normal.         Behavior: Behavior normal. Behavior is cooperative. Assessment:      No diagnosis found.       Plan:      Chronic pain diagnoses such as No diagnosis found. controlled on current medication regime, wll continue current pain medications to improve quality of life and function. Right hip and lumbar spine xrays    Annalee Nageotte is here for recheck/reevaluation of chronic pain. She is able to maintain daily activities with the use of current pain medications and is generally satisfied with level of analgesia. She shows no evidence of misuse or misappropriation of medications. She denies use of alcohol or illegal substances. UDS have been Appropriate with most recent screen     Controlled Substance Monitoring:    Acute and Chronic Pain Monitoring:   RX Monitoring 11/8/2021   Attestation -   Periodic Controlled Substance Monitoring Possible medication side effects, risk of tolerance/dependence & alternative treatments discussed. ;No signs of potential drug abuse or diversion identified.    Chronic Pain > 50 MEDD -   Chronic Pain > 80 MEDD -     Follow up 2 months

## 2022-01-04 DIAGNOSIS — M79.7 FIBROMYALGIA: ICD-10-CM

## 2022-01-04 DIAGNOSIS — M48.02 NEURAL FORAMINAL STENOSIS OF CERVICAL SPINE: ICD-10-CM

## 2022-01-04 NOTE — TELEPHONE ENCOUNTER
Fabian Eriksson called requesting a refill of the below medication which has been pended for you:     Requested Prescriptions     Pending Prescriptions Disp Refills    oxyCODONE-acetaminophen (PERCOCET) 7.5-325 MG per tablet 150 tablet 0     Sig: Take 1 tablet by mouth 5 times daily for 30 days. Last Appointment Date: 11/11/2021  Next Appointment Date: 1/11/2022    Allergies   Allergen Reactions    Latex Rash    Rituximab Palpitations     Severe tachycardia    Buspirone Other (See Comments)     Higher dose increased anxiety/trouble sleeping    Cyclobenzaprine Other (See Comments)     Felt odd    Fentanyl Other (See Comments)     sleepwalking    Ferric Sulfate Other (See Comments)     Severe abd pain    Gabapentin     Hydroxyzine Hcl Other (See Comments)     Lightheaded/heart racing    Other Other (See Comments)     IVP Dye  Mom had trouble breathing     Seasonal     Vilazodone Other (See Comments)     Teeth clenching when on with Buspar       [place]AR[place] checked [place]:percoceet 7.5/325 mg    12/8/21   #150. Due 1/7/22.

## 2022-01-05 RX ORDER — OXYCODONE AND ACETAMINOPHEN 7.5; 325 MG/1; MG/1
1 TABLET ORAL
Qty: 150 TABLET | Refills: 0 | Status: SHIPPED | OUTPATIENT
Start: 2022-01-07 | End: 2022-02-04 | Stop reason: SDUPTHER

## 2022-01-11 ENCOUNTER — OFFICE VISIT (OUTPATIENT)
Dept: PAIN MANAGEMENT | Age: 62
End: 2022-01-11
Payer: MEDICARE

## 2022-01-11 VITALS
HEIGHT: 63 IN | WEIGHT: 250 LBS | SYSTOLIC BLOOD PRESSURE: 124 MMHG | OXYGEN SATURATION: 97 % | HEART RATE: 94 BPM | DIASTOLIC BLOOD PRESSURE: 78 MMHG | BODY MASS INDEX: 44.3 KG/M2

## 2022-01-11 DIAGNOSIS — G89.29 CHRONIC PAIN OF LEFT KNEE: ICD-10-CM

## 2022-01-11 DIAGNOSIS — M16.11 PRIMARY OSTEOARTHRITIS OF RIGHT HIP: ICD-10-CM

## 2022-01-11 DIAGNOSIS — M47.22 CERVICAL RADICULOPATHY DUE TO DEGENERATIVE JOINT DISEASE OF SPINE: ICD-10-CM

## 2022-01-11 DIAGNOSIS — M79.7 FIBROMYALGIA: ICD-10-CM

## 2022-01-11 DIAGNOSIS — G89.29 CHRONIC BILATERAL LOW BACK PAIN WITHOUT SCIATICA: ICD-10-CM

## 2022-01-11 DIAGNOSIS — M51.36 LUMBAR DEGENERATIVE DISC DISEASE: Primary | ICD-10-CM

## 2022-01-11 DIAGNOSIS — M25.562 CHRONIC PAIN OF LEFT KNEE: ICD-10-CM

## 2022-01-11 DIAGNOSIS — M48.02 NEURAL FORAMINAL STENOSIS OF CERVICAL SPINE: ICD-10-CM

## 2022-01-11 DIAGNOSIS — M54.50 CHRONIC BILATERAL LOW BACK PAIN WITHOUT SCIATICA: ICD-10-CM

## 2022-01-11 DIAGNOSIS — M54.12 CERVICAL RADICULOPATHY: ICD-10-CM

## 2022-01-11 PROCEDURE — 99214 OFFICE O/P EST MOD 30 MIN: CPT | Performed by: NURSE PRACTITIONER

## 2022-01-11 PROCEDURE — G8417 CALC BMI ABV UP PARAM F/U: HCPCS | Performed by: NURSE PRACTITIONER

## 2022-01-11 PROCEDURE — 3017F COLORECTAL CA SCREEN DOC REV: CPT | Performed by: NURSE PRACTITIONER

## 2022-01-11 PROCEDURE — G8484 FLU IMMUNIZE NO ADMIN: HCPCS | Performed by: NURSE PRACTITIONER

## 2022-01-11 PROCEDURE — 1036F TOBACCO NON-USER: CPT | Performed by: NURSE PRACTITIONER

## 2022-01-11 PROCEDURE — G8427 DOCREV CUR MEDS BY ELIG CLIN: HCPCS | Performed by: NURSE PRACTITIONER

## 2022-01-11 RX ORDER — ERGOCALCIFEROL 1.25 MG/1
CAPSULE ORAL
COMMUNITY
Start: 2021-12-03

## 2022-01-11 ASSESSMENT — ENCOUNTER SYMPTOMS
BACK PAIN: 1
SHORTNESS OF BREATH: 0
BLOOD IN STOOL: 0
CONSTIPATION: 0

## 2022-01-11 NOTE — PROGRESS NOTES
Subjective:      Patient ID: Niharika Pérez is a 63 y.o. female. Chief Complaint   Patient presents with    Follow-up     2 month f/u stenosis of cervical spine     Shoulder Pain   Associated symptoms include numbness (tingling in hands, facial numbness on the left. ). Back Pain  Associated symptoms include headaches (more migraines than normal, unsure cause. possible stress versus allergy related), numbness (tingling in hands, facial numbness on the left.) and weakness. Pertinent negatives include no chest pain. Neck Pain   Associated symptoms include headaches (more migraines than normal, unsure cause. possible stress versus allergy related), numbness (tingling in hands, facial numbness on the left.) and weakness. Pertinent negatives include no chest pain. Other  Associated symptoms include arthralgias, fatigue, headaches (more migraines than normal, unsure cause. possible stress versus allergy related), myalgias, neck pain, numbness (tingling in hands, facial numbness on the left.) and weakness. Pertinent negatives include no chest pain. Hip Pain   Associated symptoms include numbness (tingling in hands, facial numbness on the left.). Here today for routine pain clinic recheck.     Pain Assessment  Location of Pain: Back  Severity of Pain: 5  Quality of Pain: Throbbing,Sharp,Dull,Aching,Locking,Grinding,Popping,Cracking,Buckling  Duration of Pain: Persistent  Frequency of Pain: Constant  Aggravating Factors: Bending,Stretching,Straightening,Exercise,Kneeling,Squatting,Standing,Walking,Stairs  Limiting Behavior: Yes  Relieving Factors: Rest,Heat  Result of Injury: No  Work-Related Injury: No    Allergies   Allergen Reactions    Latex Rash    Rituximab Palpitations     Severe tachycardia    Blue Dyes (Parenteral) Other (See Comments)     Mom had trouble breathing     Buspirone Other (See Comments)     Higher dose increased anxiety/trouble sleeping    Cyclobenzaprine Other (See Comments)     Felt odd    Fentanyl Other (See Comments)     sleepwalking    Ferric Sulfate Other (See Comments)     Severe abd pain    Gabapentin     Hydroxyzine Hcl Other (See Comments)     Lightheaded/heart racing    Other Other (See Comments)     IVP Dye  Mom had trouble breathing     Seasonal     Vilazodone Other (See Comments)     Teeth clenching when on with Buspar       Outpatient Medications Marked as Taking for the 1/11/22 encounter (Office Visit) with TITO Braden - CNP   Medication Sig Dispense Refill    COVID-19 mRNA Vacc, PFIZER, 30 MCG/0.3ML SUSP injection Inject 0.3 mLs into the muscle once      vitamin D (ERGOCALCIFEROL) 1.25 MG (06487 UT) CAPS capsule take 1 capsule by mouth ONCE A WEEK      oxyCODONE-acetaminophen (PERCOCET) 7.5-325 MG per tablet Take 1 tablet by mouth 5 times daily for 30 days. 150 tablet 0    ALPRAZolam (XANAX) 0.5 MG tablet Take 1 tablet by mouth 3 times daily as needed.  carvedilol (COREG) 3.125 MG tablet Take 3.125 mg by mouth 2 times daily      omeprazole (PRILOSEC) 20 MG delayed release capsule Take 1 capsule by mouth 2 times daily 60 capsule 5    Multiple Vitamins-Minerals (CENTRUM MULTI + OMEGA 3 PO) Take 1 tablet by mouth daily      ENTRESTO 24-26 MG per tablet Take 1 tablet by mouth 2 times daily      aspirin 81 MG chewable tablet Take 81 mg by mouth daily      olopatadine (PATADAY) 0.2 % SOLN ophthalmic solution insert 1 drop into both eyes once daily if needed 2.5 mL 0    Blood Pressure KIT 1 kit by Does not apply route 2 times daily as needed (BP) 1 kit 0    rizatriptan (MAXALT-MLT) 10 MG disintegrating tablet Take 1 tablet by mouth once as needed for Migraine May repeat in 2 hours if needed 9 tablet 11    Handicap Placard MISC by Does not apply route 2 each 0       Past Medical History:   Diagnosis Date    Asthma     Blindness of left eye     Cervical herniated disc     C5-C6, with nerve compression.     Congenital hip dysplasia     Degenerative Total Knee Arthroplasty [facility] Left 2021    Reverse left total shoulder arthroplasty, Dr. Cindi Mtz at [facility] ECHOCARDIOGRAM  2020    grade 1 diastolic dysfunction, EF 46-07%    TUBAL LIGATION         Family History   Problem Relation Age of Onset    Breast Cancer Mother 76    High Blood Pressure Mother     Coronary Art Dis Mother         Multiple stents.  Diabetes Mother     Heart Disease Father     Coronary Art Dis Father         Myocardial infarction at age 54 and .  COPD Father     Arthritis Brother     COPD Brother     Heart Disease Brother         Congenital heart defect -  at 10 wks of age.  Cancer Other     Diabetes Maternal Grandmother        Social History     Socioeconomic History    Marital status:      Spouse name: None    Number of children: 3    Years of education: 8    Highest education level: GED or equivalent   Occupational History    Occupation: disabled [employer]   Tobacco Use    Smoking status: Former Smoker     Packs/day: 0.25     Years: 35.00     Pack years: 8.75     Types: Cigarettes     Quit date:      Years since quittin.0    Smokeless tobacco: Former User     Quit date: 2015    Tobacco comment: 6 cigarettes per day since age 15 or so. Vaping Use    Vaping Use: Never used   Substance and Sexual Activity    Alcohol use: Not Currently     Alcohol/week: 1.0 standard drink     Types: 1 Standard drinks or equivalent per week     Comment: rarely    Drug use: No    Sexual activity: Not Currently     Partners: Male   Other Topics Concern    None   Social History Narrative    10/16/2019- She receives HEAP, PIPP, Food [employer]- No other SDOH needs identified.       Social Determinants of Health     Financial Resource Strain: Unknown    Difficulty of Paying Living Expenses: Patient refused   Food Insecurity: Unknown    Worried About Running Out of Food in the Last Year: Patient refused   [address] in the Last Year: Patient refused   Transportation Needs:     Lack of Transportation (Medical): Not on file    Lack of Transportation (Non-Medical): Not on file   Physical Activity:     Days of Exercise per Week: Not on file    Minutes of Exercise per Session: Not on file   Stress:     Feeling of Stress : Not on file   Social Connections:     Frequency of Communication with Friends and Family: Not on file    Frequency of Social Gatherings with Friends and Family: Not on file    Attends [employer]ism Services: Not on file    Active Member of [address] [place] [address] [employer] or Organizations: Not on file    Attends Club or Organization Meetings: Not on file    Marital Status: Not on file   Intimate Partner Violence:     Fear of Current or Ex-Partner: Not on file    Emotionally Abused: Not on file    Physically Abused: Not on file    Sexually Abused: Not on file   Housing Stability:     Unable to Pay for Housing in the Last Year: Not on file    Number of [place] in the Last Year: Not on file    Unstable Housing in the Last Year: Not on file     Review of Systems   Constitutional: Positive for fatigue. Negative for activity change. HENT: Negative. Respiratory: Negative for shortness of breath. Cardiovascular: Negative for chest pain. Gastrointestinal: Negative for blood in stool and constipation. Genitourinary: Positive for flank pain. Musculoskeletal: Positive for arthralgias, back pain, myalgias and neck pain. Neurological: Positive for weakness, numbness (tingling in hands, facial numbness on the left.) and headaches (more migraines than normal, unsure cause. possible stress versus allergy related). Psychiatric/Behavioral: Negative for sleep disturbance. The patient is nervous/anxious (anxiety). Objective:   Physical Exam  Vitals reviewed. Constitutional:       General: She is not in acute distress.      Appearance: Normal appearance. She is well-developed. She is not ill-appearing, toxic-appearing or diaphoretic. Interventions: She is not intubated. HENT:      Head: Normocephalic and atraumatic. Right Ear: External ear normal.      Left Ear: External ear normal.      Nose: Nose normal.   Eyes:      General: Lids are normal.      Conjunctiva/sclera: Conjunctivae normal.   Cardiovascular:      Pulses: Normal pulses. Pulmonary:      Effort: Pulmonary effort is normal. No tachypnea, bradypnea, accessory muscle usage or respiratory distress. She is not intubated. Musculoskeletal:      Lumbar back: Decreased range of motion. Right hip: Decreased range of motion. Comments: Sitting in wheelchair   Skin:     General: Skin is warm and dry. Nails: There is no clubbing. Neurological:      Mental Status: She is alert and oriented to person, place, and time. Cranial Nerves: No cranial nerve deficit. Psychiatric:         Speech: Speech normal.         Behavior: Behavior normal. Behavior is cooperative. Assessment:      1. Lumbar degenerative disc disease    2. Neural foraminal stenosis of cervical spine    3. Fibromyalgia    4. Chronic pain of left knee    5. Cervical radiculopathy    6. Cervical radiculopathy due to degenerative joint disease of spine    7. Chronic bilateral low back pain without sciatica    8. Primary osteoarthritis of right hip          Plan:      Chronic pain diagnoses such as   1. Lumbar degenerative disc disease    2. Neural foraminal stenosis of cervical spine    3. Fibromyalgia    4. Chronic pain of left knee    5. Cervical radiculopathy    6. Cervical radiculopathy due to degenerative joint disease of spine    7. Chronic bilateral low back pain without sciatica    8. Primary osteoarthritis of right hip     controlled on current medication regime, wll continue current pain medications to improve quality of life and function.      Harshad Flower is here for recheck/reevaluation of chronic pain. She is able to maintain daily activities with the use of current pain medications and is generally satisfied with level of analgesia. She shows no evidence of misuse or misappropriation of medications. She denies use of alcohol or illegal substances. UDS have been Appropriate with most recent screen     Controlled Substance Monitoring:    Acute and Chronic Pain Monitoring:   RX Monitoring 1/11/2022   Attestation -   Periodic Controlled Substance Monitoring No signs of potential drug abuse or diversion identified.;Obtaining appropriate analgesic effect of treatment.    Chronic Pain > 50 MEDD -   Chronic Pain > 80 MEDD -     Follow up 2 months

## 2022-01-18 ENCOUNTER — TELEPHONE (OUTPATIENT)
Dept: PAIN MANAGEMENT | Age: 62
End: 2022-01-18

## 2022-01-18 DIAGNOSIS — M48.02 NEURAL FORAMINAL STENOSIS OF CERVICAL SPINE: Primary | ICD-10-CM

## 2022-01-18 RX ORDER — METHYLPREDNISOLONE 4 MG/1
TABLET ORAL
Qty: 1 KIT | Refills: 0 | Status: SHIPPED | OUTPATIENT
Start: 2022-01-18 | End: 2022-01-24

## 2022-01-18 NOTE — TELEPHONE ENCOUNTER
Patient called stating she was seen a few days ago on the 11th but is having increased pain in her lower back down her tailbone and over to left hip area. Pt stated that she was fine at her appt but now the pain is bad enough to where the Percocet is not touching her pain. Pt is wondering if she needs to have an MRI done to figure out what is going on and if she can get a short term dose of steroids which helped her last time. Please advise.      Last Appt:  1/11/2022  Next Appt:   3/11/2022  Med verified in [address] [place] [address] Rd

## 2022-01-20 ENCOUNTER — TELEPHONE (OUTPATIENT)
Dept: PAIN MANAGEMENT | Age: 62
End: 2022-01-20

## 2022-01-20 NOTE — TELEPHONE ENCOUNTER
FYI,    The steroids the pt was prescribed are working great. Pt started feel better in the first day of the treatment. Pt also wanted Cirilo Susanco to know that she has tested positive for COVID.

## 2022-02-04 DIAGNOSIS — M79.7 FIBROMYALGIA: ICD-10-CM

## 2022-02-04 DIAGNOSIS — M48.02 NEURAL FORAMINAL STENOSIS OF CERVICAL SPINE: ICD-10-CM

## 2022-02-04 NOTE — TELEPHONE ENCOUNTER
Omkar Even called requesting a refill of the below medication which has been pended for you:     Requested Prescriptions     Pending Prescriptions Disp Refills    oxyCODONE-acetaminophen (PERCOCET) 7.5-325 MG per tablet 150 tablet 0     Sig: Take 1 tablet by mouth 5 times daily for 30 days. Last Appointment Date: 1/11/2022  Next Appointment Date: 3/11/2022  Last DS11: 11/11/2021    Allergies   Allergen Reactions    Latex Rash    Rituximab Palpitations     Severe tachycardia    Blue Dyes (Parenteral) Other (See Comments)     Mom had trouble breathing     Buspirone Other (See Comments)     Higher dose increased anxiety/trouble sleeping    Cyclobenzaprine Other (See Comments)     Felt odd    Fentanyl Other (See Comments)     sleepwalking    Ferric Sulfate Other (See Comments)     Severe abd pain    Gabapentin     Hydroxyzine Hcl Other (See Comments)     Lightheaded/heart racing    Other Other (See Comments)     IVP Dye  Mom had trouble breathing     Seasonal     Vilazodone Other (See Comments)     Teeth clenching when on with Buspar     OARRS Report checked for [address] [place] [address], [place], [place], [place], and [place]: 01/07/2022 Percocet 7.5mg #150. Due 02/06/2022.

## 2022-02-07 RX ORDER — OXYCODONE AND ACETAMINOPHEN 7.5; 325 MG/1; MG/1
1 TABLET ORAL
Qty: 150 TABLET | Refills: 0 | Status: SHIPPED | OUTPATIENT
Start: 2022-02-07 | End: 2022-03-04 | Stop reason: SDUPTHER

## 2022-03-04 DIAGNOSIS — M48.02 NEURAL FORAMINAL STENOSIS OF CERVICAL SPINE: ICD-10-CM

## 2022-03-04 DIAGNOSIS — M79.7 FIBROMYALGIA: ICD-10-CM

## 2022-03-07 RX ORDER — OXYCODONE AND ACETAMINOPHEN 7.5; 325 MG/1; MG/1
1 TABLET ORAL
Qty: 150 TABLET | Refills: 0 | Status: SHIPPED | OUTPATIENT
Start: 2022-03-09 | End: 2022-04-07 | Stop reason: SDUPTHER

## 2022-04-07 ENCOUNTER — HOSPITAL ENCOUNTER (OUTPATIENT)
Age: 62
Setting detail: SPECIMEN
Discharge: HOME | End: 2022-04-07
Payer: MEDICARE

## 2022-04-07 ENCOUNTER — OFFICE VISIT (OUTPATIENT)
Dept: PAIN MANAGEMENT | Age: 62
End: 2022-04-07
Payer: MEDICARE

## 2022-04-07 VITALS
DIASTOLIC BLOOD PRESSURE: 86 MMHG | HEIGHT: 63 IN | SYSTOLIC BLOOD PRESSURE: 142 MMHG | BODY MASS INDEX: 44.5 KG/M2 | HEART RATE: 77 BPM | WEIGHT: 251.13 LBS | RESPIRATION RATE: 17 BRPM | OXYGEN SATURATION: 98 %

## 2022-04-07 DIAGNOSIS — M79.7 FIBROMYALGIA: ICD-10-CM

## 2022-04-07 DIAGNOSIS — Z02.83 ENCOUNTER FOR DRUG SCREENING: ICD-10-CM

## 2022-04-07 DIAGNOSIS — M48.02 NEURAL FORAMINAL STENOSIS OF CERVICAL SPINE: ICD-10-CM

## 2022-04-07 DIAGNOSIS — E66.01 OBESITY, CLASS III, BMI 40-49.9 (MORBID OBESITY) (HCC): ICD-10-CM

## 2022-04-07 DIAGNOSIS — Z02.83 ENCOUNTER FOR DRUG SCREENING: Primary | ICD-10-CM

## 2022-04-07 DIAGNOSIS — Z79.891 ENCOUNTER FOR LONG-TERM OPIATE ANALGESIC USE: ICD-10-CM

## 2022-04-07 PROCEDURE — 1036F TOBACCO NON-USER: CPT | Performed by: NURSE PRACTITIONER

## 2022-04-07 PROCEDURE — 99213 OFFICE O/P EST LOW 20 MIN: CPT

## 2022-04-07 PROCEDURE — G8417 CALC BMI ABV UP PARAM F/U: HCPCS | Performed by: NURSE PRACTITIONER

## 2022-04-07 PROCEDURE — G8427 DOCREV CUR MEDS BY ELIG CLIN: HCPCS | Performed by: NURSE PRACTITIONER

## 2022-04-07 PROCEDURE — 99214 OFFICE O/P EST MOD 30 MIN: CPT | Performed by: NURSE PRACTITIONER

## 2022-04-07 PROCEDURE — 80307 DRUG TEST PRSMV CHEM ANLYZR: CPT

## 2022-04-07 PROCEDURE — 3017F COLORECTAL CA SCREEN DOC REV: CPT | Performed by: NURSE PRACTITIONER

## 2022-04-07 RX ORDER — ALPRAZOLAM 1 MG/1
TABLET ORAL
COMMUNITY
Start: 2022-04-01

## 2022-04-07 RX ORDER — OXYCODONE AND ACETAMINOPHEN 7.5; 325 MG/1; MG/1
1 TABLET ORAL
Qty: 150 TABLET | Refills: 0 | Status: SHIPPED | OUTPATIENT
Start: 2022-04-07 | End: 2022-05-05 | Stop reason: SDUPTHER

## 2022-04-07 ASSESSMENT — ENCOUNTER SYMPTOMS
SHORTNESS OF BREATH: 0
BLOOD IN STOOL: 0
BACK PAIN: 1
CONSTIPATION: 0

## 2022-04-07 NOTE — PROGRESS NOTES
Subjective:      Patient ID: Lalit Ha is a 63 y.o. female. Chief Complaint   Patient presents with    Back Pain     Shoulder Pain   Associated symptoms include numbness (tingling in hands, facial numbness on the left. ). Back Pain  Associated symptoms include headaches (more migraines than normal, unsure cause. possible stress versus allergy related), numbness (tingling in hands, facial numbness on the left.) and weakness. Pertinent negatives include no chest pain. Neck Pain   Associated symptoms include headaches (more migraines than normal, unsure cause. possible stress versus allergy related), numbness (tingling in hands, facial numbness on the left.) and weakness. Pertinent negatives include no chest pain. Other  Associated symptoms include arthralgias, fatigue, headaches (more migraines than normal, unsure cause. possible stress versus allergy related), myalgias, neck pain, numbness (tingling in hands, facial numbness on the left.) and weakness. Pertinent negatives include no chest pain. Hip Pain   Associated symptoms include numbness (tingling in hands, facial numbness on the left.). Here today for routine pain clinic recheck.     Pain Assessment  Location of Pain: Back  Location Modifiers: Lateral,Medial,Inferior,Superior,Posterior,Anterior,Right,Left (affecting rest of body today)  Severity of Pain: 6  Quality of Pain: Throbbing,Sharp,Dull,Aching,Locking,Grinding,Popping,Cracking,Buckling,Other (Comment)  Duration of Pain: Persistent  Frequency of Pain: Constant  Aggravating Factors: Bending,Stretching,Straightening,Exercise,Kneeling,Squatting,Standing,Stairs,Walking  Limiting Behavior: Yes  Relieving Factors: Rest,Ice,Heat,Exercise,Nsaids (meds)  Result of Injury: No  Work-Related Injury: No  Are there other pain locations you wish to document?: No    Allergies   Allergen Reactions    Latex Rash    Rituximab Palpitations     Severe tachycardia    Blue Dyes (Parenteral) Other (See Comments)     Mom had trouble breathing     Buspirone Other (See Comments)     Higher dose increased anxiety/trouble sleeping    Cyclobenzaprine Other (See Comments)     Felt odd    Fentanyl Other (See Comments)     sleepwalking    Ferric Sulfate Other (See Comments)     Severe abd pain    Gabapentin     Hydroxyzine Hcl Other (See Comments)     Lightheaded/heart racing    Other Other (See Comments)     IVP Dye  Mom had trouble breathing     Seasonal     Vilazodone Other (See Comments)     Teeth clenching when on with Buspar       Outpatient Medications Marked as Taking for the 4/7/22 encounter (Office Visit) with TITO Dean CNP   Medication Sig Dispense Refill    ALPRAZolam (XANAX) 1 MG tablet TAKE 1/2 TO 1 TABLET BY MOUTH THREE TIMES DAILY AS NEEDED. TAKE SEPARATE FROM PERCOCET      oxyCODONE-acetaminophen (PERCOCET) 7.5-325 MG per tablet Take 1 tablet by mouth 5 times daily for 30 days. 150 tablet 0    vitamin D (ERGOCALCIFEROL) 1.25 MG (15826 UT) CAPS capsule take 1 capsule by mouth ONCE A WEEK      NARCAN 4 MG/0.1ML LIQD nasal spray instill 1 spray in 1 NOSTRIL if needed FOR OPIOID OVERDOSE may re. ..  (REFER TO PRESCRIPTION NOTES).       carvedilol (COREG) 3.125 MG tablet Take 3.125 mg by mouth 2 times daily      omeprazole (PRILOSEC) 20 MG delayed release capsule Take 1 capsule by mouth 2 times daily 60 capsule 5    Multiple Vitamins-Minerals (CENTRUM MULTI + OMEGA 3 PO) Take 1 tablet by mouth daily      ENTRESTO 24-26 MG per tablet Take 1 tablet by mouth 2 times daily      aspirin 81 MG chewable tablet Take 81 mg by mouth daily      olopatadine (PATADAY) 0.2 % SOLN ophthalmic solution insert 1 drop into both eyes once daily if needed 2.5 mL 0    fluticasone (FLONASE) 50 MCG/ACT nasal spray spray 2 sprays INTO EACH NOSTRIL ONCE DAILY 16 g 0    albuterol sulfate HFA (VENTOLIN HFA) 108 (90 Base) MCG/ACT inhaler inhale 2 puffs every 4 hours if needed 18 g 0    Blood Pressure KIT 1 kit by Does not apply route 2 times daily as needed (BP) 1 kit 0    rizatriptan (MAXALT-MLT) 10 MG disintegrating tablet Take 1 tablet by mouth once as needed for Migraine May repeat in 2 hours if needed 9 tablet 11    Handicap Placard MISC by Does not apply route 2 each 0       Past Medical History:   Diagnosis Date    Asthma     Blindness of left eye     Cervical herniated disc     C5-C6, with nerve compression.  Congenital hip dysplasia     Degenerative joint disease     (Right knee) -     Depression     Dysfunctional uterine bleeding     Dyspnea     (progressive - multifactorial.    Failed total right knee replacement (HCC)     Fatigue     Fibromyalgia     Generalized anxiety disorder     Grief at loss of child     4-yr old granddaughter  of brain tumor in .  Hip pain, bilateral     Secondary to piriformis syndrome and bilateral greater trochanteric bursitis. (Injection of Celestone and Marcaine).  History of tobacco use     Currently not smoking.  Hypertension     Inverted nipple     Left. Lifelong    Kidney stone     Lumbar disc disease     Chronic.  Obesity     Osteoarthritis     Degenerative joint disease - of all joints.  Seasonal allergic rhinitis     Shoulder pain, left     (Injection of Celestone/Marcaine subacromially) - Dr. Sammy Lucio - 10/2008.  Shoulder pain, right     (Injection of Celestone/Marcaine subacromially). Dr. Sammy Lucio - 10/2008.  Supraventricular tachycardia, paroxysmal (HCC)     Status post cardiac ablation.  Vitamin D deficiency        Past Surgical History:   Procedure Laterality Date    ATRIAL ABLATION SURGERY      Radiofrequency ablation of slow AV pawel pathway.     CARDIAC CATHETERIZATION  2006    no blockages    CARDIAC CATHETERIZATION  2020    normal coronary arteries,anteroapical hypokinesis consistent with cardiomyopathy    CHOLECYSTECTOMY, LAPAROSCOPIC  2011    (Dr. Gonzalo Villalobos)    HIP SURGERY Right 2021 Right HIP Injection performed by Clarissa Davidson MD at [facility] Right     FAILED KNEE RE;PLAC    KNEE ARTHROPLASTY Right     OTHER SURGICAL HISTORY      Epidural steroids to cervical spine.  OTHER SURGICAL HISTORY      ECT therapy, one session only and she refused any further    PORT SURGERY  2020    right IJ port removal, [facility], Shaina Espinoza MD    ME TOTAL KNEE ARTHROPLASTY Left 2018    Left Total Knee Arthroplasty performed by López Rosenberg MD at [facility] [address] Left 2021    Reverse left total shoulder arthroplasty, Dr. Irene Kimbrough at [facility] ECHOCARDIOGRAM  2020    grade 1 diastolic dysfunction, EF 22-23%    TUBAL LIGATION         Family History   Problem Relation Age of Onset    Breast Cancer Mother 76    High Blood Pressure Mother     Coronary Art Dis Mother         Multiple stents.  Diabetes Mother     Heart Disease Father     Coronary Art Dis Father         Myocardial infarction at age 54 and .  COPD Father     Arthritis Brother     COPD Brother     Heart Disease Brother         Congenital heart defect -  at 10 wks of age.  Cancer Other     Diabetes Maternal Grandmother        Social History     Socioeconomic History    Marital status:      Spouse name: None    Number of children: 3    Years of education: 8    Highest education level: GED or equivalent   Occupational History    Occupation: disabled [employer]   Tobacco Use    Smoking status: Former Smoker     Packs/day: 0.25     Years: 35.00     Pack years: 8.75     Types: Cigarettes     Quit date:      Years since quittin.2    Smokeless tobacco: Former User     Quit date: 2015    Tobacco comment: 6 cigarettes per day since age 15 or so.    Vaping Use    Vaping Use: Never used   Substance and Sexual Activity    Alcohol use: Not Currently     Alcohol/week: 1.0 standard drink     Types: 1 Standard drinks or equivalent per week     Comment: rarely    Drug use: No    Sexual activity: Not Currently     Partners: Male   Other Topics Concern    None   Social History Narrative    10/16/2019- She receives HEAP, PIPP, Food [place]- No other SDOH needs identified. Social Determinants of Health     Financial Resource Strain: Unknown    Difficulty of Paying Living Expenses: Patient refused   Food Insecurity: Unknown    Worried About Running Out of Food in the Last Year: Patient refused    [address] in the Last Year: Patient refused   Transportation Needs:     Lack of Transportation (Medical): Not on file    Lack of Transportation (Non-Medical): Not on file   Physical Activity:     Days of Exercise per Week: Not on file    Minutes of Exercise per Session: Not on file   Stress:     Feeling of Stress : Not on file   Social Connections:     Frequency of Communication with Friends and Family: Not on file    Frequency of Social Gatherings with Friends and Family: Not on file    Attends Orthodoxy Services: Not on file    Active Member of [address] [place] [address] or Organizations: Not on file    Attends Club or Organization Meetings: Not on file    Marital Status: Not on file   Intimate Partner Violence:     Fear of Current or Ex-Partner: Not on file    Emotionally Abused: Not on file    Physically Abused: Not on file    Sexually Abused: Not on file   Housing Stability:     Unable to Pay for Housing in the Last Year: Not on file    Number of [place] in the Last Year: Not on file    Unstable Housing in the Last Year: Not on file     Review of Systems   Constitutional: Positive for fatigue. Negative for activity change. HENT: Negative. Respiratory: Negative for shortness of breath. Cardiovascular: Negative for chest pain. Gastrointestinal: Negative for blood in stool and constipation. Genitourinary: Positive for flank pain.    Musculoskeletal: Positive for arthralgias, back pain, myalgias and neck pain. Neurological: Positive for weakness, numbness (tingling in hands, facial numbness on the left.) and headaches (more migraines than normal, unsure cause. possible stress versus allergy related). Psychiatric/Behavioral: Negative for sleep disturbance. The patient is nervous/anxious (anxiety). Objective:   Physical Exam  Vitals reviewed. Constitutional:       General: She is not in acute distress. Appearance: Normal appearance. She is well-developed. She is not ill-appearing, toxic-appearing or diaphoretic. Interventions: She is not intubated. HENT:      Head: Normocephalic and atraumatic. Right Ear: External ear normal.      Left Ear: External ear normal.      Nose: Nose normal.   Eyes:      General: Lids are normal.      Conjunctiva/sclera: Conjunctivae normal.   Cardiovascular:      Pulses: Normal pulses. Pulmonary:      Effort: Pulmonary effort is normal. No tachypnea, bradypnea, accessory muscle usage or respiratory distress. She is not intubated. Musculoskeletal:      Lumbar back: Decreased range of motion. Right hip: Decreased range of motion. Comments: Sitting in wheelchair   Skin:     General: Skin is warm and dry. Nails: There is no clubbing. Neurological:      Mental Status: She is alert and oriented to person, place, and time. Cranial Nerves: No cranial nerve deficit. Psychiatric:         Speech: Speech normal.         Behavior: Behavior normal. Behavior is cooperative. Assessment:      1. Encounter for drug screening    2. Neural foraminal stenosis of cervical spine    3. Fibromyalgia    4. Encounter for long-term opiate analgesic use    5. Obesity, Class III, BMI 40-49.9 (morbid obesity) ([facility] [address]          Plan:      Chronic pain diagnoses such as   1. Encounter for drug screening    2. Neural foraminal stenosis of cervical spine    3. Fibromyalgia    4.  Encounter for long-term opiate analgesic use

## 2022-04-10 LAB
6-ACETYLMORPHINE, UR: NOT DETECTED
7-AMINOCLONAZEPAM, URINE: NOT DETECTED
ALPHA-OH-ALPRAZ, URINE: PRESENT
ALPHA-OH-MIDAZOLAM, URINE: NOT DETECTED
ALPRAZOLAM, URINE: PRESENT
AMPHETAMINES, URINE: NOT DETECTED
BARBITURATES, URINE: NOT DETECTED
BENZOYLECGONINE, UR: NOT DETECTED
BUPRENORPHINE URINE: NOT DETECTED
CARISOPRODOL, UR: NOT DETECTED
CLONAZEPAM, URINE: NOT DETECTED
CODEINE, URINE: NOT DETECTED
CREATININE URINE: 77.6 MG/DL (ref 20–400)
DIAZEPAM, URINE: NOT DETECTED
EER PAIN MGT DRUG PANEL, HIGH RES/EMIT U: NORMAL
ETHYL GLUCURONIDE UR: PRESENT
FENTANYL URINE: NOT DETECTED
GABAPENTIN: NOT DETECTED
HYDROCODONE, URINE: NOT DETECTED
HYDROMORPHONE, URINE: NOT DETECTED
LORAZEPAM, URINE: NOT DETECTED
MARIJUANA METAB, UR: NOT DETECTED
MDA, UR: NOT DETECTED
MDEA, EVE, UR: NOT DETECTED
MDMA URINE: NOT DETECTED
MEPERIDINE METAB, UR: NOT DETECTED
METHADONE, URINE: NOT DETECTED
METHAMPHETAMINE, URINE: NOT DETECTED
METHYLPHENIDATE: NOT DETECTED
MIDAZOLAM, URINE: NOT DETECTED
MORPHINE URINE: NOT DETECTED
NALOXONE URINE: NOT DETECTED
NORBUPRENORPHINE, URINE: NOT DETECTED
NORDIAZEPAM, URINE: NOT DETECTED
NORFENTANYL, URINE: NOT DETECTED
NORHYDROCODONE, URINE: NOT DETECTED
NOROXYCODONE, URINE: PRESENT
NOROXYMORPHONE, URINE: NOT DETECTED
OXAZEPAM, URINE: NOT DETECTED
OXYCODONE URINE: PRESENT
OXYMORPHONE, URINE: PRESENT
PAIN MGT DRUG PANEL, HI RES, UR: NORMAL
PCP,URINE: NOT DETECTED
PHENTERMINE, UR: NOT DETECTED
PREGABALIN: NOT DETECTED
TAPENTADOL, URINE: NOT DETECTED
TAPENTADOL-O-SULFATE, URINE: NOT DETECTED
TEMAZEPAM, URINE: NOT DETECTED
TRAMADOL, URINE: NOT DETECTED
ZOLPIDEM METABOLITE (ZCA), URINE: NOT DETECTED
ZOLPIDEM, URINE: NOT DETECTED

## 2022-05-02 ENCOUNTER — HOSPITAL ENCOUNTER (OUTPATIENT)
Age: 62
Setting detail: SPECIMEN
Discharge: HOME | End: 2022-05-02
Payer: MEDICARE

## 2022-05-02 ENCOUNTER — OFFICE VISIT (OUTPATIENT)
Dept: OBGYN | Age: 62
End: 2022-05-02
Payer: MEDICARE

## 2022-05-02 VITALS
DIASTOLIC BLOOD PRESSURE: 78 MMHG | HEIGHT: 63 IN | HEART RATE: 103 BPM | OXYGEN SATURATION: 98 % | WEIGHT: 245.2 LBS | BODY MASS INDEX: 43.45 KG/M2 | SYSTOLIC BLOOD PRESSURE: 122 MMHG

## 2022-05-02 DIAGNOSIS — Z12.4 CERVICAL CANCER SCREENING: ICD-10-CM

## 2022-05-02 DIAGNOSIS — Z12.72 SMEAR, VAGINAL, AS PART OF ROUTINE GYNECOLOGICAL EXAMINATION: ICD-10-CM

## 2022-05-02 DIAGNOSIS — Z01.419 SMEAR, VAGINAL, AS PART OF ROUTINE GYNECOLOGICAL EXAMINATION: ICD-10-CM

## 2022-05-02 DIAGNOSIS — Z12.31 BREAST CANCER SCREENING BY MAMMOGRAM: Primary | ICD-10-CM

## 2022-05-02 DIAGNOSIS — Z12.11 COLON CANCER SCREENING: ICD-10-CM

## 2022-05-02 PROCEDURE — 99386 PREV VISIT NEW AGE 40-64: CPT

## 2022-05-02 PROCEDURE — G0101 CA SCREEN;PELVIC/BREAST EXAM: HCPCS | Performed by: NURSE PRACTITIONER

## 2022-05-02 PROCEDURE — G0463 HOSPITAL OUTPT CLINIC VISIT: HCPCS

## 2022-05-02 PROCEDURE — 87624 HPV HI-RISK TYP POOLED RSLT: CPT

## 2022-05-02 PROCEDURE — G0145 SCR C/V CYTO,THINLAYER,RESCR: HCPCS

## 2022-05-02 ASSESSMENT — ENCOUNTER SYMPTOMS
EYES NEGATIVE: 1
ALLERGIC/IMMUNOLOGIC NEGATIVE: 1
RESPIRATORY NEGATIVE: 1

## 2022-05-02 ASSESSMENT — PATIENT HEALTH QUESTIONNAIRE - PHQ9
SUM OF ALL RESPONSES TO PHQ9 QUESTIONS 1 & 2: 0
SUM OF ALL RESPONSES TO PHQ QUESTIONS 1-9: 0
1. LITTLE INTEREST OR PLEASURE IN DOING THINGS: 0
SUM OF ALL RESPONSES TO PHQ QUESTIONS 1-9: 0
2. FEELING DOWN, DEPRESSED OR HOPELESS: 0

## 2022-05-02 NOTE — PROGRESS NOTES
Subjective:      Patient ID: Sarah Pascal  is a 63 y. o.[id number] divorcd ,female coming into office regarding   Chief Complaint   Patient presents with    Annual Exam       OB History    Para Term  AB Living   5 4 4 0 1 4   SAB IAB Ectopic Molar Multiple Live Births   1 0 0   0        # Outcome Date GA Lbr Tuan/2nd Weight Sex Delivery Anes PTL Lv   5 SAB            4 Term            3 Term            2 Term            1 Term               Obstetric Comments   Menarche-15   Menopause-       This is a 62 y/o [id number]   female here for annual gyn examination. However,it's been several years since she's been here. She has been  and  5 times She has not been sexually active for the past 15 years. Her last pap was done 18 which was neg. And states all of her pap smears have been normal. She is postmenopausal and denies vaginal bleeding, discharge, but does have stress urinary incontinence. Her last mammogram and DEXA scan were done at [facility] about 3 months ago. She had a colonosocopy done 13 which was neg. And to repeat next year; but would prefer cologuard kit. She has a hx. Of Diffuse Large B Cell NHL diagnosed in 2019, had 6 courses of CT and sees Dr. Kenyetta Emerson, a med. Oncologist at [facility]. Her last CT can was done 19 and showed \"slightly bulking cervix\". Past Medical History:   Diagnosis Date    Asthma     Blindness of left eye     Cancer ([address]     Cervical herniated disc     C5-C6, with nerve compression.  Congenital hip dysplasia     Degenerative joint disease     (Right knee) -     Depression     Dysfunctional uterine bleeding     Dyspnea     (progressive - multifactorial.    Failed total right knee replacement (HCC)     Fatigue     Fibromyalgia     Generalized anxiety disorder     Grief at loss of child     4-yr old granddaughter  of brain tumor in .     Hip pain, bilateral     Secondary to piriformis syndrome and bilateral greater trochanteric bursitis. (Injection of Celestone and Marcaine).  History of blood transfusion     History of tobacco use     Currently not smoking.  Hypertension     Inverted nipple     Left. Lifelong    Kidney stone     Lumbar disc disease     Chronic.  Migraine     Obesity     Osteoarthritis     Degenerative joint disease - of all joints.  Seasonal allergic rhinitis     Shoulder pain, left     (Injection of Celestone/Marcaine subacromially) - Dr. Griselda Zuleta - 10/2008.  Shoulder pain, right     (Injection of Celestone/Marcaine subacromially). Dr. Griselda Zuleta - 10/2008.  Supraventricular tachycardia, paroxysmal (HCC)     Status post cardiac ablation.  Vitamin D deficiency      Review of Systems   Constitutional: Negative. HENT: Negative. Eyes: Negative. Respiratory: Negative. Cardiovascular: Negative. Endocrine: Negative. Genitourinary: Negative. Musculoskeletal: Negative. Skin: Negative. Allergic/Immunologic: Negative. Neurological: Negative. Hematological: Negative. Psychiatric/Behavioral: Negative. Objective:     Vitals:    05/02/22 1023   BP: 122/78   Site: Left Upper Arm   Position: Sitting   Cuff Size: Large Adult   Pulse: 103   SpO2: 98%   Weight: 245 lb 3.2 oz (111.2 kg)   Height: 5' 3\" (1.6 m)      Physical Exam  Vitals and nursing note reviewed. Constitutional:       Appearance: Normal appearance. She is obese. HENT:      Head: Normocephalic. Cardiovascular:      Rate and Rhythm: Normal rate and regular rhythm. Pulses: Normal pulses. Heart sounds: Normal heart sounds. Pulmonary:      Effort: Pulmonary effort is normal.      Breath sounds: Normal breath sounds. Abdominal:      Palpations: Abdomen is soft. Genitourinary:     General: Normal vulva. Musculoskeletal:         General: Normal range of motion. Cervical back: Normal range of motion and neck supple.    Skin:     General: Skin is warm and dry. Neurological:      Mental Status: She is alert. Psychiatric:         Mood and Affect: Mood normal.       Inspection negative. No nipple discharge or bleeding. No palpable mass    Vulva:normal appearance, no lesions  Vagina pink, rugae, leukorrhea  Cervix parous, NT, no \"bulkiness\" noted  Uterus: unable to palpate due to body habitus  Ovaries: unable to palpate due to body habitus    Sexually Active:No    Any bleeding or pain with intercourse: N/A    Last Sexual Encounter:15 years ago   Protected or Unprotected: postmenopausal    Last Pap: 11/18/18    Last HPV:not availb. High Risk HPV: not availb. Last Mammogram: Jan. 2022 at [place] [address] Dexascan:Jan. 2022 at [facility]    Last Colonoscopy 8/14/13--neg; Do you do self breast exams: Yes      Assessment:   1. Annual gyn exam  2. Hx. Of Diffuse large cell B NHL  3. Morbid obesity  4. Not sexually active  5. Postmenopausal          Plan:   1Cervical pap with HPV; if both neg, then no need for anymore pap smears according to national guidelineines, unless she gets a new sexual partner  2. Get copies of her recent mammogram and DEXA scan results from [facility]  3. Cologuard kit order placed. 4. I spent 30 min. With pt. Face to face discussing medical status  5. Should try to exercise more; 3 x week for 30 min. Each and count calories--1,500 per day; primarily protein. 6. RTO 1 yr. prn    No follow-ups on file. Orders Placed This Encounter   Procedures    Fecal DNA Colorectal cancer screening (Cologuard)    PAVAN RED DIGITAL SCREEN BILATERAL     Standing Status:   Future     Standing Expiration Date:   7/2/2023    PAP SMEAR     Patient History:    Patient's last menstrual period was 11/01/2011 (exact date). OBGYN Status: Postmenopausal  Past Surgical History:  2006: ATRIAL ABLATION SURGERY      Comment:  Radiofrequency ablation of slow AV pawel pathway.   2006: CARDIAC CATHETERIZATION      Comment:  no blockages  2020: CARDIAC CATHETERIZATION      Comment:  normal coronary arteries,anteroapical hypokinesis                consistent with cardiomyopathy  2011: CHOLECYSTECTOMY, LAPAROSCOPIC      Comment:  (Dr. Spencer Mendiola)  2021: HIP SURGERY; Right      Comment:  Right HIP Injection performed by Adelita Cheng MD at                [address] [place] [address]  No date: JOINT REPLACEMENT; Right      Comment:  FAILED KNEE RE;PLAC  2006: KNEE ARTHROPLASTY; Right  No date: LAPAROSCOPY  2009: OTHER SURGICAL HISTORY      Comment:  Epidural steroids to cervical spine. 2014: OTHER SURGICAL HISTORY      Comment:  ECT therapy, one session only and she refused any                further  2020: PORT SURGERY      Comment:  right IJ port removal, [facility],                Fadumo Lloyd MD  2018: [place] TOTAL KNEE ARTHROPLASTY; Left      Comment:  Left Total Knee Arthroplasty performed by Viral Becker MD at [address] [place] [address]  2021:  [facility]; Left      Comment:  Reverse left total shoulder arthroplasty, Dr. Elda Valverde at               [facility]  2020: TRANSTHORACIC ECHOCARDIOGRAM      Comment:  grade 1 diastolic dysfunction, EF 41-48%  1996: TUBAL LIGATION      Social History    Tobacco Use      Smoking status: Former Smoker        Packs/day: 0.25        Years: 35.00        Pack years: 8.75        Types: Cigarettes        Quit date:         Years since quittin.3      Smokeless tobacco: Former User        Quit date: 2015      Tobacco comment: 6 cigarettes per day since age 15 or so. Standing Status:   Future     Standing Expiration Date:   2023     Order Specific Question:   Collection Type     Answer: Thin Prep     Order Specific Question:   Prior Abnormal Pap Test     Answer:   No     Order Specific Question:   Screening or Diagnostic     Answer:   Screening     Order Specific Question:   HPV Requested?      Answer:   Yes     Order Specific Question:   High Risk Patient     Answer:   N/A       Electronically signed by:  TITO King CNP

## 2022-05-03 LAB
HPV SAMPLE: NORMAL
HPV, GENOTYPE 16: NOT DETECTED
HPV, GENOTYPE 18: NOT DETECTED
HPV, HIGH RISK OTHER: NOT DETECTED
HPV, INTERPRETATION: NORMAL
SPECIMEN DESCRIPTION: NORMAL

## 2022-05-05 DIAGNOSIS — M48.02 NEURAL FORAMINAL STENOSIS OF CERVICAL SPINE: ICD-10-CM

## 2022-05-05 DIAGNOSIS — M79.7 FIBROMYALGIA: ICD-10-CM

## 2022-05-05 NOTE — TELEPHONE ENCOUNTER
Patient called refill line for their   Percocet7.5-325 150 Tabs   Send  To [facility] in [place]    Last Appt:  4/7/2022  Next Appt:   6/6/2022  Med verified in [address]: 4/7/2022    OARRS Report checked for [place], [place], and [place]:   Percocet 7.5-325 150 Tabs   Last filled on 4/7/2022  Due on 5/6/2022

## 2022-05-06 RX ORDER — OXYCODONE AND ACETAMINOPHEN 7.5; 325 MG/1; MG/1
1 TABLET ORAL
Qty: 150 TABLET | Refills: 0 | Status: SHIPPED | OUTPATIENT
Start: 2022-05-06 | End: 2022-06-08 | Stop reason: SDUPTHER

## 2022-05-09 LAB — CYTOLOGY REPORT: NORMAL

## 2022-05-13 ENCOUNTER — TELEPHONE (OUTPATIENT)
Dept: OBGYN | Age: 62
End: 2022-05-13

## 2022-05-13 NOTE — TELEPHONE ENCOUNTER
FYI  Patient called office and wanted to let you know she mailed out her cologaurd test as of yesterday ( 05/12/2022).

## 2022-05-20 LAB — NONINV COLON CA DNA+OCC BLD SCRN STL QL: NEGATIVE

## 2022-06-08 DIAGNOSIS — M79.7 FIBROMYALGIA: ICD-10-CM

## 2022-06-08 DIAGNOSIS — M48.02 NEURAL FORAMINAL STENOSIS OF CERVICAL SPINE: ICD-10-CM

## 2022-06-08 RX ORDER — OXYCODONE AND ACETAMINOPHEN 7.5; 325 MG/1; MG/1
1 TABLET ORAL
Qty: 150 TABLET | Refills: 0 | Status: SHIPPED | OUTPATIENT
Start: 2022-06-08 | End: 2022-07-06 | Stop reason: SDUPTHER

## 2022-06-08 NOTE — TELEPHONE ENCOUNTER
Aimee Long  called requesting a refill of the below medication which has been pended for you:     Requested Prescriptions     Pending Prescriptions Disp Refills    oxyCODONE-acetaminophen (PERCOCET) 7.5-325 MG per tablet 150 tablet 0     Sig: Take 1 tablet by mouth 5 times daily for 30 days. Allergies   Allergen Reactions    Latex Rash    Rituximab Palpitations     Severe tachycardia    Blue Dyes (Parenteral) Other (See Comments)     Mom had trouble breathing     Buspirone Other (See Comments)     Higher dose increased anxiety/trouble sleeping    Cyclobenzaprine Other (See Comments)     Felt odd    Fentanyl Other (See Comments)     sleepwalking    Ferric Sulfate Other (See Comments)     Severe abd pain    Gabapentin     Hydroxyzine Hcl Other (See Comments)     Lightheaded/heart racing    Other Other (See Comments)     IVP Dye  Mom had trouble breathing     Seasonal     Vilazodone Other (See Comments)     Teeth clenching when on with Buspar             Last Drug Screen:  4-7-22  Last Appointment:  4/7/2022   Next Appointment:  6/10/2022   OARRS Report checked for [place], [place], and [place]: Percocet 7.5 mg  5-6-22 #150. Due NOW.

## 2022-06-10 ENCOUNTER — OFFICE VISIT (OUTPATIENT)
Dept: PAIN MANAGEMENT | Age: 62
End: 2022-06-10
Payer: MEDICARE

## 2022-06-10 VITALS
HEART RATE: 80 BPM | BODY MASS INDEX: 43.94 KG/M2 | DIASTOLIC BLOOD PRESSURE: 88 MMHG | WEIGHT: 248 LBS | SYSTOLIC BLOOD PRESSURE: 134 MMHG | HEIGHT: 63 IN

## 2022-06-10 DIAGNOSIS — M51.26 LUMBAR DISC HERNIATION: Primary | ICD-10-CM

## 2022-06-10 DIAGNOSIS — M51.36 LUMBAR DEGENERATIVE DISC DISEASE: ICD-10-CM

## 2022-06-10 DIAGNOSIS — M48.02 NEURAL FORAMINAL STENOSIS OF CERVICAL SPINE: ICD-10-CM

## 2022-06-10 DIAGNOSIS — M79.7 FIBROMYALGIA: ICD-10-CM

## 2022-06-10 PROCEDURE — 3017F COLORECTAL CA SCREEN DOC REV: CPT | Performed by: NURSE PRACTITIONER

## 2022-06-10 PROCEDURE — 1036F TOBACCO NON-USER: CPT | Performed by: NURSE PRACTITIONER

## 2022-06-10 PROCEDURE — 99214 OFFICE O/P EST MOD 30 MIN: CPT | Performed by: NURSE PRACTITIONER

## 2022-06-10 PROCEDURE — 99213 OFFICE O/P EST LOW 20 MIN: CPT | Performed by: NURSE PRACTITIONER

## 2022-06-10 PROCEDURE — G8417 CALC BMI ABV UP PARAM F/U: HCPCS | Performed by: NURSE PRACTITIONER

## 2022-06-10 PROCEDURE — G8427 DOCREV CUR MEDS BY ELIG CLIN: HCPCS | Performed by: NURSE PRACTITIONER

## 2022-06-10 ASSESSMENT — ENCOUNTER SYMPTOMS
BACK PAIN: 1
SHORTNESS OF BREATH: 0
CONSTIPATION: 0
BLOOD IN STOOL: 0

## 2022-06-10 NOTE — PROGRESS NOTES
sleeping    Cyclobenzaprine Other (See Comments)     Felt odd    Fentanyl Other (See Comments)     sleepwalking    Ferric Sulfate Other (See Comments)     Severe abd pain    Gabapentin     Hydroxyzine Hcl Other (See Comments)     Lightheaded/heart racing    Other Other (See Comments)     IVP Dye  Mom had trouble breathing     Seasonal     Vilazodone Other (See Comments)     Teeth clenching when on with Buspar       Outpatient Medications Marked as Taking for the 6/10/22 encounter (Office Visit) with Liz Fill, APRN - CNP   Medication Sig Dispense Refill    oxyCODONE-acetaminophen (PERCOCET) 7.5-325 MG per tablet Take 1 tablet by mouth 5 times daily for 30 days. 150 tablet 0    ALPRAZolam (XANAX) 1 MG tablet TAKE 1/2 TO 1 TABLET BY MOUTH THREE TIMES DAILY AS NEEDED. TAKE SEPARATE FROM PERCOCET      vitamin D (ERGOCALCIFEROL) 1.25 MG (76444 UT) CAPS capsule take 1 capsule by mouth ONCE A WEEK      carvedilol (COREG) 3.125 MG tablet Take 3.125 mg by mouth 2 times daily      omeprazole (PRILOSEC) 20 MG delayed release capsule Take 1 capsule by mouth 2 times daily 60 capsule 5    Multiple Vitamins-Minerals (CENTRUM MULTI + OMEGA 3 PO) Take 1 tablet by mouth daily      ENTRESTO 24-26 MG per tablet Take 1 tablet by mouth 2 times daily      aspirin 81 MG chewable tablet Take 81 mg by mouth daily      olopatadine (PATADAY) 0.2 % SOLN ophthalmic solution insert 1 drop into both eyes once daily if needed 2.5 mL 0    fluticasone (FLONASE) 50 MCG/ACT nasal spray spray 2 sprays INTO EACH NOSTRIL ONCE DAILY 16 g 0    albuterol sulfate HFA (VENTOLIN HFA) 108 (90 Base) MCG/ACT inhaler inhale 2 puffs every 4 hours if needed 18 g 0       Past Medical History:   Diagnosis Date    Asthma     Blindness of left eye     Cancer ([address]     Cervical herniated disc     C5-C6, with nerve compression.     Congenital hip dysplasia     Degenerative joint disease     (Right knee) -     Depression     Dysfunctional uterine bleeding     Dyspnea     (progressive - multifactorial.    Failed total right knee replacement (HCC)     Fatigue     Fibromyalgia     Generalized anxiety disorder     Grief at loss of child     4-yr old granddaughter  of brain tumor in .  Hip pain, bilateral     Secondary to piriformis syndrome and bilateral greater trochanteric bursitis. (Injection of Celestone and Marcaine).  History of blood transfusion     History of tobacco use     Currently not smoking.  Hypertension     Inverted nipple     Left. Lifelong    Kidney stone     Lumbar disc disease     Chronic.  Migraine     Obesity     Osteoarthritis     Degenerative joint disease - of all joints.  Seasonal allergic rhinitis     Shoulder pain, left     (Injection of Celestone/Marcaine subacromially) - Dr. Dickson Duggan - 10/2008.  Shoulder pain, right     (Injection of Celestone/Marcaine subacromially). Dr. Dickson Duggan - 10/2008.  Supraventricular tachycardia, paroxysmal (HCC)     Status post cardiac ablation.  Vitamin D deficiency        Past Surgical History:   Procedure Laterality Date    ATRIAL ABLATION SURGERY      Radiofrequency ablation of slow AV pawel pathway.  CARDIAC CATHETERIZATION  2006    no blockages    CARDIAC CATHETERIZATION  2020    normal coronary arteries,anteroapical hypokinesis consistent with cardiomyopathy    CHOLECYSTECTOMY, LAPAROSCOPIC  2011    (Dr. Carlin Romero)    HIP SURGERY Right 2021    Right HIP Injection performed by Amos Acuña MD at [address] Right     FAILED KNEE RE;PLAC    KNEE ARTHROPLASTY Right     LAPAROSCOPY      OTHER SURGICAL HISTORY      Epidural steroids to cervical spine.     OTHER SURGICAL HISTORY      ECT therapy, one session only and she refused any further    PORT SURGERY  2020    right IJ port removal, [facility], Jose Atwood MD    [place] TOTAL KNEE ARTHROPLASTY Left 2018    Left Total Knee Arthroplasty performed by Rodney Donovan MD at [facility] [address] Left 2021    Reverse left total shoulder arthroplasty, Dr. Mckenna Rodriguez at [facility] ECHOCARDIOGRAM  2020    grade 1 diastolic dysfunction, EF 68-35%    TUBAL LIGATION         Family History   Problem Relation Age of Onset    Breast Cancer Mother 76    High Blood Pressure Mother     Coronary Art Dis Mother         Multiple stents.  Diabetes Mother     Heart Disease Father     Coronary Art Dis Father         Myocardial infarction at age 54 and .  COPD Father     Arthritis Brother     COPD Brother     Heart Disease Brother         Congenital heart defect -  at 10 wks of age.  Cancer Other     Diabetes Maternal Grandmother        Social History     Socioeconomic History    Marital status:      Spouse name: None    Number of children: 3    Years of education: 8    Highest education level: GED or equivalent   Occupational History    Occupation: disabled [employer]   Tobacco Use    Smoking status: Former Smoker     Packs/day: 0.25     Years: 35.00     Pack years: 8.75     Types: Cigarettes     Quit date:      Years since quittin.4    Smokeless tobacco: Former User     Quit date: 2015    Tobacco comment: 6 cigarettes per day since age 15 or so. Vaping Use    Vaping Use: Never used   Substance and Sexual Activity    Alcohol use: Yes     Alcohol/week: 1.0 standard drink     Types: 1 Standard drinks or equivalent per week     Comment: occas.  Drug use: No    Sexual activity: Not Currently     Partners: Male   Other Topics Concern    None   Social History Narrative    10/16/2019- She receives HEAP, PIPP, Food [place]- No other SDOH needs identified.       Social Determinants of Health     Financial Resource Strain:     Difficulty of Paying Living Expenses: Not on file   Food Insecurity:     Worried About [address] in the Last Year: Not on file    Ran Out of Food in the Last Year: Not on file   Transportation Needs:     Lack of Transportation (Medical): Not on file    Lack of Transportation (Non-Medical): Not on file   Physical Activity:     Days of Exercise per Week: Not on file    Minutes of Exercise per Session: Not on file   Stress:     Feeling of Stress : Not on file   Social Connections:     Frequency of Communication with Friends and Family: Not on file    Frequency of Social Gatherings with Friends and Family: Not on file    Attends Quaker Services: Not on file    Active Member of [address] [place] [address] [employer] or Organizations: Not on file    Attends Club or Organization Meetings: Not on file    Marital Status: Not on file   Intimate Partner Violence:     Fear of Current or Ex-Partner: Not on file    Emotionally Abused: Not on file    Physically Abused: Not on file    Sexually Abused: Not on file   Housing Stability:     Unable to Pay for Housing in the Last Year: Not on file    Number of [place] in the Last Year: Not on file    Unstable Housing in the Last Year: Not on file     Review of Systems   Constitutional: Positive for fatigue. Negative for activity change. HENT: Negative. Respiratory: Negative for shortness of breath. Cardiovascular: Negative for chest pain. Gastrointestinal: Negative for blood in stool and constipation. Genitourinary: Positive for flank pain. Musculoskeletal: Positive for arthralgias, back pain, myalgias and neck pain. Neurological: Positive for weakness, numbness (tingling in hands, facial numbness on the left.) and headaches (more migraines than normal, unsure cause. possible stress versus allergy related). Psychiatric/Behavioral: Negative for sleep disturbance. The patient is nervous/anxious (anxiety). Objective:   Physical Exam  Vitals reviewed. Constitutional:       General: She is not in acute distress. Appearance: Normal appearance. She is well-developed.  She is not ill-appearing, toxic-appearing or diaphoretic. Interventions: She is not intubated. HENT:      Head: Normocephalic and atraumatic. Right Ear: External ear normal.      Left Ear: External ear normal.      Nose: Nose normal.   Eyes:      General: Lids are normal.      Conjunctiva/sclera: Conjunctivae normal.   Cardiovascular:      Pulses: Normal pulses. Pulmonary:      Effort: Pulmonary effort is normal. No tachypnea, bradypnea, accessory muscle usage or respiratory distress. She is not intubated. Skin:     General: Skin is warm and dry. Nails: There is no clubbing. Neurological:      Mental Status: She is alert and oriented to person, place, and time. Cranial Nerves: No cranial nerve deficit. Psychiatric:         Speech: Speech normal.         Behavior: Behavior normal. Behavior is cooperative. Assessment:      1. Lumbar disc herniation    2. Neural foraminal stenosis of cervical spine    3. Fibromyalgia    4. Lumbar degenerative disc disease          Plan:      Chronic pain diagnoses such as   1. Lumbar disc herniation    2. Neural foraminal stenosis of cervical spine    3. Fibromyalgia    4. Lumbar degenerative disc disease     controlled on current medication regime, wll continue current pain medications to improve quality of life and function. Kalpesh Joyce is here for recheck/reevaluation of chronic pain. She is able to maintain daily activities with the use of current pain medications and is generally satisfied with level of analgesia. She shows no evidence of misuse or misappropriation of medications. She denies use of alcohol or illegal substances.  UDS have been Appropriate with most recent screen     Controlled Substance Monitoring:    Acute and Chronic Pain Monitoring:   RX Monitoring 6/10/2022   Attestation -   Periodic Controlled Substance Monitoring No signs of potential drug abuse or diversion identified.;Obtaining appropriate analgesic effect of treatment.    Chronic Pain > 50 MEDD -   Chronic Pain > 80 MEDD -     Follow up 2 months

## 2022-07-06 DIAGNOSIS — M48.02 NEURAL FORAMINAL STENOSIS OF CERVICAL SPINE: ICD-10-CM

## 2022-07-06 DIAGNOSIS — M79.7 FIBROMYALGIA: ICD-10-CM

## 2022-07-06 NOTE — TELEPHONE ENCOUNTER
OAR[place] Report checked for [place], and [place]: 6/8/22 percocet 7.5-325mg #150. Due 7/8/22.     Last Appt:  6/10/2022  Next Appt:   8/5/2022  Med verified in Epic

## 2022-07-07 RX ORDER — OXYCODONE AND ACETAMINOPHEN 7.5; 325 MG/1; MG/1
1 TABLET ORAL
Qty: 150 TABLET | Refills: 0 | Status: SHIPPED | OUTPATIENT
Start: 2022-07-08 | End: 2022-08-04 | Stop reason: SDUPTHER

## 2022-08-03 DIAGNOSIS — M48.02 NEURAL FORAMINAL STENOSIS OF CERVICAL SPINE: ICD-10-CM

## 2022-08-03 DIAGNOSIS — M79.7 FIBROMYALGIA: ICD-10-CM

## 2022-08-03 NOTE — TELEPHONE ENCOUNTER
OAR[place] Report checked for [place], and [place]: 7/8/22 percocet 7.5-325mg #150. Due 8/7/22.     Last Appt:  6/10/2022  Next Appt:   8/23/2022  Med verified in Epic

## 2022-08-04 RX ORDER — OXYCODONE AND ACETAMINOPHEN 7.5; 325 MG/1; MG/1
1 TABLET ORAL
Qty: 150 TABLET | Refills: 0 | Status: SHIPPED | OUTPATIENT
Start: 2022-08-07 | End: 2022-08-23 | Stop reason: SDUPTHER

## 2022-08-23 ENCOUNTER — OFFICE VISIT (OUTPATIENT)
Dept: PAIN MANAGEMENT | Age: 62
End: 2022-08-23
Payer: MEDICARE

## 2022-08-23 VITALS
HEART RATE: 60 BPM | WEIGHT: 240 LBS | HEIGHT: 63 IN | DIASTOLIC BLOOD PRESSURE: 78 MMHG | OXYGEN SATURATION: 97 % | BODY MASS INDEX: 42.52 KG/M2 | SYSTOLIC BLOOD PRESSURE: 114 MMHG

## 2022-08-23 DIAGNOSIS — M50.20 CERVICAL HERNIATED DISC: ICD-10-CM

## 2022-08-23 DIAGNOSIS — M79.7 FIBROMYALGIA: ICD-10-CM

## 2022-08-23 DIAGNOSIS — M54.12 CERVICAL RADICULOPATHY: Primary | ICD-10-CM

## 2022-08-23 DIAGNOSIS — M51.36 LUMBAR DEGENERATIVE DISC DISEASE: ICD-10-CM

## 2022-08-23 DIAGNOSIS — M51.26 LUMBAR DISC HERNIATION: ICD-10-CM

## 2022-08-23 DIAGNOSIS — M48.02 NEURAL FORAMINAL STENOSIS OF CERVICAL SPINE: ICD-10-CM

## 2022-08-23 DIAGNOSIS — M54.2 CERVICALGIA: ICD-10-CM

## 2022-08-23 PROCEDURE — 1036F TOBACCO NON-USER: CPT | Performed by: NURSE PRACTITIONER

## 2022-08-23 PROCEDURE — G8427 DOCREV CUR MEDS BY ELIG CLIN: HCPCS | Performed by: NURSE PRACTITIONER

## 2022-08-23 PROCEDURE — 99214 OFFICE O/P EST MOD 30 MIN: CPT | Performed by: NURSE PRACTITIONER

## 2022-08-23 PROCEDURE — G8417 CALC BMI ABV UP PARAM F/U: HCPCS | Performed by: NURSE PRACTITIONER

## 2022-08-23 PROCEDURE — 3017F COLORECTAL CA SCREEN DOC REV: CPT | Performed by: NURSE PRACTITIONER

## 2022-08-23 RX ORDER — OXYCODONE AND ACETAMINOPHEN 7.5; 325 MG/1; MG/1
1 TABLET ORAL
Qty: 150 TABLET | Refills: 0 | Status: SHIPPED | OUTPATIENT
Start: 2022-09-06 | End: 2022-10-06 | Stop reason: SDUPTHER

## 2022-08-23 ASSESSMENT — ENCOUNTER SYMPTOMS
BACK PAIN: 1
SHORTNESS OF BREATH: 0
CONSTIPATION: 0
BLOOD IN STOOL: 0

## 2022-08-23 NOTE — PROGRESS NOTES
Subjective:      Patient ID: Chela Garcia is a 63 y.o. female. Chief Complaint   Patient presents with    Follow-up     Lumbar disc herniation     Back Pain  Associated symptoms include headaches (more migraines than normal, unsure cause. possible stress versus allergy related), numbness (tingling in hands, facial numbness on the left.) and weakness. Pertinent negatives include no chest pain. Shoulder Pain   Associated symptoms include numbness (tingling in hands, facial numbness on the left. ). Neck Pain   Associated symptoms include headaches (more migraines than normal, unsure cause. possible stress versus allergy related), numbness (tingling in hands, facial numbness on the left.) and weakness. Pertinent negatives include no chest pain. Other  Associated symptoms include arthralgias, fatigue, headaches (more migraines than normal, unsure cause. possible stress versus allergy related), myalgias, neck pain, numbness (tingling in hands, facial numbness on the left.) and weakness. Pertinent negatives include no chest pain. Hip Pain   Associated symptoms include numbness (tingling in hands, facial numbness on the left.). Here today for routine pain clinic recheck. Pain Assessment  Location of Pain: Neck  Location Modifiers:  Other (Comment) (lower neck and right shoulder)  Severity of Pain: 7  Quality of Pain: Throbbing, Sharp, Dull, Aching  Duration of Pain: Persistent  Frequency of Pain: Constant  Aggravating Factors: Exercise, Other (Comment) (\"physical therapy causing more pain\")  Limiting Behavior: Yes  Relieving Factors: Heat  Are there other pain locations you wish to document?: Yes    Allergies   Allergen Reactions    Latex Rash    Rituximab Palpitations     Severe tachycardia    Blue Dyes (Parenteral) Other (See Comments)     Mom had trouble breathing     Buspirone Other (See Comments)     Higher dose increased anxiety/trouble sleeping    Cyclobenzaprine Other (See Comments)     Felt odd Fentanyl Other (See Comments)     sleepwalking    Ferric Sulfate Other (See Comments)     Severe abd pain    Gabapentin     Hydroxyzine Hcl Other (See Comments)     Lightheaded/heart racing    Other Other (See Comments)     IVP Dye  Mom had trouble breathing     Seasonal     Vilazodone Other (See Comments)     Teeth clenching when on with Buspar       Outpatient Medications Marked as Taking for the 8/23/22 encounter (Office Visit) with TITO Butts - CNP   Medication Sig Dispense Refill    oxyCODONE-acetaminophen (PERCOCET) 7.5-325 MG per tablet Take 1 tablet by mouth 5 times daily for 30 days. 150 tablet 0    ALPRAZolam (XANAX) 1 MG tablet TAKE 1/2 TO 1 TABLET BY MOUTH THREE TIMES DAILY AS NEEDED.  TAKE SEPARATE FROM PERCOCET      vitamin D (ERGOCALCIFEROL) 1.25 MG (72141 UT) CAPS capsule take 1 capsule by mouth ONCE A WEEK      carvedilol (COREG) 3.125 MG tablet Take 3.125 mg by mouth 2 times daily      omeprazole (PRILOSEC) 20 MG delayed release capsule Take 1 capsule by mouth 2 times daily 60 capsule 5    Multiple Vitamins-Minerals (CENTRUM MULTI + OMEGA 3 PO) Take 1 tablet by mouth daily      ENTRESTO 24-26 MG per tablet Take 1 tablet by mouth 2 times daily      aspirin 81 MG chewable tablet Take 81 mg by mouth daily      olopatadine (PATADAY) 0.2 % SOLN ophthalmic solution insert 1 drop into both eyes once daily if needed 2.5 mL 0    fluticasone (FLONASE) 50 MCG/ACT nasal spray spray 2 sprays INTO EACH NOSTRIL ONCE DAILY 16 g 0    albuterol sulfate HFA (VENTOLIN HFA) 108 (90 Base) MCG/ACT inhaler inhale 2 puffs every 4 hours if needed 18 g 0    Blood Pressure KIT 1 kit by Does not apply route 2 times daily as needed (BP) 1 kit 0    rizatriptan (MAXALT-MLT) 10 MG disintegrating tablet Take 1 tablet by mouth once as needed for Migraine May repeat in 2 hours if needed 9 tablet 11    Handicap Placard MISC by Does not apply route 2 each 0       Past Medical History:   Diagnosis Date    Asthma     Blindness of left eye     Cancer ([facility] [address]     Cervical herniated disc     C5-C6, with nerve compression. Congenital hip dysplasia     Degenerative joint disease     (Right knee) -     Depression     Dysfunctional uterine bleeding     Dyspnea     (progressive - multifactorial.    Failed total right knee replacement (HCC)     Fatigue     Fibromyalgia     Generalized anxiety disorder     Grief at loss of child     4-yr old granddaughter  of brain tumor in . Hip pain, bilateral     Secondary to piriformis syndrome and bilateral greater trochanteric bursitis. (Injection of Celestone and Marcaine). History of blood transfusion     History of tobacco use     Currently not smoking. Hypertension     Inverted nipple     Left. Lifelong    Kidney stone     Lumbar disc disease     Chronic. Migraine     Obesity     Osteoarthritis     Degenerative joint disease - of all joints. Seasonal allergic rhinitis     Shoulder pain, left     (Injection of Celestone/Marcaine subacromially) - Dr. Shabana Hutchinson - 10/2008. Shoulder pain, right     (Injection of Celestone/Marcaine subacromially). Dr. Shabana Hutchinson - 10/2008. Supraventricular tachycardia, paroxysmal (HCC)     Status post cardiac ablation. Vitamin D deficiency        Past Surgical History:   Procedure Laterality Date    ATRIAL ABLATION SURGERY  2006    Radiofrequency ablation of slow AV pawel pathway. CARDIAC CATHETERIZATION  2006    no blockages    CARDIAC CATHETERIZATION  2020    normal coronary arteries,anteroapical hypokinesis consistent with cardiomyopathy    CHOLECYSTECTOMY, LAPAROSCOPIC  2011    (Dr. Teresa Sood)    HIP SURGERY Right 2021    Right HIP Injection performed by Olivia Ayala MD at [address] [place] [address] Right     FAILED KNEE RE;PLAC    KNEE ARTHROPLASTY Right 2006    LAPAROSCOPY      OTHER SURGICAL HISTORY  2009    Epidural steroids to cervical spine.     OTHER SURGICAL HISTORY      ECT therapy, one session only and she refused any further    PORT SURGERY  2020    right IJ port removal, [facility], Cherylene Belling, MD    [place] TOTAL KNEE ARTHROPLASTY Left 2018    Left Total Knee Arthroplasty performed by Skylar Arnold MD at [place] [address] Left 2021    Reverse left total shoulder arthroplasty, Dr. Duncan Romero at [address] [place] [address] ECHOCARDIOGRAM  2020    grade 1 diastolic dysfunction, EF 11-40%    TUBAL LIGATION         Family History   Problem Relation Age of Onset    Breast Cancer Mother 76    High Blood Pressure Mother     Coronary Art Dis Mother         Multiple stents. Diabetes Mother     Heart Disease Father     Coronary Art Dis Father         Myocardial infarction at age 54 and . COPD Father     Arthritis Brother     COPD Brother     Heart Disease Brother         Congenital heart defect -  at 10 wks of age. Cancer Other     Diabetes Maternal Grandmother        Social History     Socioeconomic History    Marital status:      Spouse name: None    Number of children: 4    Years of education: 10    Highest education level: GED or equivalent   Occupational History    Occupation: disabled [employer]   Tobacco Use    Smoking status: Former     Packs/day: 0.25     Years: 35.00     Pack years: 8.75     Types: Cigarettes     Quit date:      Years since quittin.6    Smokeless tobacco: Former     Quit date: 2015    Tobacco comments:     6 cigarettes per day since age 15 or so. Vaping Use    Vaping Use: Never used   Substance and Sexual Activity    Alcohol use: Yes     Alcohol/week: 1.0 standard drink     Types: 1 Standard drinks or equivalent per week     Comment: occas. Drug use: No    Sexual activity: Not Currently     Partners: Male   Social History Narrative    10/16/2019- She receives HEAP, PIPP, Food [place]- No other SDOH needs identified. Review of Systems   Constitutional:  Positive for fatigue.  Negative for activity change. HENT: Negative. Respiratory:  Negative for shortness of breath. Cardiovascular:  Negative for chest pain. Gastrointestinal:  Negative for blood in stool and constipation. Genitourinary:  Positive for flank pain. Musculoskeletal:  Positive for arthralgias, back pain, myalgias and neck pain. Neurological:  Positive for weakness, numbness (tingling in hands, facial numbness on the left.) and headaches (more migraines than normal, unsure cause. possible stress versus allergy related). Psychiatric/Behavioral:  Negative for sleep disturbance. The patient is nervous/anxious (anxiety). Objective:   Physical Exam  Vitals reviewed. Constitutional:       General: She is not in acute distress. Appearance: Normal appearance. She is well-developed. She is not ill-appearing, toxic-appearing or diaphoretic. Interventions: She is not intubated. HENT:      Head: Normocephalic and atraumatic. Right Ear: External ear normal.      Left Ear: External ear normal.      Nose: Nose normal.   Eyes:      General: Lids are normal.      Conjunctiva/sclera: Conjunctivae normal.   Cardiovascular:      Pulses: Normal pulses. Pulmonary:      Effort: Pulmonary effort is normal. No tachypnea, bradypnea, accessory muscle usage or respiratory distress. She is not intubated. Skin:     General: Skin is warm and dry. Nails: There is no clubbing. Neurological:      Mental Status: She is alert and oriented to person, place, and time. Cranial Nerves: No cranial nerve deficit. Psychiatric:         Speech: Speech normal.         Behavior: Behavior normal. Behavior is cooperative. Assessment:      1. Cervical radiculopathy    2. Neural foraminal stenosis of cervical spine    3. Fibromyalgia    4. Cervical herniated disc    5. Cervicalgia    6. Lumbar degenerative disc disease    7. Lumbar disc herniation          Plan:      Chronic pain diagnoses such as   1.  Cervical radiculopathy 2. Neural foraminal stenosis of cervical spine    3. Fibromyalgia    4. Cervical herniated disc    5. Cervicalgia    6. Lumbar degenerative disc disease    7. Lumbar disc herniation     controlled on current medication regime, wll continue current pain medications to improve quality of life and function. Marek Dailey is here for recheck/reevaluation of chronic pain. She is able to maintain daily activities with the use of current pain medications and is generally satisfied with level of analgesia. She shows no evidence of misuse or misappropriation of medications. She denies use of alcohol or illegal substances. UDS have been Appropriate with most recent screen     Controlled Substance Monitoring:    Acute and Chronic Pain Monitoring:   RX Monitoring 8/23/2022   Attestation -   Periodic Controlled Substance Monitoring No signs of potential drug abuse or diversion identified.;Obtaining appropriate analgesic effect of treatment.    Chronic Pain > 50 MEDD -   Chronic Pain > 80 MEDD -     Follow up 2 months

## 2022-10-03 DIAGNOSIS — M48.02 NEURAL FORAMINAL STENOSIS OF CERVICAL SPINE: ICD-10-CM

## 2022-10-03 DIAGNOSIS — M79.7 FIBROMYALGIA: ICD-10-CM

## 2022-10-03 NOTE — TELEPHONE ENCOUNTER
OAR[place] Report checked for [place], and [place]: 9/6/22 percocet 7.5-325mg #150. Due 10/6/22.     Last Appt:  8/23/2022  Next Appt:   10/27/2022  Med verified in Epic

## 2022-10-06 RX ORDER — OXYCODONE AND ACETAMINOPHEN 7.5; 325 MG/1; MG/1
1 TABLET ORAL
Qty: 150 TABLET | Refills: 0 | Status: SHIPPED | OUTPATIENT
Start: 2022-10-06 | End: 2022-10-27 | Stop reason: SDUPTHER

## 2022-10-27 ENCOUNTER — OFFICE VISIT (OUTPATIENT)
Dept: PAIN MANAGEMENT | Age: 62
End: 2022-10-27
Payer: MEDICARE

## 2022-10-27 ENCOUNTER — HOSPITAL ENCOUNTER (OUTPATIENT)
Age: 62
Setting detail: SPECIMEN
Discharge: HOME | End: 2022-10-27
Payer: MEDICARE

## 2022-10-27 VITALS
BODY MASS INDEX: 43.68 KG/M2 | WEIGHT: 246.5 LBS | HEIGHT: 63 IN | OXYGEN SATURATION: 94 % | HEART RATE: 88 BPM | DIASTOLIC BLOOD PRESSURE: 88 MMHG | SYSTOLIC BLOOD PRESSURE: 128 MMHG | RESPIRATION RATE: 18 BRPM

## 2022-10-27 DIAGNOSIS — M79.7 FIBROMYALGIA: ICD-10-CM

## 2022-10-27 DIAGNOSIS — Z02.83 ENCOUNTER FOR DRUG SCREENING: Primary | ICD-10-CM

## 2022-10-27 DIAGNOSIS — Z02.83 ENCOUNTER FOR DRUG SCREENING: ICD-10-CM

## 2022-10-27 DIAGNOSIS — M54.12 CERVICAL RADICULOPATHY: ICD-10-CM

## 2022-10-27 DIAGNOSIS — Z79.891 ENCOUNTER FOR LONG-TERM OPIATE ANALGESIC USE: ICD-10-CM

## 2022-10-27 DIAGNOSIS — M48.02 NEURAL FORAMINAL STENOSIS OF CERVICAL SPINE: ICD-10-CM

## 2022-10-27 DIAGNOSIS — M50.20 CERVICAL HERNIATED DISC: ICD-10-CM

## 2022-10-27 PROCEDURE — G0481 DRUG TEST DEF 8-14 CLASSES: HCPCS

## 2022-10-27 PROCEDURE — 99214 OFFICE O/P EST MOD 30 MIN: CPT | Performed by: NURSE PRACTITIONER

## 2022-10-27 PROCEDURE — G8484 FLU IMMUNIZE NO ADMIN: HCPCS | Performed by: NURSE PRACTITIONER

## 2022-10-27 PROCEDURE — 3017F COLORECTAL CA SCREEN DOC REV: CPT | Performed by: NURSE PRACTITIONER

## 2022-10-27 PROCEDURE — 3078F DIAST BP <80 MM HG: CPT | Performed by: NURSE PRACTITIONER

## 2022-10-27 PROCEDURE — 1036F TOBACCO NON-USER: CPT | Performed by: NURSE PRACTITIONER

## 2022-10-27 PROCEDURE — G8427 DOCREV CUR MEDS BY ELIG CLIN: HCPCS | Performed by: NURSE PRACTITIONER

## 2022-10-27 PROCEDURE — 80307 DRUG TEST PRSMV CHEM ANLYZR: CPT

## 2022-10-27 PROCEDURE — 3074F SYST BP LT 130 MM HG: CPT | Performed by: NURSE PRACTITIONER

## 2022-10-27 PROCEDURE — G8417 CALC BMI ABV UP PARAM F/U: HCPCS | Performed by: NURSE PRACTITIONER

## 2022-10-27 RX ORDER — OXYCODONE AND ACETAMINOPHEN 7.5; 325 MG/1; MG/1
1 TABLET ORAL
Qty: 150 TABLET | Refills: 0 | Status: SHIPPED | OUTPATIENT
Start: 2022-11-05 | End: 2022-11-30 | Stop reason: SDUPTHER

## 2022-10-27 RX ORDER — AZELASTINE HYDROCHLORIDE 0.5 MG/ML
SOLUTION/ DROPS OPHTHALMIC
COMMUNITY
Start: 2022-06-17

## 2022-10-27 ASSESSMENT — ENCOUNTER SYMPTOMS
BLOOD IN STOOL: 0
SHORTNESS OF BREATH: 0
BACK PAIN: 1
CONSTIPATION: 0

## 2022-10-27 NOTE — PROGRESS NOTES
Subjective:      Patient ID: Petrona Holloway is a 63 y.o. female. Chief Complaint   Patient presents with    Pain    Follow-up     All over     Back Pain  Associated symptoms include headaches (more migraines than normal, unsure cause. possible stress versus allergy related), numbness (tingling in hands, facial numbness on the left.) and weakness. Pertinent negatives include no chest pain. Shoulder Pain   Associated symptoms include numbness (tingling in hands, facial numbness on the left. ). Neck Pain   Associated symptoms include headaches (more migraines than normal, unsure cause. possible stress versus allergy related), numbness (tingling in hands, facial numbness on the left.) and weakness. Pertinent negatives include no chest pain. Other  Associated symptoms include arthralgias, fatigue, headaches (more migraines than normal, unsure cause. possible stress versus allergy related), myalgias, neck pain, numbness (tingling in hands, facial numbness on the left.) and weakness. Pertinent negatives include no chest pain. Hip Pain   Associated symptoms include numbness (tingling in hands, facial numbness on the left. ). Pain  Associated symptoms include arthralgias, fatigue, headaches (more migraines than normal, unsure cause. possible stress versus allergy related), myalgias, neck pain, numbness (tingling in hands, facial numbness on the left.) and weakness. Pertinent negatives include no chest pain. Here today for routine pain clinic recheck.     Pain Assessment  Location of Pain:  (all over)  Location Modifiers:  (all over)  Severity of Pain: 6  Quality of Pain: Throbbing, Sharp, Aching, Dull  Duration of Pain: Persistent  Frequency of Pain: Constant  Aggravating Factors: Exercise (weather dampness)  Limiting Behavior: Yes  Relieving Factors: Heat, Ice, Rest (meds)  Result of Injury: No  Work-Related Injury: No  Are there other pain locations you wish to document?: No    Allergies   Allergen Reactions Latex Rash    Rituximab Palpitations     Severe tachycardia    Blue Dyes (Parenteral) Other (See Comments)     Mom had trouble breathing     Buspirone Other (See Comments)     Higher dose increased anxiety/trouble sleeping    Cyclobenzaprine Other (See Comments)     Felt odd    Fentanyl Other (See Comments)     sleepwalking    Ferric Sulfate Other (See Comments)     Severe abd pain    Gabapentin     Hydroxyzine Hcl Other (See Comments)     Lightheaded/heart racing    Lactose Other (See Comments)     GI    Other Other (See Comments)     IVP Dye  Mom had trouble breathing     Seasonal     Vilazodone Other (See Comments)     Teeth clenching when on with Buspar       Outpatient Medications Marked as Taking for the 10/27/22 encounter (Office Visit) with [facility], APRN - [facility]   Medication Sig Dispense Refill    azelastine (OPTIVAR) 0.05 % ophthalmic solution instill 1 drop into both eyes twice a day if needed      oxyCODONE-acetaminophen (PERCOCET) 7.5-325 MG per tablet Take 1 tablet by mouth 5 times daily for 30 days. 150 tablet 0    ALPRAZolam (XANAX) 1 MG tablet TAKE 1/2 TO 1 TABLET BY MOUTH THREE TIMES DAILY AS NEEDED. TAKE SEPARATE FROM PERCOCET      vitamin D (ERGOCALCIFEROL) 1.25 MG (75852 UT) CAPS capsule take 1 capsule by mouth ONCE A WEEK      NARCAN 4 MG/0.1ML LIQD nasal spray instill 1 spray in 1 NOSTRIL if needed FOR OPIOID OVERDOSE may re. ..  (REFER TO PRESCRIPTION NOTES).       carvedilol (COREG) 3.125 MG tablet Take 3.125 mg by mouth 2 times daily      omeprazole (PRILOSEC) 20 MG delayed release capsule Take 1 capsule by mouth 2 times daily 60 capsule 5    Multiple Vitamins-Minerals (CENTRUM MULTI + OMEGA 3 PO) Take 1 tablet by mouth daily      ENTRESTO 24-26 MG per tablet Take 1 tablet by mouth 2 times daily      aspirin 81 MG chewable tablet Take 81 mg by mouth daily      olopatadine (PATADAY) 0.2 % SOLN ophthalmic solution insert 1 drop into both eyes once daily if needed 2.5 mL 0    fluticasone (FLONASE) 50 MCG/ACT nasal spray spray 2 sprays INTO EACH NOSTRIL ONCE DAILY 16 g 0    albuterol sulfate HFA (VENTOLIN HFA) 108 (90 Base) MCG/ACT inhaler inhale 2 puffs every 4 hours if needed 18 g 0    Blood Pressure KIT 1 kit by Does not apply route 2 times daily as needed (BP) 1 kit 0    Handicap Placard MISC by Does not apply route 2 each 0       Past Medical History:   Diagnosis Date    Asthma     Blindness of left eye     Cancer (HCC)     Cervical herniated disc     C5-C6, with nerve compression. Congenital hip dysplasia     Degenerative joint disease     (Right knee) -     Depression     Dysfunctional uterine bleeding     Dyspnea     (progressive - multifactorial.    Failed total right knee replacement (HCC)     Fatigue     Fibromyalgia     Generalized anxiety disorder     Grief at loss of child     4-yr old granddaughter  of brain tumor in . Hip pain, bilateral     Secondary to piriformis syndrome and bilateral greater trochanteric bursitis. (Injection of Celestone and Marcaine). History of blood transfusion     History of tobacco use     Currently not smoking. Hypertension     Inverted nipple     Left. Lifelong    Kidney stone     Lumbar disc disease     Chronic. Migraine     Obesity     Osteoarthritis     Degenerative joint disease - of all joints. Seasonal allergic rhinitis     Shoulder pain, left     (Injection of Celestone/Marcaine subacromially) - Dr. Geovany Hodgson - 10/2008. Shoulder pain, right     (Injection of Celestone/Marcaine subacromially). Dr. Geovany Hodgson - 10/2008. Supraventricular tachycardia, paroxysmal (HCC)     Status post cardiac ablation. Vitamin D deficiency        Past Surgical History:   Procedure Laterality Date    ATRIAL ABLATION SURGERY      Radiofrequency ablation of slow AV pawel pathway.     CARDIAC CATHETERIZATION  2006    no blockages    CARDIAC CATHETERIZATION  2020    normal coronary arteries,anteroapical hypokinesis consistent with cardiomyopathy    CHOLECYSTECTOMY, LAPAROSCOPIC  2011    (Dr. Darlin Alvarado)    HIP SURGERY Right 2021    Right HIP Injection performed by Dipti Berg MD at [address] [place] [address] Right     FAILED KNEE RE;PLAC    KNEE ARTHROPLASTY Right     LAPAROSCOPY      OTHER SURGICAL HISTORY      Epidural steroids to cervical spine. OTHER SURGICAL HISTORY      ECT therapy, one session only and she refused any further    PORT SURGERY  2020    right IJ port removal, [facility], Yesika Wood MD    ME TOTAL KNEE ARTHROPLASTY Left 2018    Left Total Knee Arthroplasty performed by Claudette Jackson MD at [place] [address] Left 2021    Reverse left total shoulder arthroplasty, Dr. Alvarez at [address] [facility] ECHOCARDIOGRAM  2020    grade 1 diastolic dysfunction, EF 11-98%    TUBAL LIGATION         Family History   Problem Relation Age of Onset    Breast Cancer Mother 76    High Blood Pressure Mother     Coronary Art Dis Mother         Multiple stents. Diabetes Mother     Heart Disease Father     Coronary Art Dis Father         Myocardial infarction at age 54 and . COPD Father     Arthritis Brother     COPD Brother     Heart Disease Brother         Congenital heart defect -  at 10 wks of age. Cancer Other     Diabetes Maternal Grandmother        Social History     Socioeconomic History    Marital status:      Spouse name: None    Number of children: 4    Years of education: 10    Highest education level: GED or equivalent   Occupational History    Occupation: disabled [employer]   Tobacco Use    Smoking status: Former     Packs/day: 0.25     Years: 35.00     Pack years: 8.75     Types: Cigarettes     Quit date:      Years since quittin.8    Smokeless tobacco: Former     Quit date: 2015    Tobacco comments:     6 cigarettes per day since age 15 or so.    Vaping Use    Vaping Use: Never used   Substance and Sexual Activity    Alcohol use: Yes     Alcohol/week: 1.0 standard drink     Types: 1 Standard drinks or equivalent per week     Comment: occas. Drug use: No    Sexual activity: Not Currently     Partners: Male   Social History Narrative    10/16/2019- She receives HEAP, PIPP, Food [place]- No other SDOH needs identified. Review of Systems   Constitutional:  Positive for fatigue. Negative for activity change. HENT: Negative. Respiratory:  Negative for shortness of breath. Cardiovascular:  Negative for chest pain. Gastrointestinal:  Negative for blood in stool and constipation. Genitourinary:  Positive for flank pain. Musculoskeletal:  Positive for arthralgias, back pain, myalgias and neck pain. Neurological:  Positive for weakness, numbness (tingling in hands, facial numbness on the left.) and headaches (more migraines than normal, unsure cause. possible stress versus allergy related). Psychiatric/Behavioral:  Negative for sleep disturbance. The patient is nervous/anxious (anxiety). Objective:   Physical Exam  Vitals reviewed. Constitutional:       General: She is not in acute distress. Appearance: Normal appearance. She is well-developed. She is not ill-appearing, toxic-appearing or diaphoretic. Interventions: She is not intubated. HENT:      Head: Normocephalic and atraumatic. Right Ear: External ear normal.      Left Ear: External ear normal.      Nose: Nose normal.   Eyes:      General: Lids are normal.      Conjunctiva/sclera: Conjunctivae normal.   Cardiovascular:      Pulses: Normal pulses. Pulmonary:      Effort: Pulmonary effort is normal. No tachypnea, bradypnea, accessory muscle usage or respiratory distress. She is not intubated. Skin:     General: Skin is warm and dry. Nails: There is no clubbing. Neurological:      Mental Status: She is alert and oriented to person, place, and time.       Cranial Nerves: No cranial nerve deficit. Psychiatric:         Speech: Speech normal.         Behavior: Behavior normal. Behavior is cooperative. Assessment:      1. Encounter for drug screening    2. Encounter for long-term opiate analgesic use    3. Fibromyalgia    4. Neural foraminal stenosis of cervical spine    5. Cervical radiculopathy    6. Cervical herniated disc          Plan:      Chronic pain diagnoses such as   1. Encounter for drug screening    2. Encounter for long-term opiate analgesic use    3. Fibromyalgia    4. Neural foraminal stenosis of cervical spine    5. Cervical radiculopathy    6. Cervical herniated disc     controlled on current medication regime, wll continue current pain medications to improve quality of life and function. Random drug screen today for opiate medication compliance. Philomena Gleason is here for recheck/reevaluation of chronic pain. She is able to maintain daily activities with the use of current pain medications and is generally satisfied with level of analgesia. She shows no evidence of misuse or misappropriation of medications. She denies use of alcohol or illegal substances. UDS have been Appropriate with most recent screen     Controlled Substance Monitoring:    Acute and Chronic Pain Monitoring:   RX Monitoring 10/27/2022   Attestation -   Periodic Controlled Substance Monitoring No signs of potential drug abuse or diversion identified.;Obtaining appropriate analgesic effect of treatment. ;Random urine drug screen sent today.    Chronic Pain > 50 MEDD -   Chronic Pain > 80 MEDD -     Follow up 2 months

## 2022-10-30 LAB
6-ACETYLMORPHINE, UR: NOT DETECTED
7-AMINOCLONAZEPAM, URINE: NOT DETECTED
ALPHA-OH-ALPRAZ, URINE: PRESENT
ALPHA-OH-MIDAZOLAM, URINE: NOT DETECTED
ALPRAZOLAM, URINE: PRESENT
AMPHETAMINES, URINE: NOT DETECTED
BARBITURATES, URINE: NOT DETECTED
BENZOYLECGONINE, UR: NOT DETECTED
BUPRENORPHINE URINE: NOT DETECTED
CARISOPRODOL, UR: NOT DETECTED
CLONAZEPAM, URINE: NOT DETECTED
CODEINE, URINE: NOT DETECTED
CREATININE URINE: 190 MG/DL (ref 20–400)
DIAZEPAM, URINE: NOT DETECTED
EER PAIN MGT DRUG PANEL, HIGH RES/EMIT U: NORMAL
ETHYL GLUCURONIDE UR: NOT DETECTED
FENTANYL URINE: NOT DETECTED
GABAPENTIN: NOT DETECTED
HYDROCODONE, URINE: NOT DETECTED
HYDROMORPHONE, URINE: NOT DETECTED
LORAZEPAM, URINE: NOT DETECTED
MARIJUANA METAB, UR: PRESENT
MDA, UR: NOT DETECTED
MDEA, EVE, UR: NOT DETECTED
MDMA URINE: NOT DETECTED
MEPERIDINE METAB, UR: NOT DETECTED
METHADONE, URINE: NOT DETECTED
METHAMPHETAMINE, URINE: NOT DETECTED
METHYLPHENIDATE: NOT DETECTED
MIDAZOLAM, URINE: NOT DETECTED
MORPHINE URINE: NOT DETECTED
NALOXONE URINE: NOT DETECTED
NORBUPRENORPHINE, URINE: NOT DETECTED
NORDIAZEPAM, URINE: NOT DETECTED
NORFENTANYL, URINE: NOT DETECTED
NORHYDROCODONE, URINE: NOT DETECTED
NOROXYCODONE, URINE: PRESENT
NOROXYMORPHONE, URINE: PRESENT
OXAZEPAM, URINE: NOT DETECTED
OXYCODONE URINE: PRESENT
OXYMORPHONE, URINE: PRESENT
PAIN MGT DRUG PANEL, HI RES, UR: NORMAL
PCP,URINE: NOT DETECTED
PHENTERMINE, UR: NOT DETECTED
PREGABALIN: NOT DETECTED
TAPENTADOL, URINE: NOT DETECTED
TAPENTADOL-O-SULFATE, URINE: NOT DETECTED
TEMAZEPAM, URINE: NOT DETECTED
TRAMADOL, URINE: NOT DETECTED
ZOLPIDEM METABOLITE (ZCA), URINE: NOT DETECTED
ZOLPIDEM, URINE: NOT DETECTED

## 2022-11-17 ENCOUNTER — TELEPHONE (OUTPATIENT)
Dept: OBGYN | Age: 62
End: 2022-11-17

## 2022-11-29 DIAGNOSIS — M79.7 FIBROMYALGIA: ICD-10-CM

## 2022-11-29 DIAGNOSIS — M48.02 NEURAL FORAMINAL STENOSIS OF CERVICAL SPINE: ICD-10-CM

## 2022-11-29 NOTE — TELEPHONE ENCOUNTER
Patient called refill line for their  Percocet 7.5-325 mg   RA in [place]     Last Appt:  10/27/2022  Next Appt:   1/3/2023  Med verified in [address]: 10/27/22    OAR[place] Report checked for [place], [place], and [place]:  Percocet 7.5-325 mg   Last filled 11/5/22  Due  12/5/22

## 2022-11-30 RX ORDER — OXYCODONE AND ACETAMINOPHEN 7.5; 325 MG/1; MG/1
1 TABLET ORAL
Qty: 150 TABLET | Refills: 0 | Status: SHIPPED | OUTPATIENT
Start: 2022-12-05 | End: 2023-01-04

## 2022-12-06 ENCOUNTER — HOSPITAL ENCOUNTER (OUTPATIENT)
Dept: MAMMOGRAPHY | Age: 62
Discharge: HOME | End: 2022-12-08
Payer: MEDICARE

## 2022-12-06 DIAGNOSIS — Z12.31 BREAST CANCER SCREENING BY MAMMOGRAM: ICD-10-CM

## 2022-12-06 PROCEDURE — 77067 SCR MAMMO BI INCL CAD: CPT

## 2023-01-04 DIAGNOSIS — M48.02 NEURAL FORAMINAL STENOSIS OF CERVICAL SPINE: ICD-10-CM

## 2023-01-04 DIAGNOSIS — M79.7 FIBROMYALGIA: ICD-10-CM

## 2023-01-04 RX ORDER — OXYCODONE AND ACETAMINOPHEN 7.5; 325 MG/1; MG/1
1 TABLET ORAL
Qty: 150 TABLET | Refills: 0 | Status: SHIPPED | OUTPATIENT
Start: 2023-01-04 | End: 2023-02-03

## 2023-01-04 NOTE — TELEPHONE ENCOUNTER
OAR[place] Report checked for [place], [place], and [place]: 12/5/22 percocet 7.5-325mg #150. Due NOW.     Last Appt:  10/27/2022  Next Appt:   1/17/2023  Med verified in Epic

## 2023-01-17 ENCOUNTER — OFFICE VISIT (OUTPATIENT)
Dept: PAIN MANAGEMENT | Age: 63
End: 2023-01-17
Payer: MEDICARE

## 2023-01-17 VITALS
BODY MASS INDEX: 44.47 KG/M2 | OXYGEN SATURATION: 93 % | SYSTOLIC BLOOD PRESSURE: 110 MMHG | WEIGHT: 251 LBS | DIASTOLIC BLOOD PRESSURE: 72 MMHG | HEART RATE: 98 BPM | HEIGHT: 63 IN

## 2023-01-17 DIAGNOSIS — G89.29 CHRONIC LEFT SHOULDER PAIN: ICD-10-CM

## 2023-01-17 DIAGNOSIS — G89.29 CHRONIC PAIN OF LEFT KNEE: ICD-10-CM

## 2023-01-17 DIAGNOSIS — M25.562 CHRONIC PAIN OF LEFT KNEE: ICD-10-CM

## 2023-01-17 DIAGNOSIS — M50.20 CERVICAL HERNIATED DISC: Primary | ICD-10-CM

## 2023-01-17 DIAGNOSIS — M54.2 CERVICALGIA: ICD-10-CM

## 2023-01-17 DIAGNOSIS — M79.7 FIBROMYALGIA: ICD-10-CM

## 2023-01-17 DIAGNOSIS — M48.02 NEURAL FORAMINAL STENOSIS OF CERVICAL SPINE: ICD-10-CM

## 2023-01-17 DIAGNOSIS — M54.12 CERVICAL RADICULOPATHY: ICD-10-CM

## 2023-01-17 DIAGNOSIS — M25.512 CHRONIC LEFT SHOULDER PAIN: ICD-10-CM

## 2023-01-17 PROCEDURE — G8427 DOCREV CUR MEDS BY ELIG CLIN: HCPCS | Performed by: NURSE PRACTITIONER

## 2023-01-17 PROCEDURE — 1036F TOBACCO NON-USER: CPT | Performed by: NURSE PRACTITIONER

## 2023-01-17 PROCEDURE — 3074F SYST BP LT 130 MM HG: CPT | Performed by: NURSE PRACTITIONER

## 2023-01-17 PROCEDURE — 3017F COLORECTAL CA SCREEN DOC REV: CPT | Performed by: NURSE PRACTITIONER

## 2023-01-17 PROCEDURE — G8417 CALC BMI ABV UP PARAM F/U: HCPCS | Performed by: NURSE PRACTITIONER

## 2023-01-17 PROCEDURE — 99214 OFFICE O/P EST MOD 30 MIN: CPT | Performed by: NURSE PRACTITIONER

## 2023-01-17 PROCEDURE — G8484 FLU IMMUNIZE NO ADMIN: HCPCS | Performed by: NURSE PRACTITIONER

## 2023-01-17 PROCEDURE — 3078F DIAST BP <80 MM HG: CPT | Performed by: NURSE PRACTITIONER

## 2023-01-17 ASSESSMENT — ENCOUNTER SYMPTOMS
CONSTIPATION: 0
SHORTNESS OF BREATH: 0
BACK PAIN: 1
BLOOD IN STOOL: 0

## 2023-01-17 NOTE — PROGRESS NOTES
Subjective:      Patient ID: Merna Sacks is a 63 y.o. female. Chief Complaint   Patient presents with    Back Pain     2m     Back Pain  Associated symptoms include headaches (more migraines than normal, unsure cause. possible stress versus allergy related), numbness (tingling in hands, facial numbness on the left.) and weakness. Pertinent negatives include no chest pain. Shoulder Pain   Associated symptoms include numbness (tingling in hands, facial numbness on the left. ). Neck Pain   Associated symptoms include headaches (more migraines than normal, unsure cause. possible stress versus allergy related), numbness (tingling in hands, facial numbness on the left.) and weakness. Pertinent negatives include no chest pain. Other  Associated symptoms include arthralgias, fatigue, headaches (more migraines than normal, unsure cause. possible stress versus allergy related), myalgias, neck pain, numbness (tingling in hands, facial numbness on the left.) and weakness. Pertinent negatives include no chest pain. Hip Pain   Associated symptoms include numbness (tingling in hands, facial numbness on the left. ). Pain  Associated symptoms include arthralgias, fatigue, headaches (more migraines than normal, unsure cause. possible stress versus allergy related), myalgias, neck pain, numbness (tingling in hands, facial numbness on the left.) and weakness. Pertinent negatives include no chest pain. Here today for routine pain clinic recheck.     Pain Assessment  Location of Pain: Back  Severity of Pain: 6  Quality of Pain: Aching (burning, gnawing)  Duration of Pain: Persistent  Frequency of Pain: Constant  Aggravating Factors: Stairs, Walking, Squatting, Standing, Kneeling, Exercise, Straightening, Stretching, Bending  Limiting Behavior: Yes  Relieving Factors: Rest, Ice, Heat (meds)  Result of Injury: No  Work-Related Injury: No    Allergies   Allergen Reactions    Latex Rash    Rituximab Palpitations     Severe tachycardia    Blue Dyes (Parenteral) Other (See Comments)     Mom had trouble breathing     Buspirone Other (See Comments)     Higher dose increased anxiety/trouble sleeping    Cyclobenzaprine Other (See Comments)     Felt odd    Fentanyl Other (See Comments)     sleepwalking    Ferric Sulfate Other (See Comments)     Severe abd pain    Gabapentin     Hydroxyzine Hcl Other (See Comments)     Lightheaded/heart racing    Lactose Other (See Comments)     GI    Other Other (See Comments)     IVP Dye  Mom had trouble breathing     Seasonal     Vilazodone Other (See Comments)     Teeth clenching when on with Buspar       Outpatient Medications Marked as Taking for the 1/17/23 encounter (Office Visit) with TITO Egan - CNP   Medication Sig Dispense Refill    oxyCODONE-acetaminophen (PERCOCET) 7.5-325 MG per tablet Take 1 tablet by mouth 5 times daily for 30 days. 150 tablet 0    azelastine (OPTIVAR) 0.05 % ophthalmic solution instill 1 drop into both eyes twice a day if needed      ALPRAZolam (XANAX) 1 MG tablet TAKE 1/2 TO 1 TABLET BY MOUTH THREE TIMES DAILY AS NEEDED. TAKE SEPARATE FROM PERCOCET      vitamin D (ERGOCALCIFEROL) 1.25 MG (55778 UT) CAPS capsule take 1 capsule by mouth ONCE A WEEK      NARCAN 4 MG/0.1ML LIQD nasal spray instill 1 spray in 1 NOSTRIL if needed FOR OPIOID OVERDOSE may re. ..  (REFER TO PRESCRIPTION NOTES).       carvedilol (COREG) 3.125 MG tablet Take 3.125 mg by mouth 2 times daily      omeprazole (PRILOSEC) 20 MG delayed release capsule Take 1 capsule by mouth 2 times daily 60 capsule 5    Multiple Vitamins-Minerals (CENTRUM MULTI + OMEGA 3 PO) Take 1 tablet by mouth daily      ENTRESTO 24-26 MG per tablet Take 1 tablet by mouth 2 times daily      aspirin 81 MG chewable tablet Take 81 mg by mouth daily      olopatadine (PATADAY) 0.2 % SOLN ophthalmic solution insert 1 drop into both eyes once daily if needed 2.5 mL 0    fluticasone (FLONASE) 50 MCG/ACT nasal spray spray 2 sprays INTO EACH NOSTRIL ONCE DAILY 16 g 0    albuterol sulfate HFA (VENTOLIN HFA) 108 (90 Base) MCG/ACT inhaler inhale 2 puffs every 4 hours if needed 18 g 0    Blood Pressure KIT 1 kit by Does not apply route 2 times daily as needed (BP) 1 kit 0    rizatriptan (MAXALT-MLT) 10 MG disintegrating tablet Take 1 tablet by mouth once as needed for Migraine May repeat in 2 hours if needed 9 tablet 11    Handicap Placard MISC by Does not apply route 2 each 0       Past Medical History:   Diagnosis Date    Asthma     Blindness of left eye     Cancer ([address]     Cervical herniated disc     C5-C6, with nerve compression. Congenital hip dysplasia     Degenerative joint disease     (Right knee) -     Depression     Dysfunctional uterine bleeding     Dyspnea     (progressive - multifactorial.    Failed total right knee replacement (HCC)     Fatigue     Fibromyalgia     Generalized anxiety disorder     Grief at loss of child     4-yr old granddaughter  of brain tumor in . Hip pain, bilateral     Secondary to piriformis syndrome and bilateral greater trochanteric bursitis. (Injection of Celestone and Marcaine). History of blood transfusion     History of tobacco use     Currently not smoking. Hypertension     Inverted nipple     Left. Lifelong    Kidney stone     Lumbar disc disease     Chronic. Migraine     Obesity     Osteoarthritis     Degenerative joint disease - of all joints. Seasonal allergic rhinitis     Shoulder pain, left     (Injection of Celestone/Marcaine subacromially) - Dr. Julia Arriaga - 10/2008. Shoulder pain, right     (Injection of Celestone/Marcaine subacromially). Dr. Julia Arriaga - 10/2008. Supraventricular tachycardia, paroxysmal (HCC)     Status post cardiac ablation. Vitamin D deficiency        Past Surgical History:   Procedure Laterality Date    ATRIAL ABLATION SURGERY  2006    Radiofrequency ablation of slow AV pawel pathway.     CARDIAC CATHETERIZATION  2006    no blockages    CARDIAC CATHETERIZATION  2020    normal coronary arteries,anteroapical hypokinesis consistent with cardiomyopathy    CHOLECYSTECTOMY, LAPAROSCOPIC  2011    (Dr. Spike Guo)    HIP SURGERY Right 2021    Right HIP Injection performed by Liat Keyes MD at [address] Right     FAILED KNEE RE;PLAC    KNEE ARTHROPLASTY Right     LAPAROSCOPY      OTHER SURGICAL HISTORY      Epidural steroids to cervical spine. OTHER SURGICAL HISTORY      ECT therapy, one session only and she refused any further    PORT SURGERY  2020    right IJ port removal, Kaz Martinez MD    MA ARTHRP KNE CONDYLE&PLATU MEDIAL&LAT COMPARTMENTS Left 2018    Left Total Knee Arthroplasty performed by Stiven Garcia MD at [place] [address] Left 2021    Reverse left total shoulder arthroplasty, Dr. Jennifer Reyes at [address] [place] [address] ECHOCARDIOGRAM  2020    grade 1 diastolic dysfunction, EF 49-54%    TUBAL LIGATION         Family History   Problem Relation Age of Onset    Breast Cancer Mother 76    High Blood Pressure Mother     Coronary Art Dis Mother         Multiple stents. Diabetes Mother     Heart Disease Father     Coronary Art Dis Father         Myocardial infarction at age 54 and . COPD Father     Arthritis Brother     COPD Brother     Heart Disease Brother         Congenital heart defect -  at 10 wks of age.     Diabetes Maternal Grandmother     Cancer Other     Breast Cancer Paternal Aunt        Social History     Socioeconomic History    Marital status:      Spouse name: None    Number of children: 4    Years of education: 10    Highest education level: GED or equivalent   Occupational History    Occupation: disabled [employer]   Tobacco Use    Smoking status: Former     Packs/day: 0.25     Years: 35.00     Pack years: 8.75     Types: Cigarettes     Quit date:      Years since quitting: 15.0    Smokeless tobacco: Former     Quit date: 6/1/2015    Tobacco comments:     6 cigarettes per day since age 17 or so.   Vaping Use    Vaping Use: Never used   Substance and Sexual Activity    Alcohol use: Yes     Alcohol/week: 1.0 standard drink     Types: 1 Standard drinks or equivalent per week     Comment: occas.    Drug use: No    Sexual activity: Not Currently     Partners: Male   Social History Narrative    10/16/2019- She receives HEAP, PIPP, Food [place]- No other SDOH needs identified.      Review of Systems   Constitutional:  Positive for fatigue. Negative for activity change.   HENT: Negative.     Respiratory:  Negative for shortness of breath.    Cardiovascular:  Negative for chest pain.   Gastrointestinal:  Negative for blood in stool and constipation.   Genitourinary:  Positive for flank pain.   Musculoskeletal:  Positive for arthralgias, back pain, myalgias and neck pain.   Neurological:  Positive for weakness, numbness (tingling in hands, facial numbness on the left.) and headaches (more migraines than normal, unsure cause. possible stress versus allergy related).   Psychiatric/Behavioral:  Negative for sleep disturbance. The patient is nervous/anxious (anxiety).      Objective:   Physical Exam  Vitals reviewed.   Constitutional:       General: She is not in acute distress.     Appearance: Normal appearance. She is well-developed. She is not ill-appearing, toxic-appearing or diaphoretic.      Interventions: She is not intubated.  HENT:      Head: Normocephalic and atraumatic.      Right Ear: External ear normal.      Left Ear: External ear normal.      Nose: Nose normal.   Eyes:      General: Lids are normal.      Conjunctiva/sclera: Conjunctivae normal.   Cardiovascular:      Pulses: Normal pulses.   Pulmonary:      Effort: Pulmonary effort is normal. No tachypnea, bradypnea, accessory muscle usage or respiratory distress. She is not intubated.   Skin:     General: Skin is warm and dry.      Nails:  There is no clubbing. Neurological:      Mental Status: She is alert and oriented to person, place, and time. Cranial Nerves: No cranial nerve deficit. Psychiatric:         Speech: Speech normal.         Behavior: Behavior normal. Behavior is cooperative. Assessment:      1. Cervical herniated disc    2. Fibromyalgia    3. Neural foraminal stenosis of cervical spine    4. Cervical radiculopathy    5. Cervicalgia    6. Chronic left shoulder pain    7. Chronic pain of left knee          Plan:      Chronic pain diagnoses such as   1. Cervical herniated disc    2. Fibromyalgia    3. Neural foraminal stenosis of cervical spine    4. Cervical radiculopathy    5. Cervicalgia    6. Chronic left shoulder pain    7. Chronic pain of left knee     controlled on current medication regime, wll continue current pain medications to improve quality of life and function. Random drug screen today for opiate medication compliance. Angelito Lerner is here for recheck/reevaluation of chronic pain. She is able to maintain daily activities with the use of current pain medications and is generally satisfied with level of analgesia. She shows no evidence of misuse or misappropriation of medications. She denies use of alcohol or illegal substances. UDS have been Appropriate with most recent screen     Controlled Substance Monitoring:    Acute and Chronic Pain Monitoring:   RX Monitoring 1/17/2023   Attestation -   Periodic Controlled Substance Monitoring No signs of potential drug abuse or diversion identified.;Obtaining appropriate analgesic effect of treatment.    Chronic Pain > 50 MEDD -   Chronic Pain > 80 MEDD -     Follow up 2 months

## 2023-01-30 DIAGNOSIS — M79.7 FIBROMYALGIA: ICD-10-CM

## 2023-01-30 DIAGNOSIS — M48.02 NEURAL FORAMINAL STENOSIS OF CERVICAL SPINE: ICD-10-CM

## 2023-01-30 NOTE — TELEPHONE ENCOUNTER
OARRS Report checked for [address] [place] [address], [place], and [place]: 1/4/23 percocet 7.5-325mg #150. Due 2/3/23.     Josefina Dunne     Last Appt:  1/17/2023  Next Appt:   3/17/2023  Med verified in Epic

## 2023-02-02 RX ORDER — OXYCODONE AND ACETAMINOPHEN 7.5; 325 MG/1; MG/1
1 TABLET ORAL
Qty: 150 TABLET | Refills: 0 | Status: SHIPPED | OUTPATIENT
Start: 2023-02-03 | End: 2023-03-05

## 2023-02-27 DIAGNOSIS — M79.7 FIBROMYALGIA: ICD-10-CM

## 2023-02-27 DIAGNOSIS — M48.02 NEURAL FORAMINAL STENOSIS OF CERVICAL SPINE: ICD-10-CM

## 2023-02-27 NOTE — TELEPHONE ENCOUNTER
Tamiko Other called requesting a refill of the below medication which has been pended for you:     Requested Prescriptions     Pending Prescriptions Disp Refills    oxyCODONE-acetaminophen (PERCOCET) 7.5-325 MG per tablet 150 tablet 0     Sig: Take 1 tablet by mouth 5 times daily for 30 days. Last Appointment Date: 1/17/2023  Next Appointment Date: 3/17/2023    Allergies   Allergen Reactions    Latex Rash    Rituximab Palpitations     Severe tachycardia    Blue Dyes (Parenteral) Other (See Comments)     Mom had trouble breathing     Buspirone Other (See Comments)     Higher dose increased anxiety/trouble sleeping    Cyclobenzaprine Other (See Comments)     Felt odd    Fentanyl Other (See Comments)     sleepwalking    Ferric Sulfate Other (See Comments)     Severe abd pain    Gabapentin     Hydroxyzine Hcl Other (See Comments)     Lightheaded/heart racing    Lactose Other (See Comments)     GI    Other Other (See Comments)     IVP Dye  Mom had trouble breathing     Seasonal     Vilazodone Other (See Comments)     Teeth clenching when on with Buspar       Pharmacy:  [address] [facility] Report checked for [place], [place], and [place]:Percocet 7.5/325 02/02/23 #150. Due 03/04/23.

## 2023-03-01 RX ORDER — OXYCODONE AND ACETAMINOPHEN 7.5; 325 MG/1; MG/1
1 TABLET ORAL
Qty: 150 TABLET | Refills: 0 | Status: SHIPPED | OUTPATIENT
Start: 2023-03-04 | End: 2023-04-03

## 2023-03-16 ENCOUNTER — HOSPITAL ENCOUNTER (OUTPATIENT)
Age: 63
Setting detail: SPECIMEN
Discharge: HOME | End: 2023-03-16
Payer: MEDICARE

## 2023-03-16 ENCOUNTER — OFFICE VISIT (OUTPATIENT)
Dept: PAIN MANAGEMENT | Age: 63
End: 2023-03-16
Payer: MEDICARE

## 2023-03-16 VITALS
HEART RATE: 84 BPM | BODY MASS INDEX: 44.46 KG/M2 | WEIGHT: 251 LBS | DIASTOLIC BLOOD PRESSURE: 78 MMHG | SYSTOLIC BLOOD PRESSURE: 118 MMHG | OXYGEN SATURATION: 96 % | RESPIRATION RATE: 16 BRPM

## 2023-03-16 DIAGNOSIS — G89.29 CHRONIC BILATERAL LOW BACK PAIN WITHOUT SCIATICA: ICD-10-CM

## 2023-03-16 DIAGNOSIS — M47.22 CERVICAL RADICULOPATHY DUE TO DEGENERATIVE JOINT DISEASE OF SPINE: ICD-10-CM

## 2023-03-16 DIAGNOSIS — M54.50 CHRONIC BILATERAL LOW BACK PAIN WITHOUT SCIATICA: ICD-10-CM

## 2023-03-16 DIAGNOSIS — M54.2 CERVICALGIA: ICD-10-CM

## 2023-03-16 DIAGNOSIS — M51.36 LUMBAR DEGENERATIVE DISC DISEASE: ICD-10-CM

## 2023-03-16 DIAGNOSIS — M54.12 CERVICAL RADICULOPATHY: ICD-10-CM

## 2023-03-16 DIAGNOSIS — Z02.83 ENCOUNTER FOR DRUG SCREENING: ICD-10-CM

## 2023-03-16 DIAGNOSIS — Z79.891 ENCOUNTER FOR LONG-TERM OPIATE ANALGESIC USE: Primary | ICD-10-CM

## 2023-03-16 DIAGNOSIS — Z79.891 ENCOUNTER FOR LONG-TERM OPIATE ANALGESIC USE: ICD-10-CM

## 2023-03-16 PROCEDURE — 1036F TOBACCO NON-USER: CPT | Performed by: NURSE PRACTITIONER

## 2023-03-16 PROCEDURE — 3017F COLORECTAL CA SCREEN DOC REV: CPT | Performed by: NURSE PRACTITIONER

## 2023-03-16 PROCEDURE — 80307 DRUG TEST PRSMV CHEM ANLYZR: CPT

## 2023-03-16 PROCEDURE — 3078F DIAST BP <80 MM HG: CPT | Performed by: NURSE PRACTITIONER

## 2023-03-16 PROCEDURE — G0481 DRUG TEST DEF 8-14 CLASSES: HCPCS

## 2023-03-16 PROCEDURE — 99213 OFFICE O/P EST LOW 20 MIN: CPT

## 2023-03-16 PROCEDURE — G8427 DOCREV CUR MEDS BY ELIG CLIN: HCPCS | Performed by: NURSE PRACTITIONER

## 2023-03-16 PROCEDURE — G8484 FLU IMMUNIZE NO ADMIN: HCPCS | Performed by: NURSE PRACTITIONER

## 2023-03-16 PROCEDURE — 3074F SYST BP LT 130 MM HG: CPT | Performed by: NURSE PRACTITIONER

## 2023-03-16 PROCEDURE — G8417 CALC BMI ABV UP PARAM F/U: HCPCS | Performed by: NURSE PRACTITIONER

## 2023-03-16 PROCEDURE — 99214 OFFICE O/P EST MOD 30 MIN: CPT | Performed by: NURSE PRACTITIONER

## 2023-03-16 ASSESSMENT — ENCOUNTER SYMPTOMS
SHORTNESS OF BREATH: 0
BLOOD IN STOOL: 0
CONSTIPATION: 0
BACK PAIN: 1

## 2023-03-16 ASSESSMENT — PATIENT HEALTH QUESTIONNAIRE - PHQ9
SUM OF ALL RESPONSES TO PHQ QUESTIONS 1-9: 0
2. FEELING DOWN, DEPRESSED OR HOPELESS: 0
SUM OF ALL RESPONSES TO PHQ9 QUESTIONS 1 & 2: 0
SUM OF ALL RESPONSES TO PHQ QUESTIONS 1-9: 0
SUM OF ALL RESPONSES TO PHQ QUESTIONS 1-9: 0
1. LITTLE INTEREST OR PLEASURE IN DOING THINGS: 0
SUM OF ALL RESPONSES TO PHQ QUESTIONS 1-9: 0

## 2023-03-16 NOTE — PROGRESS NOTES
Subjective:      Patient ID: Niharika Davis is a 66 y.o. female. Chief Complaint   Patient presents with    Neck Pain     2 mo f/u Neck and Fibromyalgia pain. Back Pain  Associated symptoms include headaches (more migraines than normal, unsure cause. possible stress versus allergy related), numbness (tingling in hands, facial numbness on the left.) and weakness. Pertinent negatives include no chest pain. Shoulder Pain   Associated symptoms include numbness (tingling in hands, facial numbness on the left. ). Neck Pain   Associated symptoms include headaches (more migraines than normal, unsure cause. possible stress versus allergy related), numbness (tingling in hands, facial numbness on the left.) and weakness. Pertinent negatives include no chest pain. Other  Associated symptoms include arthralgias, fatigue, headaches (more migraines than normal, unsure cause. possible stress versus allergy related), myalgias, neck pain, numbness (tingling in hands, facial numbness on the left.) and weakness. Pertinent negatives include no chest pain. Hip Pain   Associated symptoms include numbness (tingling in hands, facial numbness on the left. ). Pain  Associated symptoms include arthralgias, fatigue, headaches (more migraines than normal, unsure cause. possible stress versus allergy related), myalgias, neck pain, numbness (tingling in hands, facial numbness on the left.) and weakness. Pertinent negatives include no chest pain. Here today for routine pain clinic recheck.     Left knee revision scheduled next week    Pain Assessment  Location of Pain: Back  Location Modifiers: Left, Right, Medial, Posterior, Superior, Inferior  Severity of Pain: 5  Quality of Pain: Throbbing, Sharp, Dull, Aching, Locking, Grinding, Popping, Cracking, Buckling  Duration of Pain: Persistent  Frequency of Pain: Constant  Aggravating Factors: Stairs, Walking, Standing, Squatting, Kneeling, Exercise, Straightening, Stretching  Limiting Behavior: Yes  Relieving Factors: Rest (Rx)  Result of Injury: No  Work-Related Injury: No    Allergies   Allergen Reactions    Latex Rash    Rituximab Palpitations     Severe tachycardia    Blue Dyes (Parenteral) Other (See Comments)     Mom had trouble breathing     Buspirone Other (See Comments)     Higher dose increased anxiety/trouble sleeping    Cyclobenzaprine Other (See Comments)     Felt odd    Fentanyl Other (See Comments)     sleepwalking    Ferric Sulfate Other (See Comments)     Severe abd pain    Gabapentin     Hydroxyzine Hcl Other (See Comments)     Lightheaded/heart racing    Lactose Other (See Comments)     GI    Other Other (See Comments)     IVP Dye  Mom had trouble breathing     Seasonal     Vilazodone Other (See Comments)     Teeth clenching when on with Buspar       Outpatient Medications Marked as Taking for the 3/16/23 encounter (Office Visit) with TITO Park - CNP   Medication Sig Dispense Refill    oxyCODONE-acetaminophen (PERCOCET) 7.5-325 MG per tablet Take 1 tablet by mouth 5 times daily for 30 days. 150 tablet 0    azelastine (OPTIVAR) 0.05 % ophthalmic solution instill 1 drop into both eyes twice a day if needed      ALPRAZolam (XANAX) 1 MG tablet TAKE 1/2 TO 1 TABLET BY MOUTH THREE TIMES DAILY AS NEEDED. TAKE SEPARATE FROM PERCOCET      vitamin D (ERGOCALCIFEROL) 1.25 MG (96389 UT) CAPS capsule take 1 capsule by mouth ONCE A WEEK      carvedilol (COREG) 3.125 MG tablet Take 3.125 mg by mouth 2 times daily      omeprazole (PRILOSEC) 20 MG delayed release capsule Take 1 capsule by mouth 2 times daily 60 capsule 5    Multiple Vitamins-Minerals (CENTRUM MULTI + OMEGA 3 PO) Take 1 tablet by mouth daily      ENTRESTO 24-26 MG per tablet Take 1 tablet by mouth 2 times daily      aspirin 81 MG chewable tablet Take 81 mg by mouth daily      olopatadine (PATADAY) 0.2 % SOLN ophthalmic solution insert 1 drop into both eyes once daily if needed 2.5 mL 0    fluticasone (FLONASE) 50  MCG/ACT nasal spray spray 2 sprays INTO EACH NOSTRIL ONCE DAILY 16 g 0    albuterol sulfate HFA (VENTOLIN HFA) 108 (90 Base) MCG/ACT inhaler inhale 2 puffs every 4 hours if needed 18 g 0    Blood Pressure KIT 1 kit by Does not apply route 2 times daily as needed (BP) 1 kit 0    rizatriptan (MAXALT-MLT) 10 MG disintegrating tablet Take 1 tablet by mouth once as needed for Migraine May repeat in 2 hours if needed 9 tablet 11    Handicap Placard MISC by Does not apply route 2 each 0       Past Medical History:   Diagnosis Date    Asthma     Blindness of left eye     Cancer ([facility] [address]     Cervical herniated disc     C5-C6, with nerve compression. Congenital hip dysplasia     Degenerative joint disease     (Right knee) -     Depression     Dysfunctional uterine bleeding     Dyspnea     (progressive - multifactorial.    Failed total right knee replacement (HCC)     Fatigue     Fibromyalgia     Generalized anxiety disorder     Grief at loss of child     4-yr old granddaughter  of brain tumor in . Hip pain, bilateral     Secondary to piriformis syndrome and bilateral greater trochanteric bursitis. (Injection of Celestone and Marcaine). History of blood transfusion     History of tobacco use     Currently not smoking. Hypertension     Inverted nipple     Left. Lifelong    Kidney stone     Lumbar disc disease     Chronic. Migraine     Obesity     Osteoarthritis     Degenerative joint disease - of all joints. Seasonal allergic rhinitis     Shoulder pain, left     (Injection of Celestone/Marcaine subacromially) - Dr. Inga Thayer - 10/2008. Shoulder pain, right     (Injection of Celestone/Marcaine subacromially). Dr. Inga Thayer - 10/2008. Supraventricular tachycardia, paroxysmal (HCC)     Status post cardiac ablation. Vitamin D deficiency        Past Surgical History:   Procedure Laterality Date    ATRIAL ABLATION SURGERY      Radiofrequency ablation of slow AV pawel pathway.     CARDIAC CATHETERIZATION 2006    no blockages    CARDIAC CATHETERIZATION  2020    normal coronary arteries,anteroapical hypokinesis consistent with cardiomyopathy    CHOLECYSTECTOMY, LAPAROSCOPIC  2011    (Dr. Kendra Malagon)    HIP SURGERY Right 2021    Right HIP Injection performed by Karmen Zarate MD at [address] Right     FAILED KNEE RE;PLAC    KNEE ARTHROPLASTY Right     LAPAROSCOPY      OTHER SURGICAL HISTORY      Epidural steroids to cervical spine. OTHER SURGICAL HISTORY      ECT therapy, one session only and she refused any further    PORT SURGERY  2020    right IJ port removal, Chandrika Bell MD    IN ARTHRP KNE CONDYLE&PLATU MEDIAL&LAT COMPARTMENTS Left 2018    Left Total Knee Arthroplasty performed by Carlton Abreu MD at [place] [address] Left 2021    Reverse left total shoulder arthroplasty, Dr. Gallegos  at [facility] ECHOCARDIOGRAM  2020    grade 1 diastolic dysfunction, EF 53-03%    TUBAL LIGATION         Family History   Problem Relation Age of Onset    Breast Cancer Mother 76    High Blood Pressure Mother     Coronary Art Dis Mother         Multiple stents. Diabetes Mother     Heart Disease Father     Coronary Art Dis Father         Myocardial infarction at age 54 and . COPD Father     Arthritis Brother     COPD Brother     Heart Disease Brother         Congenital heart defect -  at 10 wks of age.     Diabetes Maternal Grandmother     Cancer Other     Breast Cancer Paternal Aunt        Social History     Socioeconomic History    Marital status:      Spouse name: None    Number of children: 4    Years of education: 10    Highest education level: GED or equivalent   Occupational History    Occupation: disabled [employer]   Tobacco Use    Smoking status: Former     Packs/day: 0.25     Years: 35.00     Pack years: 8.75     Types: Cigarettes     Quit date:  Years since quitting: 15.2    Smokeless tobacco: Former     Quit date: 6/1/2015    Tobacco comments:     6 cigarettes per day since age 15 or so. Vaping Use    Vaping Use: Never used   Substance and Sexual Activity    Alcohol use: Yes     Alcohol/week: 1.0 standard drink     Types: 1 Standard drinks or equivalent per week     Comment: occas. Drug use: No    Sexual activity: Not Currently     Partners: Male   Social History Narrative    10/16/2019- She receives HEAP, PIPP, [employer] [place]- No other SDOH needs identified. Review of Systems   Constitutional:  Positive for fatigue. Negative for activity change. HENT: Negative. Respiratory:  Negative for shortness of breath. Cardiovascular:  Negative for chest pain. Gastrointestinal:  Negative for blood in stool and constipation. Genitourinary:  Positive for flank pain. Musculoskeletal:  Positive for arthralgias, back pain, myalgias and neck pain. Neurological:  Positive for weakness, numbness (tingling in hands, facial numbness on the left.) and headaches (more migraines than normal, unsure cause. possible stress versus allergy related). Psychiatric/Behavioral:  Negative for sleep disturbance. The patient is nervous/anxious (anxiety). Objective:   Physical Exam  Vitals reviewed. Constitutional:       General: She is not in acute distress. Appearance: Normal appearance. She is well-developed. She is not ill-appearing, toxic-appearing or diaphoretic. Interventions: She is not intubated. HENT:      Head: Normocephalic and atraumatic. Right Ear: External ear normal.      Left Ear: External ear normal.      Nose: Nose normal.   Eyes:      General: Lids are normal.      Conjunctiva/sclera: Conjunctivae normal.   Cardiovascular:      Pulses: Normal pulses. Pulmonary:      Effort: Pulmonary effort is normal. No tachypnea, bradypnea, accessory muscle usage or respiratory distress. She is not intubated.    Skin:     General: Skin is warm and dry. Nails: There is no clubbing. Neurological:      Mental Status: She is alert and oriented to person, place, and time. Cranial Nerves: No cranial nerve deficit. Psychiatric:         Speech: Speech normal.         Behavior: Behavior normal. Behavior is cooperative. Assessment:      1. Encounter for long-term opiate analgesic use    2. Encounter for drug screening    3. Cervical radiculopathy    4. Cervicalgia    5. Cervical radiculopathy due to degenerative joint disease of spine    6. Lumbar degenerative disc disease    7. Chronic bilateral low back pain without sciatica          Plan:      Chronic pain diagnoses such as   1. Encounter for long-term opiate analgesic use    2. Encounter for drug screening    3. Cervical radiculopathy    4. Cervicalgia    5. Cervical radiculopathy due to degenerative joint disease of spine    6. Lumbar degenerative disc disease    7. Chronic bilateral low back pain without sciatica     controlled on current medication regime, wll continue current pain medications to improve quality of life and function. Random drug screen today for opiate medication compliance. Nakia Rivera is here for recheck/reevaluation of chronic pain. She is able to maintain daily activities with the use of current pain medications and is generally satisfied with level of analgesia. She shows no evidence of misuse or misappropriation of medications. She denies use of alcohol or illegal substances. UDS have been Appropriate with most recent screen     Controlled Substance Monitoring:    Acute and Chronic Pain Monitoring:   RX Monitoring 3/16/2023   Attestation -   Periodic Controlled Substance Monitoring No signs of potential drug abuse or diversion identified.;Obtaining appropriate analgesic effect of treatment. ;Random urine drug screen sent today.    Chronic Pain > 50 MEDD -   Chronic Pain > 80 MEDD -     Follow up 2 months

## 2023-03-19 LAB
6-ACETYLMORPHINE, UR: NOT DETECTED
7-AMINOCLONAZEPAM, URINE: NOT DETECTED
ALPHA-OH-ALPRAZ, URINE: PRESENT
ALPHA-OH-MIDAZOLAM, URINE: NOT DETECTED
ALPRAZOLAM, URINE: PRESENT
AMPHETAMINES, URINE: NOT DETECTED
BARBITURATES, URINE: NOT DETECTED
BENZOYLECGONINE, UR: NOT DETECTED
BUPRENORPHINE URINE: NOT DETECTED
CARISOPRODOL, UR: NOT DETECTED
CLONAZEPAM, URINE: NOT DETECTED
CODEINE, URINE: NOT DETECTED
CREATININE URINE: 279.7 MG/DL (ref 20–400)
DIAZEPAM, URINE: NOT DETECTED
EER PAIN MGT DRUG PANEL, HIGH RES/EMIT U: NORMAL
ETHYL GLUCURONIDE UR: NORMAL
FENTANYL URINE: NOT DETECTED
GABAPENTIN: NOT DETECTED
HYDROCODONE, URINE: NOT DETECTED
HYDROMORPHONE, URINE: NOT DETECTED
LORAZEPAM, URINE: NOT DETECTED
MARIJUANA METAB, UR: PRESENT
MDA, UR: NOT DETECTED
MDEA, EVE, UR: NOT DETECTED
MDMA URINE: NOT DETECTED
MEPERIDINE METAB, UR: NOT DETECTED
METHADONE, URINE: NOT DETECTED
METHAMPHETAMINE, URINE: NOT DETECTED
METHYLPHENIDATE: NOT DETECTED
MIDAZOLAM, URINE: NOT DETECTED
MORPHINE URINE: NOT DETECTED
NALOXONE URINE: NOT DETECTED
NORBUPRENORPHINE, URINE: NOT DETECTED
NORDIAZEPAM, URINE: NOT DETECTED
NORFENTANYL, URINE: NOT DETECTED
NORHYDROCODONE, URINE: NOT DETECTED
NOROXYCODONE, URINE: PRESENT
NOROXYMORPHONE, URINE: PRESENT
OXAZEPAM, URINE: NOT DETECTED
OXYCODONE URINE: PRESENT
OXYMORPHONE, URINE: PRESENT
PAIN MGT DRUG PANEL, HI RES, UR: NORMAL
PCP,URINE: NOT DETECTED
PHENTERMINE, UR: NOT DETECTED
PREGABALIN: NOT DETECTED
TAPENTADOL, URINE: NOT DETECTED
TAPENTADOL-O-SULFATE, URINE: NOT DETECTED
TEMAZEPAM, URINE: NOT DETECTED
TRAMADOL, URINE: NOT DETECTED
ZOLPIDEM METABOLITE (ZCA), URINE: NOT DETECTED
ZOLPIDEM, URINE: NOT DETECTED

## 2023-03-22 ENCOUNTER — TELEPHONE (OUTPATIENT)
Dept: PAIN MANAGEMENT | Age: 63
End: 2023-03-22

## 2023-03-22 DIAGNOSIS — M47.22 CERVICAL RADICULOPATHY DUE TO DEGENERATIVE JOINT DISEASE OF SPINE: ICD-10-CM

## 2023-03-22 DIAGNOSIS — M54.12 CERVICAL RADICULOPATHY: Primary | ICD-10-CM

## 2023-03-22 DIAGNOSIS — M54.2 CERVICALGIA: ICD-10-CM

## 2023-03-22 DIAGNOSIS — G89.18 POST-OP PAIN: ICD-10-CM

## 2023-03-22 RX ORDER — OXYCODONE AND ACETAMINOPHEN 10; 325 MG/1; MG/1
1 TABLET ORAL EVERY 4 HOURS PRN
Qty: 42 TABLET | Refills: 0 | Status: SHIPPED | OUTPATIENT
Start: 2023-03-22 | End: 2023-03-29

## 2023-03-22 NOTE — TELEPHONE ENCOUNTER
Patient called, she had a left knee revision surgery on Monday, she got back home last night and is having a lot of pain. Patient states Ovidio Simon told her to call if she needs anything.        [phone]

## 2023-03-22 NOTE — TELEPHONE ENCOUNTER
[phone] with Pt and advised Rx sent to pharmacy via Arturo Torres CNP , (Percocet 10mg, 42 tabs). Educated/re-educated Pt r/t to taking minimal amount needed for pain mgmt & to decrease dosing as soon as manageable. Asked Pt about Narcan on hand and Pt advised she did have at the home. Pt verbalized understanding, no further questions at this time. Pt to report to ER PRN pain/concerns. [address] with Alber Richter ([facility] Pharmacist) for Rx clarification, as per Pt request.  Alber Richter, Pharmacist advised Rx will be ready for  @ 1700. No New orders.

## 2023-03-22 NOTE — TELEPHONE ENCOUNTER
Percocet 7.5 mg 5 times daily and ice. Med is only lasting about 3 hours. [facility]    Vicodin given in hospital per surgeon. Allergic reaction red face, flushed.

## 2023-04-04 DIAGNOSIS — M79.7 FIBROMYALGIA: ICD-10-CM

## 2023-04-04 DIAGNOSIS — M48.02 NEURAL FORAMINAL STENOSIS OF CERVICAL SPINE: ICD-10-CM

## 2023-04-04 RX ORDER — OXYCODONE AND ACETAMINOPHEN 7.5; 325 MG/1; MG/1
1 TABLET ORAL
Qty: 150 TABLET | Refills: 0 | Status: SHIPPED | OUTPATIENT
Start: 2023-04-04 | End: 2023-05-04

## 2023-04-04 NOTE — TELEPHONE ENCOUNTER
Iker Jorge called requesting a refill of the below medication which has been pended for you:     Requested Prescriptions     Pending Prescriptions Disp Refills    oxyCODONE-acetaminophen (PERCOCET) 7.5-325 MG per tablet 150 tablet 0     Sig: Take 1 tablet by mouth 5 times daily for 30 days. Last Appointment Date: 3/16/2023  Next Appointment Date: 5/15/2023    Allergies   Allergen Reactions    Latex Rash    Rituximab Palpitations     Severe tachycardia    Blue Dyes (Parenteral) Other (See Comments)     Mom had trouble breathing     Buspirone Other (See Comments)     Higher dose increased anxiety/trouble sleeping    Cyclobenzaprine Other (See Comments)     Felt odd    Fentanyl Other (See Comments)     sleepwalking    Ferric Sulfate Other (See Comments)     Severe abd pain    Gabapentin     Hydrocodone-Acetaminophen Other (See Comments)     Flushed and red face    Hydroxyzine Hcl Other (See Comments)     Lightheaded/heart racing    Lactose Other (See Comments)     GI    Other Other (See Comments)     IVP Dye  Mom had trouble breathing     Seasonal     Vilazodone Other (See Comments)     Teeth clenching when on with Buspar       Pharmacy:  [address] [place] [address] Report checked for [place], [place], and [place]: 03/04/23 #150. Due NOW. Patient received Percocet 10/325 due to knee surgery:  [place] Report checked for [place], and [place]: 03/22/42 #42.  Due .

## 2023-05-01 DIAGNOSIS — M79.7 FIBROMYALGIA: ICD-10-CM

## 2023-05-01 DIAGNOSIS — M48.02 NEURAL FORAMINAL STENOSIS OF CERVICAL SPINE: ICD-10-CM

## 2023-05-01 NOTE — TELEPHONE ENCOUNTER
Macy Huddleston called requesting a refill of the below medication which has been pended for you:     Requested Prescriptions     Pending Prescriptions Disp Refills    oxyCODONE-acetaminophen (PERCOCET) 7.5-325 MG per tablet 150 tablet 0     Sig: Take 1 tablet by mouth 5 times daily for 30 days. Last Appointment Date: 3/16/2023  Next Appointment Date: 5/15/2023    Allergies   Allergen Reactions    Latex Rash    Rituximab Palpitations     Severe tachycardia    Blue Dyes (Parenteral) Other (See Comments)     Mom had trouble breathing     Buspirone Other (See Comments)     Higher dose increased anxiety/trouble sleeping    Cyclobenzaprine Other (See Comments)     Felt odd    Fentanyl Other (See Comments)     sleepwalking    Ferric Sulfate Other (See Comments)     Severe abd pain    Gabapentin     Hydrocodone-Acetaminophen Other (See Comments)     Flushed and red face    Hydroxyzine Hcl Other (See Comments)     Lightheaded/heart racing    Lactose Other (See Comments)     GI    Other Other (See Comments)     IVP Dye  Mom had trouble breathing     Seasonal     Vilazodone Other (See Comments)     Teeth clenching when on with Buspar       Pharmacy:  [facility]    Last DS11 3/16/23. Please review. [place] and [place]:  percocet 7.5/325 mg  4/4/23   #150  Due 5/4/23.

## 2023-05-02 RX ORDER — OXYCODONE AND ACETAMINOPHEN 7.5; 325 MG/1; MG/1
1 TABLET ORAL
Qty: 150 TABLET | Refills: 0 | Status: SHIPPED | OUTPATIENT
Start: 2023-05-02 | End: 2023-06-01

## 2023-05-15 ENCOUNTER — OFFICE VISIT (OUTPATIENT)
Dept: PAIN MANAGEMENT | Age: 63
End: 2023-05-15
Payer: MEDICARE

## 2023-05-15 VITALS
HEIGHT: 63 IN | OXYGEN SATURATION: 98 % | BODY MASS INDEX: 45.54 KG/M2 | WEIGHT: 257 LBS | HEART RATE: 90 BPM | SYSTOLIC BLOOD PRESSURE: 122 MMHG | DIASTOLIC BLOOD PRESSURE: 74 MMHG

## 2023-05-15 DIAGNOSIS — M51.26 LUMBAR DISC HERNIATION: ICD-10-CM

## 2023-05-15 DIAGNOSIS — M54.12 CERVICAL RADICULOPATHY: ICD-10-CM

## 2023-05-15 DIAGNOSIS — M25.512 CHRONIC LEFT SHOULDER PAIN: Primary | ICD-10-CM

## 2023-05-15 DIAGNOSIS — M48.02 NEURAL FORAMINAL STENOSIS OF CERVICAL SPINE: ICD-10-CM

## 2023-05-15 DIAGNOSIS — G89.29 CHRONIC LEFT SHOULDER PAIN: Primary | ICD-10-CM

## 2023-05-15 DIAGNOSIS — M51.36 LUMBAR DEGENERATIVE DISC DISEASE: ICD-10-CM

## 2023-05-15 PROCEDURE — 1036F TOBACCO NON-USER: CPT | Performed by: NURSE PRACTITIONER

## 2023-05-15 PROCEDURE — 99214 OFFICE O/P EST MOD 30 MIN: CPT | Performed by: NURSE PRACTITIONER

## 2023-05-15 PROCEDURE — 3078F DIAST BP <80 MM HG: CPT | Performed by: NURSE PRACTITIONER

## 2023-05-15 PROCEDURE — 3074F SYST BP LT 130 MM HG: CPT | Performed by: NURSE PRACTITIONER

## 2023-05-15 PROCEDURE — 3017F COLORECTAL CA SCREEN DOC REV: CPT | Performed by: NURSE PRACTITIONER

## 2023-05-15 PROCEDURE — G8427 DOCREV CUR MEDS BY ELIG CLIN: HCPCS | Performed by: NURSE PRACTITIONER

## 2023-05-15 PROCEDURE — G8417 CALC BMI ABV UP PARAM F/U: HCPCS | Performed by: NURSE PRACTITIONER

## 2023-05-15 ASSESSMENT — ENCOUNTER SYMPTOMS
BACK PAIN: 1
CONSTIPATION: 0
SHORTNESS OF BREATH: 0
BLOOD IN STOOL: 0

## 2023-05-15 NOTE — PROGRESS NOTES
Subjective:      Patient ID: Pavan Gibbs is a 66 y.o. female. Chief Complaint   Patient presents with    Back Pain     2m     Back Pain  Associated symptoms include headaches (more migraines than normal, unsure cause. possible stress versus allergy related), numbness (tingling in hands, facial numbness on the left.) and weakness. Pertinent negatives include no chest pain. Shoulder Pain   Associated symptoms include numbness (tingling in hands, facial numbness on the left. ). Neck Pain   Associated symptoms include headaches (more migraines than normal, unsure cause. possible stress versus allergy related), numbness (tingling in hands, facial numbness on the left.) and weakness. Pertinent negatives include no chest pain. Other  Associated symptoms include arthralgias, fatigue, headaches (more migraines than normal, unsure cause. possible stress versus allergy related), myalgias, neck pain, numbness (tingling in hands, facial numbness on the left.) and weakness. Pertinent negatives include no chest pain. Hip Pain   Associated symptoms include numbness (tingling in hands, facial numbness on the left. ). Pain  Associated symptoms include arthralgias, fatigue, headaches (more migraines than normal, unsure cause. possible stress versus allergy related), myalgias, neck pain, numbness (tingling in hands, facial numbness on the left.) and weakness. Pertinent negatives include no chest pain. Here today for routine pain clinic recheck.     Left knee revision since last visit, recovering    Pain Assessment  Location of Pain: Back  Severity of Pain: 5  Quality of Pain: Aching (gnawing, burning)  Duration of Pain: Persistent  Frequency of Pain: Constant  Aggravating Factors: Bending, Stretching, Straightening, Exercise, Kneeling, Squatting, Standing, Walking, Stairs  Limiting Behavior: Yes  Relieving Factors: Rest, Ice, Heat (meds)  Result of Injury: No  Work-Related Injury: No    Allergies   Allergen Reactions

## 2023-05-26 DIAGNOSIS — M79.7 FIBROMYALGIA: ICD-10-CM

## 2023-05-26 DIAGNOSIS — M48.02 NEURAL FORAMINAL STENOSIS OF CERVICAL SPINE: ICD-10-CM

## 2023-05-26 RX ORDER — OXYCODONE AND ACETAMINOPHEN 7.5; 325 MG/1; MG/1
1 TABLET ORAL
Qty: 150 TABLET | Refills: 0 | Status: SHIPPED | OUTPATIENT
Start: 2023-06-01 | End: 2023-07-01

## 2023-05-26 NOTE — TELEPHONE ENCOUNTER
Wero Stokes called requesting a refill of the below medication which has been pended for you:     Requested Prescriptions     Pending Prescriptions Disp Refills    oxyCODONE-acetaminophen (PERCOCET) 7.5-325 MG per tablet 150 tablet 0     Sig: Take 1 tablet by mouth 5 times daily for 30 days. Last Appointment Date: 5/15/2023  Next Appointment Date: 7/14/2023    Allergies   Allergen Reactions    Latex Rash    Rituximab Palpitations     Severe tachycardia    Blue Dyes (Parenteral) Other (See Comments)     Mom had trouble breathing     Buspirone Other (See Comments)     Higher dose increased anxiety/trouble sleeping    Cyclobenzaprine Other (See Comments)     Felt odd    Fentanyl Other (See Comments)     sleepwalking    Ferric Sulfate Other (See Comments)     Severe abd pain    Gabapentin     Hydrocodone-Acetaminophen Other (See Comments)     Flushed and red face    Hydroxyzine Hcl Other (See Comments)     Lightheaded/heart racing    Lactose Other (See Comments)     GI    Other Other (See Comments)     IVP Dye  Mom had trouble breathing     Ropivacaine Hives     Was given after revision of left knee and it caused her hives and irritation at the wound site- so she removed herself    Seasonal     Vilazodone Other (See Comments)     Teeth clenching when on with Buspar       Pharmacy:  [facility] Report checked for [place], and [place]:Percocet 7.5/325 05/02/23 #150. Due 06/01/23.

## 2023-06-06 ENCOUNTER — TELEPHONE (OUTPATIENT)
Dept: PAIN MANAGEMENT | Age: 63
End: 2023-06-06

## 2023-06-06 NOTE — TELEPHONE ENCOUNTER
PA for percocet 7.5/325 mg started thru covermymeds with the following message generated: The patient currently has access to the requested medication and a Prior Authorization is not needed for the patient/medication. Write notified pharmacy who stated that patient already picked up med.

## 2023-06-22 NOTE — TELEPHONE ENCOUNTER
Patient called refill line for their Percocet sent to [facility] in [place]. Last Appt:  1/11/2022  Next Appt:   3/11/2022  Med verified in [address]: 11/11/21    [place] checked [place], and [place]: 2/7/2022 oxycodone acetaminophen 7.5-325mg #150. Due 3/9/2022.
210.82

## 2023-06-27 DIAGNOSIS — M48.02 NEURAL FORAMINAL STENOSIS OF CERVICAL SPINE: ICD-10-CM

## 2023-06-27 DIAGNOSIS — M79.7 FIBROMYALGIA: ICD-10-CM

## 2023-06-27 RX ORDER — OXYCODONE AND ACETAMINOPHEN 7.5; 325 MG/1; MG/1
1 TABLET ORAL
Qty: 150 TABLET | Refills: 0 | Status: SHIPPED | OUTPATIENT
Start: 2023-07-02 | End: 2023-08-01

## 2023-07-14 ENCOUNTER — OFFICE VISIT (OUTPATIENT)
Dept: PAIN MANAGEMENT | Age: 63
End: 2023-07-14
Payer: MEDICARE

## 2023-07-14 VITALS
HEIGHT: 63 IN | SYSTOLIC BLOOD PRESSURE: 128 MMHG | OXYGEN SATURATION: 94 % | WEIGHT: 263 LBS | HEART RATE: 86 BPM | DIASTOLIC BLOOD PRESSURE: 84 MMHG | BODY MASS INDEX: 46.6 KG/M2 | RESPIRATION RATE: 18 BRPM

## 2023-07-14 DIAGNOSIS — M51.26 LUMBAR DISC HERNIATION: ICD-10-CM

## 2023-07-14 DIAGNOSIS — M54.50 CHRONIC BILATERAL LOW BACK PAIN WITHOUT SCIATICA: Primary | ICD-10-CM

## 2023-07-14 DIAGNOSIS — M51.36 LUMBAR DEGENERATIVE DISC DISEASE: ICD-10-CM

## 2023-07-14 DIAGNOSIS — G89.29 CHRONIC BILATERAL LOW BACK PAIN WITHOUT SCIATICA: Primary | ICD-10-CM

## 2023-07-14 DIAGNOSIS — M54.2 CERVICALGIA: ICD-10-CM

## 2023-07-14 PROCEDURE — 99214 OFFICE O/P EST MOD 30 MIN: CPT | Performed by: NURSE PRACTITIONER

## 2023-07-14 RX ORDER — BISACODYL 10 MG
10 SUPPOSITORY, RECTAL RECTAL PRN
COMMUNITY
Start: 2023-03-22

## 2023-07-14 RX ORDER — OMEPRAZOLE 40 MG/1
CAPSULE, DELAYED RELEASE ORAL
COMMUNITY
Start: 2023-05-08

## 2023-07-14 RX ORDER — AMOXICILLIN 250 MG
1 CAPSULE ORAL 2 TIMES DAILY
COMMUNITY
Start: 2023-03-21

## 2023-07-14 RX ORDER — PANTOPRAZOLE SODIUM 20 MG/1
TABLET, DELAYED RELEASE ORAL
COMMUNITY
Start: 2023-04-10

## 2023-07-14 ASSESSMENT — ENCOUNTER SYMPTOMS
CONSTIPATION: 0
BLOOD IN STOOL: 0
BACK PAIN: 1
SHORTNESS OF BREATH: 0

## 2023-07-14 NOTE — PROGRESS NOTES
Subjective:      Patient ID: Jaren Thompson is a 66 y.o. female. Chief Complaint   Patient presents with    Pain     All over     Back Pain  Associated symptoms include headaches (more migraines than normal, unsure cause. possible stress versus allergy related), numbness (tingling in hands, facial numbness on the left.) and weakness. Pertinent negatives include no chest pain. Shoulder Pain   Associated symptoms include numbness (tingling in hands, facial numbness on the left. ). Neck Pain   Associated symptoms include headaches (more migraines than normal, unsure cause. possible stress versus allergy related), numbness (tingling in hands, facial numbness on the left.) and weakness. Pertinent negatives include no chest pain. Other  Associated symptoms include arthralgias, fatigue, headaches (more migraines than normal, unsure cause. possible stress versus allergy related), myalgias, neck pain, numbness (tingling in hands, facial numbness on the left.) and weakness. Pertinent negatives include no chest pain. Hip Pain   Associated symptoms include numbness (tingling in hands, facial numbness on the left. ). Pain  Associated symptoms include arthralgias, fatigue, headaches (more migraines than normal, unsure cause. possible stress versus allergy related), myalgias, neck pain, numbness (tingling in hands, facial numbness on the left.) and weakness. Pertinent negatives include no chest pain. Here today for routine pain clinic recheck. Pain Assessment  Location of Pain: Other (Comment) (all over)  Location Modifiers:  Other (Comment) (allover)  Severity of Pain: 5  Quality of Pain: Throbbing, Sharp, Dull, Aching  Duration of Pain: Persistent  Frequency of Pain: Constant  Aggravating Factors:  (weather cold damp)  Limiting Behavior: Yes  Relieving Factors: Rest, Ice, Heat (meds)  Result of Injury: No  Work-Related Injury: No  Are there other pain locations you wish to document?: No    Allergies   Allergen

## 2023-07-25 DIAGNOSIS — M79.7 FIBROMYALGIA: ICD-10-CM

## 2023-07-25 DIAGNOSIS — M48.02 NEURAL FORAMINAL STENOSIS OF CERVICAL SPINE: ICD-10-CM

## 2023-07-25 NOTE — TELEPHONE ENCOUNTER
Lety Sagastume called requesting a refill of the below medication which has been pended for you:     Requested Prescriptions     Pending Prescriptions Disp Refills    oxyCODONE-acetaminophen (PERCOCET) 7.5-325 MG per tablet 150 tablet 0     Sig: Take 1 tablet by mouth 5 times daily for 30 days. Last Appointment Date: 7/14/2023  Next Appointment Date: 9/14/2023    Allergies   Allergen Reactions    Latex Rash    Rituximab Palpitations     Severe tachycardia    Blue Dyes (Parenteral) Other (See Comments)     Mom had trouble breathing     Buspirone Other (See Comments)     Higher dose increased anxiety/trouble sleeping    Cyclobenzaprine Other (See Comments)     Felt odd    Fentanyl Other (See Comments)     sleepwalking    Ferric Sulfate Other (See Comments)     Severe abd pain    Gabapentin     Hydrocodone-Acetaminophen Other (See Comments)     Flushed and red face    Hydroxyzine Hcl Other (See Comments)     Lightheaded/heart racing    Lactose Other (See Comments)     GI    Other Other (See Comments)     IVP Dye  Mom had trouble breathing     Ropivacaine Hives     Was given after revision of left knee and it caused her hives and irritation at the wound site- so she removed herself    Seasonal     Vilazodone Other (See Comments)     Teeth clenching when on with Buspar       [facility]:  [facility] Report checked for [place], [place], and [place]:Percocet 7.5/325 07/02/23 #150. Due 08/01/23.

## 2023-07-27 RX ORDER — OXYCODONE AND ACETAMINOPHEN 7.5; 325 MG/1; MG/1
1 TABLET ORAL
Qty: 150 TABLET | Refills: 0 | Status: SHIPPED | OUTPATIENT
Start: 2023-08-01 | End: 2023-08-31

## 2023-08-22 DIAGNOSIS — M79.7 FIBROMYALGIA: ICD-10-CM

## 2023-08-22 DIAGNOSIS — M48.02 NEURAL FORAMINAL STENOSIS OF CERVICAL SPINE: ICD-10-CM

## 2023-08-22 NOTE — TELEPHONE ENCOUNTER
Tyree Hopper called requesting a refill of the below medication which has been pended for you:     Requested Prescriptions     Pending Prescriptions Disp Refills    oxyCODONE-acetaminophen (PERCOCET) 7.5-325 MG per tablet 150 tablet 0     Sig: Take 1 tablet by mouth 5 times daily for 30 days. Last Appointment Date: 7/14/2023  Next Appointment Date: 9/14/2023    Allergies   Allergen Reactions    Latex Rash    Rituximab Palpitations     Severe tachycardia    Blue Dyes (Parenteral) Other (See Comments)     Mom had trouble breathing     Buspirone Other (See Comments)     Higher dose increased anxiety/trouble sleeping    Cyclobenzaprine Other (See Comments)     Felt odd    Fentanyl Other (See Comments)     sleepwalking    Ferric Sulfate Other (See Comments)     Severe abd pain    Gabapentin     Hydrocodone-Acetaminophen Other (See Comments)     Flushed and red face    Hydroxyzine Hcl Other (See Comments)     Lightheaded/heart racing    Lactose Other (See Comments)     GI    Other Other (See Comments)     IVP Dye  Mom had trouble breathing     Ropivacaine Hives     Was given after revision of left knee and it caused her hives and irritation at the wound site- so she removed herself    Seasonal     Vilazodone Other (See Comments)     Teeth clenching when on with Buspar       Pharmacy:  [facility] Report checked for [place], [place], and [place]:Percocet 7.5/325 08/01/23 #150. Due 08/31/23.

## 2023-08-25 RX ORDER — OXYCODONE AND ACETAMINOPHEN 7.5; 325 MG/1; MG/1
1 TABLET ORAL
Qty: 150 TABLET | Refills: 0 | Status: SHIPPED | OUTPATIENT
Start: 2023-08-31 | End: 2023-09-30

## 2023-09-14 ENCOUNTER — HOSPITAL ENCOUNTER (OUTPATIENT)
Age: 63
Setting detail: SPECIMEN
Discharge: HOME | End: 2023-09-14
Payer: MEDICARE

## 2023-09-14 ENCOUNTER — OFFICE VISIT (OUTPATIENT)
Dept: PAIN MANAGEMENT | Age: 63
End: 2023-09-14
Payer: MEDICARE

## 2023-09-14 VITALS
SYSTOLIC BLOOD PRESSURE: 96 MMHG | BODY MASS INDEX: 45.71 KG/M2 | DIASTOLIC BLOOD PRESSURE: 74 MMHG | HEIGHT: 63 IN | OXYGEN SATURATION: 96 % | WEIGHT: 258 LBS | RESPIRATION RATE: 17 BRPM | HEART RATE: 71 BPM

## 2023-09-14 DIAGNOSIS — Z02.83 ENCOUNTER FOR DRUG SCREENING: Primary | ICD-10-CM

## 2023-09-14 DIAGNOSIS — M54.12 CERVICAL RADICULOPATHY: ICD-10-CM

## 2023-09-14 DIAGNOSIS — M79.7 FIBROMYALGIA: ICD-10-CM

## 2023-09-14 DIAGNOSIS — Z79.891 ENCOUNTER FOR LONG-TERM OPIATE ANALGESIC USE: ICD-10-CM

## 2023-09-14 DIAGNOSIS — Z02.83 ENCOUNTER FOR DRUG SCREENING: ICD-10-CM

## 2023-09-14 DIAGNOSIS — M47.22 CERVICAL RADICULOPATHY DUE TO DEGENERATIVE JOINT DISEASE OF SPINE: ICD-10-CM

## 2023-09-14 DIAGNOSIS — M51.36 LUMBAR DEGENERATIVE DISC DISEASE: ICD-10-CM

## 2023-09-14 DIAGNOSIS — M48.02 NEURAL FORAMINAL STENOSIS OF CERVICAL SPINE: ICD-10-CM

## 2023-09-14 PROCEDURE — G0481 DRUG TEST DEF 8-14 CLASSES: HCPCS

## 2023-09-14 PROCEDURE — G8427 DOCREV CUR MEDS BY ELIG CLIN: HCPCS | Performed by: NURSE PRACTITIONER

## 2023-09-14 PROCEDURE — 3017F COLORECTAL CA SCREEN DOC REV: CPT | Performed by: NURSE PRACTITIONER

## 2023-09-14 PROCEDURE — 3074F SYST BP LT 130 MM HG: CPT | Performed by: NURSE PRACTITIONER

## 2023-09-14 PROCEDURE — 99214 OFFICE O/P EST MOD 30 MIN: CPT | Performed by: NURSE PRACTITIONER

## 2023-09-14 PROCEDURE — 3078F DIAST BP <80 MM HG: CPT | Performed by: NURSE PRACTITIONER

## 2023-09-14 PROCEDURE — 80307 DRUG TEST PRSMV CHEM ANLYZR: CPT

## 2023-09-14 PROCEDURE — G8417 CALC BMI ABV UP PARAM F/U: HCPCS | Performed by: NURSE PRACTITIONER

## 2023-09-14 PROCEDURE — 1036F TOBACCO NON-USER: CPT | Performed by: NURSE PRACTITIONER

## 2023-09-14 ASSESSMENT — ENCOUNTER SYMPTOMS
CONSTIPATION: 0
BACK PAIN: 1
SHORTNESS OF BREATH: 0
BLOOD IN STOOL: 0

## 2023-09-14 NOTE — PROGRESS NOTES
Subjective:      Patient ID: Margaret Zepeda is a 66 y.o. female. Chief Complaint   Patient presents with    Pain     All over     Back Pain  Associated symptoms include headaches (more migraines than normal, unsure cause. possible stress versus allergy related), numbness (tingling in hands, facial numbness on the left.) and weakness. Pertinent negatives include no chest pain. Shoulder Pain   Associated symptoms include numbness (tingling in hands, facial numbness on the left. ). Neck Pain   Associated symptoms include headaches (more migraines than normal, unsure cause. possible stress versus allergy related), numbness (tingling in hands, facial numbness on the left.) and weakness. Pertinent negatives include no chest pain. Other  Associated symptoms include arthralgias, fatigue, headaches (more migraines than normal, unsure cause. possible stress versus allergy related), myalgias, neck pain, numbness (tingling in hands, facial numbness on the left.) and weakness. Pertinent negatives include no chest pain. Hip Pain   Associated symptoms include numbness (tingling in hands, facial numbness on the left. ). Pain  Associated symptoms include arthralgias, fatigue, headaches (more migraines than normal, unsure cause. possible stress versus allergy related), myalgias, neck pain, numbness (tingling in hands, facial numbness on the left.) and weakness. Pertinent negatives include no chest pain. Here today for routine pain clinic recheck. Pain Assessment  Location of Pain: Other (Comment) (all over)  Location Modifiers:  Other (Comment) (all over)  Severity of Pain: 5  Quality of Pain: Throbbing, Sharp, Dull, Aching  Duration of Pain: Persistent  Frequency of Pain: Constant  Aggravating Factors:  (damp movement stress)  Limiting Behavior: Yes  Relieving Factors: Rest, Heat (meds)  Result of Injury: No  Work-Related Injury: No  Are there other pain locations you wish to document?: No    Allergies   Allergen

## 2023-09-17 LAB
6-ACETYLMORPHINE, UR: NOT DETECTED
7-AMINOCLONAZEPAM, URINE: NOT DETECTED
ALPHA-OH-ALPRAZ, URINE: PRESENT
ALPHA-OH-MIDAZOLAM, URINE: NOT DETECTED
ALPRAZOLAM, URINE: PRESENT
AMPHETAMINES, URINE: NOT DETECTED
BARBITURATES, URINE: NOT DETECTED
BENZOYLECGONINE, UR: NOT DETECTED
BUPRENORPHINE URINE: NOT DETECTED
CARISOPRODOL, UR: NOT DETECTED
CLONAZEPAM, URINE: NOT DETECTED
CODEINE, URINE: NOT DETECTED
CREATININE URINE: 23.8 MG/DL (ref 20–400)
DIAZEPAM, URINE: NOT DETECTED
EER PAIN MGT DRUG PANEL, HIGH RES/EMIT U: NORMAL
ETHYL GLUCURONIDE UR: NOT DETECTED
FENTANYL URINE: NOT DETECTED
GABAPENTIN: NOT DETECTED
HYDROCODONE, OPI6M: NOT DETECTED
HYDROMORPHONE, OPI3M: NOT DETECTED
LORAZEPAM, URINE: NOT DETECTED
MARIJUANA METAB, UR: NOT DETECTED
MDA, UR: NOT DETECTED
MDEA, EVE, UR: NOT DETECTED
MDMA URINE: NOT DETECTED
MEPERIDINE METAB, UR: NOT DETECTED
METHADONE, URINE: NOT DETECTED
METHAMPHETAMINE, URINE: NOT DETECTED
METHYLPHENIDATE: NOT DETECTED
MIDAZOLAM, URINE: NOT DETECTED
MORPHINE, OPI1M: NOT DETECTED
NALOXONE URINE: NOT DETECTED
NORBUPRENORPHINE, URINE: NOT DETECTED
NORDIAZEPAM, URINE: NOT DETECTED
NORFENTANYL, URINE: NOT DETECTED
NORHYDROCODONE, URINE: NOT DETECTED
NOROXYCODONE, URINE: PRESENT
NOROXYMORPHONE, URINE: NOT DETECTED
OXAZEPAM, URINE: NOT DETECTED
OXYCODONE URINE: PRESENT
OXYMORPHONE, URINE: PRESENT
PAIN MGT DRUG PANEL, HI RES, UR: NORMAL
PCP,URINE: NOT DETECTED
PHENTERMINE, UR: NOT DETECTED
PREGABALIN: NOT DETECTED
TAPENTADOL, URINE: NOT DETECTED
TAPENTADOL-O-SULFATE, URINE: NOT DETECTED
TEMAZEPAM, URINE: NOT DETECTED
TRAMADOL, URINE: NOT DETECTED
ZOLPIDEM METABOLITE (ZCA), URINE: NOT DETECTED
ZOLPIDEM, URINE: NOT DETECTED

## 2023-09-25 DIAGNOSIS — M48.02 NEURAL FORAMINAL STENOSIS OF CERVICAL SPINE: ICD-10-CM

## 2023-09-25 DIAGNOSIS — M79.7 FIBROMYALGIA: ICD-10-CM

## 2023-09-25 RX ORDER — OXYCODONE AND ACETAMINOPHEN 7.5; 325 MG/1; MG/1
1 TABLET ORAL
Qty: 150 TABLET | Refills: 0 | Status: SHIPPED | OUTPATIENT
Start: 2023-09-30 | End: 2023-10-30

## 2023-09-25 NOTE — TELEPHONE ENCOUNTER
Valarie Gonzalez called requesting a refill of the below medication which has been pended for you:     Requested Prescriptions     Pending Prescriptions Disp Refills    oxyCODONE-acetaminophen (PERCOCET) 7.5-325 MG per tablet 150 tablet 0     Sig: Take 1 tablet by mouth 5 times daily for 30 days. Last Appointment Date: 9/14/2023  Next Appointment Date: 10/12/2023    Allergies   Allergen Reactions    Latex Rash    Rituximab Palpitations     Severe tachycardia    Blue Dyes (Parenteral) Other (See Comments)     Mom had trouble breathing     Buspirone Other (See Comments)     Higher dose increased anxiety/trouble sleeping    Cyclobenzaprine Other (See Comments)     Felt odd    Fentanyl Other (See Comments)     sleepwalking    Ferric Sulfate Other (See Comments)     Severe abd pain    Gabapentin     Hydrocodone-Acetaminophen Other (See Comments)     Flushed and red face    Hydroxyzine Hcl Other (See Comments)     Lightheaded/heart racing    Lactose Other (See Comments)     GI    Other Other (See Comments)     IVP Dye  Mom had trouble breathing     Ropivacaine Hives     Was given after revision of left knee and it caused her hives and irritation at the wound site- so she removed herself    Seasonal     Vilazodone Other (See Comments)     Teeth clenching when on with Buspar       Pharmacy:  [facility] Report checked for [place], [place], and [place]:Percocet 7.5/325 08/31/23 #150. Due 09/30/23.

## 2023-10-16 ENCOUNTER — OFFICE VISIT (OUTPATIENT)
Dept: PAIN MANAGEMENT | Age: 63
End: 2023-10-16
Payer: MEDICARE

## 2023-10-16 VITALS
OXYGEN SATURATION: 92 % | HEART RATE: 82 BPM | WEIGHT: 262 LBS | SYSTOLIC BLOOD PRESSURE: 118 MMHG | HEIGHT: 63 IN | BODY MASS INDEX: 46.42 KG/M2 | DIASTOLIC BLOOD PRESSURE: 72 MMHG

## 2023-10-16 DIAGNOSIS — M25.551 HIP PAIN, BILATERAL: ICD-10-CM

## 2023-10-16 DIAGNOSIS — M51.36 LUMBAR DEGENERATIVE DISC DISEASE: Primary | ICD-10-CM

## 2023-10-16 DIAGNOSIS — M48.02 NEURAL FORAMINAL STENOSIS OF CERVICAL SPINE: ICD-10-CM

## 2023-10-16 DIAGNOSIS — M54.2 CERVICALGIA: ICD-10-CM

## 2023-10-16 DIAGNOSIS — M25.552 HIP PAIN, BILATERAL: ICD-10-CM

## 2023-10-16 DIAGNOSIS — M79.7 FIBROMYALGIA: ICD-10-CM

## 2023-10-16 DIAGNOSIS — M47.22 CERVICAL RADICULOPATHY DUE TO DEGENERATIVE JOINT DISEASE OF SPINE: ICD-10-CM

## 2023-10-16 PROCEDURE — G8417 CALC BMI ABV UP PARAM F/U: HCPCS | Performed by: NURSE PRACTITIONER

## 2023-10-16 PROCEDURE — 3074F SYST BP LT 130 MM HG: CPT | Performed by: NURSE PRACTITIONER

## 2023-10-16 PROCEDURE — 1036F TOBACCO NON-USER: CPT | Performed by: NURSE PRACTITIONER

## 2023-10-16 PROCEDURE — 3017F COLORECTAL CA SCREEN DOC REV: CPT | Performed by: NURSE PRACTITIONER

## 2023-10-16 PROCEDURE — G8484 FLU IMMUNIZE NO ADMIN: HCPCS | Performed by: NURSE PRACTITIONER

## 2023-10-16 PROCEDURE — 3078F DIAST BP <80 MM HG: CPT | Performed by: NURSE PRACTITIONER

## 2023-10-16 PROCEDURE — 99214 OFFICE O/P EST MOD 30 MIN: CPT | Performed by: NURSE PRACTITIONER

## 2023-10-16 PROCEDURE — G8427 DOCREV CUR MEDS BY ELIG CLIN: HCPCS | Performed by: NURSE PRACTITIONER

## 2023-10-16 RX ORDER — OXYCODONE AND ACETAMINOPHEN 7.5; 325 MG/1; MG/1
1 TABLET ORAL
Qty: 150 TABLET | Refills: 0 | Status: SHIPPED | OUTPATIENT
Start: 2023-10-30 | End: 2023-11-29

## 2023-10-16 ASSESSMENT — ENCOUNTER SYMPTOMS
BLOOD IN STOOL: 0
CONSTIPATION: 0
BACK PAIN: 1
SHORTNESS OF BREATH: 0

## 2023-10-16 NOTE — PROGRESS NOTES
Subjective:      Patient ID: Jie Pham is a 66 y.o. female. Chief Complaint   Patient presents with    Back Pain     1m     Back Pain  Associated symptoms include headaches (more migraines than normal, unsure cause. possible stress versus allergy related), numbness (tingling in hands, facial numbness on the left.) and weakness. Pertinent negatives include no chest pain. Shoulder Pain   Associated symptoms include numbness (tingling in hands, facial numbness on the left. ). Neck Pain   Associated symptoms include headaches (more migraines than normal, unsure cause. possible stress versus allergy related), numbness (tingling in hands, facial numbness on the left.) and weakness. Pertinent negatives include no chest pain. Other  Associated symptoms include arthralgias, fatigue, headaches (more migraines than normal, unsure cause. possible stress versus allergy related), myalgias, neck pain, numbness (tingling in hands, facial numbness on the left.) and weakness. Pertinent negatives include no chest pain. Hip Pain   Associated symptoms include numbness (tingling in hands, facial numbness on the left. ). Pain  Associated symptoms include arthralgias, fatigue, headaches (more migraines than normal, unsure cause. possible stress versus allergy related), myalgias, neck pain, numbness (tingling in hands, facial numbness on the left.) and weakness. Pertinent negatives include no chest pain. Here today for routine pain clinic recheck.     Pain Assessment  Location of Pain: Back  Severity of Pain: 6 (with medication)  Quality of Pain: Throbbing, Sharp, Dull, Aching, Locking, Grinding, Popping, Cracking, Buckling  Duration of Pain: Persistent  Frequency of Pain: Constant  Aggravating Factors: Bending, Stretching, Straightening, Exercise, Kneeling, Squatting, Standing, Walking, Stairs  Limiting Behavior: Yes  Relieving Factors: Rest, Ice, Heat (meds)  Result of Injury: No  Work-Related Injury: No    Allergies

## 2023-11-27 ENCOUNTER — TELEMEDICINE (OUTPATIENT)
Dept: PAIN MANAGEMENT | Age: 63
End: 2023-11-27
Payer: MEDICARE

## 2023-11-27 DIAGNOSIS — M79.7 FIBROMYALGIA: ICD-10-CM

## 2023-11-27 DIAGNOSIS — M48.02 NEURAL FORAMINAL STENOSIS OF CERVICAL SPINE: ICD-10-CM

## 2023-11-27 DIAGNOSIS — M51.36 LUMBAR DEGENERATIVE DISC DISEASE: Primary | ICD-10-CM

## 2023-11-27 DIAGNOSIS — M47.22 CERVICAL RADICULOPATHY DUE TO DEGENERATIVE JOINT DISEASE OF SPINE: ICD-10-CM

## 2023-11-27 DIAGNOSIS — M54.2 CERVICALGIA: ICD-10-CM

## 2023-11-27 PROCEDURE — 99212 OFFICE O/P EST SF 10 MIN: CPT | Performed by: NURSE PRACTITIONER

## 2023-11-27 PROCEDURE — 99214 OFFICE O/P EST MOD 30 MIN: CPT | Performed by: NURSE PRACTITIONER

## 2023-11-27 PROCEDURE — G8427 DOCREV CUR MEDS BY ELIG CLIN: HCPCS | Performed by: NURSE PRACTITIONER

## 2023-11-27 PROCEDURE — 3017F COLORECTAL CA SCREEN DOC REV: CPT | Performed by: NURSE PRACTITIONER

## 2023-11-27 RX ORDER — OXYCODONE AND ACETAMINOPHEN 7.5; 325 MG/1; MG/1
1 TABLET ORAL
Qty: 150 TABLET | Refills: 0 | Status: SHIPPED | OUTPATIENT
Start: 2023-11-29 | End: 2023-12-29

## 2023-11-27 ASSESSMENT — ENCOUNTER SYMPTOMS
BACK PAIN: 1
BLOOD IN STOOL: 0
SHORTNESS OF BREATH: 0
CONSTIPATION: 0

## 2023-11-27 NOTE — PROGRESS NOTES
2023    TELEHEALTH EVALUATION -- Audio/Visual    HPI:    Mariela Talamantes (:  1960) has requested an audio/video evaluation for the following concern(s):    Here today for routine pain clinic recheck. Doing well on current medication regime, no side effects. No aberrant behaviors noted. Review of Systems   Constitutional:  Positive for fatigue. Negative for activity change. HENT: Negative. Respiratory:  Negative for shortness of breath. Cardiovascular:  Negative for chest pain. Gastrointestinal:  Negative for blood in stool and constipation. Genitourinary:  Positive for flank pain. Musculoskeletal:  Positive for arthralgias, back pain, myalgias and neck pain. Neurological:  Positive for weakness, numbness and headaches. Psychiatric/Behavioral:  Negative for sleep disturbance. The patient is nervous/anxious (anxiety). Prior to Visit Medications    Medication Sig Taking? Authorizing Provider   oxyCODONE-acetaminophen (PERCOCET) 7.5-325 MG per tablet Take 1 tablet by mouth 5 times daily for 30 days. Yes Issac Jovel, APRN - CNP   senna-docusate (PERICOLACE) 8.6-50 MG per tablet Take 1 tablet by mouth 2 times daily  ProviderEpi MD   Ferrous Sulfate (IRON PO) Take 1 tablet by mouth daily  Epi Rahman MD   bisacodyl (DULCOLAX) 10 MG suppository Place 1 suppository rectally as needed  Epi Rahman MD   azelastine (OPTIVAR) 0.05 % ophthalmic solution instill 1 drop into both eyes twice a day if needed  Epi Rahman MD   ALPRAZolam (XANAX) 1 MG tablet TAKE 1/2 TO 1 TABLET BY MOUTH THREE TIMES DAILY AS NEEDED.  TAKE SEPARATE FROM PERCOCET  Epi Rahman MD   vitamin D (ERGOCALCIFEROL) 1.25 MG (23769 UT) CAPS capsule take 1 capsule by mouth ONCE A WEEK  Epi Rahman MD   NARCAN 4 MG/0.1ML LIQD nasal spray   Epi Rahman MD   carvedilol (COREG) 3.125 MG tablet Take 1 tablet by mouth 2 times daily  Epi Rahman

## 2024-01-25 DIAGNOSIS — M79.7 FIBROMYALGIA: ICD-10-CM

## 2024-01-25 DIAGNOSIS — M48.02 NEURAL FORAMINAL STENOSIS OF CERVICAL SPINE: ICD-10-CM

## 2024-01-25 RX ORDER — OXYCODONE AND ACETAMINOPHEN 7.5; 325 MG/1; MG/1
1 TABLET ORAL
Qty: 150 TABLET | Refills: 0 | Status: SHIPPED | OUTPATIENT
Start: 2024-01-28 | End: 2024-02-27

## 2024-01-25 NOTE — TELEPHONE ENCOUNTER
Gisell called requesting a refill of the below medication which has been pended for you:     Requested Prescriptions     Pending Prescriptions Disp Refills    oxyCODONE-acetaminophen (PERCOCET) 7.5-325 MG per tablet 150 tablet 0     Sig: Take 1 tablet by mouth 5 times daily for 30 days.       Last Appointment Date: 11/27/2023  Next Appointment Date: 1/31/2024    Allergies   Allergen Reactions    Latex Rash    Rituximab Palpitations     Severe tachycardia    Blue Dyes (Parenteral) Other (See Comments)     Mom had trouble breathing     Buspirone Other (See Comments)     Higher dose increased anxiety/trouble sleeping    Cyclobenzaprine Other (See Comments)     Felt odd    Fentanyl Other (See Comments)     sleepwalking    Ferric Sulfate Other (See Comments)     Severe abd pain    Gabapentin     Hydrocodone-Acetaminophen Other (See Comments)     Flushed and red face    Hydroxyzine Hcl Other (See Comments)     Lightheaded/heart racing    Lactose Other (See Comments)     GI    Other Other (See Comments)     IVP Dye  Mom had trouble breathing     Ropivacaine Hives     Was given after revision of left knee and it caused her hives and irritation at the wound site- so she removed herself    Seasonal     Vilazodone Other (See Comments)     Teeth clenching when on with Buspar       [facility]  [facility]     Last DS11 9/14/23.  Please review.     [facility] Report checked for [place], [place], and [place]: Percocet 7.5/325 mg   12/29/23   #150  . Due 1/2824.

## 2024-01-31 ENCOUNTER — OFFICE VISIT (OUTPATIENT)
Dept: PAIN MANAGEMENT | Age: 64
End: 2024-01-31
Payer: MEDICARE

## 2024-01-31 VITALS
RESPIRATION RATE: 18 BRPM | SYSTOLIC BLOOD PRESSURE: 128 MMHG | WEIGHT: 261 LBS | DIASTOLIC BLOOD PRESSURE: 78 MMHG | HEART RATE: 86 BPM | BODY MASS INDEX: 47.3 KG/M2 | OXYGEN SATURATION: 97 %

## 2024-01-31 DIAGNOSIS — Q65.89 CONGENITAL HIP DYSPLASIA: ICD-10-CM

## 2024-01-31 DIAGNOSIS — M50.20 CERVICAL HERNIATED DISC: ICD-10-CM

## 2024-01-31 DIAGNOSIS — M25.562 CHRONIC PAIN OF LEFT KNEE: ICD-10-CM

## 2024-01-31 DIAGNOSIS — M16.11 PRIMARY OSTEOARTHRITIS OF RIGHT HIP: ICD-10-CM

## 2024-01-31 DIAGNOSIS — M51.36 LUMBAR DEGENERATIVE DISC DISEASE: ICD-10-CM

## 2024-01-31 DIAGNOSIS — M54.12 CERVICAL RADICULOPATHY: ICD-10-CM

## 2024-01-31 DIAGNOSIS — M25.551 HIP PAIN, BILATERAL: Primary | ICD-10-CM

## 2024-01-31 DIAGNOSIS — M25.552 HIP PAIN, BILATERAL: Primary | ICD-10-CM

## 2024-01-31 DIAGNOSIS — G89.29 CHRONIC LEFT SHOULDER PAIN: ICD-10-CM

## 2024-01-31 DIAGNOSIS — M54.2 CERVICALGIA: ICD-10-CM

## 2024-01-31 DIAGNOSIS — G89.29 CHRONIC PAIN OF LEFT KNEE: ICD-10-CM

## 2024-01-31 DIAGNOSIS — M51.26 LUMBAR DISC HERNIATION: ICD-10-CM

## 2024-01-31 DIAGNOSIS — M25.512 CHRONIC LEFT SHOULDER PAIN: ICD-10-CM

## 2024-01-31 DIAGNOSIS — M17.12 PRIMARY OSTEOARTHRITIS OF LEFT KNEE: ICD-10-CM

## 2024-01-31 PROCEDURE — 99214 OFFICE O/P EST MOD 30 MIN: CPT | Performed by: NURSE PRACTITIONER

## 2024-01-31 ASSESSMENT — PATIENT HEALTH QUESTIONNAIRE - PHQ9
SUM OF ALL RESPONSES TO PHQ QUESTIONS 1-9: 0
SUM OF ALL RESPONSES TO PHQ9 QUESTIONS 1 & 2: 0
2. FEELING DOWN, DEPRESSED OR HOPELESS: 0
1. LITTLE INTEREST OR PLEASURE IN DOING THINGS: 0
SUM OF ALL RESPONSES TO PHQ QUESTIONS 1-9: 0

## 2024-01-31 ASSESSMENT — ENCOUNTER SYMPTOMS
BACK PAIN: 1
NAUSEA: 1
ABDOMINAL PAIN: 1
APNEA: 1
EYES NEGATIVE: 1
ABDOMINAL DISTENTION: 1

## 2024-01-31 NOTE — PROGRESS NOTES
[facility]  [address] [place] [address]    Patient Name: Gisell Thurman  MRN: [id number]  Encounter Date: 2/2/2024     SUBJECTIVE:  Gisell Thurman is a 63 y.o., female being seen today regarding   Chief Complaint   Patient presents with    Back Pain     2 mo f/u Rx Mgmt.    and routine medication management.    Functionality Assessment & Goals:  On a scale of 0 (Does not Interfere) to 10 (Completely Interferes)  Which number describes how during the past week pain has interfered with the following:   A.  General Activity: 8     B.  Mood:  8   C.  Walking Ability:  8   D.  Normal Work (Includes both work outside the home and housework):  9   E.  Relations with Other People:  8   F.  Sleep:  8    G.  Enjoyment of Life:  8  2.  Patient prefers to Take their Pain Medications:   [x] On a regular basis   [x] Only when necessary   [] Does not take pain medications  3.  What are the Patient's Goals/ Expectations for Visiting Pain Management?   [x] Learn about my pain   [] Physical Therapy   [] Receive Injections   [] Deal with Anxiety and Stress   [x] Receive Medication   [] Treat Depression    [x] Treat Sleep   [] Treat Opioid Dependence/ Addiction    Recent Imaging since last appointment related to chief complaint:  10/16/23 PET Scan and MRI Lumbar (11/25/2019).  Recent Interventional Procedures since last appointment related to chief complaint:  N/A  Patient reports a 0 % improvement of pain and ADL's after procedure.    Most recent Oswestry Disability Questionnaire completed by patient on 10/16/23     Current Pain Assessment:         1/31/2024    11:14 AM   AMB PAIN ASSESSMENT   Location of Pain Other (Comment)   Location Modifiers Other (Comment)   Severity of Pain 8   Quality of Pain Throbbing;Sharp;Dull;Aching;Locking;Grinding;Popping;Cracking;Buckling;Other (Comment)   Duration of Pain Persistent   Frequency of Pain Constant   Aggravating Factors

## 2024-02-26 ENCOUNTER — OFFICE VISIT (OUTPATIENT)
Dept: PAIN MANAGEMENT | Age: 64
End: 2024-02-26
Payer: MEDICARE

## 2024-02-26 VITALS
WEIGHT: 261 LBS | HEART RATE: 81 BPM | DIASTOLIC BLOOD PRESSURE: 62 MMHG | OXYGEN SATURATION: 96 % | BODY MASS INDEX: 47.3 KG/M2 | SYSTOLIC BLOOD PRESSURE: 122 MMHG

## 2024-02-26 DIAGNOSIS — M51.36 LUMBAR DEGENERATIVE DISC DISEASE: ICD-10-CM

## 2024-02-26 DIAGNOSIS — M79.7 FIBROMYALGIA: ICD-10-CM

## 2024-02-26 DIAGNOSIS — M50.20 CERVICAL HERNIATED DISC: ICD-10-CM

## 2024-02-26 DIAGNOSIS — M17.12 PRIMARY OSTEOARTHRITIS OF LEFT KNEE: ICD-10-CM

## 2024-02-26 DIAGNOSIS — M54.2 CERVICALGIA: ICD-10-CM

## 2024-02-26 DIAGNOSIS — M54.12 CERVICAL RADICULOPATHY: ICD-10-CM

## 2024-02-26 DIAGNOSIS — M16.11 PRIMARY OSTEOARTHRITIS OF RIGHT HIP: Primary | ICD-10-CM

## 2024-02-26 DIAGNOSIS — M51.26 LUMBAR DISC HERNIATION: ICD-10-CM

## 2024-02-26 DIAGNOSIS — M47.22 CERVICAL RADICULOPATHY DUE TO DEGENERATIVE JOINT DISEASE OF SPINE: ICD-10-CM

## 2024-02-26 DIAGNOSIS — M48.02 NEURAL FORAMINAL STENOSIS OF CERVICAL SPINE: ICD-10-CM

## 2024-02-26 PROCEDURE — 3078F DIAST BP <80 MM HG: CPT | Performed by: NURSE PRACTITIONER

## 2024-02-26 PROCEDURE — 99214 OFFICE O/P EST MOD 30 MIN: CPT | Performed by: NURSE PRACTITIONER

## 2024-02-26 PROCEDURE — 3017F COLORECTAL CA SCREEN DOC REV: CPT | Performed by: NURSE PRACTITIONER

## 2024-02-26 PROCEDURE — 1036F TOBACCO NON-USER: CPT | Performed by: NURSE PRACTITIONER

## 2024-02-26 PROCEDURE — G8417 CALC BMI ABV UP PARAM F/U: HCPCS | Performed by: NURSE PRACTITIONER

## 2024-02-26 PROCEDURE — 3074F SYST BP LT 130 MM HG: CPT | Performed by: NURSE PRACTITIONER

## 2024-02-26 PROCEDURE — G8427 DOCREV CUR MEDS BY ELIG CLIN: HCPCS | Performed by: NURSE PRACTITIONER

## 2024-02-26 PROCEDURE — G8484 FLU IMMUNIZE NO ADMIN: HCPCS | Performed by: NURSE PRACTITIONER

## 2024-02-26 RX ORDER — OXYCODONE AND ACETAMINOPHEN 7.5; 325 MG/1; MG/1
1 TABLET ORAL
Qty: 150 TABLET | Refills: 0 | Status: SHIPPED | OUTPATIENT
Start: 2024-02-26 | End: 2024-03-27

## 2024-02-26 ASSESSMENT — ENCOUNTER SYMPTOMS
EYES NEGATIVE: 1
BACK PAIN: 1
ABDOMINAL PAIN: 1
NAUSEA: 1
ABDOMINAL DISTENTION: 1
APNEA: 1

## 2024-02-26 NOTE — PROGRESS NOTES
[facility]  [address] [place]. [address]    Patient Name: Gisell Thurman  MRN: [id number]  Encounter Date: 2/26/2024     SUBJECTIVE:  Gisell Thurman is a 64 y.o., female being seen today regarding   Chief Complaint   Patient presents with    Back Pain    Medication Refill    and routine medication management.    Functionality Assessment & Goals:  On a scale of 0 (Does not Interfere) to 10 (Completely Interferes)  Which number describes how during the past week pain has interfered with the following:   A.  General Activity: 8     B.  Mood:  8   C.  Walking Ability:  8   D.  Normal Work (Includes both work outside the home and housework):  9   E.  Relations with Other People:  8   F.  Sleep:  8    G.  Enjoyment of Life:  8  2.  Patient prefers to Take their Pain Medications:   [x] On a regular basis   [x] Only when necessary   [] Does not take pain medications  3.  What are the Patient's Goals/ Expectations for Visiting Pain Management?   [x] Learn about my pain   [] Physical Therapy   [] Receive Injections   [] Deal with Anxiety and Stress   [x] Receive Medication   [] Treat Depression    [x] Treat Sleep   [] Treat Opioid Dependence/ Addiction    Recent Imaging since last appointment related to chief complaint:  10/16/23 PET Scan and MRI Lumbar (11/25/2019).  Recent Interventional Procedures since last appointment related to chief complaint:  N/A  Patient reports a 0 % improvement of pain and ADL's after procedure.    Most recent Oswestry Disability Questionnaire completed by patient on 10/16/23     Current Pain Assessment:         2/26/2024    10:34 AM   AMB PAIN ASSESSMENT   Location of Pain Back   Severity of Pain 6   Quality of Pain Throbbing;Sharp;Dull;Aching;Locking;Grinding;Popping;Cracking;Buckling   Duration of Pain Persistent   Frequency of Pain Constant   Aggravating Factors Bending;Stretching;Straightening;Exercise;Kneeling;Squatting;Standing;Walking;Stairs

## 2024-03-22 ENCOUNTER — TELEMEDICINE (OUTPATIENT)
Dept: PAIN MANAGEMENT | Age: 64
End: 2024-03-22
Payer: MEDICARE

## 2024-03-22 DIAGNOSIS — M51.36 LUMBAR DEGENERATIVE DISC DISEASE: ICD-10-CM

## 2024-03-22 DIAGNOSIS — M51.26 LUMBAR DISC HERNIATION: ICD-10-CM

## 2024-03-22 DIAGNOSIS — M48.02 NEURAL FORAMINAL STENOSIS OF CERVICAL SPINE: ICD-10-CM

## 2024-03-22 DIAGNOSIS — M50.20 CERVICAL HERNIATED DISC: ICD-10-CM

## 2024-03-22 DIAGNOSIS — M79.7 FIBROMYALGIA: ICD-10-CM

## 2024-03-22 DIAGNOSIS — M16.11 PRIMARY OSTEOARTHRITIS OF RIGHT HIP: Primary | ICD-10-CM

## 2024-03-22 DIAGNOSIS — M54.12 CERVICAL RADICULOPATHY: ICD-10-CM

## 2024-03-22 PROCEDURE — G8428 CUR MEDS NOT DOCUMENT: HCPCS | Performed by: NURSE PRACTITIONER

## 2024-03-22 PROCEDURE — 99212 OFFICE O/P EST SF 10 MIN: CPT | Performed by: NURSE PRACTITIONER

## 2024-03-22 PROCEDURE — 99214 OFFICE O/P EST MOD 30 MIN: CPT | Performed by: NURSE PRACTITIONER

## 2024-03-22 PROCEDURE — 3017F COLORECTAL CA SCREEN DOC REV: CPT | Performed by: NURSE PRACTITIONER

## 2024-03-22 RX ORDER — OXYCODONE AND ACETAMINOPHEN 7.5; 325 MG/1; MG/1
1 TABLET ORAL
Qty: 150 TABLET | Refills: 0 | Status: SHIPPED | OUTPATIENT
Start: 2024-03-22 | End: 2024-04-21

## 2024-03-22 ASSESSMENT — ENCOUNTER SYMPTOMS
SHORTNESS OF BREATH: 0
CONSTIPATION: 0
BLOOD IN STOOL: 0
BACK PAIN: 1

## 2024-03-22 NOTE — PROGRESS NOTES
MG per tablet      3. Neural foraminal stenosis of cervical spine  oxyCODONE-acetaminophen (PERCOCET) 7.5-325 MG per tablet      4. Lumbar disc herniation        5. Lumbar degenerative disc disease        6. Cervical radiculopathy        7. Cervical herniated disc          Chronic pain diagnoses such as   1. Primary osteoarthritis of right hip    2. Fibromyalgia    3. Neural foraminal stenosis of cervical spine    4. Lumbar disc herniation    5. Lumbar degenerative disc disease    6. Cervical radiculopathy    7. Cervical herniated disc     controlled on current medication regime, wll continue current pain medications to improve quality of life and function.   Controlled Substance Monitoring:    Acute and Chronic Pain Monitoring:   RX Monitoring Periodic Controlled Substance Monitoring   3/22/2024  11:12 AM No signs of potential drug abuse or diversion identified.;Obtaining appropriate analgesic effect of treatment.     Gisell Thurman is here for recheck/reevaluation of chronic pain. She is able to maintain daily activities with the use of current pain medications and is generally satisfied with level of analgesia. She shows no evidence of misuse or misappropriation of medications.  She denies use of alcohol or illegal substances. UDS have been Appropriate with most recent screen       No follow-ups on file.    Gisell Thurman, was evaluated through a synchronous (real-time) audio-video encounter. The patient (or guardian if applicable) is aware that this is a billable service, which includes applicable co-pays. This Virtual Visit was conducted with patient's (and/or legal guardian's) consent. Patient identification was verified, and a caregiver was present when appropriate.   The patient was located at Home: [address] [facility] [facility] [address]  Provider was located at Facility (Appt Dept): [address] [place] [address]    Total time spent on this encounter: Not billed by time    --Thea Torres, APRBEAU - CNP

## 2024-04-24 ENCOUNTER — OFFICE VISIT (OUTPATIENT)
Dept: PAIN MANAGEMENT | Age: 64
End: 2024-04-24
Payer: MEDICARE

## 2024-04-24 ENCOUNTER — HOSPITAL ENCOUNTER (OUTPATIENT)
Age: 64
Setting detail: SPECIMEN
Discharge: HOME | End: 2024-04-24
Payer: MEDICARE

## 2024-04-24 VITALS
DIASTOLIC BLOOD PRESSURE: 88 MMHG | WEIGHT: 265 LBS | RESPIRATION RATE: 17 BRPM | HEIGHT: 63 IN | BODY MASS INDEX: 46.95 KG/M2 | HEART RATE: 95 BPM | OXYGEN SATURATION: 92 % | SYSTOLIC BLOOD PRESSURE: 136 MMHG

## 2024-04-24 DIAGNOSIS — Z79.891 ENCOUNTER FOR LONG-TERM OPIATE ANALGESIC USE: ICD-10-CM

## 2024-04-24 DIAGNOSIS — Z02.83 ENCOUNTER FOR DRUG SCREENING: ICD-10-CM

## 2024-04-24 DIAGNOSIS — M48.02 NEURAL FORAMINAL STENOSIS OF CERVICAL SPINE: ICD-10-CM

## 2024-04-24 DIAGNOSIS — M16.11 PRIMARY OSTEOARTHRITIS OF RIGHT HIP: ICD-10-CM

## 2024-04-24 DIAGNOSIS — M79.7 FIBROMYALGIA: ICD-10-CM

## 2024-04-24 DIAGNOSIS — M50.20 CERVICAL HERNIATED DISC: ICD-10-CM

## 2024-04-24 DIAGNOSIS — M54.2 CERVICALGIA: ICD-10-CM

## 2024-04-24 DIAGNOSIS — M54.12 CERVICAL RADICULOPATHY: ICD-10-CM

## 2024-04-24 DIAGNOSIS — G89.29 CHRONIC LEFT SHOULDER PAIN: ICD-10-CM

## 2024-04-24 DIAGNOSIS — M25.512 CHRONIC LEFT SHOULDER PAIN: ICD-10-CM

## 2024-04-24 DIAGNOSIS — M50.20 CERVICAL HERNIATED DISC: Primary | ICD-10-CM

## 2024-04-24 PROCEDURE — 99214 OFFICE O/P EST MOD 30 MIN: CPT | Performed by: NURSE PRACTITIONER

## 2024-04-24 PROCEDURE — 3075F SYST BP GE 130 - 139MM HG: CPT | Performed by: NURSE PRACTITIONER

## 2024-04-24 PROCEDURE — 3079F DIAST BP 80-89 MM HG: CPT | Performed by: NURSE PRACTITIONER

## 2024-04-24 PROCEDURE — G8427 DOCREV CUR MEDS BY ELIG CLIN: HCPCS | Performed by: NURSE PRACTITIONER

## 2024-04-24 PROCEDURE — 1036F TOBACCO NON-USER: CPT | Performed by: NURSE PRACTITIONER

## 2024-04-24 PROCEDURE — G0481 DRUG TEST DEF 8-14 CLASSES: HCPCS

## 2024-04-24 PROCEDURE — 80307 DRUG TEST PRSMV CHEM ANLYZR: CPT

## 2024-04-24 PROCEDURE — 3017F COLORECTAL CA SCREEN DOC REV: CPT | Performed by: NURSE PRACTITIONER

## 2024-04-24 PROCEDURE — G8417 CALC BMI ABV UP PARAM F/U: HCPCS | Performed by: NURSE PRACTITIONER

## 2024-04-24 RX ORDER — OXYCODONE AND ACETAMINOPHEN 7.5; 325 MG/1; MG/1
1 TABLET ORAL
Qty: 150 TABLET | Refills: 0 | Status: SHIPPED | OUTPATIENT
Start: 2024-04-24 | End: 2024-05-24

## 2024-04-24 ASSESSMENT — ENCOUNTER SYMPTOMS
EYES NEGATIVE: 1
NAUSEA: 1
BACK PAIN: 1
ABDOMINAL PAIN: 1
APNEA: 1
ABDOMINAL DISTENTION: 1

## 2024-04-24 NOTE — PROGRESS NOTES
[facility]  [address] [place]. [address]    Patient Name: Gisell Thurman  MRN: [id number]  Encounter Date: 4/24/2024     SUBJECTIVE:  Gisell Thurman is a 64 y.o., female being seen today regarding   Chief Complaint   Patient presents with    Chronic Pain    and routine medication management.    Functionality Assessment & Goals:  On a scale of 0 (Does not Interfere) to 10 (Completely Interferes)  Which number describes how during the past week pain has interfered with the following:   A.  General Activity: 8     B.  Mood:  8   C.  Walking Ability:  8   D.  Normal Work (Includes both work outside the home and housework):  9   E.  Relations with Other People:  8   F.  Sleep:  8    G.  Enjoyment of Life:  8  2.  Patient prefers to Take their Pain Medications:   [x] On a regular basis   [x] Only when necessary   [] Does not take pain medications  3.  What are the Patient's Goals/ Expectations for Visiting Pain Management?   [x] Learn about my pain   [] Physical Therapy   [] Receive Injections   [] Deal with Anxiety and Stress   [x] Receive Medication   [] Treat Depression    [x] Treat Sleep   [] Treat Opioid Dependence/ Addiction    Recent Imaging since last appointment related to chief complaint:  10/16/23 PET Scan and MRI Lumbar (11/25/2019).  Recent Interventional Procedures since last appointment related to chief complaint:  N/A  Patient reports a 0 % improvement of pain and ADL's after procedure.    Most recent Oswestry Disability Questionnaire completed by patient on 10/16/23     Current Pain Assessment:         4/24/2024    11:22 AM   AMB PAIN ASSESSMENT   Location of Pain Back   Location Modifiers Medial;Posterior;Inferior;Superior   Severity of Pain 6   Quality of Pain Throbbing;Sharp;Dull;Aching   Duration of Pain Persistent   Frequency of Pain Constant   Aggravating Factors Straightening;Stretching;Bending;Kneeling;Squatting;Standing;Walking   Limiting Behavior Yes

## 2024-04-27 LAB
6-ACETYLMORPHINE, UR: NOT DETECTED
7-AMINOCLONAZEPAM, URINE: NOT DETECTED
ALPHA-OH-ALPRAZ, URINE: PRESENT
ALPHA-OH-MIDAZOLAM, URINE: NOT DETECTED
ALPRAZOLAM, URINE: PRESENT
AMPHETAMINES, URINE: NOT DETECTED
BARBITURATES, URINE: NEGATIVE
BENZOYLECGONINE, UR: NEGATIVE
BUPRENORPHINE URINE: NOT DETECTED
CARISOPRODOL, UR: NEGATIVE
CLONAZEPAM, URINE: NOT DETECTED
CODEINE, URINE: NOT DETECTED
CREAT UR-MCNC: 209.6 MG/DL (ref 20–400)
DIAZEPAM, URINE: NOT DETECTED
EER PAIN MGT DRUG PANEL, HIGH RES/EMIT U: NORMAL
ETHYL GLUCURONIDE UR: NEGATIVE
FENTANYL URINE: NOT DETECTED
GABAPENTIN: NOT DETECTED
HYDROCODONE, URINE: NOT DETECTED
HYDROMORPHONE, URINE: NOT DETECTED
LORAZEPAM, URINE: NOT DETECTED
MARIJUANA METAB, UR: NORMAL
MDA, UR: NOT DETECTED
MDEA, EVE, UR: NOT DETECTED
MDMA URINE: NOT DETECTED
MEPERIDINE METAB, UR: NOT DETECTED
METHADONE, URINE: NEGATIVE
METHAMPHETAMINE, URINE: NOT DETECTED
METHYLPHENIDATE: NOT DETECTED
MIDAZOLAM, URINE: NOT DETECTED
MORPHINE, OPI1M: NOT DETECTED
NALOXONE URINE: NOT DETECTED
NORBUPRENORPHINE, URINE: NOT DETECTED
NORDIAZEPAM, URINE: NOT DETECTED
NORFENTANYL, URINE: NOT DETECTED
NORHYDROCODONE, URINE: NOT DETECTED
NOROXYCODONE, URINE: PRESENT
NOROXYMORPHONE, URINE: PRESENT
OXAZEPAM, URINE: NOT DETECTED
OXYCODONE URINE: PRESENT
OXYMORPHONE, URINE: PRESENT
PAIN MGT DRUG PANEL, HI RES, UR: NORMAL
PCP,URINE: NEGATIVE
PHENTERMINE, UR: NOT DETECTED
PREGABALIN: NOT DETECTED
TAPENTADOL, URINE: NOT DETECTED
TAPENTADOL-O-SULFATE, URINE: NOT DETECTED
TEMAZEPAM, URINE: NOT DETECTED
TRAMADOL, URINE: NEGATIVE
ZOLPIDEM METABOLITE (ZCA), URINE: NOT DETECTED
ZOLPIDEM, URINE: NOT DETECTED

## 2024-05-23 ENCOUNTER — TELEMEDICINE (OUTPATIENT)
Dept: PAIN MANAGEMENT | Age: 64
End: 2024-05-23
Payer: MEDICARE

## 2024-05-23 DIAGNOSIS — M51.36 LUMBAR DEGENERATIVE DISC DISEASE: ICD-10-CM

## 2024-05-23 DIAGNOSIS — M17.12 PRIMARY OSTEOARTHRITIS OF LEFT KNEE: Primary | ICD-10-CM

## 2024-05-23 DIAGNOSIS — M79.7 FIBROMYALGIA: ICD-10-CM

## 2024-05-23 DIAGNOSIS — M47.22 CERVICAL RADICULOPATHY DUE TO DEGENERATIVE JOINT DISEASE OF SPINE: ICD-10-CM

## 2024-05-23 DIAGNOSIS — M54.2 CERVICALGIA: ICD-10-CM

## 2024-05-23 DIAGNOSIS — M48.02 NEURAL FORAMINAL STENOSIS OF CERVICAL SPINE: ICD-10-CM

## 2024-05-23 PROCEDURE — G8417 CALC BMI ABV UP PARAM F/U: HCPCS | Performed by: NURSE PRACTITIONER

## 2024-05-23 PROCEDURE — 1036F TOBACCO NON-USER: CPT | Performed by: NURSE PRACTITIONER

## 2024-05-23 PROCEDURE — G8428 CUR MEDS NOT DOCUMENT: HCPCS | Performed by: NURSE PRACTITIONER

## 2024-05-23 PROCEDURE — 3017F COLORECTAL CA SCREEN DOC REV: CPT | Performed by: NURSE PRACTITIONER

## 2024-05-23 PROCEDURE — 99214 OFFICE O/P EST MOD 30 MIN: CPT | Performed by: NURSE PRACTITIONER

## 2024-05-23 PROCEDURE — 99212 OFFICE O/P EST SF 10 MIN: CPT | Performed by: NURSE PRACTITIONER

## 2024-05-23 RX ORDER — OXYCODONE AND ACETAMINOPHEN 7.5; 325 MG/1; MG/1
1 TABLET ORAL
Qty: 150 TABLET | Refills: 0 | Status: SHIPPED | OUTPATIENT
Start: 2024-05-24 | End: 2024-06-23

## 2024-05-23 ASSESSMENT — ENCOUNTER SYMPTOMS
BACK PAIN: 1
SHORTNESS OF BREATH: 0
CONSTIPATION: 0
BLOOD IN STOOL: 0

## 2024-05-23 NOTE — PROGRESS NOTES
2024    TELEHEALTH EVALUATION -- Audio/Visual    HPI:    Gisell Thurman (:  1960) has requested an audio/video evaluation for the following concern(s):    Here today for routine pain clinic recheck.    Doing well on current medication regime, no side effects. No aberrant behaviors noted.     Review of Systems   Constitutional:  Positive for fatigue. Negative for activity change.   HENT: Negative.     Respiratory:  Negative for shortness of breath.    Cardiovascular:  Negative for chest pain.   Gastrointestinal:  Negative for blood in stool and constipation.   Genitourinary:  Positive for flank pain.   Musculoskeletal:  Positive for arthralgias, back pain, myalgias and neck pain.   Neurological:  Positive for weakness, numbness and headaches.   Psychiatric/Behavioral:  Negative for sleep disturbance. The patient is nervous/anxious (anxiety).        Prior to Visit Medications    Medication Sig Taking? Authorizing Provider   oxyCODONE-acetaminophen (PERCOCET) 7.5-325 MG per tablet Take 1 tablet by mouth 5 times daily for 30 days. Yes Thea Torres APRN - CNP   oxyCODONE-acetaminophen (PERCOCET) 7.5-325 MG per tablet Take 1 tablet by mouth 5 times daily for 30 days.  Thea Torres, TITO - CNP   senna-docusate (PERICOLACE) 8.6-50 MG per tablet Take 1 tablet by mouth 2 times daily  ProviderEpi MD   azelastine (OPTIVAR) 0.05 % ophthalmic solution instill 1 drop into both eyes twice a day if needed  Epi Rahman MD   ALPRAZolam (XANAX) 1 MG tablet TAKE 1/2 TO 1 TABLET BY MOUTH THREE TIMES DAILY AS NEEDED. TAKE SEPARATE FROM PERCOCET  Epi Rahman MD   vitamin D (ERGOCALCIFEROL) 1.25 MG (20917 UT) CAPS capsule take 1 capsule by mouth ONCE A WEEK  Epi Rahman MD   NARCAN 4 MG/0.1ML LIQD nasal spray   Epi Rahman MD   carvedilol (COREG) 3.125 MG tablet Take 1 tablet by mouth 2 times daily  Epi Rahman MD   omeprazole (PRILOSEC) 20 MG delayed release

## 2024-06-24 ENCOUNTER — OFFICE VISIT (OUTPATIENT)
Dept: PAIN MANAGEMENT | Age: 64
End: 2024-06-24
Payer: MEDICARE

## 2024-06-24 VITALS
BODY MASS INDEX: 44.65 KG/M2 | HEART RATE: 71 BPM | HEIGHT: 63 IN | WEIGHT: 252 LBS | DIASTOLIC BLOOD PRESSURE: 80 MMHG | OXYGEN SATURATION: 98 % | RESPIRATION RATE: 17 BRPM | SYSTOLIC BLOOD PRESSURE: 112 MMHG

## 2024-06-24 DIAGNOSIS — M48.02 NEURAL FORAMINAL STENOSIS OF CERVICAL SPINE: ICD-10-CM

## 2024-06-24 DIAGNOSIS — M25.512 CHRONIC LEFT SHOULDER PAIN: ICD-10-CM

## 2024-06-24 DIAGNOSIS — M51.26 LUMBAR DISC HERNIATION: Primary | ICD-10-CM

## 2024-06-24 DIAGNOSIS — M51.36 LUMBAR DEGENERATIVE DISC DISEASE: ICD-10-CM

## 2024-06-24 DIAGNOSIS — M54.12 CERVICAL RADICULOPATHY: ICD-10-CM

## 2024-06-24 DIAGNOSIS — G89.29 CHRONIC LEFT SHOULDER PAIN: ICD-10-CM

## 2024-06-24 DIAGNOSIS — M47.22 CERVICAL RADICULOPATHY DUE TO DEGENERATIVE JOINT DISEASE OF SPINE: ICD-10-CM

## 2024-06-24 DIAGNOSIS — M79.7 FIBROMYALGIA: ICD-10-CM

## 2024-06-24 DIAGNOSIS — M54.2 CERVICALGIA: ICD-10-CM

## 2024-06-24 PROCEDURE — 3074F SYST BP LT 130 MM HG: CPT | Performed by: NURSE PRACTITIONER

## 2024-06-24 PROCEDURE — 99214 OFFICE O/P EST MOD 30 MIN: CPT | Performed by: NURSE PRACTITIONER

## 2024-06-24 PROCEDURE — G8417 CALC BMI ABV UP PARAM F/U: HCPCS | Performed by: NURSE PRACTITIONER

## 2024-06-24 PROCEDURE — 3079F DIAST BP 80-89 MM HG: CPT | Performed by: NURSE PRACTITIONER

## 2024-06-24 PROCEDURE — 1036F TOBACCO NON-USER: CPT | Performed by: NURSE PRACTITIONER

## 2024-06-24 PROCEDURE — 3017F COLORECTAL CA SCREEN DOC REV: CPT | Performed by: NURSE PRACTITIONER

## 2024-06-24 PROCEDURE — G8427 DOCREV CUR MEDS BY ELIG CLIN: HCPCS | Performed by: NURSE PRACTITIONER

## 2024-06-24 RX ORDER — OXYCODONE AND ACETAMINOPHEN 7.5; 325 MG/1; MG/1
1 TABLET ORAL
Qty: 150 TABLET | Refills: 0 | Status: SHIPPED | OUTPATIENT
Start: 2024-06-24 | End: 2024-07-24

## 2024-06-24 ASSESSMENT — ENCOUNTER SYMPTOMS
APNEA: 1
EYES NEGATIVE: 1
NAUSEA: 1
BACK PAIN: 1
ABDOMINAL PAIN: 1
ABDOMINAL DISTENTION: 1

## 2024-06-24 NOTE — PROGRESS NOTES
[facility] Management  [address] [place]. [address]    Patient Name: Gisell Thurman  MRN: [id number]  Encounter Date: 6/24/2024     SUBJECTIVE:  Gisell Thurman is a 64 y.o., female being seen today regarding   Chief Complaint   Patient presents with    Back Pain    and routine medication management.    Functionality Assessment & Goals:  On a scale of 0 (Does not Interfere) to 10 (Completely Interferes)  Which number describes how during the past week pain has interfered with the following:   A.  General Activity: 8     B.  Mood:  8   C.  Walking Ability:  8   D.  Normal Work (Includes both work outside the home and housework):  9   E.  Relations with Other People:  8   F.  Sleep:  8    G.  Enjoyment of Life:  8  2.  Patient prefers to Take their Pain Medications:   [x] On a regular basis   [x] Only when necessary   [] Does not take pain medications  3.  What are the Patient's Goals/ Expectations for Visiting Pain Management?   [x] Learn about my pain   [] Physical Therapy   [] Receive Injections   [] Deal with Anxiety and Stress   [x] Receive Medication   [] Treat Depression    [x] Treat Sleep   [] Treat Opioid Dependence/ Addiction    Recent Imaging since last appointment related to chief complaint:  10/16/23 PET Scan and MRI Lumbar (11/25/2019).  Recent Interventional Procedures since last appointment related to chief complaint:  N/A  Patient reports a 0 % improvement of pain and ADL's after procedure.    Most recent Oswestry Disability Questionnaire completed by patient on 10/16/23     Current Pain Assessment:         6/24/2024     3:05 PM   AMB PAIN ASSESSMENT   Location of Pain Back   Location Modifiers Left;Right;Anterior;Posterior;Superior;Inferior;Medial;Lateral;Dorsal   Severity of Pain 5   Quality of Pain Throbbing;Sharp;Dull;Aching   Duration of Pain Persistent   Frequency of Pain Constant   Aggravating Factors  Isai Pompa 
 
 
 [address] [facility] [address]. 
[phone] Patient: Saima Banegas MRN: [id number] :2014 Visit Information Date & Time Provider Department Dept. Phone Encounter #  
 5/3/2018  4:00 PM LEIGH Matt 14 [id number] Upcoming Health Maintenance Date Due PEDIATRIC DENTIST REFERRAL 2017 DTaP/Tdap/Td series (4 - DTaP) 3/16/2018 Hepatitis A Peds Age 1-18 (2 of 2 - Standard Series) 2018 Varicella Peds Age 1-18 (2 of 2 - 2 Dose Childhood Series) 2020 IPV Peds Age 0-18 (4 of 4 - All-IPV Series) 2020 MMR Peds Age 1-18 (2 of 2) 2020 MCV through Age 25 (1 of 2) 2027 Allergies as of 5/3/2018  Review Complete On: 5/3/2018 By: Suraj Watson MD  
 No Known Allergies Current Immunizations  Never Reviewed Name Date DTaP 2017, 2017, 2017 Hep A Vaccine 2017 Hep B Vaccine 2017, 2017, 2016 Hib 2017, 2017, 2017 Hib (PRP-T) 3/16/2018 Influenza Vaccine 10/19/2017, 2017 MMR 2017 Pneumococcal Conjugate (PCV-13) 3/16/2018, 2017, 2017, 2017 Poliovirus vaccine 2017, 2017, 2017 Rotavirus Vaccine 2017, 2017, 2017 Varicella Virus Vaccine 2017 Not reviewed this visit You Were Diagnosed With   
  
 Codes Comments Mild intermittent reactive airway disease with acute exacerbation    -  Primary ICD-10-CM: J45.21 ICD-9-CM: 155.98 Vitals Pulse Temp Resp Height(growth percentile) Weight(growth percentile) SpO2  
 130 97.9 °F (36.6 °C) (Axillary) 28 2' 7.5\" (0.8 m) (38 %, Z= -0.30)* 27 lb 10.5 oz (12.5 kg) (93 %, Z= 1.49)* 100% BMI Smoking Status 19.6 kg/m2 Never Smoker *Growth percentiles are based on WHO (Boys, 0-2 years) data. BSA Data Body Surface Area 0.53 m 2 Preferred Pharmacy Pharmacy Name Phone [facility] [address] [facility], [place] [phone] Your Updated Medication List  
  
   
This list is accurate as of 5/3/18  5:06 PM.  Always use your most recent med list.  
  
  
  
  
 albuterol 1.25 mg/3 mL Nebu Commonly known as:  ACCUNEB  
3 mL by Nebulization route three (3) times daily for 5 days. prednisoLONE 15 mg/5 mL syrup Commonly known as:  Earma Ambrosia Take 4 mL by mouth daily for 5 days. Prescriptions Sent to Pharmacy Refills  
 prednisoLONE (PRELONE) 15 mg/5 mL syrup 0 Sig: Take 4 mL by mouth daily for 5 days. Class: Normal  
 Pharmacy: [address] [place] [address] Ph #: [phone] Route: Oral  
 albuterol (ACCUNEB) 1.25 mg/3 mL nebu 3 Sig: 3 mL by Nebulization route three (3) times daily for 5 days. Class: Normal  
 Pharmacy: [address] [place] [address] Ph #: [phone] Route: Nebulization Patient Instructions START Prednisolone Syrup, ONCE DAILY x 5 DAYS 
 
START Albuterol Nebs, 1 dose 3 TIMES DAILY x 5-7  DAYS (CAN be used every 3-4 hours if needed, for wheezing or labored breathing) RECHECK in office in 5-7 DAYS Reactive Airway Disease in Children: Care Instructions Your Care Instructions Reactive airway disease is a breathing problem. It appears as wheezing, which is a whistling noise in your child's airways. It may be caused by a viral or bacterial infection. Or it may be from allergies, tobacco smoke, or something else in the environment. When your child is around these triggers, his or her body releases chemicals that make the airways get tight. Reactive airway disease is a lot like asthma. Both can cause wheezing. But asthma is ongoing, while reactive airway disease may occur only now and then. Your child may have tests to see if he or she has asthma.  Your child may take the same medicines used to treat asthma. Good home care and follow-up care with your child's doctor can help your child recover. Follow-up care is a key part of your child's treatment and safety. Be sure to make and go to all appointments, and call your doctor if your child is having problems. It's also a good idea to know your child's test results and keep a list of the medicines your child takes. How can you care for your child at home? · Have your child take medicines exactly as prescribed. Call your doctor if you think your child is having a problem with his or her medicine. · Keep your child away from smoke. Do not smoke or let anyone else smoke around your child or in your house. · If you know what caused your child to wheeze (such as perfume or the odor of household chemicals), try to avoid it in the future. · Teach your child to wash his or her hands several times a day. And try using hand gels or wipes that contain alcohol. This can prevent colds and other infections. When should you call for help? Call 911 anytime you think your child may need emergency care. For example, call if: 
? · Your child has severe trouble breathing. Signs may include the chest sinking in, using belly muscles to breathe, or nostrils flaring while your child is struggling to breathe. ? Watch closely for changes in your child's health, and be sure to contact your doctor if: 
? · Your child coughs up yellow, dark brown, or bloody mucus. ? · Your child has a fever. ? · Your child's wheezing gets worse. Where can you learn more? Go to http://lakshmi-tod.info/. Enter G568 in the search box to learn more about \"Reactive Airway Disease in Children: Care Instructions. \" Current as of: May 12, 2017 Content Version: 11.4 © 9827-1516 Healthwise, Incorporated.  Care instructions adapted under license by [employer] (which disclaims liability or warranty for this information). If you have questions about a medical condition or this instruction, always ask your healthcare professional. [address] any warranty or liability for your use of this information. Introducing [facility] SERVICES! Dear Parent or Guardian, Thank you for requesting a Optosecurity account for your child. With Optosecurity, you can view your childs hospital or ER discharge instructions, current allergies, immunizations and much more. In order to access your childs information, we require a signed consent on file. Please see the [facility] department or call [phone] for instructions on completing a Optosecurity Proxy request.   
Additional Information If you have questions, please visit the Frequently Asked Questions section of the Opt[employer]cur[employer] website at [email or web address] Remember, Optosecurity is NOT to be used for urgent needs. For medical emergencies, dial 911. Now available from your iPhone and Android! Please provide this summary of care documentation to your next provider. Your primary care clinician is listed as Marcelino Feliciano. If you have any questions after today's visit, please call [phone].

## 2024-07-18 DIAGNOSIS — M79.7 FIBROMYALGIA: ICD-10-CM

## 2024-07-18 DIAGNOSIS — M48.02 NEURAL FORAMINAL STENOSIS OF CERVICAL SPINE: ICD-10-CM

## 2024-07-18 RX ORDER — OXYCODONE AND ACETAMINOPHEN 7.5; 325 MG/1; MG/1
1 TABLET ORAL
Qty: 150 TABLET | Refills: 0 | Status: SHIPPED | OUTPATIENT
Start: 2024-07-24 | End: 2024-08-23

## 2024-07-18 NOTE — TELEPHONE ENCOUNTER
Gisell called requesting a refill of the below medication which has been pended for you:     Requested Prescriptions     Pending Prescriptions Disp Refills    oxyCODONE-acetaminophen (PERCOCET) 7.5-325 MG per tablet 150 tablet 0     Sig: Take 1 tablet by mouth 5 times daily for 30 days.       Last Appointment Date: 6/24/2024  Next Appointment Date: 7/31/2024    Allergies   Allergen Reactions    Latex Rash    Rituximab Palpitations     Severe tachycardia    Blue Dyes (Parenteral) Other (See Comments)     Mom had trouble breathing     Buspirone Other (See Comments)     Higher dose increased anxiety/trouble sleeping    Cyclobenzaprine Other (See Comments)     Felt odd    Fentanyl Other (See Comments)     sleepwalking    Ferric Sulfate (Iron Persulfate) Other (See Comments)     Severe abd pain    Gabapentin     Hydrocodone-Acetaminophen Other (See Comments)     Flushed and red face    Hydroxyzine Hcl Other (See Comments)     Lightheaded/heart racing    Lactose Other (See Comments)     GI    Other Other (See Comments)     IVP Dye  Mom had trouble breathing     Ropivacaine Hives     Was given after revision of left knee and it caused her hives and irritation at the wound site- so she removed herself    Seasonal     Vilazodone Other (See Comments)     Teeth clenching when on with Buspar       Pharmacy:  [facility]    OARRS Report checked for [place], [place], and [place]:Percocet 7.5/325     6/24/24 #150. Due 7/24/24.

## 2024-07-31 ENCOUNTER — OFFICE VISIT (OUTPATIENT)
Dept: PAIN MANAGEMENT | Age: 64
End: 2024-07-31
Payer: MEDICARE

## 2024-07-31 VITALS
SYSTOLIC BLOOD PRESSURE: 108 MMHG | DIASTOLIC BLOOD PRESSURE: 80 MMHG | BODY MASS INDEX: 43.23 KG/M2 | HEIGHT: 63 IN | WEIGHT: 244 LBS

## 2024-07-31 DIAGNOSIS — M50.20 CERVICAL HERNIATED DISC: Primary | ICD-10-CM

## 2024-07-31 DIAGNOSIS — M48.02 NEURAL FORAMINAL STENOSIS OF CERVICAL SPINE: ICD-10-CM

## 2024-07-31 DIAGNOSIS — M47.22 CERVICAL RADICULOPATHY DUE TO DEGENERATIVE JOINT DISEASE OF SPINE: ICD-10-CM

## 2024-07-31 DIAGNOSIS — M51.36 LUMBAR DEGENERATIVE DISC DISEASE: ICD-10-CM

## 2024-07-31 PROCEDURE — 3017F COLORECTAL CA SCREEN DOC REV: CPT | Performed by: NURSE PRACTITIONER

## 2024-07-31 PROCEDURE — 99213 OFFICE O/P EST LOW 20 MIN: CPT | Performed by: NURSE PRACTITIONER

## 2024-07-31 PROCEDURE — G8417 CALC BMI ABV UP PARAM F/U: HCPCS | Performed by: NURSE PRACTITIONER

## 2024-07-31 PROCEDURE — 3074F SYST BP LT 130 MM HG: CPT | Performed by: NURSE PRACTITIONER

## 2024-07-31 PROCEDURE — G8427 DOCREV CUR MEDS BY ELIG CLIN: HCPCS | Performed by: NURSE PRACTITIONER

## 2024-07-31 PROCEDURE — 1036F TOBACCO NON-USER: CPT | Performed by: NURSE PRACTITIONER

## 2024-07-31 PROCEDURE — 99214 OFFICE O/P EST MOD 30 MIN: CPT | Performed by: NURSE PRACTITIONER

## 2024-07-31 PROCEDURE — 3079F DIAST BP 80-89 MM HG: CPT | Performed by: NURSE PRACTITIONER

## 2024-07-31 ASSESSMENT — ENCOUNTER SYMPTOMS
ABDOMINAL DISTENTION: 1
EYES NEGATIVE: 1
APNEA: 1
NAUSEA: 1
BACK PAIN: 1
ABDOMINAL PAIN: 1

## 2024-07-31 NOTE — PROGRESS NOTES
[facility]  [address] [place]. [address]    Patient Name: Gisell Thurman  MRN: [id number]  Encounter Date: 7/31/2024     SUBJECTIVE:  Gisell Thurman is a 64 y.o., female being seen today regarding   Chief Complaint   Patient presents with    Joint Pain     All over    Back Pain    and routine medication management.    Functionality Assessment & Goals:  On a scale of 0 (Does not Interfere) to 10 (Completely Interferes)  Which number describes how during the past week pain has interfered with the following:   A.  General Activity: 8     B.  Mood:  8   C.  Walking Ability:  8   D.  Normal Work (Includes both work outside the home and housework):  9   E.  Relations with Other People:  8   F.  Sleep:  8    G.  Enjoyment of Life:  8  2.  Patient prefers to Take their Pain Medications:   [x] On a regular basis   [x] Only when necessary   [] Does not take pain medications  3.  What are the Patient's Goals/ Expectations for Visiting Pain Management?   [x] Learn about my pain   [] Physical Therapy   [] Receive Injections   [] Deal with Anxiety and Stress   [x] Receive Medication   [] Treat Depression    [x] Treat Sleep   [] Treat Opioid Dependence/ Addiction    Recent Imaging since last appointment related to chief complaint:  10/16/23 PET Scan and MRI Lumbar (11/25/2019).  Recent Interventional Procedures since last appointment related to chief complaint:  N/A  Patient reports a 0 % improvement of pain and ADL's after procedure.    Most recent Oswestry Disability Questionnaire completed by patient on 10/16/23     Current Pain Assessment:         7/31/2024     2:30 PM   AMB PAIN ASSESSMENT   Location of Pain Other (Comment)   Severity of Pain 8   Quality of Pain Sharp   Duration of Pain Persistent   Frequency of Pain Constant   Limiting Behavior Yes   Relieving Factors Rest;Heat   Result of Injury No   Work-Related Injury No   Are there other pain locations you wish to document? Yes

## 2024-08-23 ENCOUNTER — OFFICE VISIT (OUTPATIENT)
Dept: PAIN MANAGEMENT | Age: 64
End: 2024-08-23
Payer: MEDICARE

## 2024-08-23 VITALS
OXYGEN SATURATION: 97 % | HEART RATE: 80 BPM | DIASTOLIC BLOOD PRESSURE: 84 MMHG | BODY MASS INDEX: 42.87 KG/M2 | WEIGHT: 242 LBS | SYSTOLIC BLOOD PRESSURE: 134 MMHG | RESPIRATION RATE: 16 BRPM

## 2024-08-23 DIAGNOSIS — M48.02 NEURAL FORAMINAL STENOSIS OF CERVICAL SPINE: ICD-10-CM

## 2024-08-23 DIAGNOSIS — M25.512 CHRONIC LEFT SHOULDER PAIN: Primary | ICD-10-CM

## 2024-08-23 DIAGNOSIS — M51.36 LUMBAR DEGENERATIVE DISC DISEASE: ICD-10-CM

## 2024-08-23 DIAGNOSIS — M51.26 LUMBAR DISC HERNIATION: ICD-10-CM

## 2024-08-23 DIAGNOSIS — M79.7 FIBROMYALGIA: ICD-10-CM

## 2024-08-23 DIAGNOSIS — G89.29 CHRONIC LEFT SHOULDER PAIN: Primary | ICD-10-CM

## 2024-08-23 PROCEDURE — 99214 OFFICE O/P EST MOD 30 MIN: CPT | Performed by: NURSE PRACTITIONER

## 2024-08-23 PROCEDURE — 3075F SYST BP GE 130 - 139MM HG: CPT | Performed by: NURSE PRACTITIONER

## 2024-08-23 PROCEDURE — 3017F COLORECTAL CA SCREEN DOC REV: CPT | Performed by: NURSE PRACTITIONER

## 2024-08-23 PROCEDURE — G8427 DOCREV CUR MEDS BY ELIG CLIN: HCPCS | Performed by: NURSE PRACTITIONER

## 2024-08-23 PROCEDURE — 3079F DIAST BP 80-89 MM HG: CPT | Performed by: NURSE PRACTITIONER

## 2024-08-23 PROCEDURE — G8417 CALC BMI ABV UP PARAM F/U: HCPCS | Performed by: NURSE PRACTITIONER

## 2024-08-23 PROCEDURE — 1036F TOBACCO NON-USER: CPT | Performed by: NURSE PRACTITIONER

## 2024-08-23 RX ORDER — OXYCODONE AND ACETAMINOPHEN 7.5; 325 MG/1; MG/1
1 TABLET ORAL 2 TIMES DAILY
Qty: 60 TABLET | Refills: 0 | Status: CANCELLED | OUTPATIENT
Start: 2024-08-23 | End: 2024-09-22

## 2024-08-23 RX ORDER — OXYCODONE AND ACETAMINOPHEN 7.5; 325 MG/1; MG/1
1 TABLET ORAL
Qty: 150 TABLET | Refills: 0 | Status: SHIPPED | OUTPATIENT
Start: 2024-08-23 | End: 2024-09-22

## 2024-08-23 RX ORDER — OXYCODONE AND ACETAMINOPHEN 7.5; 325 MG/1; MG/1
TABLET ORAL
Qty: 150 TABLET | Refills: 0 | OUTPATIENT
Start: 2024-08-23

## 2024-08-23 RX ORDER — OXYCODONE AND ACETAMINOPHEN 7.5; 325 MG/1; MG/1
1 TABLET ORAL
Qty: 150 TABLET | Refills: 0 | Status: CANCELLED | OUTPATIENT
Start: 2024-08-23 | End: 2024-09-22

## 2024-08-30 ASSESSMENT — ENCOUNTER SYMPTOMS
NAUSEA: 1
APNEA: 1
EYES NEGATIVE: 1
ABDOMINAL PAIN: 1
BACK PAIN: 1
ABDOMINAL DISTENTION: 1

## 2024-08-30 NOTE — PROGRESS NOTES
[facility]  [address] [place]. [address]    Patient Name: Gisell Thurman  MRN: [id number]  Encounter Date: 8/30/2024     SUBJECTIVE:  Gisell Thurman is a 64 y.o., female being seen today regarding   Chief Complaint   Patient presents with    Chronic Pain    Back Pain    and routine medication management.    Functionality Assessment & Goals:  On a scale of 0 (Does not Interfere) to 10 (Completely Interferes)  Which number describes how during the past week pain has interfered with the following:   A.  General Activity: 8     B.  Mood:  8   C.  Walking Ability:  8   D.  Normal Work (Includes both work outside the home and housework):  9   E.  Relations with Other People:  8   F.  Sleep:  8    G.  Enjoyment of Life:  8  2.  Patient prefers to Take their Pain Medications:   [x] On a regular basis   [x] Only when necessary   [] Does not take pain medications  3.  What are the Patient's Goals/ Expectations for Visiting Pain Management?   [x] Learn about my pain   [] Physical Therapy   [] Receive Injections   [] Deal with Anxiety and Stress   [x] Receive Medication   [] Treat Depression    [x] Treat Sleep   [] Treat Opioid Dependence/ Addiction    Recent Imaging since last appointment related to chief complaint:  10/16/23 PET Scan and MRI Lumbar (11/25/2019).  Recent Interventional Procedures since last appointment related to chief complaint:  N/A  Patient reports a 0 % improvement of pain and ADL's after procedure.    Most recent Oswestry Disability Questionnaire completed by patient on 10/16/23     Current Pain Assessment:         8/23/2024    11:33 AM   AMB PAIN ASSESSMENT   Location of Pain Other (Comment)   Location Modifiers Left;Anterior;Right;Posterior;Superior;Inferior;Medial;Lateral   Severity of Pain 7   Quality of Pain Throbbing;Sharp;Dull;Aching;Locking;Grinding;Popping;Cracking;Buckling   Duration of Pain Persistent   Frequency of Pain Constant   Aggravating Factors

## 2024-09-12 DIAGNOSIS — M48.02 NEURAL FORAMINAL STENOSIS OF CERVICAL SPINE: ICD-10-CM

## 2024-09-12 DIAGNOSIS — M79.7 FIBROMYALGIA: ICD-10-CM

## 2024-09-12 RX ORDER — OXYCODONE AND ACETAMINOPHEN 7.5; 325 MG/1; MG/1
1 TABLET ORAL
Qty: 150 TABLET | Refills: 0 | Status: SHIPPED | OUTPATIENT
Start: 2024-09-20 | End: 2024-10-20

## 2024-09-20 ENCOUNTER — OFFICE VISIT (OUTPATIENT)
Dept: PAIN MANAGEMENT | Age: 64
End: 2024-09-20
Payer: MEDICARE

## 2024-09-20 VITALS
RESPIRATION RATE: 12 BRPM | OXYGEN SATURATION: 96 % | BODY MASS INDEX: 46.01 KG/M2 | HEART RATE: 73 BPM | SYSTOLIC BLOOD PRESSURE: 120 MMHG | HEIGHT: 62 IN | WEIGHT: 250 LBS | DIASTOLIC BLOOD PRESSURE: 68 MMHG

## 2024-09-20 DIAGNOSIS — M17.12 PRIMARY OSTEOARTHRITIS OF LEFT KNEE: ICD-10-CM

## 2024-09-20 DIAGNOSIS — M51.26 LUMBAR DISC HERNIATION: Primary | ICD-10-CM

## 2024-09-20 DIAGNOSIS — M51.36 LUMBAR DEGENERATIVE DISC DISEASE: ICD-10-CM

## 2024-09-20 DIAGNOSIS — G89.29 CHRONIC LEFT SHOULDER PAIN: ICD-10-CM

## 2024-09-20 DIAGNOSIS — M25.512 CHRONIC LEFT SHOULDER PAIN: ICD-10-CM

## 2024-09-20 PROCEDURE — 3017F COLORECTAL CA SCREEN DOC REV: CPT | Performed by: NURSE PRACTITIONER

## 2024-09-20 PROCEDURE — 3078F DIAST BP <80 MM HG: CPT | Performed by: NURSE PRACTITIONER

## 2024-09-20 PROCEDURE — 99214 OFFICE O/P EST MOD 30 MIN: CPT | Performed by: NURSE PRACTITIONER

## 2024-09-20 PROCEDURE — 1036F TOBACCO NON-USER: CPT | Performed by: NURSE PRACTITIONER

## 2024-09-20 PROCEDURE — G8427 DOCREV CUR MEDS BY ELIG CLIN: HCPCS | Performed by: NURSE PRACTITIONER

## 2024-09-20 PROCEDURE — 3074F SYST BP LT 130 MM HG: CPT | Performed by: NURSE PRACTITIONER

## 2024-09-20 PROCEDURE — G8417 CALC BMI ABV UP PARAM F/U: HCPCS | Performed by: NURSE PRACTITIONER

## 2024-09-20 ASSESSMENT — ENCOUNTER SYMPTOMS
NAUSEA: 1
BACK PAIN: 1
ABDOMINAL PAIN: 1
EYES NEGATIVE: 1
APNEA: 1
ABDOMINAL DISTENTION: 1

## 2024-10-17 DIAGNOSIS — M48.02 NEURAL FORAMINAL STENOSIS OF CERVICAL SPINE: ICD-10-CM

## 2024-10-17 DIAGNOSIS — M79.7 FIBROMYALGIA: ICD-10-CM

## 2024-10-18 RX ORDER — OXYCODONE AND ACETAMINOPHEN 7.5; 325 MG/1; MG/1
1 TABLET ORAL
Qty: 150 TABLET | Refills: 0 | Status: SHIPPED | OUTPATIENT
Start: 2024-10-19 | End: 2024-11-18

## 2024-10-18 NOTE — TELEPHONE ENCOUNTER
Percocet 7.5-325 mg  DS11 4/24/24  [facility]   Last filled 9/20/24  Due 10/20/24 fill the 19th  Last Appt:  9/20/2024  Next Appt:   10/24/2024  Med verified in Epic

## 2024-10-24 ENCOUNTER — TELEMEDICINE (OUTPATIENT)
Dept: PAIN MANAGEMENT | Age: 64
End: 2024-10-24
Payer: MEDICARE

## 2024-10-24 DIAGNOSIS — M25.551 HIP PAIN, BILATERAL: ICD-10-CM

## 2024-10-24 DIAGNOSIS — M51.360 DEGENERATION OF INTERVERTEBRAL DISC OF LUMBAR REGION WITH DISCOGENIC BACK PAIN: ICD-10-CM

## 2024-10-24 DIAGNOSIS — G89.29 ENCOUNTER FOR CHRONIC PAIN MANAGEMENT: ICD-10-CM

## 2024-10-24 DIAGNOSIS — M25.552 HIP PAIN, BILATERAL: ICD-10-CM

## 2024-10-24 DIAGNOSIS — M54.2 CERVICALGIA: ICD-10-CM

## 2024-10-24 DIAGNOSIS — G89.29 CHRONIC LEFT SHOULDER PAIN: Primary | ICD-10-CM

## 2024-10-24 DIAGNOSIS — M48.02 NEURAL FORAMINAL STENOSIS OF CERVICAL SPINE: ICD-10-CM

## 2024-10-24 DIAGNOSIS — M25.512 CHRONIC LEFT SHOULDER PAIN: Primary | ICD-10-CM

## 2024-10-24 DIAGNOSIS — M51.26 LUMBAR DISC HERNIATION: ICD-10-CM

## 2024-10-24 PROCEDURE — 3017F COLORECTAL CA SCREEN DOC REV: CPT | Performed by: NURSE PRACTITIONER

## 2024-10-24 PROCEDURE — 99212 OFFICE O/P EST SF 10 MIN: CPT | Performed by: NURSE PRACTITIONER

## 2024-10-24 PROCEDURE — 99214 OFFICE O/P EST MOD 30 MIN: CPT | Performed by: NURSE PRACTITIONER

## 2024-10-24 PROCEDURE — G8427 DOCREV CUR MEDS BY ELIG CLIN: HCPCS | Performed by: NURSE PRACTITIONER

## 2024-10-24 ASSESSMENT — ENCOUNTER SYMPTOMS
CONSTIPATION: 0
SHORTNESS OF BREATH: 0
BACK PAIN: 1
BLOOD IN STOOL: 0

## 2024-10-24 NOTE — PROGRESS NOTES
Lumbar disc herniation        7. Hip pain, bilateral            Chronic pain diagnoses such as   1. Chronic left shoulder pain    2. Encounter for chronic pain management    3. Cervicalgia    4. Neural foraminal stenosis of cervical spine    5. Degeneration of intervertebral disc of lumbar region with discogenic back pain    6. Lumbar disc herniation    7. Hip pain, bilateral       controlled on current medication regime, wll continue current pain medications to improve quality of life and function.   Controlled Substance Monitoring:    Acute and Chronic Pain Monitoring:   RX Monitoring Periodic Controlled Substance Monitoring   10/18/2024   3:09 PM Possible medication side effects, risk of tolerance/dependence & alternative treatments discussed.;Potential drug abuse or diversion identified, see note documentation.     Gisell Thurman is here for recheck/reevaluation of chronic pain. She is able to maintain daily activities with the use of current pain medications and is generally satisfied with level of analgesia. She shows no evidence of misuse or misappropriation of medications.  She denies use of alcohol or illegal substances. UDS have been Appropriate with most recent screen       Return in about 7 weeks (around 12/9/2024).    Gisell Thurman, was evaluated through a synchronous (real-time) audio-video encounter. The patient (or guardian if applicable) is aware that this is a billable service, which includes applicable co-pays. This Virtual Visit was conducted with patient's (and/or legal guardian's) consent. Patient identification was verified, and a caregiver was present when appropriate.   The patient was located at Home: [address] [facility] [address]  Provider was located at Facility (Appt Dept): [address] [place] [address]    Total time spent on this encounter: Not billed by time    --TITO Park CNP on 10/24/2024 at 2:04 PM    An electronic signature was used to authenticate this

## 2024-11-13 DIAGNOSIS — M48.02 NEURAL FORAMINAL STENOSIS OF CERVICAL SPINE: ICD-10-CM

## 2024-11-13 DIAGNOSIS — M79.7 FIBROMYALGIA: ICD-10-CM

## 2024-11-13 RX ORDER — OXYCODONE AND ACETAMINOPHEN 7.5; 325 MG/1; MG/1
1 TABLET ORAL
Qty: 150 TABLET | Refills: 0 | Status: SHIPPED | OUTPATIENT
Start: 2024-11-18 | End: 2024-12-18

## 2024-11-13 NOTE — TELEPHONE ENCOUNTER
Gisell called requesting a refill of the below medication which has been pended for you:     Requested Prescriptions     Pending Prescriptions Disp Refills    oxyCODONE-acetaminophen (PERCOCET) 7.5-325 MG per tablet 150 tablet 0     Sig: Take 1 tablet by mouth 5 times daily for 30 days.       Last Appointment Date: 10/24/2024  Next Appointment Date: 12/9/2024    Allergies   Allergen Reactions    Latex Rash    Rituximab Palpitations     Severe tachycardia    Blue Dyes (Parenteral) Other (See Comments)     Mom had trouble breathing     Buspirone Other (See Comments)     Higher dose increased anxiety/trouble sleeping    Cyclobenzaprine Other (See Comments)     Felt odd    Fentanyl Other (See Comments)     sleepwalking    Ferric Sulfate (Iron Persulfate) Other (See Comments)     Severe abd pain    Gabapentin     Hydrocodone-Acetaminophen Other (See Comments)     Flushed and red face    Hydroxyzine Hcl Other (See Comments)     Lightheaded/heart racing    Lactose Other (See Comments)     GI    Other Other (See Comments)     IVP Dye  Mom had trouble breathing     Ropivacaine Hives     Was given after revision of left knee and it caused her hives and irritation at the wound site- so she removed herself    Seasonal     Vilazodone Other (See Comments)     [facility]       [facility]     Last DS11 4/24/24.  Please review.     OARRS Report checked for [place], [place], and [place]:  percocet 7.5/325  10/19/24 #120  . Due 11/18/24.

## 2024-12-09 ENCOUNTER — HOSPITAL ENCOUNTER (OUTPATIENT)
Age: 64
Setting detail: SPECIMEN
Discharge: HOME | End: 2024-12-09
Payer: MEDICARE

## 2024-12-09 ENCOUNTER — OFFICE VISIT (OUTPATIENT)
Dept: PAIN MANAGEMENT | Age: 64
End: 2024-12-09
Payer: MEDICARE

## 2024-12-09 VITALS
HEIGHT: 62 IN | HEART RATE: 85 BPM | RESPIRATION RATE: 12 BRPM | OXYGEN SATURATION: 97 % | SYSTOLIC BLOOD PRESSURE: 128 MMHG | DIASTOLIC BLOOD PRESSURE: 74 MMHG | BODY MASS INDEX: 45.45 KG/M2 | WEIGHT: 247 LBS

## 2024-12-09 DIAGNOSIS — Z02.83 ENCOUNTER FOR DRUG SCREENING: Primary | ICD-10-CM

## 2024-12-09 DIAGNOSIS — M79.7 FIBROMYALGIA: ICD-10-CM

## 2024-12-09 DIAGNOSIS — Z79.891 ENCOUNTER FOR LONG-TERM OPIATE ANALGESIC USE: ICD-10-CM

## 2024-12-09 DIAGNOSIS — M48.02 NEURAL FORAMINAL STENOSIS OF CERVICAL SPINE: ICD-10-CM

## 2024-12-09 DIAGNOSIS — G89.29 CHRONIC LEFT SHOULDER PAIN: ICD-10-CM

## 2024-12-09 DIAGNOSIS — M25.512 CHRONIC LEFT SHOULDER PAIN: ICD-10-CM

## 2024-12-09 DIAGNOSIS — Z02.83 ENCOUNTER FOR DRUG SCREENING: ICD-10-CM

## 2024-12-09 PROCEDURE — G0481 DRUG TEST DEF 8-14 CLASSES: HCPCS

## 2024-12-09 PROCEDURE — 80307 DRUG TEST PRSMV CHEM ANLYZR: CPT

## 2024-12-09 PROCEDURE — 3017F COLORECTAL CA SCREEN DOC REV: CPT | Performed by: NURSE PRACTITIONER

## 2024-12-09 PROCEDURE — G8427 DOCREV CUR MEDS BY ELIG CLIN: HCPCS | Performed by: NURSE PRACTITIONER

## 2024-12-09 PROCEDURE — 3078F DIAST BP <80 MM HG: CPT | Performed by: NURSE PRACTITIONER

## 2024-12-09 PROCEDURE — G8484 FLU IMMUNIZE NO ADMIN: HCPCS | Performed by: NURSE PRACTITIONER

## 2024-12-09 PROCEDURE — 99214 OFFICE O/P EST MOD 30 MIN: CPT | Performed by: NURSE PRACTITIONER

## 2024-12-09 PROCEDURE — 1036F TOBACCO NON-USER: CPT | Performed by: NURSE PRACTITIONER

## 2024-12-09 PROCEDURE — G8417 CALC BMI ABV UP PARAM F/U: HCPCS | Performed by: NURSE PRACTITIONER

## 2024-12-09 PROCEDURE — 3074F SYST BP LT 130 MM HG: CPT | Performed by: NURSE PRACTITIONER

## 2024-12-09 RX ORDER — OXYCODONE AND ACETAMINOPHEN 7.5; 325 MG/1; MG/1
1 TABLET ORAL
Qty: 150 TABLET | Refills: 0 | Status: SHIPPED | OUTPATIENT
Start: 2024-12-18 | End: 2025-01-17

## 2024-12-09 RX ORDER — PANTOPRAZOLE SODIUM 40 MG/1
40 TABLET, DELAYED RELEASE ORAL DAILY
COMMUNITY
Start: 2024-11-27 | End: 2025-11-27

## 2024-12-09 RX ORDER — TRIAMCINOLONE ACETONIDE 0.1 %
PASTE (GRAM) DENTAL
COMMUNITY
Start: 2024-10-22 | End: 2024-12-09 | Stop reason: SINTOL

## 2024-12-09 RX ORDER — LORATADINE 10 MG/1
TABLET ORAL
COMMUNITY
Start: 2024-11-01

## 2024-12-09 ASSESSMENT — ENCOUNTER SYMPTOMS
BACK PAIN: 1
ABDOMINAL DISTENTION: 1
NAUSEA: 1
APNEA: 1
ABDOMINAL PAIN: 1
EYES NEGATIVE: 1

## 2024-12-09 NOTE — PROGRESS NOTES
[facility]  [address] [place]. [address]    Patient Name: Gisell Thurman  MRN: [id number]  Encounter Date: 12/9/2024     SUBJECTIVE:  Gisell Thurman is a 64 y.o., female being seen today regarding   Chief Complaint   Patient presents with    Shoulder Pain     Left shoulder    and routine medication management.    Functionality Assessment & Goals:  On a scale of 0 (Does not Interfere) to 10 (Completely Interferes)  Which number describes how during the past week pain has interfered with the following:   A.  General Activity: 8     B.  Mood:  8   C.  Walking Ability:  8   D.  Normal Work (Includes both work outside the home and housework):  9   E.  Relations with Other People:  8   F.  Sleep:  8    G.  Enjoyment of Life:  8  2.  Patient prefers to Take their Pain Medications:   [x] On a regular basis   [x] Only when necessary   [] Does not take pain medications  3.  What are the Patient's Goals/ Expectations for Visiting Pain Management?   [x] Learn about my pain   [] Physical Therapy   [] Receive Injections   [] Deal with Anxiety and Stress   [x] Receive Medication   [] Treat Depression    [x] Treat Sleep   [] Treat Opioid Dependence/ Addiction    Recent Imaging since last appointment related to chief complaint:  10/16/23 PET Scan and MRI Lumbar (11/25/2019).  Recent Interventional Procedures since last appointment related to chief complaint:  N/A  Patient reports a 0 % improvement of pain and ADL's after procedure.    Most recent Oswestry Disability Questionnaire completed by patient on 10/16/23     Current Pain Assessment:         12/9/2024    11:10 AM   AMB PAIN ASSESSMENT   Location of Pain Shoulder   Location Modifiers Left   Severity of Pain 5   Quality of Pain Throbbing;Sharp;Dull;Aching   Duration of Pain Persistent   Frequency of Pain Constant   Aggravating Factors Bending;Stretching;Straightening;Exercise;Kneeling;Squatting;Standing;Walking;Stairs   Limiting Behavior

## 2024-12-13 LAB
6-ACETYLMORPHINE, UR: NOT DETECTED
7-AMINOCLONAZEPAM, URINE: NOT DETECTED
ALPHA-OH-ALPRAZ, URINE: PRESENT
ALPHA-OH-MIDAZOLAM, URINE: NOT DETECTED
ALPRAZOLAM, URINE: PRESENT
AMPHETAMINE, URINE: NOT DETECTED
BARBITURATES, URINE: NEGATIVE
BENZOYLECGONINE, UR: NEGATIVE
BUPRENORPHINE URINE: NOT DETECTED
CARISOPRODOL, UR: NEGATIVE
CLONAZEPAM, URINE: NOT DETECTED
CODEINE, URINE: NOT DETECTED
CREAT UR-MCNC: 24.4 MG/DL (ref 20–400)
DIAZEPAM, URINE: NOT DETECTED
EER PAIN MGT DRUG PANEL, HIGH RES/EMIT U: NORMAL
ETHYL GLUCURONIDE UR: NEGATIVE
FENTANYL URINE: NOT DETECTED
GABAPENTIN: NOT DETECTED
HYDROCODONE, URINE: NOT DETECTED
HYDROMORPHONE, URINE: NOT DETECTED
LORAZEPAM, URINE: NOT DETECTED
MARIJUANA METAB, UR: NORMAL
MDA, URINE: NOT DETECTED
MDEA, EVE, UR: NOT DETECTED
MDMA, URINE: NOT DETECTED
MEPERIDINE METAB, UR: NOT DETECTED
METHADONE, URINE: NEGATIVE
METHAMPHETAMINE, URINE: NOT DETECTED
METHYLPHENIDATE: NOT DETECTED
MIDAZOLAM, URINE: NOT DETECTED
MORPHINE, OPI1M: NOT DETECTED
NALOXONE URINE: NOT DETECTED
NORBUPRENORPHINE, URINE: NOT DETECTED
NORDIAZEPAM, URINE: NOT DETECTED
NORFENTANYL, URINE: NOT DETECTED
NORHYDROCODONE, URINE: NOT DETECTED
NOROXYCODONE, URINE: NOT DETECTED
NOROXYMORPHONE, URINE: NOT DETECTED
OXAZEPAM, URINE: NOT DETECTED
OXYCODONE URINE: NOT DETECTED
OXYMORPHONE, URINE: PRESENT
PAIN MGT DRUG PANEL, HI RES, UR: NORMAL
PCP,URINE: NEGATIVE
PHENTERMINE, UR: NOT DETECTED
PREGABALIN: NOT DETECTED
TAPENTADOL, URINE: NOT DETECTED
TAPENTADOL-O-SULFATE, URINE: NOT DETECTED
TEMAZEPAM, URINE: NOT DETECTED
TRAMADOL, URINE: NEGATIVE
ZOLPIDEM METABOLITE (ZCA), URINE: NOT DETECTED
ZOLPIDEM, URINE: NOT DETECTED

## 2024-12-19 ENCOUNTER — TELEPHONE (OUTPATIENT)
Dept: SURGERY | Age: 64
End: 2024-12-19

## 2024-12-19 VITALS
WEIGHT: 246.91 LBS | BODY MASS INDEX: 43.75 KG/M2 | DIASTOLIC BLOOD PRESSURE: 87 MMHG | HEART RATE: 90 BPM | TEMPERATURE: 98.4 F | HEIGHT: 63 IN | OXYGEN SATURATION: 98 % | SYSTOLIC BLOOD PRESSURE: 159 MMHG

## 2024-12-19 RX ORDER — DOCUSATE SODIUM 100 MG/1
100 CAPSULE, LIQUID FILLED ORAL 2 TIMES DAILY PRN
COMMUNITY

## 2024-12-19 RX ORDER — FAMOTIDINE 40 MG/1
40 TABLET, FILM COATED ORAL NIGHTLY
COMMUNITY
Start: 2024-12-18 | End: 2025-03-18

## 2024-12-19 RX ORDER — NITROGLYCERIN 0.6 MG/1
0.6 TABLET SUBLINGUAL EVERY 5 MIN PRN
COMMUNITY

## 2024-12-19 NOTE — TELEPHONE ENCOUNTER
[facility]  General Surgery - Dr. Sudheer Marquez  Phone # [phone]  Fax # [phone]    Patient:Gisell Thurman    :1960     Surgical/Procedure Planned: EGD     Date & Location: D       Outpatient   Planned Length of OR: 30 min.    Sedation: intravenous sedation      Estimated Cardiac Risk for Non-Cardiac Surgery/Procedure     Low______ Moderate______ High______    Medication Instructions - Clarification needed by this date: 25  Patient is scheduled for office visit on 25 @ [facility]. Thank you in advance!    ASA 81 mg            Hold ___ Days      Resume medications:     Lovenox indicated: _____Yes _____NO    Provider: Dr. Chung     Date Sent: 24 via fax     Signature of Provider Giving Orders for Medication holds:    _____________________________________________

## 2024-12-20 NOTE — TELEPHONE ENCOUNTER
Received cardiac risk and ASA 81 mg hold back from PCP Dr. Chung's office. Patient is an estimated moderate cardiac risk and is to hold aspirin 81 mg x 7 days prior to procedure. Med hold scanned and attached to this encounter.

## 2025-01-06 ENCOUNTER — INITIAL CONSULT (OUTPATIENT)
Dept: SURGERY | Age: 65
End: 2025-01-06
Payer: MEDICARE

## 2025-01-06 VITALS
SYSTOLIC BLOOD PRESSURE: 134 MMHG | DIASTOLIC BLOOD PRESSURE: 86 MMHG | HEIGHT: 63 IN | WEIGHT: 241 LBS | HEART RATE: 74 BPM | BODY MASS INDEX: 42.7 KG/M2

## 2025-01-06 DIAGNOSIS — Z87.19 HX OF GASTROESOPHAGEAL REFLUX (GERD): Primary | ICD-10-CM

## 2025-01-06 PROCEDURE — G8417 CALC BMI ABV UP PARAM F/U: HCPCS | Performed by: SURGERY

## 2025-01-06 PROCEDURE — G8427 DOCREV CUR MEDS BY ELIG CLIN: HCPCS | Performed by: SURGERY

## 2025-01-06 PROCEDURE — M1308 PR FLU IMMUNIZE NO ADMIN: HCPCS | Performed by: SURGERY

## 2025-01-06 PROCEDURE — 3017F COLORECTAL CA SCREEN DOC REV: CPT | Performed by: SURGERY

## 2025-01-06 PROCEDURE — 3075F SYST BP GE 130 - 139MM HG: CPT | Performed by: SURGERY

## 2025-01-06 PROCEDURE — 3079F DIAST BP 80-89 MM HG: CPT | Performed by: SURGERY

## 2025-01-06 PROCEDURE — 1036F TOBACCO NON-USER: CPT | Performed by: SURGERY

## 2025-01-06 NOTE — ASSESSMENT & PLAN NOTE
Patient does have history of GERD and has been on medical therapy which is seem to keep her symptoms under good control.  These new symptoms are not typical of GERD.  She is concerned that this may be related to changes in her medication but the symptoms started prior to any changes to her GERD medications.  Recent CT scan did show a moderate hiatal hernia which would put her at a higher risk for reflux but again her symptoms do not really match that diagnosis.  She has been recommended by her PCP to have an EGD done to investigate for reflux.  I can certainly proceed but I had a discussion with her today that I am not confident that we are going to obtain a diagnosis that would explain all of her oral complaints.  I do think she should follow-up with the ENT to get input from them.  I will begin planning for EGD a few weeks from now and will await input from ENT.  Risks of the procedure including bleeding, infection, perforation, need for surgery, failure to obtain diagnosis anesthesia risks are discussed and consent is obtained.  Patient may continue her current oral medication regimen for GERD.

## 2025-01-06 NOTE — PROGRESS NOTES
Places Lived in the Last Year: Not on file     Unstable Housing in the Last Year: No       ROS:   Review of Systems - General ROS: negative  Psychological ROS: negative  Ophthalmic ROS: negative  ENT ROS: negative  Respiratory ROS: no cough, shortness of breath, or wheezing  Cardiovascular ROS: no chest pain or dyspnea on exertion  Gastrointestinal ROS: per HPI  Genito-Urinary ROS: no dysuria, trouble voiding, or hematuria  Musculoskeletal ROS: negative  Dermatological ROS: negative      Objective   Vitals:    01/06/25 1157   BP: 134/86   Pulse: 74     General:in no apparent distress and well developed and well nourished  Eyes: No gross abnormalities.  Ears, Nose, Throat: hearing grossly normal bilaterally.  Some discoloration of the tongue noted but no plaques noted.  No streaking or lesions in the posterior oropharynx appreciated.  Neck: neck supple and non tender without mass  Lungs: clear to auscultation without wheezes or rales   Heart: S1S2, no mumurs, RRR  Abdomen: Soft, obese, no significant tenderness to palpation, bowel sounds present  Extremity: negative  Neuro: CN II-XII grossly intact      1. Hx of gastroesophageal reflux (GERD)  Assessment & Plan:  Patient does have history of GERD and has been on medical therapy which is seem to keep her symptoms under good control.  These new symptoms are not typical of GERD.  She is concerned that this may be related to changes in her medication but the symptoms started prior to any changes to her GERD medications.  Recent CT scan did show a moderate hiatal hernia which would put her at a higher risk for reflux but again her symptoms do not really match that diagnosis.  She has been recommended by her PCP to have an EGD done to investigate for reflux.  I can certainly proceed but I had a discussion with her today that I am not confident that we are going to obtain a diagnosis that would explain all of her oral complaints.  I do think she should follow-up with the

## 2025-01-13 ENCOUNTER — TELEMEDICINE (OUTPATIENT)
Dept: PAIN MANAGEMENT | Age: 65
End: 2025-01-13
Payer: MEDICARE

## 2025-01-13 DIAGNOSIS — M51.360 DEGENERATION OF INTERVERTEBRAL DISC OF LUMBAR REGION WITH DISCOGENIC BACK PAIN: ICD-10-CM

## 2025-01-13 DIAGNOSIS — M48.02 NEURAL FORAMINAL STENOSIS OF CERVICAL SPINE: ICD-10-CM

## 2025-01-13 DIAGNOSIS — M25.511 CHRONIC RIGHT SHOULDER PAIN: ICD-10-CM

## 2025-01-13 DIAGNOSIS — M17.12 PRIMARY OSTEOARTHRITIS OF LEFT KNEE: ICD-10-CM

## 2025-01-13 DIAGNOSIS — M51.26 LUMBAR DISC HERNIATION: Primary | ICD-10-CM

## 2025-01-13 DIAGNOSIS — M79.7 FIBROMYALGIA: ICD-10-CM

## 2025-01-13 DIAGNOSIS — G89.29 CHRONIC RIGHT SHOULDER PAIN: ICD-10-CM

## 2025-01-13 DIAGNOSIS — M54.2 CERVICALGIA: ICD-10-CM

## 2025-01-13 PROCEDURE — 3017F COLORECTAL CA SCREEN DOC REV: CPT | Performed by: NURSE PRACTITIONER

## 2025-01-13 PROCEDURE — 99212 OFFICE O/P EST SF 10 MIN: CPT | Performed by: NURSE PRACTITIONER

## 2025-01-13 PROCEDURE — G8427 DOCREV CUR MEDS BY ELIG CLIN: HCPCS | Performed by: NURSE PRACTITIONER

## 2025-01-13 PROCEDURE — 99214 OFFICE O/P EST MOD 30 MIN: CPT | Performed by: NURSE PRACTITIONER

## 2025-01-13 RX ORDER — OXYCODONE AND ACETAMINOPHEN 7.5; 325 MG/1; MG/1
1 TABLET ORAL
Qty: 150 TABLET | Refills: 0 | Status: SHIPPED | OUTPATIENT
Start: 2025-01-17 | End: 2025-02-16

## 2025-01-13 ASSESSMENT — ENCOUNTER SYMPTOMS
CONSTIPATION: 0
SHORTNESS OF BREATH: 0
BACK PAIN: 1
BLOOD IN STOOL: 0

## 2025-01-13 NOTE — PROGRESS NOTES
[] Abnormal -    HENT:   [x] Normocephalic, atraumatic.  [] Abnormal   [x] Mouth/Throat: Mucous membranes are moist.     External Ears [x] Normal  [] Abnormal-     Neck: [x] No visualized mass     Pulmonary/Chest: [x] Respiratory effort normal.  [x] No visualized signs of difficulty breathing or respiratory distress        [] Abnormal-      Musculoskeletal:   [x] Normal gait with no signs of ataxia         [x] Normal range of motion of neck        [] Abnormal-       Neurological:        [x] No Facial Asymmetry (Cranial nerve 7 motor function) (limited exam to video visit)          [x] No gaze palsy        [] Abnormal-         Skin:        [x] No significant exanthematous lesions or discoloration noted on facial skin         [] Abnormal-            Psychiatric:       [x] Normal Affect [x] No Hallucinations        [] Abnormal-     Other pertinent observable physical exam findings-     ASSESSMENT/PLAN:   Diagnosis Orders   1. Lumbar disc herniation        2. Fibromyalgia  oxyCODONE-acetaminophen (PERCOCET) 7.5-325 MG per tablet      3. Neural foraminal stenosis of cervical spine  oxyCODONE-acetaminophen (PERCOCET) 7.5-325 MG per tablet      4. Primary osteoarthritis of left knee        5. Chronic right shoulder pain        6. Degeneration of intervertebral disc of lumbar region with discogenic back pain        7. Cervicalgia            Chronic pain diagnoses such as   1. Lumbar disc herniation    2. Fibromyalgia    3. Neural foraminal stenosis of cervical spine    4. Primary osteoarthritis of left knee    5. Chronic right shoulder pain    6. Degeneration of intervertebral disc of lumbar region with discogenic back pain    7. Cervicalgia       controlled on current medication regime, wll continue current pain medications to improve quality of life and function.   Controlled Substance Monitoring:    Acute and Chronic Pain Monitoring:   RX Monitoring Periodic Controlled Substance Monitoring   1/13/2025   9:51 AM No signs

## 2025-02-13 ENCOUNTER — TELEMEDICINE (OUTPATIENT)
Dept: PAIN MANAGEMENT | Age: 65
End: 2025-02-13
Payer: MEDICARE

## 2025-02-13 DIAGNOSIS — M51.360 DEGENERATION OF INTERVERTEBRAL DISC OF LUMBAR REGION WITH DISCOGENIC BACK PAIN: ICD-10-CM

## 2025-02-13 DIAGNOSIS — M25.512 CHRONIC LEFT SHOULDER PAIN: Primary | ICD-10-CM

## 2025-02-13 DIAGNOSIS — M79.7 FIBROMYALGIA: ICD-10-CM

## 2025-02-13 DIAGNOSIS — M51.26 LUMBAR DISC HERNIATION: ICD-10-CM

## 2025-02-13 DIAGNOSIS — M47.22 CERVICAL RADICULOPATHY DUE TO DEGENERATIVE JOINT DISEASE OF SPINE: ICD-10-CM

## 2025-02-13 DIAGNOSIS — M48.02 NEURAL FORAMINAL STENOSIS OF CERVICAL SPINE: ICD-10-CM

## 2025-02-13 DIAGNOSIS — G89.29 CHRONIC LEFT SHOULDER PAIN: Primary | ICD-10-CM

## 2025-02-13 PROCEDURE — 99214 OFFICE O/P EST MOD 30 MIN: CPT | Performed by: NURSE PRACTITIONER

## 2025-02-13 PROCEDURE — 3017F COLORECTAL CA SCREEN DOC REV: CPT | Performed by: NURSE PRACTITIONER

## 2025-02-13 PROCEDURE — G8427 DOCREV CUR MEDS BY ELIG CLIN: HCPCS | Performed by: NURSE PRACTITIONER

## 2025-02-13 RX ORDER — OXYCODONE AND ACETAMINOPHEN 7.5; 325 MG/1; MG/1
1 TABLET ORAL
Qty: 150 TABLET | Refills: 0 | Status: SHIPPED | OUTPATIENT
Start: 2025-02-15 | End: 2025-03-17

## 2025-02-17 ASSESSMENT — ENCOUNTER SYMPTOMS
CONSTIPATION: 0
SHORTNESS OF BREATH: 0
BACK PAIN: 1
BLOOD IN STOOL: 0

## 2025-02-17 NOTE — PROGRESS NOTES
medications to improve quality of life and function.   Controlled Substance Monitoring:    Acute and Chronic Pain Monitoring:   RX Monitoring Periodic Controlled Substance Monitoring   2/13/2025  10:39 AM No signs of potential drug abuse or diversion identified.;Obtaining appropriate analgesic effect of treatment.     Gisell Thurman is here for recheck/reevaluation of chronic pain. She is able to maintain daily activities with the use of current pain medications and is generally satisfied with level of analgesia. She shows no evidence of misuse or misappropriation of medications.  She denies use of alcohol or illegal substances. UDS have been Appropriate with most recent screen       No follow-ups on file.    Gisell Thurman, was evaluated through a synchronous (real-time) audio-video encounter. The patient (or guardian if applicable) is aware that this is a billable service, which includes applicable co-pays. This Virtual Visit was conducted with patient's (and/or legal guardian's) consent. Patient identification was verified, and a caregiver was present when appropriate.   The patient was located at Home: [address] [address]  Provider was located at Facility (Appt Dept): [address] [place] [address]    Total time spent on this encounter: Not billed by time    --TITO Park - CNP on 2/17/2025 at 3:34 PM    An electronic signature was used to authenticate this note.

## 2025-02-26 ENCOUNTER — HOSPITAL ENCOUNTER (EMERGENCY)
Age: 65
Discharge: LEFT BEFORE DISCHARGE | End: 2025-02-26
Attending: EMERGENCY MEDICINE

## 2025-02-26 VITALS
RESPIRATION RATE: 20 BRPM | HEART RATE: 100 BPM | TEMPERATURE: 98.1 F | SYSTOLIC BLOOD PRESSURE: 152 MMHG | DIASTOLIC BLOOD PRESSURE: 101 MMHG | OXYGEN SATURATION: 97 %

## 2025-02-28 ENCOUNTER — HOSPITAL ENCOUNTER (INPATIENT)
Age: 65
LOS: 8 days | Discharge: HOME | End: 2025-03-08
Attending: PSYCHIATRY & NEUROLOGY | Admitting: PSYCHIATRY & NEUROLOGY
Payer: MEDICARE

## 2025-02-28 PROBLEM — R45.851 DEPRESSION WITH SUICIDAL IDEATION: Status: ACTIVE | Noted: 2025-02-28

## 2025-02-28 PROBLEM — F33.2 MAJOR DEPRESSIVE DISORDER, RECURRENT SEVERE WITHOUT PSYCHOTIC FEATURES (HCC): Status: ACTIVE | Noted: 2025-02-28

## 2025-02-28 PROBLEM — F32.A DEPRESSION WITH SUICIDAL IDEATION: Status: ACTIVE | Noted: 2025-02-28

## 2025-02-28 PROCEDURE — 6370000000 HC RX 637 (ALT 250 FOR IP): Performed by: NURSE PRACTITIONER

## 2025-02-28 PROCEDURE — 6370000000 HC RX 637 (ALT 250 FOR IP)

## 2025-02-28 PROCEDURE — 1240000000 HC EMOTIONAL WELLNESS R&B

## 2025-02-28 PROCEDURE — 99222 1ST HOSP IP/OBS MODERATE 55: CPT

## 2025-02-28 PROCEDURE — 6370000000 HC RX 637 (ALT 250 FOR IP): Performed by: PSYCHIATRY & NEUROLOGY

## 2025-02-28 PROCEDURE — APPSS60 APP SPLIT SHARED TIME 46-60 MINUTES

## 2025-02-28 PROCEDURE — 99222 1ST HOSP IP/OBS MODERATE 55: CPT | Performed by: PSYCHIATRY & NEUROLOGY

## 2025-02-28 RX ORDER — TRAZODONE HYDROCHLORIDE 50 MG/1
50 TABLET ORAL NIGHTLY PRN
Status: DISCONTINUED | OUTPATIENT
Start: 2025-02-28 | End: 2025-03-08 | Stop reason: HOSPADM

## 2025-02-28 RX ORDER — SALIVA STIMULANT COMB. NO.3
SPRAY, NON-AEROSOL (ML) MUCOUS MEMBRANE PRN
Status: DISCONTINUED | OUTPATIENT
Start: 2025-02-28 | End: 2025-03-08 | Stop reason: HOSPADM

## 2025-02-28 RX ORDER — MAGNESIUM HYDROXIDE/ALUMINUM HYDROXICE/SIMETHICONE 120; 1200; 1200 MG/30ML; MG/30ML; MG/30ML
30 SUSPENSION ORAL EVERY 6 HOURS PRN
Status: DISCONTINUED | OUTPATIENT
Start: 2025-02-28 | End: 2025-03-08 | Stop reason: HOSPADM

## 2025-02-28 RX ORDER — NITROGLYCERIN 0.4 MG/1
0.4 TABLET SUBLINGUAL EVERY 5 MIN PRN
Status: DISCONTINUED | OUTPATIENT
Start: 2025-02-28 | End: 2025-03-08 | Stop reason: HOSPADM

## 2025-02-28 RX ORDER — METOPROLOL SUCCINATE 25 MG/1
25 TABLET, EXTENDED RELEASE ORAL DAILY
Status: DISCONTINUED | OUTPATIENT
Start: 2025-02-28 | End: 2025-02-28

## 2025-02-28 RX ORDER — QUETIAPINE FUMARATE 25 MG/1
25 TABLET, FILM COATED ORAL NIGHTLY
Status: DISCONTINUED | OUTPATIENT
Start: 2025-02-28 | End: 2025-03-02

## 2025-02-28 RX ORDER — CARVEDILOL 3.12 MG/1
3.12 TABLET ORAL 2 TIMES DAILY WITH MEALS
Status: ON HOLD | COMMUNITY
End: 2025-02-28 | Stop reason: ALTCHOICE

## 2025-02-28 RX ORDER — METOPROLOL SUCCINATE 25 MG/1
25 TABLET, EXTENDED RELEASE ORAL DAILY
Status: ON HOLD | COMMUNITY
End: 2025-03-07 | Stop reason: HOSPADM

## 2025-02-28 RX ORDER — ESCITALOPRAM OXALATE 10 MG/1
5 TABLET ORAL DAILY
Status: DISCONTINUED | OUTPATIENT
Start: 2025-03-01 | End: 2025-03-05

## 2025-02-28 RX ORDER — ALPRAZOLAM 0.25 MG
0.5 TABLET ORAL 3 TIMES DAILY
Status: DISCONTINUED | OUTPATIENT
Start: 2025-02-28 | End: 2025-03-02

## 2025-02-28 RX ORDER — DIPHENHYDRAMINE HYDROCHLORIDE 50 MG/ML
50 INJECTION INTRAMUSCULAR; INTRAVENOUS EVERY 6 HOURS PRN
Status: DISCONTINUED | OUTPATIENT
Start: 2025-02-28 | End: 2025-03-08 | Stop reason: HOSPADM

## 2025-02-28 RX ORDER — POLYETHYLENE GLYCOL 3350 17 G/17G
17 POWDER, FOR SOLUTION ORAL DAILY PRN
Status: DISCONTINUED | OUTPATIENT
Start: 2025-02-28 | End: 2025-03-08 | Stop reason: HOSPADM

## 2025-02-28 RX ORDER — ACETAMINOPHEN 325 MG/1
650 TABLET ORAL EVERY 6 HOURS PRN
Status: DISCONTINUED | OUTPATIENT
Start: 2025-02-28 | End: 2025-03-08 | Stop reason: HOSPADM

## 2025-02-28 RX ORDER — HALOPERIDOL 5 MG/ML
5 INJECTION INTRAMUSCULAR EVERY 6 HOURS PRN
Status: DISCONTINUED | OUTPATIENT
Start: 2025-02-28 | End: 2025-03-08 | Stop reason: HOSPADM

## 2025-02-28 RX ORDER — METOPROLOL SUCCINATE 25 MG/1
25 TABLET, EXTENDED RELEASE ORAL DAILY
Status: DISCONTINUED | OUTPATIENT
Start: 2025-02-28 | End: 2025-03-08 | Stop reason: HOSPADM

## 2025-02-28 RX ORDER — ALPRAZOLAM 0.25 MG
0.5 TABLET ORAL ONCE
Status: DISCONTINUED | OUTPATIENT
Start: 2025-02-28 | End: 2025-03-05

## 2025-02-28 RX ORDER — PANTOPRAZOLE SODIUM 40 MG/1
40 TABLET, DELAYED RELEASE ORAL
Status: DISCONTINUED | OUTPATIENT
Start: 2025-03-01 | End: 2025-03-01

## 2025-02-28 RX ORDER — OMEPRAZOLE 40 MG/1
40 CAPSULE, DELAYED RELEASE ORAL 2 TIMES DAILY
Status: ON HOLD | COMMUNITY
End: 2025-03-07 | Stop reason: HOSPADM

## 2025-02-28 RX ORDER — POLYETHYLENE GLYCOL 3350 17 G
2 POWDER IN PACKET (EA) ORAL
Status: DISCONTINUED | OUTPATIENT
Start: 2025-02-28 | End: 2025-03-08 | Stop reason: HOSPADM

## 2025-02-28 RX ORDER — SUCRALFATE ORAL 1 G/10ML
1 SUSPENSION ORAL
Status: ON HOLD | COMMUNITY
Start: 2024-12-19 | End: 2025-03-07 | Stop reason: HOSPADM

## 2025-02-28 RX ORDER — METOPROLOL SUCCINATE 25 MG/1
25 TABLET, EXTENDED RELEASE ORAL DAILY
Status: DISCONTINUED | OUTPATIENT
Start: 2025-03-01 | End: 2025-02-28

## 2025-02-28 RX ORDER — HALOPERIDOL 5 MG/1
5 TABLET ORAL EVERY 6 HOURS PRN
Status: DISCONTINUED | OUTPATIENT
Start: 2025-02-28 | End: 2025-03-08 | Stop reason: HOSPADM

## 2025-02-28 RX ORDER — METOPROLOL SUCCINATE 25 MG/1
25 TABLET, EXTENDED RELEASE ORAL DAILY
Status: ON HOLD | COMMUNITY
End: 2025-02-28 | Stop reason: ALTCHOICE

## 2025-02-28 RX ADMIN — QUETIAPINE FUMARATE 25 MG: 25 TABLET ORAL at 20:27

## 2025-02-28 RX ADMIN — METOPROLOL SUCCINATE 25 MG: 25 TABLET, EXTENDED RELEASE ORAL at 20:34

## 2025-02-28 RX ADMIN — ALPRAZOLAM 0.5 MG: 0.25 TABLET ORAL at 20:27

## 2025-02-28 RX ADMIN — Medication: at 22:55

## 2025-02-28 ASSESSMENT — PAIN SCALES - GENERAL: PAINLEVEL_OUTOF10: 6

## 2025-02-28 ASSESSMENT — PATIENT HEALTH QUESTIONNAIRE - PHQ9
1. LITTLE INTEREST OR PLEASURE IN DOING THINGS: NOT AT ALL
SUM OF ALL RESPONSES TO PHQ QUESTIONS 1-9: 0
2. FEELING DOWN, DEPRESSED OR HOPELESS: NOT AT ALL

## 2025-02-28 ASSESSMENT — SLEEP AND FATIGUE QUESTIONNAIRES
SLEEP PATTERN: DIFFICULTY FALLING ASLEEP;DISTURBED/INTERRUPTED SLEEP;INSOMNIA;RESTLESSNESS
DO YOU HAVE DIFFICULTY SLEEPING: YES
DO YOU USE A SLEEP AID: COMMENT
AVERAGE NUMBER OF SLEEP HOURS: 4

## 2025-02-28 ASSESSMENT — LIFESTYLE VARIABLES
HOW OFTEN DO YOU HAVE A DRINK CONTAINING ALCOHOL: MONTHLY OR LESS
HOW MANY STANDARD DRINKS CONTAINING ALCOHOL DO YOU HAVE ON A TYPICAL DAY: 1 OR 2
HOW OFTEN DO YOU HAVE A DRINK CONTAINING ALCOHOL: NEVER
HOW MANY STANDARD DRINKS CONTAINING ALCOHOL DO YOU HAVE ON A TYPICAL DAY: PATIENT DOES NOT DRINK

## 2025-02-28 ASSESSMENT — PAIN DESCRIPTION - LOCATION: LOCATION: GENERALIZED

## 2025-02-28 NOTE — H&P
[facility] Internal Medicine  Sánchez Saldivar MD; Zain Najera MD, Robert Mooney MD, Sybil Ramírez MD, Dr Andrey Ceja MD; Mal Ruggiero MD    [facility] Internal Medicine   IN-PATIENT SERVICE   [facility]     HISTORY AND PHYSICAL EXAMINATION            Date:   2025  Patient name:  Gisell Thurman  Date of admission:  2025  9:59 AM  MRN:   [id number]  Account:  [id number]  YOB: 1960  PCP:    Boaz Chung MD  Room:   [address] [place] [address]  Code Status:    Full Code      Chief Complaint:     Suicidal /Ac Psychosis    History Obtained From:     Patient/EMR/bedside RN     History of Present Illness:         Past Medical History:     Past Medical History:   Diagnosis Date    Asthma     Blindness of left eye     Cardiomyopathy (HCC)     Cervical herniated disc     C5-C6, with nerve compression.    Congenital hip dysplasia     Degenerative joint disease     (Right knee) -     Depression     Depression with suicidal ideation 2025    Diffuse large B cell lymphoma (HCC)     Dysfunctional uterine bleeding     Dyspnea     (progressive - multifactorial.    Encounter for chronic pain management 2015    Failed total right knee replacement     Fatigue     Fibromyalgia     Generalized anxiety disorder     GERD (gastroesophageal reflux disease)     Grief at loss of child     4-yr old granddaughter  of brain tumor in .    Hip pain, bilateral     Secondary to piriformis syndrome and bilateral greater trochanteric bursitis. (Injection of Celestone and Marcaine).    History of blood transfusion     History of tobacco use     Currently not smoking.    Hypertension     Inverted nipple     Left.  Lifelong    Kidney stone     Lumbar disc disease     Chronic.    Migraine     NSTEMI (non-ST elevated myocardial infarction) ([facility]) 2020    [place] [facility] - tranfer to [facility]    Obesity     Osteoarthritis     Degenerative joint disease - of all  hour(s)).    Imaging/Diagonstics:  No results found.    Assessment :      Hospital Problems             Last Modified POA    * (Principal) Major depressive disorder, recurrent severe without psychotic features (HCC) 2/28/2025 Yes    Depression with suicidal ideation 2/28/2025 Yes       Plan:     Admitted to inpatient psych with depression with suicidal ideation  Morbid obesity.  Asthma.  Stable.  Hypertension.  Controlled.  NICM.  History of SVT s/p AV pawel ablation 2006.  Was initially on Entresto and Coreg.  Recently discontinued by his cardiologist due to patient's allergies.  Currently on Toprol-XL.  History of stage IV diffuse B large B-cell lymphoma.  On observation with oncology  Labs and medications reviewed.     DVT prophylaxis,  Pt mobile   Full code status       Consultations:   IP CONSULT TO INTERNAL MEDICINE      Myriam Henderson MD  2/28/2025  3:32 PM    Copy sent to Dr. Chung, Boaz GILLESPIE MD    Please note that this chart was generated using voice recognition Dragon dictation software.  Although every effort was made to ensure the accuracy of this automated transcription, some errors in transcription may have occurred.

## 2025-02-28 NOTE — GROUP NOTE
Group Therapy Note    Date: 2/28/2025    Group Start Time: 1000  Group End Time: 1045  Group Topic: Psychotherapy     Elizabeth Morrissey MSW        Group Therapy Note    Attendees: 4/11     Patient was offered group therapy today but declined to participate despite encouragement from staff.  1:1 was offered.    Discipline Responsible: /Counselor      Signature:  CHARLIE Pacheco

## 2025-02-28 NOTE — PROGRESS NOTES
Pharmacy Medication History Note      List of current medications patient is taking is complete.    Source of information: [facility] (Aleida at [phone]), [facility] (Terry at [phone]), Epic, PDMP, [facility] dispense report    Changes made to medication list:  Medications removed (include reason, ex. therapy complete or physician discontinued, noncompliance):  Carvedilol (discontinued by another provider - started on Metroprolol), Pantoprazole (alternate therapy), duplicate Percocet, Vitamin D (list clean up - not filled in over 6 months), Omeprazole 20 mg (dose adjustment), Olopatadine (list clean up), Flonase (list clean up), Albuterol (list clean up), Rizatriptan (list clean up)    Medications flagged for provider review:  Entresto - Per pharmacist at [facility], patient's daughter reported to their pharmacy that the patient does not want to take Entresto anymore. Additionally, documentation in [facility] Everywhere says patient was concerned for allergic reaction (increased salivation) and Entresto was stopped with no improvement   Sucralfate - last dispensed in December 2024 for 14 day supply    Medications added/doses adjusted:  Adjusted Alprazolam to 0.5 to 1 mg three times daily - LF 1/23/25 (qty 90 for 30 ds)  Added Metoprolol succinate 25 mg daily  Adjusted Omeprazole to 40 mg twice daily    Other notes (ex. Recent course of antibiotics, Coumadin dosing):  Oxycodone/Acetaminophen 7.5/325 mg LF 2/15/25 (qty 150 for 30 ds) - following with [facility].   Alprazolam prescribed by Dr. Boaz Chung (PCP)  Patient has been on three different antibiotics over the last 4 to 6 weeks (Amoxicillin, Penicillin, and Clindamycin). All courses should be complete if taken as prescribed.      Current Home Medication List at Time of Admission:  Prior to Admission medications    Medication Sig   omeprazole (PRILOSEC) 40 MG delayed release capsule Take 1 capsule by  mouth in the morning and at bedtime   metoprolol succinate (TOPROL XL) 25 MG extended release tablet Take 1 tablet by mouth daily   oxyCODONE-acetaminophen (PERCOCET) 7.5-325 MG per tablet Take 1 tablet by mouth 5 times daily for 30 days.   docusate sodium (COLACE) 100 MG capsule Take 1 capsule by mouth 2 times daily as needed for Constipation   famotidine (PEPCID) 40 MG tablet Take 1 tablet by mouth nightly   nitroglycerin (NITROSTAT) 0.6 MG SL tablet Place 1 tablet under the tongue every 5 minutes as needed for Chest pain up to max of 3 total doses. If no relief after 1 dose, call 911.   ALPRAZolam (XANAX) 1 MG tablet Take 0.5-1 tablets by mouth in the morning, at noon, and at bedtime.   NARCAN 4 MG/0.1ML LIQD nasal spray As needed   Multiple Vitamins-Minerals (CENTRUM MULTI + OMEGA 3 PO) Take 1 tablet by mouth daily   aspirin 81 MG chewable tablet Take 1 tablet by mouth daily         Please let me know if you have any questions about this encounter. Thank you!    Electronically signed by Bryce Ignacio RPH on 2/28/2025 at 11:53 AM

## 2025-02-28 NOTE — GROUP NOTE
Group Therapy Note    Date: 2/28/2025    Group Start Time: 1330  Group End Time: 1430  Group Topic: Relaxation    CZ Ginny Viveros CTRS        Group Therapy Note    Attendees: 3/11     Topic: To increase socialization, practice relaxation through creative expression and discussion r/t   relaxation through leisure activities and making time for self calming.         Comments:   Patient did not participate in Relaxation Group, at 1330, despite staff encouragement.      Pt remained resting in room , pt states she feels uncomfortable being around peers due to spitting out  \"Gunk\" or saliva .    Q15 minute safety checks maintained for patient safety and will continue to encourage   patient to attend unit programming.       Discipline Responsible: Psychoeducational Specialist   Signature: SHOBHA YATES

## 2025-02-28 NOTE — BH NOTE
Patient arrived to the unit on a stretcher via transport to room 212. Patient alert and orientated to the unit.

## 2025-02-28 NOTE — PROGRESS NOTES
Behavioral Services  Medicare Certification Upon Admission    I certify that this patient's inpatient psychiatric hospital admission is medically necessary for:    [x] (1) Treatment which could reasonably be expected to improve this patient's condition,       [x] (2) Or for diagnostic study;     AND     [x](2) The inpatient psychiatric services are provided while the individual is under the care of a physician and are included in the individualized plan of care.    Estimated length of stay/service 4 to 7 days    Plan for post-hospital care home with outpatient community mental health follow-up    Electronically signed by MARTÍNEZ PELAYO MD on 2/28/2025 at 6:41 PM

## 2025-02-28 NOTE — H&P
Father     Coronary Art Dis Father         Myocardial infarction at age 55 and .    COPD Father     Arthritis Brother     COPD Brother     Heart Disease Brother         Congenital heart defect -  at 9 wks of age.    Diabetes Maternal Grandmother     Cancer Other     Breast Cancer Paternal Aunt        Psychiatric Family History  Patient endorses psychiatric family history.     Suicides in family: [x] Yes [] No - grandfather    Substance use in family: [x] Yes [] No         PHYSICAL EXAM:  Vitals:  BP (!) 126/92   Pulse 94   Temp 98.7 °F (37.1 °C) (Temporal)   Resp 18   Ht 1.6 m (5' 3\")   Wt 103.4 kg (228 lb)   LMP 2011 (Exact Date)   SpO2 96%   BMI 40.39 kg/m²   Pain Level: 0    LABS:  Labs reviewed: [x] Yes  Metabolic Screening:  [x] Yes [] No (order HgBA1C/Lipid panel if not screened in past year)  Last EKG in EMR reviewed: [x] Yes   QTC: 444       Review of Systems   Constitutional: Negative for chills and weight loss.   HENT: Negative for ear pain and nosebleeds.    Eyes: Negative for blurred vision and photophobia.   Respiratory: Negative for cough, shortness of breath and wheezing.    Cardiovascular: Negative for chest pain and palpitations.   Gastrointestinal: Negative for abdominal pain, diarrhea and vomiting.   Genitourinary: Negative for dysuria and urgency.   Musculoskeletal: Negative for falls and joint pain.   Skin: Negative for itching and rash.   Neurological: Negative for tremors, seizures and weakness.   Endo/Heme/Allergies: Does not bruise/bleed easily.      Mental Status Examination:    Level of consciousness: Awake and alert  Appearance:  Appropriate attire, seated on bed, fair grooming   Behavior/Motor: Approachable, anxious, spitting into cup  Attitude toward examiner:  Cooperative, attentive, good eye contact  Speech: Normal rate, volume, and tone.  Mood: Anxious  Affect: Mood-congruent  Thought processes:  Linear and coherent but somatically preoccupied  Thought content:  Active suicidal ideations, with a  current plan or intent              Denies homicidal ideations               Denies hallucinations              Denies delusions              Denies paranoia  Cognition:  Oriented to self, location, time, situation  Concentration: Clinically adequate  Memory: Intact  Insight &Judgment: Poor         DSM-5 Diagnosis    Principal Problem: Major depressive disorder, recurrent severe without psychotic features ([facility])        Psychosocial and Contextual factors:  Financial   Occupational   Relationship   Legal   Living situation   Educational     Past Medical History:   Diagnosis Date    Asthma     Blindness of left eye     Cardiomyopathy ([facility])     Cervical herniated disc     C5-C6, with nerve compression.    Congenital hip dysplasia     Degenerative joint disease     (Right knee) -     Depression     Depression with suicidal ideation 2025    Diffuse large B cell lymphoma ([facility])     Dysfunctional uterine bleeding     Dyspnea     (progressive - multifactorial.    Encounter for chronic pain management 2015    Failed total right knee replacement     Fatigue     Fibromyalgia     Generalized anxiety disorder     GERD (gastroesophageal reflux disease)     Grief at loss of child     4-yr old granddaughter  of brain tumor in .    Hip pain, bilateral     Secondary to piriformis syndrome and bilateral greater trochanteric bursitis. (Injection of Celestone and Marcaine).    History of blood transfusion     History of tobacco use     Currently not smoking.    Hypertension     Inverted nipple     Left.  Lifelong    Kidney stone     Lumbar disc disease     Chronic.    Migraine     NSTEMI (non-ST elevated myocardial infarction) ([facility]) 2020    [place] [facility] - tranfer to [facility]    Obesity     Osteoarthritis     Degenerative joint disease - of all joints.    Seasonal allergic rhinitis     Shoulder pain, left     (Injection of Celestone/Marcaine subacromially) - Dr. Dash - 10/2008.

## 2025-02-28 NOTE — BH NOTE
Behavioral Health [place]  Admission Note     Admission Type:   Voluntary     Reason for admission:  Reason for Admission: Patient presents to unit with complaints of increased anxiety and increased insomnia. Patient states she hasn't slept in \"days\". Patient states she has been taking xanax for 10+ years and believes her anxiety is not being helped with medication but only getting worse but patient also does not want to stop medication. Patient denies SI and HI on admit. Patient reports hearing voices at home (during this period of time where she hasn't slept). Patient endorses extreme anxiety and denies hearing voices since and denies hearing them any previous time prior to hospitalization.      Addictive Behavior:   Addictive Behavior  In the Past 3 Months, Have You Felt or Has Someone Told You That You Have a Problem With  : None    Medical Problems:   Past Medical History:   Diagnosis Date    Asthma     Blindness of left eye     Cardiomyopathy (HCC)     Cervical herniated disc     C5-C6, with nerve compression.    Congenital hip dysplasia     Degenerative joint disease     (Right knee) -     Depression     Depression with suicidal ideation 2025    Diffuse large B cell lymphoma (HCC)     Dysfunctional uterine bleeding     Dyspnea     (progressive - multifactorial.    Encounter for chronic pain management 2015    Failed total right knee replacement     Fatigue     Fibromyalgia     Generalized anxiety disorder     GERD (gastroesophageal reflux disease)     Grief at loss of child     4-yr old granddaughter  of brain tumor in .    Hip pain, bilateral     Secondary to piriformis syndrome and bilateral greater trochanteric bursitis. (Injection of Celestone and Marcaine).    History of blood transfusion     History of tobacco use     Currently not smoking.    Hypertension     Inverted nipple     Left.  Lifelong    Kidney stone     Lumbar disc disease     Chronic.    Migraine     NSTEMI (non-ST  stress,relaxation techniques, changing routine, distraction)                                                           ( ) Basic information about quitting (benefits of quitting, techniques in how to quit, available resources  ( ) [facility]                                                                                                                   ( ) Patient refused counseling  (X) Patient has not smoked in the last 30 days    Metabolic Screening:    Lab Results   Component Value Date    LABA1C 5.6 07/17/2019       Lab Results   Component Value Date    CHOL 126 09/10/2021    CHOL 129 11/08/2018    CHOL 138 01/19/2016    CHOL 126 05/12/2015    CHOL 142 11/04/2014     Lab Results   Component Value Date    TRIG 49 09/10/2021    TRIG 87 11/08/2018    TRIG 48 01/19/2016    TRIG 34 05/12/2015    TRIG 45 11/04/2014     Lab Results   Component Value Date    HDL 70 09/10/2021    HDL 62 11/08/2018    HDL 71 01/19/2016    HDL 78 05/12/2015    HDL 77 11/04/2014     No components found for: \"LDLCAL\"  No components found for: \"LABVLDL\"      Body mass index is 40.39 kg/m².    BP Readings from Last 2 Encounters:   02/28/25 (!) 126/92   02/26/25 (!) 152/101         Pt admitted with followings belongings:  Dental Appliances: None  Vision - Corrective Lenses: Eyeglasses (reading glasses)  Hearing Aid: None  Jewelry: Ring  Body Piercings Removed: No  Clothing: Footwear, Jacket/Coat, Pajamas, Pants, Shirt, Socks, Undergarments, Sweater  Other Valuables: Suitcase  The following personal items were collected during admission. Items secured in locker/safe. Items will be returned to patient at discharge.     Perlita Mahajan RN

## 2025-02-28 NOTE — CARE COORDINATION
BHI Biopsychosocial Assessment    Current Level of Psychosocial Functioning     Independent xx  Dependent    Minimal Assist     Comments:    Psychosocial High Risk Factors (check all that apply)    Unable to obtain meds   Chronic illness/pain    Substance abuse   Lack of Family Support   Financial stress   Isolation   Inadequate Community Resources  Suicide attempt(s)  Not taking medications   Victim of crime   Developmental Delay  Unable to manage personal needs    Age 65 or older   Homeless  No transportation   Readmission within 30 days  Unemployment  Traumatic Event    Comments:   Psychiatric Advanced Directives: none reported     Family to Involve in Treatment: daughter is supportive     Sexual Orientation:  n/a    Patient Strengths: receives social security income, lives with family who is supportive     Patient Barriers: not linked with psychiatry       Opiate Education Provided:  denies       CMHC/mental health history:  follows with her primary care doctor Dr. HAYLEY Chung in [facility]     Plan of Care   medication management, group/individual therapies, family meetings, psycho -education, treatment team meetings to assist with stabilization    Initial Discharge Plan:  return home with family       Clinical Summary:  Tessie is a 65 year old single female who has been admitted to [facility] with report of increased anxiety and suicidal ideation. Gisell states she shares home with her daughter and grandchildren. She states she sees her primary care doctor for management of medications. She denies AOD issues, denies legal concerns. Writer offered ongoing support, encouragement.

## 2025-03-01 LAB
25(OH)D3 SERPL-MCNC: 63.5 NG/ML (ref 30–100)
AMPHET UR QL SCN: NEGATIVE
BACTERIA URNS QL MICRO: ABNORMAL
BARBITURATES UR QL SCN: NEGATIVE
BENZODIAZ UR QL: NEGATIVE
BILIRUB UR QL STRIP: NEGATIVE
CANNABINOIDS UR QL SCN: NEGATIVE
CLARITY UR: CLEAR
COCAINE UR QL SCN: NEGATIVE
COLOR UR: YELLOW
EPI CELLS #/AREA URNS HPF: ABNORMAL /HPF
FENTANYL UR QL: NEGATIVE
GLUCOSE UR STRIP-MCNC: NEGATIVE MG/DL
HGB UR QL STRIP.AUTO: NEGATIVE
KETONES UR STRIP-MCNC: NEGATIVE MG/DL
LEUKOCYTE ESTERASE UR QL STRIP: ABNORMAL
METHADONE UR QL: NEGATIVE
NITRITE UR QL STRIP: NEGATIVE
OPIATES UR QL SCN: NEGATIVE
OXYCODONE UR QL SCN: NEGATIVE
PCP UR QL SCN: NEGATIVE
PH UR STRIP: 7 [PH] (ref 5–8)
PROT UR STRIP-MCNC: NEGATIVE MG/DL
RBC #/AREA URNS HPF: ABNORMAL /HPF
SP GR UR STRIP: 1 (ref 1–1.03)
TEST INFORMATION: NORMAL
UROBILINOGEN UR STRIP-ACNC: NORMAL EU/DL (ref 0–1)
WBC #/AREA URNS HPF: ABNORMAL /HPF

## 2025-03-01 PROCEDURE — 36415 COLL VENOUS BLD VENIPUNCTURE: CPT

## 2025-03-01 PROCEDURE — 6370000000 HC RX 637 (ALT 250 FOR IP): Performed by: PSYCHIATRY & NEUROLOGY

## 2025-03-01 PROCEDURE — 6370000000 HC RX 637 (ALT 250 FOR IP)

## 2025-03-01 PROCEDURE — 80307 DRUG TEST PRSMV CHEM ANLYZR: CPT

## 2025-03-01 PROCEDURE — 82306 VITAMIN D 25 HYDROXY: CPT

## 2025-03-01 PROCEDURE — 81001 URINALYSIS AUTO W/SCOPE: CPT

## 2025-03-01 PROCEDURE — 1240000000 HC EMOTIONAL WELLNESS R&B

## 2025-03-01 PROCEDURE — 99232 SBSQ HOSP IP/OBS MODERATE 35: CPT | Performed by: PSYCHIATRY & NEUROLOGY

## 2025-03-01 PROCEDURE — APPSS30 APP SPLIT SHARED TIME 16-30 MINUTES: Performed by: NURSE PRACTITIONER

## 2025-03-01 PROCEDURE — 99232 SBSQ HOSP IP/OBS MODERATE 35: CPT

## 2025-03-01 PROCEDURE — 90833 PSYTX W PT W E/M 30 MIN: CPT | Performed by: PSYCHIATRY & NEUROLOGY

## 2025-03-01 RX ORDER — PANTOPRAZOLE SODIUM 40 MG/1
40 TABLET, DELAYED RELEASE ORAL ONCE
Status: COMPLETED | OUTPATIENT
Start: 2025-03-01 | End: 2025-03-01

## 2025-03-01 RX ORDER — LOPERAMIDE HYDROCHLORIDE 2 MG/1
2 CAPSULE ORAL 4 TIMES DAILY PRN
Status: DISCONTINUED | OUTPATIENT
Start: 2025-03-01 | End: 2025-03-08 | Stop reason: HOSPADM

## 2025-03-01 RX ORDER — PANTOPRAZOLE SODIUM 40 MG/1
40 TABLET, DELAYED RELEASE ORAL
Status: DISCONTINUED | OUTPATIENT
Start: 2025-03-02 | End: 2025-03-08 | Stop reason: HOSPADM

## 2025-03-01 RX ADMIN — LOPERAMIDE HYDROCHLORIDE 2 MG: 2 CAPSULE ORAL at 06:11

## 2025-03-01 RX ADMIN — ALUMINUM HYDROXIDE, MAGNESIUM HYDROXIDE, AND SIMETHICONE 30 ML: 200; 200; 20 SUSPENSION ORAL at 10:15

## 2025-03-01 RX ADMIN — ALUMINUM HYDROXIDE, MAGNESIUM HYDROXIDE, AND SIMETHICONE 30 ML: 200; 200; 20 SUSPENSION ORAL at 20:56

## 2025-03-01 RX ADMIN — QUETIAPINE FUMARATE 25 MG: 25 TABLET ORAL at 20:54

## 2025-03-01 RX ADMIN — ALPRAZOLAM 0.5 MG: 0.25 TABLET ORAL at 08:45

## 2025-03-01 RX ADMIN — ESCITALOPRAM OXALATE 5 MG: 10 TABLET ORAL at 08:45

## 2025-03-01 RX ADMIN — ALPRAZOLAM 0.5 MG: 0.25 TABLET ORAL at 15:45

## 2025-03-01 RX ADMIN — ALUMINUM HYDROXIDE, MAGNESIUM HYDROXIDE, AND SIMETHICONE 30 ML: 200; 200; 20 SUSPENSION ORAL at 02:03

## 2025-03-01 RX ADMIN — METOPROLOL SUCCINATE 25 MG: 25 TABLET, EXTENDED RELEASE ORAL at 08:45

## 2025-03-01 RX ADMIN — PANTOPRAZOLE SODIUM 40 MG: 40 TABLET, DELAYED RELEASE ORAL at 06:11

## 2025-03-01 RX ADMIN — ALPRAZOLAM 0.5 MG: 0.25 TABLET ORAL at 22:06

## 2025-03-01 RX ADMIN — PANTOPRAZOLE SODIUM 40 MG: 40 TABLET, DELAYED RELEASE ORAL at 12:15

## 2025-03-01 ASSESSMENT — PAIN DESCRIPTION - DESCRIPTORS: DESCRIPTORS: TIGHTNESS

## 2025-03-01 ASSESSMENT — PAIN DESCRIPTION - ORIENTATION: ORIENTATION: RIGHT

## 2025-03-01 ASSESSMENT — PAIN DESCRIPTION - LOCATION: LOCATION: ABDOMEN

## 2025-03-01 ASSESSMENT — PAIN SCALES - GENERAL: PAINLEVEL_OUTOF10: 9

## 2025-03-01 NOTE — PLAN OF CARE
Problem: Depression  Goal: Will be euthymic at discharge  Description: INTERVENTIONS:  1. Administer medication as ordered  2. Provide emotional support via 1:1 interaction with staff  3. Encourage involvement in milieu/groups/activities  4. Monitor for social isolation  Outcome: Progressing     Problem: Dolores  Goal: Will exhibit normal sleep and speech and no impulsivity  Description: INTERVENTIONS:  1. Administer medication as ordered  2. Set limits on impulsive behavior  3. Make attempts to decrease external stimuli as possible  Outcome: Progressing     Problem: Psychosis  Goal: Will report no hallucinations or delusions  Description: INTERVENTIONS:  1. Administer medication as  ordered  2. Assist with reality testing to support increasing orientation  3. Assess if patient's hallucinations or delusions are encouraging self harm or harm to others and intervene as appropriate  Outcome: Progressing     Problem: Anxiety  Goal: Will report anxiety at manageable levels  Description: INTERVENTIONS:  1. Administer medication as ordered  2. Teach and rehearse alternative coping skills  3. Provide emotional support with 1:1 interaction with staff  Outcome: Progressing     Problem: Pain  Goal: Verbalizes/displays adequate comfort level or baseline comfort level  Outcome: Progressing     Problem: Safety - Adult  Goal: Free from fall injury  Outcome: Progressing   Patient alert and oriented X 4. Patient positive for anxiety/depression.  Patient states has no thoughts to harm self or others.  Patient states has no suicidal/homicidal thoughts at this time.  Patient  pain level 10 states that abdomen hurts.  Patient refused any tylenol and received Protonix 40 mg.  Patient continues not to sleep at night.  Patient with lianne noted with no hallucinations/delusions.  Patient free from falls and injuries at this time.  Continue to monitor

## 2025-03-01 NOTE — PROGRESS NOTES
Daily Progress Note  3/1/2025    Patient Name: Gisell Thurman    CHIEF COMPLAINT:  Depression with suicidal ideation and acute psychosis           SUBJECTIVE:    Patient was seen for follow-up assessment today.  Nursing staff report patient has been very focused on full somatic complaints today.  She has been resting in her room for much of the day.  She was found to be lying in bed on approach.  She is complaining of some stomach pain and diarrhea.  She denied any blood in her stool.  Patient is also endorsing some nausea but has not vomited.  Protonix has been started for patient today.  Patient continues to endorse low mood and overwhelming symptoms of depression and anxiety.  She describes suicidal ideation as intermittent and she cannot contract for safety in the community.  Patient is on a benzo taper.  At this time patient has yet to demonstrate stability and warrants further hospitalization for safety and stabilization.  Spoke with nurse.  They will have internal medicine round on patient today regarding GI symptoms.  No oxycodone and urine so likely not withdrawal related.  No other symptoms of withdrawal observed.    Appetite:  [] Adequate/Unchanged  [] Increased  [x] Decreased      Sleep:       [] Adequate/Unchanged  [] Fair  [x] Poor      Group Attendance on Unit:   [] Yes   [] Selectively    [x] No    Compliant with scheduled medications: [x] Yes  [] No    Received emergency medications in past 24 hrs: [] Yes   [x] No    Medication Side Effects: Denies         Mental Status Exam  Level of consciousness: Awake and alert  Appearance:  Appropriate attire, resting on bed, fair grooming   Behavior/Motor: Approachable, anxious, restless  Attitude toward examiner:  Cooperative, distracted, fair eye contact  Speech: Normal rate and volume, anxious tone  Mood: Anxious, distressed  Affect: Mood-congruent  Thought processes:  Linear and coherent but somatically preoccupied  Thought content: Active suicidal  ideations, with a current plan or intent              Denies homicidal ideations               Denies hallucinations              Denies delusions              Denies paranoia  Cognition:  Oriented to self, location, time, situation  Concentration: Clinically adequate  Memory: Intact  Insight & Judgment: Poor    Data   height is 1.6 m (5' 3\") and weight is 103.4 kg (228 lb). Her temporal temperature is 97.4 °F (36.3 °C). Her blood pressure is 132/84 and her pulse is 92. Her respiration is 16 and oxygen saturation is 95%.   Labs:   Admission on 02/28/2025   Component Date Value Ref Range Status    Vit D, 25-Hydroxy 03/01/2025 63.5  30.0 - 100.0 ng/mL Final    Comment:    Reference Range:  Vitamin D status         Range   Deficiency              <20 ng/mL   Mild Deficiency       20-30 ng/mL   Sufficiency           ng/mL   Toxicity               >100 ng/mL      Amphetamine Screen, Ur 03/01/2025 NEGATIVE  NEGATIVE Final    Cutoff: 1000 ng/mL    Barbiturate Screen, Ur 03/01/2025 NEGATIVE  NEGATIVE Final    Cutoff: 200 ng/ml    Benzodiazepine Screen, Urine 03/01/2025 NEGATIVE  NEGATIVE Final    Cutoff: 200 ng/ml    Cocaine Metabolite, Urine 03/01/2025 NEGATIVE  NEGATIVE Final    Cutoff: 300 ng/ml    Methadone Screen, Urine 03/01/2025 NEGATIVE  NEGATIVE Final    Cutoff: 300 ng/ml    Opiates, Urine 03/01/2025 NEGATIVE  NEGATIVE Final    Cutoff: 300 ng/ml    Phencyclidine, Urine 03/01/2025 NEGATIVE  NEGATIVE Final    Cutoff: 25 ng/ml    Cannabinoid Scrn, Ur 03/01/2025 NEGATIVE  NEGATIVE Final    Cutoff: 50 ng/ml    Oxycodone Screen, Ur 03/01/2025 NEGATIVE  NEGATIVE Final    Cutoff: 100 ng/ml    Fentanyl, Ur 03/01/2025 NEGATIVE  NEGATIVE Final    Cutoff: 5 ng/ml    Test Information 03/01/2025 This method is a screening test to detect only these drug classes as part of a medical workup. Confirmatory testing by another method should be ordered if clinically indicated.   Final    Color, UA 03/01/2025 Yellow  Yellow Final

## 2025-03-01 NOTE — BH NOTE
Patient had severe abdomen patient stated does not take Protonix takes Prilosec instead.  Writer explained to patient that sometimes the pharmacy does not have the medication that they have they substitute the same effects different medication.  Patient was attainment that she only can receive that medication. Writer then messaged the doctor New order for another dose of Protonix 40 mg and given by mouth tolerated well.  Spoke with the Nurse Practioner and stated that when Medical doctor comes to have a CT scan done.  CT at present not done due medical doctor spoke with patient and stated feeling better

## 2025-03-01 NOTE — GROUP NOTE
Group Therapy Note    Date: 3/1/2025    Group Start Time: 1030  Group End Time: 1040  Group Topic: Psychoeducation    [facility]    Kimberlyn Lauren MSW        Group Therapy Note    Attendees: 0/13       Patient was offered group therapy today but declined to participate despite encouragement from staff.  1:1 was offered.     Signature:  CHARLIE Diaz

## 2025-03-01 NOTE — PROGRESS NOTES
[facility] Internal Medicine  Sánchez Saldivar MD; Zain Najera MD, Robert Mooney MD, Sybil Ramírez MD, Dr Andrey Ceja MD; Mal Ruggiero MD    [facility] Internal Medicine   IN-PATIENT SERVICE   [facility]    Progress Note            Date:   3/1/2025  Patient name:  Gisell Thurman  Date of admission:  2025  9:59 AM  MRN:   [id number]  Account:  [id number]  YOB: 1960  PCP:    Boaz Chung MD  Room:   [address] [place] [address]  Code Status:    Full Code      Chief Complaint:     Suicidal /Ac Psychosis    History Obtained From:     Patient/EMR/bedside RN     History of Present Illness:         Past Medical History:     Past Medical History:   Diagnosis Date    Asthma     Blindness of left eye     Cardiomyopathy (HCC)     Cervical herniated disc     C5-C6, with nerve compression.    Congenital hip dysplasia     Degenerative joint disease     (Right knee) -     Depression     Depression with suicidal ideation 2025    Diffuse large B cell lymphoma (HCC)     Dysfunctional uterine bleeding     Dyspnea     (progressive - multifactorial.    Encounter for chronic pain management 2015    Failed total right knee replacement     Fatigue     Fibromyalgia     Generalized anxiety disorder     GERD (gastroesophageal reflux disease)     Grief at loss of child     4-yr old granddaughter  of brain tumor in .    Hip pain, bilateral     Secondary to piriformis syndrome and bilateral greater trochanteric bursitis. (Injection of Celestone and Marcaine).    History of blood transfusion     History of tobacco use     Currently not smoking.    Hypertension     Inverted nipple     Left.  Lifelong    Kidney stone     Lumbar disc disease     Chronic.    Migraine     NSTEMI (non-ST elevated myocardial infarction) ([facility]) 2020    [facility] - tranfer to [facility]    Obesity     Osteoarthritis     Degenerative joint disease - of all joints.    Seasonal  MEDIAL&LAT COMPARTMENTS Left 06/05/2018    Left Total Knee Arthroplasty performed by Jorje Boles MD at [facility] OR    SHOULDER ARTHROPLASTY Left 08/09/2021    Reverse left total shoulder arthroplasty, Dr. Neville at [facility]    TRANSTHORACIC ECHOCARDIOGRAM N/A 08/20/2020    grade 1 diastolic dysfunction, EF 55-60%    TUBAL LIGATION N/A 1996    post partum @ [facility]    UPPER GASTROINTESTINAL ENDOSCOPY N/A 01/27/2025    EGD w/ biopsy - unremarkable, small sliding hiatal hernia; performed by Dr. Marquez @ [facility]        Medications Prior to Admission:     Prior to Admission medications    Medication Sig Start Date End Date Taking? Authorizing Provider   omeprazole (PRILOSEC) 40 MG delayed release capsule Take 1 capsule by mouth in the morning and at bedtime    Epi Rahman MD   metoprolol succinate (TOPROL XL) 25 MG extended release tablet Take 1 tablet by mouth daily    Epi Rahman MD   sucralfate (CARAFATE) 1 GM/10ML suspension Take 10 mLs by mouth 4 times daily (before meals and nightly) 12/19/24   Epi Rahman MD   oxyCODONE-acetaminophen (PERCOCET) 7.5-325 MG per tablet Take 1 tablet by mouth 5 times daily for 30 days. 2/15/25 3/17/25  Thea Torres APRN - CNP   docusate sodium (COLACE) 100 MG capsule Take 1 capsule by mouth 2 times daily as needed for Constipation    Epi Rahman MD   famotidine (PEPCID) 40 MG tablet Take 1 tablet by mouth nightly 12/18/24 3/18/25  Epi Rahman MD   nitroglycerin (NITROSTAT) 0.6 MG SL tablet Place 1 tablet under the tongue every 5 minutes as needed for Chest pain up to max of 3 total doses. If no relief after 1 dose, call 911.    Epi Rahman MD   ALPRAZolam (XANAX) 1 MG tablet Take 0.5-1 tablets by mouth in the morning, at noon, and at bedtime. 4/1/22   Epi Rahman MD   NARCAN 4 MG/0.1ML LIQD nasal spray  8/3/21   Epi Rahman MD   Multiple Vitamins-Minerals (CENTRUM MULTI + OMEGA 3 PO) Take

## 2025-03-02 PROCEDURE — 1240000000 HC EMOTIONAL WELLNESS R&B

## 2025-03-02 PROCEDURE — 99231 SBSQ HOSP IP/OBS SF/LOW 25: CPT

## 2025-03-02 PROCEDURE — 6370000000 HC RX 637 (ALT 250 FOR IP)

## 2025-03-02 PROCEDURE — 6370000000 HC RX 637 (ALT 250 FOR IP): Performed by: PSYCHIATRY & NEUROLOGY

## 2025-03-02 PROCEDURE — 99232 SBSQ HOSP IP/OBS MODERATE 35: CPT | Performed by: PSYCHIATRY & NEUROLOGY

## 2025-03-02 PROCEDURE — 90833 PSYTX W PT W E/M 30 MIN: CPT | Performed by: PSYCHIATRY & NEUROLOGY

## 2025-03-02 PROCEDURE — APPSS30 APP SPLIT SHARED TIME 16-30 MINUTES: Performed by: NURSE PRACTITIONER

## 2025-03-02 RX ORDER — QUETIAPINE FUMARATE 50 MG/1
50 TABLET, FILM COATED ORAL NIGHTLY
Status: DISCONTINUED | OUTPATIENT
Start: 2025-03-03 | End: 2025-03-03

## 2025-03-02 RX ORDER — CARBOXYMETHYLCELLULOSE SODIUM 5 MG/ML
2 SOLUTION/ DROPS OPHTHALMIC 3 TIMES DAILY
Status: DISCONTINUED | OUTPATIENT
Start: 2025-03-02 | End: 2025-03-08 | Stop reason: HOSPADM

## 2025-03-02 RX ORDER — QUETIAPINE FUMARATE 25 MG/1
25 TABLET, FILM COATED ORAL ONCE
Status: COMPLETED | OUTPATIENT
Start: 2025-03-02 | End: 2025-03-02

## 2025-03-02 RX ORDER — ALPRAZOLAM 0.25 MG
0.25 TABLET ORAL 3 TIMES DAILY
Status: DISCONTINUED | OUTPATIENT
Start: 2025-03-03 | End: 2025-03-05

## 2025-03-02 RX ORDER — CARBOXYMETHYLCELLULOSE SODIUM 5 MG/ML
1 SOLUTION/ DROPS OPHTHALMIC 3 TIMES DAILY PRN
Status: DISCONTINUED | OUTPATIENT
Start: 2025-03-02 | End: 2025-03-03

## 2025-03-02 RX ADMIN — QUETIAPINE FUMARATE 25 MG: 25 TABLET ORAL at 21:27

## 2025-03-02 RX ADMIN — ALPRAZOLAM 0.5 MG: 0.25 TABLET ORAL at 15:30

## 2025-03-02 RX ADMIN — ALPRAZOLAM 0.5 MG: 0.25 TABLET ORAL at 08:17

## 2025-03-02 RX ADMIN — ESCITALOPRAM OXALATE 5 MG: 10 TABLET ORAL at 08:18

## 2025-03-02 RX ADMIN — ALPRAZOLAM 0.5 MG: 0.25 TABLET ORAL at 21:27

## 2025-03-02 RX ADMIN — PANTOPRAZOLE SODIUM 40 MG: 40 TABLET, DELAYED RELEASE ORAL at 06:23

## 2025-03-02 RX ADMIN — PANTOPRAZOLE SODIUM 40 MG: 40 TABLET, DELAYED RELEASE ORAL at 16:26

## 2025-03-02 RX ADMIN — METOPROLOL SUCCINATE 25 MG: 25 TABLET, EXTENDED RELEASE ORAL at 08:18

## 2025-03-02 RX ADMIN — CARBOXYMETHYLCELLULOSE SODIUM 2 DROP: 5 SOLUTION/ DROPS OPHTHALMIC at 21:27

## 2025-03-02 RX ADMIN — QUETIAPINE FUMARATE 25 MG: 25 TABLET ORAL at 22:02

## 2025-03-02 NOTE — PLAN OF CARE
Problem: Psychosis  Goal: Will report no hallucinations or delusions  Description: INTERVENTIONS:  1. Administer medication as  ordered  2. Assist with reality testing to support increasing orientation  3. Assess if patient's hallucinations or delusions are encouraging self harm or harm to others and intervene as appropriate  3/2/2025 1643 by Naomi Caceres LPN  Outcome: Progressing  Note: Patient denies any current suicidal thoughts and agrees to seek out staff if thoughts arise. Appears depressed and anxious. Denies hallucinations. Continues to be focused on stomach issues. Isolative to self and out for needs. Compliant with medications and staff.      Problem: Safety - Adult  Goal: Free from fall injury  Outcome: Progressing  Note: Patient has remained free from falls. Safety precautions in place.

## 2025-03-02 NOTE — GROUP NOTE
Recreation Group Note        Date: March 3, 2025 Start Time: 1100  End Time: 1120      Number of Participants in Group & Unit Census:  2/14/    Topic: Recreational Group      Comments:     Patient did not participate in Recreation group, despite staff encouragement and explanation of benefits.  Patient remain seclusive to self.  Q15 minute safety checks maintained for patient safety and will continue to encourage patient to attend unit programming.

## 2025-03-02 NOTE — PROGRESS NOTES
Daily Progress Note  3/2/2025    Patient Name: Gisell Thurman    CHIEF COMPLAINT:  Depression with suicidal ideation and acute psychosis           SUBJECTIVE:    Patient was seen for follow-up assessment today.  She remains compliant with scheduled medications.  She has not required any emergency medications for agitation in the past 24 hours.  Nursing staff report patient remains isolative to her room and reporting significant anxiety.  She does not socialize with peers or attend groups.  Patient was sitting up in bed during discussion.  She remains focused on multiple somatic complaints.  She talked at length about multiple vague bodily discomforts.  Patient stated \"I feel like death every day\".  Patient shared that she is \"hypersensitive to everything\".  She began spitting into a cup and reports that this has been going on since September.  Patient shares that she is often nervous about recurrence of lymphoma.  She is endorsing nausea and decreased appetite today.  Her stomach pain is much much improved today when compared to yesterday.  Patient has not been spending time in the day area or interacting with peers much.  She was encouraged to do so and to attend groups.  Patient stated \"no I feel gross\".  She is reporting no improvement in mood.  She describes suicidal ideation as fleeting and she was not able to contract for safety in the community.  At this time patient has shown little improvement in mood or thought process.    Appetite:  [] Adequate/Unchanged  [] Increased  [x] Decreased      Sleep:       [] Adequate/Unchanged  [] Fair  [x] Poor      Group Attendance on Unit:   [] Yes   [] Selectively    [x] No    Compliant with scheduled medications: [x] Yes  [] No    Received emergency medications in past 24 hrs: [] Yes   [x] No    Medication Side Effects: Denies         Mental Status Exam  Level of consciousness: Awake and alert  Appearance:  Appropriate attire, resting on bed, fair grooming

## 2025-03-02 NOTE — PROGRESS NOTES
[facility] Internal Medicine  Sánchez Saldivar MD; Zain Najera MD, Robert Mooney MD, Sybil Ramírez MD, Dr Andrey Ceja MD; Mal Ruggiero MD    [facility] Internal Medicine   IN-PATIENT SERVICE   [facility]    Progress Note            Date:   3/2/2025  Patient name:  Gisell Thurman  Date of admission:  2025  9:59 AM  MRN:   [id number]  Account:  [id number]  YOB: 1960  PCP:    Boaz Chung MD  Room:   [address] [place] [address]  Code Status:    Full Code      Chief Complaint:     Suicidal /Ac Psychosis    History Obtained From:     Patient/EMR/bedside RN     History of Present Illness:         Past Medical History:     Past Medical History:   Diagnosis Date    Asthma     Blindness of left eye     Cardiomyopathy (HCC)     Cervical herniated disc     C5-C6, with nerve compression.    Congenital hip dysplasia     Degenerative joint disease     (Right knee) -     Depression     Depression with suicidal ideation 2025    Diffuse large B cell lymphoma (HCC)     Dysfunctional uterine bleeding     Dyspnea     (progressive - multifactorial.    Encounter for chronic pain management 2015    Failed total right knee replacement     Fatigue     Fibromyalgia     Generalized anxiety disorder     GERD (gastroesophageal reflux disease)     Grief at loss of child     4-yr old granddaughter  of brain tumor in .    Hip pain, bilateral     Secondary to piriformis syndrome and bilateral greater trochanteric bursitis. (Injection of Celestone and Marcaine).    History of blood transfusion     History of tobacco use     Currently not smoking.    Hypertension     Inverted nipple     Left.  Lifelong    Kidney stone     Lumbar disc disease     Chronic.    Migraine     NSTEMI (non-ST elevated myocardial infarction) ([facility]) 2020    [facility] - tranfer to [facility]    Obesity     Osteoarthritis     Degenerative joint disease - of all joints.    Seasonal  MEDIAL&LAT COMPARTMENTS Left 06/05/2018    Left Total Knee Arthroplasty performed by Jorje Boles MD at [facility] OR    SHOULDER ARTHROPLASTY Left 08/09/2021    Reverse left total shoulder arthroplasty, Dr. Neville at [facility]    TRANSTHORACIC ECHOCARDIOGRAM N/A 08/20/2020    grade 1 diastolic dysfunction, EF 55-60%    TUBAL LIGATION N/A 1996    post partum @ [facility]    UPPER GASTROINTESTINAL ENDOSCOPY N/A 01/27/2025    EGD w/ biopsy - unremarkable, small sliding hiatal hernia; performed by Dr. Marquez @ [facility]        Medications Prior to Admission:     Prior to Admission medications    Medication Sig Start Date End Date Taking? Authorizing Provider   omeprazole (PRILOSEC) 40 MG delayed release capsule Take 1 capsule by mouth in the morning and at bedtime    Epi Rahman MD   metoprolol succinate (TOPROL XL) 25 MG extended release tablet Take 1 tablet by mouth daily    Epi Rahman MD   sucralfate (CARAFATE) 1 GM/10ML suspension Take 10 mLs by mouth 4 times daily (before meals and nightly) 12/19/24   Epi Rahman MD   oxyCODONE-acetaminophen (PERCOCET) 7.5-325 MG per tablet Take 1 tablet by mouth 5 times daily for 30 days. 2/15/25 3/17/25  Thea Torres APRN - CNP   docusate sodium (COLACE) 100 MG capsule Take 1 capsule by mouth 2 times daily as needed for Constipation    Epi Rahman MD   famotidine (PEPCID) 40 MG tablet Take 1 tablet by mouth nightly 12/18/24 3/18/25  Epi Rahman MD   nitroglycerin (NITROSTAT) 0.6 MG SL tablet Place 1 tablet under the tongue every 5 minutes as needed for Chest pain up to max of 3 total doses. If no relief after 1 dose, call 911.    Epi Rahman MD   ALPRAZolam (XANAX) 1 MG tablet Take 0.5-1 tablets by mouth in the morning, at noon, and at bedtime. 4/1/22   Epi Rahman MD   NARCAN 4 MG/0.1ML LIQD nasal spray  8/3/21   Epi Rahman MD   Multiple Vitamins-Minerals (CENTRUM MULTI + OMEGA 3 PO) Take

## 2025-03-02 NOTE — GROUP NOTE
Group Therapy Note    Date: 3/2/2025    Group Start Time: 0900  Group End Time: 0930  Group Topic: Community Meeting    [facility] Adult    Mary Mcnair        Group Therapy Note    Attendees: 4/13       Patient's Goal:  Eat bland diet, eat more, get more sleep    Notes:      Status After Intervention:  Unchanged    Participation Level: Active Listener    Participation Quality: Appropriate      Speech:  normal      Thought Process/Content: Logical      Affective Functioning: Congruent      Mood: anxious      Level of consciousness:  Alert      Response to Learning: Able to verbalize current knowledge/experience and Progressing to goal      Endings: None Reported    Modes of Intervention: Education, Support, and Socialization      Discipline Responsible: Behavorial Health Tech      Signature:  Mary Mcnair

## 2025-03-02 NOTE — PROGRESS NOTES
Comprehensive Nutrition Assessment    Type and Reason for Visit:  Initial, Positive nutrition screen (Patient believes she has lost \"30 pounds\" due to food aversions and \"excessive dry mouth\"))    Nutrition Recommendations/Plan:   Provide Regular diet with \"No Milk to Drink\".   Provide vanilla or strawberry Ensure Plus High Protein with meals.   Attempt to provide foods and beverages best tolerated.     Malnutrition Assessment:  Malnutrition Status:  At risk for malnutrition (03/02/25 9149)    Context:  Chronic Illness     Findings of the 6 clinical characteristics of malnutrition:  Energy Intake:  75% or less estimated energy requirements for 1 month or longer  Weight Loss:   (14% loss x 7 months)     Body Fat Loss:  Unable to assess     Muscle Mass Loss:     Unable to assess  Fluid Accumulation:  No fluid accumulation     Strength:  Not Performed    Nutrition Assessment:    Pt admitted due to depression with suicidal ideations. Pt reports ongoing issues with excess saliva and slimy feeling in mouth for nearly 7 months that has contributed to decreased oral intake with certain food aversions. Thinks she has lost 40 lb. States it is difficult for her to swallow dry foods such as toast, bread, crackers and dry meat. States the smell of certain foods nauseates her. She is unable to eat fried foods. States she used to love to eat, but now finds eating difficult. Does not care for the Ensure, but tries to drink it as she knows it is good for her; cannot tolerate the chocolate flavor. Also unable to tolerate certain citrus and spicy foods. Has a lactose intolerance but is able to tolerate yogurt and small amounts of dairy cooked in foods. Pt is worried that some medical condition is causing the eating issues, but work up has been negative so far. Chart review also indicates pt complains of dry mouth too,    Nutrition Related Findings:    No edema. No recent labs observed. Hx: mental illness, spinal osteoarthritis,  kidney stones, SVT, chronic pain, B-Cell Lymphoma, and GERD.         Current Nutrition Intake & Therapies:    Average Meal Intake: 1-25%, 26-50% (Estimated)  Average Supplements Intake: 26-50%, 51-75% (estimated)  ADULT DIET; Regular  ADULT ORAL NUTRITION SUPPLEMENT; Breakfast, Lunch, Dinner; Standard High Calorie/High Protein Oral Supplement    Anthropometric Measures:  Height: 160 cm (5' 2.99\")  Ideal Body Weight (IBW): 115 lbs (52 kg)    Admission Body Weight: 103.4 kg (227 lb 15.3 oz)  Current Body Weight: 103.4 kg (227 lb 15.3 oz), 198.2 % IBW. Weight Source: Stated  Current BMI (kg/m2): 40.4  Usual Body Weight: 120.2 kg (264 lb 15.9 oz)     % Weight Change (Calculated): -14                    BMI Categories: Obese Class 3 (BMI 40.0 or greater)    Estimated Daily Nutrient Needs:  Energy Requirements Based On: Formula  Weight Used for Energy Requirements: Admission  Energy (kcal/day): 5815-2847 based on [place]-St. Jeor with 1.1-1.2 factor  Weight Used for Protein Requirements: Admission  Protein (g/day): 83 based on 0.8 gm per kg  Method Used for Fluid Requirements: 1 ml/kcal  Fluid (ml/day): may be further increased due to increased saliva output    Nutrition Diagnosis:   Inadequate oral intake related to decreased appetite, altered taste perception, psychological cause or life stress, swallowing difficulty (increased saliva production) as evidenced by poor intake prior to admission, weight loss, intake 26-50%, intake 0-25%    Nutrition Interventions:   Food and/or Nutrient Delivery: Modify Current Diet, Continue Oral Nutrition Supplement (Allow dairy products as tolerate, avoid chocolate Ensure)     Coordination of Nutrition Care: Continue to monitor while inpatient       Goals:  Goals: PO intake 50% or greater  Type of Goal: New goal       Nutrition Monitoring and Evaluation:   Behavioral-Environmental Outcomes: Beliefs and Attitudes  Food/Nutrient Intake Outcomes: Diet Advancement/Tolerance, Food and

## 2025-03-02 NOTE — PLAN OF CARE
disease)     Grief at loss of child     4-yr old granddaughter  of brain tumor in .    Hip pain, bilateral     Secondary to piriformis syndrome and bilateral greater trochanteric bursitis. (Injection of Celestone and Marcaine).    History of blood transfusion     History of tobacco use     Currently not smoking.    Hypertension     Inverted nipple     Left.  Lifelong    Kidney stone     Lumbar disc disease     Chronic.    Migraine     NSTEMI (non-ST elevated myocardial infarction) ([facility]) 2020    [place] [facility] - tranfer to [facility]    Obesity     Osteoarthritis     Degenerative joint disease - of all joints.    Seasonal allergic rhinitis     Shoulder pain, left     (Injection of Celestone/Marcaine subacromially) - Dr. Dash - 10/2008.    Shoulder pain, right     (Injection of Celestone/Marcaine subacromially). Dr. Dash - 10/2008.    Supraventricular tachycardia, paroxysmal     Status post cardiac ablation.    Type 2 diabetes mellitus without complication ([facility])     Vitamin D deficiency        Risk:  Fall Risk   Bernardo Scale Bernardo Scale Score: 22  BVC    Change in scores:  No Changes to plan of Care:  No    Status EXAM:   Mental Status and Behavioral Exam  Normal: No  Level of Assistance: Independent/Self  Facial Expression: Worried  Affect: Unstable  Level of Consciousness: Alert  Frequency of Checks: 4 times per hour, close  Mood:Normal: No  Mood: Anxious, Suspicious, Depressed, Helpless  Motor Activity:Normal: No  Motor Activity: Decreased  Eye Contact: Fair  Observed Behavior: Cooperative, Friendly  Sexual Misconduct History: Current - no  Preception: [place] to person, [place] to time, [place] to place, [place] to situation  Attention:Normal: No  Attention: Unable to concentrate  Thought Processes: Unremarkable  Thought Content:Normal: No  Thought Content: Preoccupations  Depression Symptoms: Feelings of hopelessess, Change in energy level, Feelings of worthlessness, Feelings of helplessness, Increased  irritability  Anxiety Symptoms: Generalized  Dolores Symptoms: No problems reported or observed.  Hallucinations: None  Delusions: No  Memory:Normal: Yes  Insight and Judgment: Yes  Insight and Judgment: Poor judgment, Poor insight    Daily Assessment Last Entry:   Daily Sleep (WDL): Within Defined Limits            Daily Nutrition (WDL): Within Defined Limits  Appetite Change: Decreased  Barriers to Nutrition: Food intolerance, Diarrhea  Level of Assistance: Independent/Self    Patient Monitoring:  Frequency of Checks: 4 times per hour, close    Psychiatric Symptoms:   Depression Symptoms  Depression Symptoms: Feelings of hopelessess, Change in energy level, Feelings of worthlessness, Feelings of helplessness, Increased irritability  Anxiety Symptoms  Anxiety Symptoms: Generalized  Dolores Symptoms  Dolores Symptoms: No problems reported or observed.          Suicide Risk CSSR-S:  1) Within the past month, have you wished you were dead or wished you could go to sleep and not wake up? : No  2) Have you actually had any thoughts of killing yourself? : No  6) Have you ever done anything, started to do anything, or prepared to do anything to end your life?: No  Change in Result:  No Change in Plan of care:  No      EDUCATION:   Learner Progress Toward Treatment Goals:   Reviewed results and recommendations of this team, Reviewed group plan and strategies, Reviewed signs, symptoms and risk of self harm and violent behavior, Reviewed goals and plan of care    Method:  small group, individual verbal education    Outcome:   Verbalized by patient but needs reinforcement to obtain goals    PATIENT GOALS:  Short term:  Patient unable to attend - on the phone  Long term:  Patient unable to attend - on the phone    PLAN/TREATMENT RECOMMENDATIONS UPDATE:  continue with group therapies, increased socialization, continue planning for after discharge goals, continue with medication compliance    SHORT-TERM GOALS UPDATE:   Time frame for

## 2025-03-03 PROCEDURE — 6370000000 HC RX 637 (ALT 250 FOR IP): Performed by: PSYCHIATRY & NEUROLOGY

## 2025-03-03 PROCEDURE — 97530 THERAPEUTIC ACTIVITIES: CPT

## 2025-03-03 PROCEDURE — 90833 PSYTX W PT W E/M 30 MIN: CPT | Performed by: PSYCHIATRY & NEUROLOGY

## 2025-03-03 PROCEDURE — 97161 PT EVAL LOW COMPLEX 20 MIN: CPT

## 2025-03-03 PROCEDURE — 1240000000 HC EMOTIONAL WELLNESS R&B

## 2025-03-03 PROCEDURE — 97535 SELF CARE MNGMENT TRAINING: CPT

## 2025-03-03 PROCEDURE — 97110 THERAPEUTIC EXERCISES: CPT

## 2025-03-03 PROCEDURE — 6370000000 HC RX 637 (ALT 250 FOR IP)

## 2025-03-03 PROCEDURE — 99232 SBSQ HOSP IP/OBS MODERATE 35: CPT | Performed by: PSYCHIATRY & NEUROLOGY

## 2025-03-03 PROCEDURE — 99231 SBSQ HOSP IP/OBS SF/LOW 25: CPT

## 2025-03-03 PROCEDURE — 97165 OT EVAL LOW COMPLEX 30 MIN: CPT

## 2025-03-03 RX ADMIN — PANTOPRAZOLE SODIUM 40 MG: 40 TABLET, DELAYED RELEASE ORAL at 15:38

## 2025-03-03 RX ADMIN — METOPROLOL SUCCINATE 25 MG: 25 TABLET, EXTENDED RELEASE ORAL at 08:32

## 2025-03-03 RX ADMIN — PANTOPRAZOLE SODIUM 40 MG: 40 TABLET, DELAYED RELEASE ORAL at 06:35

## 2025-03-03 RX ADMIN — CARBOXYMETHYLCELLULOSE SODIUM 2 DROP: 5 SOLUTION/ DROPS OPHTHALMIC at 20:30

## 2025-03-03 RX ADMIN — ESCITALOPRAM OXALATE 5 MG: 10 TABLET ORAL at 08:32

## 2025-03-03 RX ADMIN — ALPRAZOLAM 0.25 MG: 0.25 TABLET ORAL at 20:30

## 2025-03-03 RX ADMIN — ALPRAZOLAM 0.25 MG: 0.25 TABLET ORAL at 15:38

## 2025-03-03 RX ADMIN — ALPRAZOLAM 0.25 MG: 0.25 TABLET ORAL at 08:33

## 2025-03-03 RX ADMIN — CARBOXYMETHYLCELLULOSE SODIUM 2 DROP: 5 SOLUTION/ DROPS OPHTHALMIC at 14:37

## 2025-03-03 RX ADMIN — QUETIAPINE FUMARATE 75 MG: 50 TABLET ORAL at 20:30

## 2025-03-03 RX ADMIN — CARBOXYMETHYLCELLULOSE SODIUM 2 DROP: 5 SOLUTION/ DROPS OPHTHALMIC at 08:36

## 2025-03-03 ASSESSMENT — LIFESTYLE VARIABLES
HOW MANY STANDARD DRINKS CONTAINING ALCOHOL DO YOU HAVE ON A TYPICAL DAY: PATIENT DOES NOT DRINK
HOW OFTEN DO YOU HAVE A DRINK CONTAINING ALCOHOL: NEVER

## 2025-03-03 NOTE — PROGRESS NOTES
Daily Progress Note  Jose Armendariz MD  3/3/2025  CHIEF COMPLAINT: Depression with suicidal ideation    Reviewed patient's current plan of care and vital signs with nursing staff.  Sleep:  4 hours last night  Attending groups: No: Still largely isolating to room    SUBJECTIVE:    Of note patient did sleep a little better last night.  Tolerated the increase in Seroquel without any side effects.  Reports he woke up around 2 AM and had difficulty falling asleep after that.  After discussion of risk benefits and alternatives patient is agreeable to further titrate the recall to 75 mg.  Spent time in supportive psychotherapy discussing patient's stressors at home.  Patient \"still not feeling right\".  But does acknowledge feeling a little bit better after some better sleep last night on a relative basis.  Tolerating decrease in Xanax well so far.  Will monitor    Mental Status Exam  Level of consciousness:  Within normal limits  Appearance: Hospital attire, seated in chair, with good grooming and hygiene   Behavior/Motor: No abnormalities noted  Attitude toward examiner:  Cooperative, attentive, good eye contact  Speech:  spontaneous, normal rate, normal volume and well articulated  Mood: Depressed  Affect: Congruent  Thought processes:  linear, goal directed and coherent  Thought content:  denies homicidal ideation  Suicidal Ideation: Endorses suicidal ideation  Delusions:  no evidence of delusions  Perceptual Disturbance:  denies any perceptual disturbance  Cognition:  Oriented to self, location, time, and situation  Memory: age appropriate  Insight & Judgement: improving  Medication side effects:  denies       Data   height is 1.6 m (5' 2.99\") and weight is 103.4 kg (228 lb). Her temporal temperature is 97.7 °F (36.5 °C). Her blood pressure is 140/105 (abnormal) and her pulse is 88. Her respiration is 16 and oxygen saturation is 96%.   Labs:   Admission on 02/28/2025   Component Date Value Ref Range Status    Vit D,  discussion we mutually agreed to proceed with the medication changes.   Increase Seroquel to 75 mg by mouth at bedtime  Attempt to develop insight  Psycho-education conducted.  Supportive Therapy conducted.  Probable discharge is undetermined at this time  Follow-up daily while in the inpatient unit    Spent 17 minutes or more with the patient in supportive psychotherapy.      Electronically signed by MARTÍNEZ PELAYO MD on 3/3/25 at 2:06 PM EST    **This report has been created using voice recognition software. It may contain minor errors which are inherent in voice recognition technology.**

## 2025-03-03 NOTE — PLAN OF CARE
Problem: Depression  Goal: Will be euthymic at discharge  Description: INTERVENTIONS:  1. Administer medication as ordered  2. Provide emotional support via 1:1 interaction with staff  3. Encourage involvement in milieu/groups/activities  4. Monitor for social isolation  Outcome: Progressing     Problem: Psychosis  Goal: Will report no hallucinations or delusions  Description: INTERVENTIONS:  1. Administer medication as  ordered  2. Assist with reality testing to support increasing orientation  3. Assess if patient's hallucinations or delusions are encouraging self harm or harm to others and intervene as appropriate  3/2/2025 2210 by Elaine Rosales RN  Outcome: Progressing     Patient alert and oriented. Patient endorses some symptoms of anxiety and depression but states they are improving at this time.  Patient denies any hallucinations or delusions and has not been seen responding to any internal stimuli.  Patient is mostly isolative to room this evening. Patient is very focused on her medical conditions and medications. Patient is cooperative and friendly with staff. Patient is medication compliant and behavior remains controlled at this time.  Safe environment provided. Q15 minute checks maintained.

## 2025-03-03 NOTE — GROUP NOTE
Group Therapy Note    Date: 3/3/2025    Group Start Time: 1000  Group End Time: 1030  Group Topic: Group Documentation    [facility]    Shahrzad Cisneros LPN    Group Therapy Note         Patient's Goal:  get some rest    Status After Intervention:  Improved    Participation Level: Active Listener    Participation Quality: Appropriate      Speech:  Normal       Thought Process/Content: Logical      Affective Functioning: Congruent      Mood:  Cooperative       Level of consciousness:  Oriented x4      Response to Learning: Able to verbalize current knowledge/experience      Endings: None Reported    Modes of Intervention: Education      Discipline Responsible: Licensed Practical Nurse      Signature:  Shahrzad Cisneros LPN

## 2025-03-03 NOTE — PROGRESS NOTES
MEDIAL&LAT COMPARTMENTS Left 06/05/2018    Left Total Knee Arthroplasty performed by Jorje Boles MD at [facility] OR    SHOULDER ARTHROPLASTY Left 08/09/2021    Reverse left total shoulder arthroplasty, Dr. Neville at [facility]    TRANSTHORACIC ECHOCARDIOGRAM N/A 08/20/2020    grade 1 diastolic dysfunction, EF 55-60%    TUBAL LIGATION N/A 1996    post partum @ [facility]    UPPER GASTROINTESTINAL ENDOSCOPY N/A 01/27/2025    EGD w/ biopsy - unremarkable, small sliding hiatal hernia; performed by Dr. Marquez @ [facility]        Medications Prior to Admission:     Prior to Admission medications    Medication Sig Start Date End Date Taking? Authorizing Provider   omeprazole (PRILOSEC) 40 MG delayed release capsule Take 1 capsule by mouth in the morning and at bedtime    Epi Rahman MD   metoprolol succinate (TOPROL XL) 25 MG extended release tablet Take 1 tablet by mouth daily    Epi Rahman MD   sucralfate (CARAFATE) 1 GM/10ML suspension Take 10 mLs by mouth 4 times daily (before meals and nightly) 12/19/24   Epi Rahman MD   oxyCODONE-acetaminophen (PERCOCET) 7.5-325 MG per tablet Take 1 tablet by mouth 5 times daily for 30 days. 2/15/25 3/17/25  Thea Torres APRN - CNP   docusate sodium (COLACE) 100 MG capsule Take 1 capsule by mouth 2 times daily as needed for Constipation    Epi Rahman MD   famotidine (PEPCID) 40 MG tablet Take 1 tablet by mouth nightly 12/18/24 3/18/25  Epi Rahman MD   nitroglycerin (NITROSTAT) 0.6 MG SL tablet Place 1 tablet under the tongue every 5 minutes as needed for Chest pain up to max of 3 total doses. If no relief after 1 dose, call 911.    Epi Rahman MD   ALPRAZolam (XANAX) 1 MG tablet Take 0.5-1 tablets by mouth in the morning, at noon, and at bedtime. 4/1/22   Epi Rahman MD   NARCAN 4 MG/0.1ML LIQD nasal spray  8/3/21   Epi Rahman MD   Multiple Vitamins-Minerals (CENTRUM MULTI + OMEGA 3 PO) Take  24 hour(s)).      Imaging/Diagonstics:  No results found.    Assessment :      Hospital Problems             Last Modified POA    * (Principal) Major depressive disorder, recurrent severe without psychotic features (HCC) 2/28/2025 Yes    Depression with suicidal ideation 2/28/2025 Yes       Plan:     Admitted to inpatient psych with depression with suicidal ideation  Morbid obesity.  Asthma.  Stable.  Hypertension.  Controlled.  NICM.  History of SVT s/p AV pawel ablation 2006.  Was initially on Entresto and Coreg.  Recently discontinued by his cardiologist due to patient's allergies.  Currently on Toprol-XL.  History of stage IV diffuse B large B-cell lymphoma.  On observation with oncology  Labs and medications reviewed.     DVT prophylaxis,  Pt mobile   Full code status     3/3  Patient seen and examined at bedside.  Labs, medication and vitals reviewed.    Consultations:   IP CONSULT TO INTERNAL MEDICINE      Myriam Henderson MD  3/3/2025  5:19 PM    Copy sent to Boaz Whiting MD    Please note that this chart was generated using voice recognition Dragon dictation software.  Although every effort was made to ensure the accuracy of this automated transcription, some errors in transcription may have occurred.

## 2025-03-03 NOTE — BH NOTE
Writer reached out to Dr. Armendariz about the increase in dose of Seroquel and patient's concern. Awaiting reply.

## 2025-03-03 NOTE — GROUP NOTE
Psych-Ed/Relapse Prevention Group Note        Date: March 3, 2025 Start Time: 11am  End Time: 11:45am      Number of Participants in Group & Unit Census:  7/13    Topic: Socialization    Goal of Group:Patient will demonstrate improved interpersonal skills and offer peer support.        Comments:     Patient did not participate in Psych-Ed/Relapse Prevention group, despite staff encouragement and explanation of benefits.  Patient remain seclusive to self.  Q15 minute safety checks maintained for patient safety and will continue to encourage patient to attend unit programming.        Signature:  Annalisa Barnes CTRS

## 2025-03-03 NOTE — PLAN OF CARE
Problem: Depression  Goal: Will be euthymic at discharge  Description: INTERVENTIONS:  1. Administer medication as ordered  2. Provide emotional support via 1:1 interaction with staff  3. Encourage involvement in milieu/groups/activities  4. Monitor for social isolation  3/3/2025 0919 by Jodi Gilliam LPN  Outcome: Progressing   Patient  reports depression.   Problem: Dolores  Goal: Will exhibit normal sleep and speech and no impulsivity  Description: INTERVENTIONS:  1. Administer medication as ordered  2. Set limits on impulsive behavior  3. Make attempts to decrease external stimuli as possible  Outcome: Progressing     Problem: Psychosis  Goal: Will report no hallucinations or delusions  Description: INTERVENTIONS:  1. Administer medication as  ordered  2. Assist with reality testing to support increasing orientation  3. Assess if patient's hallucinations or delusions are encouraging self harm or harm to others and intervene as appropriate  3/3/2025 0919 by Joid Gilliam LPN  Outcome: Progressing   Patient denies any hallucinations   Problem: Anxiety  Goal: Will report anxiety at manageable levels  Description: INTERVENTIONS:  1. Administer medication as ordered  2. Teach and rehearse alternative coping skills  3. Provide emotional support with 1:1 interaction with staff  Outcome: Progressing   Patient is anxious reports feeling like,\" honesty I'm dying\" So this has causes her  depressed and increase in anxiety,  She is isolative to room. Takes morning medications.   Problem: Pain  Goal: Verbalizes/displays adequate comfort level or baseline comfort level  Outcome: Progressing   Patient reports discomfort from mouth dryness.   Problem: Safety - Adult  Goal: Free from fall injury  Outcome: Progressing   Patient remains free from falls

## 2025-03-03 NOTE — PROGRESS NOTES
Physical Therapy    [facility]   Physical Therapy Evaluation  Date: 3/3/25  Patient Name: Gisell Thurman       Room: 0212/0212-01  MRN: [id number]  Account: [id number]   : 1960  (65 y.o.) Gender: female     Discharge Recommendations:  Discharge Recommendations: Home with assist PRN           Past Medical History:  has a past medical history of Asthma, Blindness of left eye, Cardiomyopathy (HCC), Cervical herniated disc, Congenital hip dysplasia, Degenerative joint disease, Depression, Depression with suicidal ideation, Diffuse large B cell lymphoma (HCC), Dysfunctional uterine bleeding, Dyspnea, Encounter for chronic pain management, Failed total right knee replacement, Fatigue, Fibromyalgia, Generalized anxiety disorder, GERD (gastroesophageal reflux disease), Grief at loss of child, Hip pain, bilateral, History of blood transfusion, History of tobacco use, Hypertension, Inverted nipple, Kidney stone, Lumbar disc disease, Migraine, NSTEMI (non-ST elevated myocardial infarction) ([facility]), Obesity, Osteoarthritis, Seasonal allergic rhinitis, Shoulder pain, left, Shoulder pain, right, Supraventricular tachycardia, paroxysmal, Type 2 diabetes mellitus without complication (HCC), and Vitamin D deficiency.  Past Surgical History:   has a past surgical history that includes Atrial ablation surgery (N/A, ); Knee Arthroplasty (Right, ); Tubal ligation (N/A, ); Cardiac catheterization (N/A, ); Cholecystectomy, laparoscopic (N/A, 2011); other surgical history (N/A, ); other surgical history (N/A, ); pr arthrp kne condyle&platu medial&lat compartments (Left, 2018); Cardiac catheterization (N/A, 2020); Port Surgery (Right, 2020); transthoracic echocardiogram (N/A, 2020); Total shoulder arthroplasty (Left, 2021); hip surgery (Right, 2021); laparoscopy (N/A); CT BIOPSY BONE MARROW (2020); Colonoscopy (N/A, 2013); Cardiac

## 2025-03-04 ENCOUNTER — TELEPHONE (OUTPATIENT)
Dept: PAIN MANAGEMENT | Age: 65
End: 2025-03-04

## 2025-03-04 PROCEDURE — 90833 PSYTX W PT W E/M 30 MIN: CPT | Performed by: PSYCHIATRY & NEUROLOGY

## 2025-03-04 PROCEDURE — 6370000000 HC RX 637 (ALT 250 FOR IP): Performed by: PSYCHIATRY & NEUROLOGY

## 2025-03-04 PROCEDURE — APPSS30 APP SPLIT SHARED TIME 16-30 MINUTES

## 2025-03-04 PROCEDURE — 6370000000 HC RX 637 (ALT 250 FOR IP)

## 2025-03-04 PROCEDURE — 99231 SBSQ HOSP IP/OBS SF/LOW 25: CPT

## 2025-03-04 PROCEDURE — 1240000000 HC EMOTIONAL WELLNESS R&B

## 2025-03-04 PROCEDURE — 99232 SBSQ HOSP IP/OBS MODERATE 35: CPT | Performed by: PSYCHIATRY & NEUROLOGY

## 2025-03-04 RX ORDER — QUETIAPINE FUMARATE 100 MG/1
100 TABLET, FILM COATED ORAL NIGHTLY
Status: DISCONTINUED | OUTPATIENT
Start: 2025-03-04 | End: 2025-03-05

## 2025-03-04 RX ADMIN — ALPRAZOLAM 0.25 MG: 0.25 TABLET ORAL at 20:39

## 2025-03-04 RX ADMIN — ALPRAZOLAM 0.25 MG: 0.25 TABLET ORAL at 07:59

## 2025-03-04 RX ADMIN — CARBOXYMETHYLCELLULOSE SODIUM 2 DROP: 5 SOLUTION/ DROPS OPHTHALMIC at 20:39

## 2025-03-04 RX ADMIN — CARBOXYMETHYLCELLULOSE SODIUM 2 DROP: 5 SOLUTION/ DROPS OPHTHALMIC at 08:09

## 2025-03-04 RX ADMIN — TRAZODONE HYDROCHLORIDE 50 MG: 50 TABLET ORAL at 00:25

## 2025-03-04 RX ADMIN — QUETIAPINE FUMARATE 100 MG: 100 TABLET ORAL at 20:44

## 2025-03-04 RX ADMIN — ESCITALOPRAM OXALATE 5 MG: 10 TABLET ORAL at 07:59

## 2025-03-04 RX ADMIN — METOPROLOL SUCCINATE 25 MG: 25 TABLET, EXTENDED RELEASE ORAL at 08:00

## 2025-03-04 RX ADMIN — PANTOPRAZOLE SODIUM 40 MG: 40 TABLET, DELAYED RELEASE ORAL at 06:32

## 2025-03-04 RX ADMIN — CARBOXYMETHYLCELLULOSE SODIUM 2 DROP: 5 SOLUTION/ DROPS OPHTHALMIC at 14:13

## 2025-03-04 RX ADMIN — PANTOPRAZOLE SODIUM 40 MG: 40 TABLET, DELAYED RELEASE ORAL at 15:42

## 2025-03-04 RX ADMIN — PSYLLIUM HUSK 1 PACKET: 3.4 POWDER ORAL at 08:08

## 2025-03-04 RX ADMIN — ALPRAZOLAM 0.25 MG: 0.25 TABLET ORAL at 14:13

## 2025-03-04 RX ADMIN — Medication: at 00:28

## 2025-03-04 NOTE — PLAN OF CARE
Problem: Depression  Goal: Will be euthymic at discharge  Description: INTERVENTIONS:  1. Administer medication as ordered  2. Provide emotional support via 1:1 interaction with staff  3. Encourage involvement in milieu/groups/activities  4. Monitor for social isolation  3/3/2025 2228 by Ernesto Pandya LPN  Outcome: Progressing  Note: Patient has been calm and cooperative this shift.     Problem: Dolores  Goal: Will exhibit normal sleep and speech and no impulsivity  Description: INTERVENTIONS:  1. Administer medication as ordered  2. Set limits on impulsive behavior  3. Make attempts to decrease external stimuli as possible  3/3/2025 2228 by Ernesto Pandya LPN  Outcome: Progressing  Note: Patient has been calm and cooperative this shift.     Problem: Psychosis  Goal: Will report no hallucinations or delusions  Description: INTERVENTIONS:  1. Administer medication as  ordered  2. Assist with reality testing to support increasing orientation  3. Assess if patient's hallucinations or delusions are encouraging self harm or harm to others and intervene as appropriate  3/3/2025 2228 by Ernesto Pandya LPN  Outcome: Progressing  Note: No paranoid or delusional thoughts noted at this time.  Pt is oriented to reality and is goal directed.  Able to identify some plans and goals for self-care after discharge.     Problem: Anxiety  Goal: Will report anxiety at manageable levels  Description: INTERVENTIONS:  1. Administer medication as ordered  2. Teach and rehearse alternative coping skills  3. Provide emotional support with 1:1 interaction with staff  3/3/2025 2228 by Ernseto Pandya LPN  Outcome: Not Progressing  Note: Patient is complaining of anxiety at this time, feeling restless and having trouble calming self down in order to restfully sleep this evening. medication given as ordered for increased anxiety and sleep.     Problem: Pain  Goal: Verbalizes/displays adequate comfort level or baseline comfort level  3/3/2025  2228 by Ernesto Pandya LPN  Outcome: Progressing  Note: Patient denies any pain currently. Patient encouraged to inform staff if she develops any pain. Patient voiced understanding.      Problem: Safety - Adult  Goal: Free from fall injury  3/3/2025 2228 by Ernesto Pandya LPN  Outcome: Progressing  Note: Patient remains free from falls and understands safety risks. Patient is wearing non skid footwear and will alert staff if assistance is needed. Q15min safety checks continue     Problem: Nutrition Deficit:  Goal: Optimize nutritional status  Outcome: Progressing

## 2025-03-04 NOTE — GROUP NOTE
Group Therapy Note    Date: 3/4/2025    Group Start Time: 0720  Group End Time: 0750  Group Topic: Orientation Group    Hemalatha Corley LPN        Group Therapy Note    Attendees: 12/12Group Therapy Note     Date: 3/4/25     Group Start Time: 720  Group End Time: 750  Group Topic: Orientation Group     [facility]    LPN           Group Therapy Note     Attendees: 12/12     Patient attended morning orientation group and actively listened while educated on unit policies, expectations, and orientation. Patient was reminded of group times, location of white board, today's staff, shower location/time, group time limitations at nurses station, phone and tv times. Patient educated on discharge process and planning as well as informed that all patient's are seen by a MD or NP every day.            Signature: Hemalatha Justin      Silver Nitrate Text: The wound bed was treated with silver nitrate after the biopsy was performed.

## 2025-03-04 NOTE — GROUP NOTE
Group Therapy Note    Date: 3/4/2025    Group Start Time: 1000  Group End Time: 1030  Group Topic: Psychotherapy     Elizabeth Morrissey MSW        Group Therapy Note    Attendees: 3/12     Patient was offered group therapy today but declined to participate despite encouragement from staff.  1:1 was offered.    Discipline Responsible: /Counselor      Signature:  CHARLIE Pacheco

## 2025-03-04 NOTE — GROUP NOTE
Group Therapy Note    Date: 3/4/2025    Group Start Time: 900  Group End Time: 915  Group Topic: Group Therapy    Elsa Ferrer LPN        Group Therapy Note    Attendees:          Patient's Goal:  ***    Notes:  ***    Status After Intervention:  {Status After Intervention:161543777}    Participation Level: {Participation Level:612379013}    Participation Quality: {[facility] PARTICIPATION QUALITY:192751701}      Speech:  {ED  CD_SPEECH:17125}      Thought Process/Content: {Thought Process/Content:582025430}      Affective Functioning: {Affective Functionin}      Mood: {Mood:242030067}      Level of consciousness:  {Level of consciousness:712016865}      Response to Learning: {[facility] Responses to Learnin}      Endings: {[facility] Endings:86618}    Modes of Intervention: {[facility] Modes of Intervention:303580113}      Discipline Responsible: {[facility] Multidisciplinary:876152986}      Signature:  ELSA BARRY LPN

## 2025-03-04 NOTE — TELEPHONE ENCOUNTER
CMS SUB-3  Measure  Has the patient ever used any alcohol or any other substance such as cocaine, marijuana, ecstasy, pain medications, heroin, methamphetamines, etc. at any point in their lifetime? Yes    If yes,...  1. Is the pt interested in being prescribed an FDA-approved medication for alcohol or opiate addiction at discharge? Yes  2. Is the pt interested in an aftercare appointment for addictions treatment (i.e., AODA PHP/IOP, RTC, MH PHP/IOP, OP therapy, OP psychiatry, OP PCP, OP NP/PA)? Yes    Therapist will notify patient's inpatient attending physician and .     Vandana Reyes LPC-IT       [facility]  called because patient is admitted under Dr. Darinel Evans currently for psych. Patient was admitted for depression and suicidal ideation.    When toxic screen was ran on 3/1, patient was negative for all opiates and they noticed she was prescribed Percocet 7.5/325, 5 times per day from [facility]. Dr. Evans asked patient if she took them every day and patient said yes. When asked how many she had left, patient stated zero. Per [facility]ARRS, patient would not be due 3/17/25.     stated patient has not gone through any withdraw symptoms and has not complained about pain. She also stated patient was over taking Xanax and they are slowly weaning her down.     is going to contact this office when patient is being discharged.

## 2025-03-04 NOTE — GROUP NOTE
Group Therapy Note    Date: 3/4/2025    Group Start Time: 1330  Group End Time: 1415  Group Topic: Recreational    [facility] Cheyanne Ivan CTRS    Recreation Group Note        Date: March 4, 2025 Start Time: 1:30pm  End Time:  215pm       Number of Participants in Group & Unit Census:  3/9    Topic: recreation group     Goal of Group: pt will demonstrate improved leisure awareness and improved interpersonal relationships      Comments:     Patient did not participate in Recreation group, despite staff encouragement and explanation of benefits.  Patient remain seclusive to self.  Q15 minute safety checks maintained for patient safety and will continue to encourage patient to attend unit programming.              Signature:  SHOBHA CASTANO

## 2025-03-04 NOTE — GROUP NOTE
Group Therapy Note    Date: 3/4/2025    Group Start Time: 1100  Group End Time: 1130  Group Topic: Cognitive Skills    Cheyanne Rao CTRS    Cognitive Skills Group Note        Date: March 4, 2025 Start Time: 11am  End Time: 11:30am      Number of Participants in Group & Unit Census:  5/10    Topic: cognitive skills     Goal of Group: pt will demonstrate improved coping skills and improved interpersonal relationships       Comments:     Patient did not participate in Cognitive Skills group, despite staff encouragement and explanation of benefits.  Patient remain seclusive to self.  Q15 minute safety checks maintained for patient safety and will continue to encourage patient to attend unit programming.              Signature:  SHOBHA CASTANO

## 2025-03-04 NOTE — PLAN OF CARE
Problem: Depression  Goal: Will be euthymic at discharge  Description: INTERVENTIONS:  1. Administer medication as ordered  2. Provide emotional support via 1:1 interaction with staff  3. Encourage involvement in milieu/groups/activities  4. Monitor for social isolation  3/4/2025 0949 by López Hill LPN  Outcome: Not Progressing     Problem: Anxiety  Goal: Will report anxiety at manageable levels  Description: INTERVENTIONS:  1. Administer medication as ordered  2. Teach and rehearse alternative coping skills  3. Provide emotional support with 1:1 interaction with staff  3/4/2025 0949 by López Hill LPN  Outcome: Not Progressing  Flowsheets  Taken 3/4/2025 0946  Will report anxiety at manageable levels:   Administer medication as ordered   Provide emotional support with 1:1 interaction with staff   Teach and rehearse alternative coping skills  Taken 3/4/2025 0941  Will report anxiety at manageable levels:   Administer medication as ordered   Provide emotional support with 1:1 interaction with staff   Teach and rehearse alternative coping skills     Problem: Dolores  Goal: Will exhibit normal sleep and speech and no impulsivity  Description: INTERVENTIONS:  1. Administer medication as ordered  2. Set limits on impulsive behavior  3. Make attempts to decrease external stimuli as possible  3/4/2025 0949 by López Hill LPN  Outcome: Progressing     Problem: Psychosis  Goal: Will report no hallucinations or delusions  Description: INTERVENTIONS:  1. Administer medication as  ordered  2. Assist with reality testing to support increasing orientation  3. Assess if patient's hallucinations or delusions are encouraging self harm or harm to others and intervene as appropriate  3/4/2025 0949 by López Hill LPN  Outcome: Progressing     Problem: Pain  Goal: Verbalizes/displays adequate comfort level or baseline comfort level  3/4/2025 0949 by López Hill

## 2025-03-04 NOTE — GROUP NOTE
Group Therapy Note    Date: 3/4/2025    Group Start Time: 0900  Group End Time: 0915  Group Topic: Group Therapy    Elsa Ferrer LPN        Group Therapy Note    Attendees: 5/12       Patient's Goal:  \"Get some sleep tonight\"    Notes:  Patient brightened and actively participates in group, shares thoughts and feelings freely.    Status After Intervention:  Improved    Participation Level: Interactive    Participation Quality: Appropriate      Speech:  normal      Thought Process/Content: Logical      Affective Functioning: Congruent      Mood: anxious      Level of consciousness:  Alert      Response to Learning: Able to verbalize current knowledge/experience      Endings: None Reported    Modes of Intervention: Support      Discipline Responsible: Licensed Practical Nurse      Signature:  ELSA BARRY LPN

## 2025-03-04 NOTE — PROGRESS NOTES
[facility]  Sánchez Saldivar MD; Zain Najera MD, Robert Mooney MD, Sybil Ramírez MD, Dr Andrey Ceja MD; Mal Ruggiero MD    [facility]   IN-PATIENT SERVICE   [facility]    Progress Note            Date:   3/4/2025  Patient name:  Gisell Thuramn  Date of admission:  2025  9:59 AM  MRN:   [id number]  Account:  [id number]  YOB: 1960  PCP:    Boaz Chung MD  Room:   [address] [place] [address]  Code Status:    Full Code      Chief Complaint:     Suicidal /Ac Psychosis    History Obtained From:     Patient/EMR/bedside RN     History of Present Illness:         Past Medical History:     Past Medical History:   Diagnosis Date    Asthma     Blindness of left eye     Cardiomyopathy (HCC)     Cervical herniated disc     C5-C6, with nerve compression.    Congenital hip dysplasia     Degenerative joint disease     (Right knee) -     Depression     Depression with suicidal ideation 2025    Diffuse large B cell lymphoma (HCC)     Dysfunctional uterine bleeding     Dyspnea     (progressive - multifactorial.    Encounter for chronic pain management 2015    Failed total right knee replacement     Fatigue     Fibromyalgia     Generalized anxiety disorder     GERD (gastroesophageal reflux disease)     Grief at loss of child     4-yr old granddaughter  of brain tumor in .    Hip pain, bilateral     Secondary to piriformis syndrome and bilateral greater trochanteric bursitis. (Injection of Celestone and Marcaine).    History of blood transfusion     History of tobacco use     Currently not smoking.    Hypertension     Inverted nipple     Left.  Lifelong    Kidney stone     Lumbar disc disease     Chronic.    Migraine     NSTEMI (non-ST elevated myocardial infarction) ([facility]) 2020    [place] [facility] - tranfer to [facility]    Obesity     Osteoarthritis     Degenerative joint disease - of all joints.    Seasonal  hour(s)).      Imaging/Diagonstics:  No results found.    Assessment :      Hospital Problems             Last Modified POA    * (Principal) Major depressive disorder, recurrent severe without psychotic features (HCC) 2/28/2025 Yes    Depression with suicidal ideation 2/28/2025 Yes       Plan:     Admitted to inpatient psych with depression with suicidal ideation  Morbid obesity.  Asthma.  Stable.  Hypertension.  Controlled.  NICM.  History of SVT s/p AV pawel ablation 2006.  Was initially on Entresto and Coreg.  Recently discontinued by his cardiologist due to patient's allergies.  Currently on Toprol-XL.  History of stage IV diffuse B large B-cell lymphoma.  On observation with oncology  Labs and medications reviewed.     DVT prophylaxis,  Pt mobile   Full code status     3/4  Patient seen and examined at bedside.  Labs, medication and vitals reviewed.    Consultations:   IP CONSULT TO INTERNAL MEDICINE      Myriam Henderson MD  3/4/2025  6:07 PM    Copy sent to Dr. Chung, Boaz GILLESPIE MD    Please note that this chart was generated using voice recognition Dragon dictation software.  Although every effort was made to ensure the accuracy of this automated transcription, some errors in transcription may have occurred.

## 2025-03-04 NOTE — CARE COORDINATION
Writer spoke with Guadalupe, medical assistant for Thea Torres NP at [facility] [phone]. Guadalupe requests contact when discharge date determined to schedule an appointment for Gisell to come to clinic for appointment.

## 2025-03-04 NOTE — PROGRESS NOTES
Daily Progress Note  3/4/2025    Patient Name: Gisell Thurman    CHIEF COMPLAINT:  Depression with suicidal ideation          SUBJECTIVE:      Patient is seen today for a follow up assessment. Vitals and labs reviewed and appear within normal limits.  Nannette is compliant with scheduled medications. She remains behaviorally in control and has not required emergency medications in the past 24 hours.  Nannette is seen at bedside today.  She is somewhat despondent.  She reports \"I'm resting because I only got one hour of sleep last night.\"  She continues to endorse significantly poor sleep and feels that it is because her Xanax is being reduced.  She reports that her appetite is poor.  She does endorse some feelings of depression but mostly complains about anxiety.  Nannette reports improvement in suicidal ideation.  She denies homicidal ideation.  She denies both auditory and visual hallucinations.  She does endorse some racing thoughts and states she feel as if \"there is a buzzer inside my body.\"  She denies other side effects to medications but does not feel Seroquel has been beneficial for sleep at this point.    Appetite:  [] Normal/Adequate/Unchanged  [] Increased  [x] Decreased      Sleep:       [] Normal/Adequate/Unchanged  [] Fair  [x] Poor      Group Attendance on Unit:   [] Yes  [] Selectively    [x] No    Medication Side Effects:  Patient denies any medication side effects at the time of assessment.         Mental Status Exam  Level of consciousness: Alert and awake.   Appearance: Appropriate attire for setting, resting in bed, with fair  grooming and hygiene.   Behavior/Motor: Approachable, withdrawn  Attitude toward examiner: Cooperative, attentive, good eye contact.  Speech: Normal rate, normal volume, normal tone.  Mood:  Patient reports \"Trying to rest because I only got one hour of sleep\".   Affect: Depressed  Thought processes: Linear and coherent.   Thought content: Denies homicidal ideation.  Suicidal

## 2025-03-05 PROCEDURE — 6370000000 HC RX 637 (ALT 250 FOR IP): Performed by: PSYCHIATRY & NEUROLOGY

## 2025-03-05 PROCEDURE — APPSS30 APP SPLIT SHARED TIME 16-30 MINUTES

## 2025-03-05 PROCEDURE — 99232 SBSQ HOSP IP/OBS MODERATE 35: CPT | Performed by: PSYCHIATRY & NEUROLOGY

## 2025-03-05 PROCEDURE — 90833 PSYTX W PT W E/M 30 MIN: CPT | Performed by: PSYCHIATRY & NEUROLOGY

## 2025-03-05 PROCEDURE — 1240000000 HC EMOTIONAL WELLNESS R&B

## 2025-03-05 PROCEDURE — 99231 SBSQ HOSP IP/OBS SF/LOW 25: CPT

## 2025-03-05 PROCEDURE — 6370000000 HC RX 637 (ALT 250 FOR IP)

## 2025-03-05 RX ORDER — ALPRAZOLAM 0.25 MG
0.25 TABLET ORAL 2 TIMES DAILY
Status: DISCONTINUED | OUTPATIENT
Start: 2025-03-05 | End: 2025-03-06

## 2025-03-05 RX ORDER — ESCITALOPRAM OXALATE 10 MG/1
10 TABLET ORAL DAILY
Status: DISCONTINUED | OUTPATIENT
Start: 2025-03-06 | End: 2025-03-08 | Stop reason: HOSPADM

## 2025-03-05 RX ADMIN — Medication: at 01:43

## 2025-03-05 RX ADMIN — ALPRAZOLAM 0.25 MG: 0.25 TABLET ORAL at 16:24

## 2025-03-05 RX ADMIN — ALPRAZOLAM 0.25 MG: 0.25 TABLET ORAL at 08:35

## 2025-03-05 RX ADMIN — ALPRAZOLAM 0.25 MG: 0.25 TABLET ORAL at 20:44

## 2025-03-05 RX ADMIN — QUETIAPINE FUMARATE 150 MG: 100 TABLET ORAL at 20:44

## 2025-03-05 RX ADMIN — CARBOXYMETHYLCELLULOSE SODIUM 2 DROP: 5 SOLUTION/ DROPS OPHTHALMIC at 16:25

## 2025-03-05 RX ADMIN — METOPROLOL SUCCINATE 25 MG: 25 TABLET, EXTENDED RELEASE ORAL at 08:34

## 2025-03-05 RX ADMIN — CARBOXYMETHYLCELLULOSE SODIUM 2 DROP: 5 SOLUTION/ DROPS OPHTHALMIC at 08:38

## 2025-03-05 RX ADMIN — ESCITALOPRAM OXALATE 5 MG: 10 TABLET ORAL at 08:35

## 2025-03-05 RX ADMIN — Medication: at 16:23

## 2025-03-05 RX ADMIN — CARBOXYMETHYLCELLULOSE SODIUM 2 DROP: 5 SOLUTION/ DROPS OPHTHALMIC at 20:46

## 2025-03-05 RX ADMIN — PANTOPRAZOLE SODIUM 40 MG: 40 TABLET, DELAYED RELEASE ORAL at 05:59

## 2025-03-05 RX ADMIN — PANTOPRAZOLE SODIUM 40 MG: 40 TABLET, DELAYED RELEASE ORAL at 16:24

## 2025-03-05 RX ADMIN — TRAZODONE HYDROCHLORIDE 50 MG: 50 TABLET ORAL at 20:44

## 2025-03-05 NOTE — GROUP NOTE
Group Therapy Note    Date: 3/5/2025    Group Start Time: 1100  Group End Time: 1130  Group Topic: Cognitive Skills    Cheyanne Rao CTRS    Cognitive Skills Group Note        Date: March 5, 2025 Start Time: 11am  End Time: 11:30am      Number of Participants in Group & Unit Census:  3/10    Topic: cognitive skills    Goal of Group pt will demonstrate improved coping skills and improved interpersonal relationships      Comments:     Patient did not participate in Cognitive Skills group, despite staff encouragement and explanation of benefits.  Patient remain seclusive to self.  Q15 minute safety checks maintained for patient safety and will continue to encourage patient to attend unit programming.              Signature:  SHOBHA CASTANO

## 2025-03-05 NOTE — PLAN OF CARE
Problem: Depression  Goal: Will be euthymic at discharge  Description: INTERVENTIONS:  1. Administer medication as ordered  2. Provide emotional support via 1:1 interaction with staff  3. Encourage involvement in milieu/groups/activities  4. Monitor for social isolation  Outcome: Progressing     Problem: Dolores  Goal: Will exhibit normal sleep and speech and no impulsivity  Description: INTERVENTIONS:  1. Administer medication as ordered  2. Set limits on impulsive behavior  3. Make attempts to decrease external stimuli as possible  Outcome: Progressing     Problem: Psychosis  Goal: Will report no hallucinations or delusions  Description: INTERVENTIONS:  1. Administer medication as  ordered  2. Assist with reality testing to support increasing orientation  3. Assess if patient's hallucinations or delusions are encouraging self harm or harm to others and intervene as appropriate  Outcome: Progressing     Problem: Anxiety  Goal: Will report anxiety at manageable levels  Description: INTERVENTIONS:  1. Administer medication as ordered  2. Teach and rehearse alternative coping skills  3. Provide emotional support with 1:1 interaction with staff  Outcome: Progressing     Problem: Pain  Goal: Verbalizes/displays adequate comfort level or baseline comfort level  Outcome: Progressing     Problem: Safety - Adult  Goal: Free from fall injury  Outcome: Progressing     Problem: Nutrition Deficit:  Goal: Optimize nutritional status  Outcome: Progressing  Flowsheets (Taken 3/5/2025 1031 by Shahla Gill, RD, LD)  Nutrient intake appropriate for improving, restoring, or maintaining nutritional needs: Monitor oral intake, labs, and treatment plans     Patient denies any suicidal, homicidal ideation.  Patient denies audio/visual Hallucinations . Patient reports eating and sleeping adequately. Patient is complaint with scheduled medications and remains in behavioral control. Patient is friendly and cooperative, Patient positive  for anxiety but denies feeling depressed, Patient stated that she does feel safe. Patient is isolative to her room, only out for needs.  Patient is calm, Well groomed. Pleasant to talk with.patient denied pain, Patient is independent with ADL's. She does walk with a walker. Patient refused to shower today. Patient is calm and shows no signs of distress. Staff will contiune with safety checks Q15 mintues and at irregular intervals.

## 2025-03-05 NOTE — PLAN OF CARE
Problem: Psychosis  Goal: Will report no hallucinations or delusions  Description: INTERVENTIONS:  1. Administer medication as  ordered  2. Assist with reality testing to support increasing orientation  3. Assess if patient's hallucinations or delusions are encouraging self harm or harm to others and intervene as appropriate  3/4/2025 2243 by Selena Gordon RN  Outcome: Progressing     Problem: Safety - Adult  Goal: Free from fall injury  3/4/2025 2243 by Selena Gordon RN  Outcome: Progressing    The patient has been isolative to her room for the evening. She denies endorsing thoughts of self harm. She denies experiencing any hallucinations, delusions, anxiousness or depression. Writer encouraged the patient to seek assistance if needed should those thoughts arise. She voiced an understanding. The patient remains free from injuries and falls. She is compliant with her scheduled medications. She continues to monitor her nutrition, informing writer that smells of foods nauseate her. Writer will continue to offer emotional support. Safety maintained.

## 2025-03-05 NOTE — PROGRESS NOTES
Daily Progress Note  3/5/2025    Patient Name: Gisell Thurman    CHIEF COMPLAINT:  Depression with suicidal ideation          SUBJECTIVE:      Patient is seen today for a follow up assessment. Vitals and labs reviewed and appear within normal limits.  Nannette is compliant with scheduled medications. She remains behaviorally in control and has not required emergency medications in the past 24 hours.  Nannette is seen at bedside today.  She continues to note significant anxiety regarding her salivation problem.  She states that is her main area of concern at this point.  She does report having an appointment set up with an autoimmune specialist.  She reports that due to this her mood is still somewhat depressed.  She however denies both suicidal and homicidal ideation.  She denies auditory and visual hallucinations.  She reports that her sleep remains poor however it was improved from the night before, stating she got about 4 hours.  She does report her appetite is poor as well.  She is being weaned from her Xanax.  She continues to require inpatient hospitalization for her safety and stability.     Appetite:  [] Normal/Adequate/Unchanged  [] Increased  [x] Decreased      Sleep:       [] Normal/Adequate/Unchanged  [] Fair  [x] Poor      Group Attendance on Unit:   [] Yes  [] Selectively    [x] No    Medication Side Effects:  Patient denies any medication side effects at the time of assessment.         Mental Status Exam  Level of consciousness: Alert and awake.   Appearance: Appropriate attire for setting, resting in bed, with fair  grooming and hygiene.   Behavior/Motor: Approachable, calm  Attitude toward examiner: Cooperative, attentive, good eye contact.  Speech: Normal rate, normal volume, normal tone.  Mood:  Anxious  Affect: Mood-congruent  Thought processes: Linear and coherent.   Thought content: Denies homicidal ideation.  Suicidal Ideation: Reports improvement in suicidal ideations  Delusions: No evidence of

## 2025-03-05 NOTE — GROUP NOTE
Group Therapy Note    Date: 3/5/2025    Group Start Time: 0800  Group End Time: 0815  Group Topic: Orientation Group    Perlita Crawford RN        Group Therapy Note    Attendees: 10/10     Patient attended morning orientation group and actively listened while educated on unit policies, expectations, and orientation. Patient was reminded of group times, location of white board, today's staff, shower location/time, group time limitations at nurses station, phone and tv times. Patient educated on discharge process and planning as well as informed that all patient's are seen by a MD or NP every day.     Signature:  Perlita Mahajan RN

## 2025-03-05 NOTE — PROGRESS NOTES
further increased due to increased saliva output    Nutrition Diagnosis:   Inadequate oral intake related to decreased appetite, altered taste perception, psychological cause or life stress, swallowing difficulty (increased saliva production) as evidenced by poor intake prior to admission, weight loss, intake 26-50%, intake 0-25%    Nutrition Interventions:   Food and/or Nutrient Delivery: Continue Current Diet, Continue Oral Nutrition Supplement     Coordination of Nutrition Care: Continue to monitor while inpatient       Goals:  Goals: PO intake 50% or greater  Type of Goal: Continue current goal  Previous Goal Met: Progressing toward Goal(s)    Nutrition Monitoring and Evaluation:   Behavioral-Environmental Outcomes: Beliefs and Attitudes  Food/Nutrient Intake Outcomes: Food and Nutrient Intake, Supplement Intake  Physical Signs/Symptoms Outcomes: Biochemical Data, Chewing or Swallowing, GI Status, Fluid Status or Edema, Skin, Weight    Discharge Planning:    Continue current diet     Shahla Gill RD, LD  Contact: [phone]

## 2025-03-05 NOTE — GROUP NOTE
Group Therapy Note    Date: 3/5/2025    Group Start Time: 0913  Group End Time: 0921  Group Topic: Group Documentation    [facility]    Shahrzad Cisneros LPN        Group Therapy Note         Patient's Goal:  get some rest, have this placed cleaned.     Status After Intervention:  Improved    Participation Level: Active Listener    Participation Quality: Appropriate      Speech:  Normal       Thought Process/Content: Logical      Affective Functioning: Congruent      Mood:  cooperative       Level of consciousness:  Oriented x4      Response to Learning: Able to verbalize current knowledge/experience      Endings: None Reported    Modes of Intervention: Education      Discipline Responsible: Licensed Practical Nurse      Signature:  Shahrzad Cisneros LPN

## 2025-03-05 NOTE — TELEPHONE ENCOUNTER
No further controlled substances. Will write term letter, just not while she's admitted to hospital

## 2025-03-05 NOTE — PROGRESS NOTES
[facility]  Sánchez Saldivar MD; Zain Najera MD, Robert Mooney MD, Sybil Ramírez MD, Dr Andrey Ceja MD; Mal Ruggiero MD    [facility] Internal Medicine   IN-PATIENT SERVICE   [facility]    Progress Note            Date:   3/5/2025  Patient name:  Gisell Thurman  Date of admission:  2025  9:59 AM  MRN:   [id number]  Account:  [id number]  YOB: 1960  PCP:    Boaz Chung MD  Room:   [address] [place] [address]  Code Status:    Full Code      Chief Complaint:     Suicidal /Ac Psychosis    History Obtained From:     Patient/EMR/bedside RN     History of Present Illness:         Past Medical History:     Past Medical History:   Diagnosis Date    Asthma     Blindness of left eye     Cardiomyopathy (HCC)     Cervical herniated disc     C5-C6, with nerve compression.    Congenital hip dysplasia     Degenerative joint disease     (Right knee) -     Depression     Depression with suicidal ideation 2025    Diffuse large B cell lymphoma (HCC)     Dysfunctional uterine bleeding     Dyspnea     (progressive - multifactorial.    Encounter for chronic pain management 2015    Failed total right knee replacement     Fatigue     Fibromyalgia     Generalized anxiety disorder     GERD (gastroesophageal reflux disease)     Grief at loss of child     4-yr old granddaughter  of brain tumor in .    Hip pain, bilateral     Secondary to piriformis syndrome and bilateral greater trochanteric bursitis. (Injection of Celestone and Marcaine).    History of blood transfusion     History of tobacco use     Currently not smoking.    Hypertension     Inverted nipple     Left.  Lifelong    Kidney stone     Lumbar disc disease     Chronic.    Migraine     NSTEMI (non-ST elevated myocardial infarction) ([facility]) 2020    [place] ED - tranfer to [facility]    Obesity     Osteoarthritis     Degenerative joint disease - of all joints.    Seasonal

## 2025-03-05 NOTE — GROUP NOTE
Group Therapy Note    Date: 3/5/2025    Group Start Time: 1430  Group End Time: 1515  Group Topic: Recreational    [facility] Cheyanne Ivan CTRS    Recreation Group Note        Date: March 5, 2025 Start Time: 2:30pm  End Time:  315pm      Number of Participants in Group & Unit Census:  2/8    Topic: recreation group     Goal of Group: pt will demonstrate improved coping skills and improved leisure awareness      Comments:     Patient did not participate in Recreation group, despite staff encouragement and explanation of benefits.  Patient remain seclusive to self.  Q15 minute safety checks maintained for patient safety and will continue to encourage patient to attend unit programming.              Signature:  SHOBHA CASTANO

## 2025-03-06 PROCEDURE — 99231 SBSQ HOSP IP/OBS SF/LOW 25: CPT

## 2025-03-06 PROCEDURE — 6370000000 HC RX 637 (ALT 250 FOR IP): Performed by: PSYCHIATRY & NEUROLOGY

## 2025-03-06 PROCEDURE — 6370000000 HC RX 637 (ALT 250 FOR IP)

## 2025-03-06 PROCEDURE — 1240000000 HC EMOTIONAL WELLNESS R&B

## 2025-03-06 PROCEDURE — APPSS30 APP SPLIT SHARED TIME 16-30 MINUTES

## 2025-03-06 PROCEDURE — 90833 PSYTX W PT W E/M 30 MIN: CPT | Performed by: PSYCHIATRY & NEUROLOGY

## 2025-03-06 PROCEDURE — 99232 SBSQ HOSP IP/OBS MODERATE 35: CPT | Performed by: PSYCHIATRY & NEUROLOGY

## 2025-03-06 RX ORDER — ALPRAZOLAM 0.25 MG
0.25 TABLET ORAL NIGHTLY
Status: DISCONTINUED | OUTPATIENT
Start: 2025-03-07 | End: 2025-03-08 | Stop reason: HOSPADM

## 2025-03-06 RX ADMIN — QUETIAPINE FUMARATE 150 MG: 100 TABLET ORAL at 20:52

## 2025-03-06 RX ADMIN — CARBOXYMETHYLCELLULOSE SODIUM 2 DROP: 5 SOLUTION/ DROPS OPHTHALMIC at 14:04

## 2025-03-06 RX ADMIN — ALPRAZOLAM 0.25 MG: 0.25 TABLET ORAL at 20:52

## 2025-03-06 RX ADMIN — Medication: at 20:52

## 2025-03-06 RX ADMIN — Medication: at 09:11

## 2025-03-06 RX ADMIN — METOPROLOL SUCCINATE 25 MG: 25 TABLET, EXTENDED RELEASE ORAL at 09:11

## 2025-03-06 RX ADMIN — ESCITALOPRAM OXALATE 10 MG: 10 TABLET ORAL at 09:11

## 2025-03-06 RX ADMIN — CARBOXYMETHYLCELLULOSE SODIUM 2 DROP: 5 SOLUTION/ DROPS OPHTHALMIC at 20:53

## 2025-03-06 RX ADMIN — CARBOXYMETHYLCELLULOSE SODIUM 2 DROP: 5 SOLUTION/ DROPS OPHTHALMIC at 09:14

## 2025-03-06 RX ADMIN — PANTOPRAZOLE SODIUM 40 MG: 40 TABLET, DELAYED RELEASE ORAL at 16:05

## 2025-03-06 RX ADMIN — ALPRAZOLAM 0.25 MG: 0.25 TABLET ORAL at 09:11

## 2025-03-06 RX ADMIN — PANTOPRAZOLE SODIUM 40 MG: 40 TABLET, DELAYED RELEASE ORAL at 06:13

## 2025-03-06 ASSESSMENT — LIFESTYLE VARIABLES
HOW OFTEN DO YOU HAVE A DRINK CONTAINING ALCOHOL: NEVER
HOW OFTEN DO YOU HAVE A DRINK CONTAINING ALCOHOL: NEVER
HOW MANY STANDARD DRINKS CONTAINING ALCOHOL DO YOU HAVE ON A TYPICAL DAY: PATIENT DOES NOT DRINK
HOW MANY STANDARD DRINKS CONTAINING ALCOHOL DO YOU HAVE ON A TYPICAL DAY: PATIENT DOES NOT DRINK

## 2025-03-06 NOTE — GROUP NOTE
Group Therapy Note    Date: 3/6/2025    Group Start Time: 1000  Group End Time: 1030  Group Topic: Psychotherapy    [facility]    Elizabeth Muhammad MSW        Group Therapy Note    Attendees: 2/9       Patient's Goal:  Use vision boards to identify goals for various parts of life    Notes: Participated but left [facility] through group    Status After Intervention:  Improved    Participation Level: Active Listener and Interactive    Participation Quality: Appropriate, Attentive, and Sharing      Speech:  normal      Thought Process/Content: Logical  Linear      Affective Functioning: Congruent      Mood: euthymic      Level of consciousness:  Alert and Oriented x4      Response to Learning: Able to verbalize current knowledge/experience and Able to verbalize/acknowledge new learning      Endings: None Reported    Modes of Intervention: Support and Activity      Discipline Responsible: /Counselor      Signature:  CHARLIE Pacheco

## 2025-03-06 NOTE — PROGRESS NOTES
hour(s)).      Imaging/Diagonstics:  No results found.    Assessment :      Hospital Problems             Last Modified POA    * (Principal) Major depressive disorder, recurrent severe without psychotic features (HCC) 2/28/2025 Yes    Depression with suicidal ideation 2/28/2025 Yes       Plan:     Admitted to inpatient psych with depression with suicidal ideation  Morbid obesity.  Asthma.  Stable.  Hypertension.  Controlled.  NICM.  History of SVT s/p AV pawel ablation 2006.  Was initially on Entresto and Coreg.  Recently discontinued by his cardiologist due to patient's allergies.  Currently on Toprol-XL.  History of stage IV diffuse B large B-cell lymphoma.  On observation with oncology  Labs and medications reviewed.     DVT prophylaxis,  Pt mobile   Full code status     3/6  Patient seen and examined at bedside.  Labs, medication and vitals reviewed.    Consultations:   IP CONSULT TO INTERNAL MEDICINE      Myriam Henderson MD  3/6/2025  3:07 PM    Copy sent to Dr. Chung, Boaz GILLESPIE MD    Please note that this chart was generated using voice recognition Dragon dictation software.  Although every effort was made to ensure the accuracy of this automated transcription, some errors in transcription may have occurred.

## 2025-03-06 NOTE — PROGRESS NOTES
Daily Progress Note  3/6/2025    Patient Name: Gisell Thurman    CHIEF COMPLAINT:  Depression with suicidal ideation          SUBJECTIVE:      Patient was seen today for a follow up assessment. Vitals and labs reviewed and appear within normal limits.  Nannette is compliant with scheduled medications. She remains behaviorally in control and has not required emergency medications in the past 24 hours.  However, nursing reports that she was more isolative to her room today but she did attend group. Nannette is seen at bedside today.  She continues to note significant anxiety regarding her salivation problem, and states \"I don't know what is causing it.  Nobody seems to know what is going on.\" She reports her mood is \"better than some\", but continues to report some anxiety due to her salivation problem.  She denies both suicidal and homicidal ideation.  She denies auditory and visual hallucinations.  She reports that her sleep has improved from the night before, stating she got about 5 hours.  She continues to report that her appetite is poor due to her \"mouth problems\".  She is being weaned from her Xanax.  She continues to require inpatient hospitalization for her safety and stability.       Appetite:  [] Normal/Adequate/Unchanged  [] Increased  [x] Decreased      Sleep:       [] Normal/Adequate/Unchanged  [x] Fair  [] Poor      Group Attendance on Unit:   [] Yes  [x] Selectively    [] No    Medication Side Effects: Patient denies any medication side effects at the time of assessment.         Mental Status Exam  Level of consciousness: Alert and awake.   Appearance: Appropriate attire for setting, seated on bed, with fair  grooming and hygiene.   Behavior/Motor: Approachable, anxious  Attitude toward examiner: Cooperative, attentive, good eye contact.  Speech: Normal rate, normal volume, normal tone.  Mood:  Patient reports \"better than some\".   Affect: anxious   Thought processes: Linear and coherent.   Thought content:  PRN  haloperidol lactate (HALDOL) injection 5 mg, 5 mg, IntraMUSCular, Q6H PRN **AND** diphenhydrAMINE (BENADRYL) injection 50 mg, 50 mg, IntraMUSCular, Q6H PRN  nicotine polacrilex (COMMIT) lozenge 2 mg, 2 mg, Oral, Q2H PRN  saliva substitute (BIOTENE/MOUTH KOTE) liquid, , Oral, PRN  nitroGLYCERIN (NITROSTAT) SL tablet 0.4 mg, 0.4 mg, SubLINGual, Q5 Min PRN  metoprolol succinate (TOPROL XL) extended release tablet 25 mg, 25 mg, Oral, Daily    ASSESSMENT  Major depressive disorder, recurrent severe without psychotic features (HCC)         HANDOFF  Patient symptoms are: Remains Unstable.  Medications as determined by attending physician   Monitor need and frequency of PRN medications.  Encourage participation in groups and milieu.  Probable discharge is to be determined by MD.     Electronically signed by TITO Marr CNP on 3/6/2025 at 3:45 PM    **This report has been created using voice recognition software. It may contain minor errors which are inherent in voice recognition technology.**     I independently saw and evaluated the patient.  I reviewed the nurse practitioners documentation above. Principle diagnosis we are treating for is Major depressive disorder, recurrent severe without psychotic features (HCC).  Any additional comments or changes to the nurse practitioners documentation are stated below otherwise agree with assessment. Spent 17 minutes with the patient in supportive psychotherapy.  Plan will be as follows:      PLAN  Patient s symptoms   are improving  Discontinue morning dose of Xanax  Attempt to develop insight  Psycho-education conducted.  Supportive Therapy conducted.  Probable discharge is Saturday with continued improvement  Follow-up daily while on inpatient unit    Electronically signed by MARTÍNEZ PELAYO MD on 3/6/2025 at 8:55 PM

## 2025-03-06 NOTE — PLAN OF CARE
Problem: Depression  Goal: Will be euthymic at discharge  Description: INTERVENTIONS:  1. Administer medication as ordered  2. Provide emotional support via 1:1 interaction with staff  3. Encourage involvement in milieu/groups/activities  4. Monitor for social isolation  3/6/2025 1017 by Mandy Villatoro, RN  Outcome: Progressing   6/10  Problem: Dolores  Goal: Will exhibit normal sleep and speech and no impulsivity  Description: INTERVENTIONS:  1. Administer medication as ordered  2. Set limits on impulsive behavior  3. Make attempts to decrease external stimuli as possible  3/6/2025 1017 by Mandy Villatoro RN  Outcome: Progressing   Controlled on the unit currently

## 2025-03-06 NOTE — PLAN OF CARE
Problem: Depression  Goal: Will be euthymic at discharge  Description: INTERVENTIONS:  1. Administer medication as ordered  2. Provide emotional support via 1:1 interaction with staff  3. Encourage involvement in milieu/groups/activities  4. Monitor for social isolation  3/6/2025 0045 by Fritz Zacarias RN  Outcome: Progressing    Problem: Safety - Adult  Goal: Free from fall injury  3/6/2025 0045 by Fritz Zacarias RN  Outcome: Progressing      Problem: Psychosis  Goal: Will report no hallucinations or delusions  Description: INTERVENTIONS:  1. Administer medication as  ordered  2. Assist with reality testing to support increasing orientation  3. Assess if patient's hallucinations or delusions are encouraging self harm or harm to others and intervene as appropriate  3/6/2025 0045 by Fritz Zacarias RN  Outcome: Progressing    Patient will be free from fall injury while a patient at [facility]. Patient will be free from hallucinations and delusions during stay in hospital. Patient has been educated on the use of the call light and staying compliant with medication administration after discharge. Patient has verbalized understanding.

## 2025-03-06 NOTE — PROGRESS NOTES
Physical Therapy  DATE: 3/6/2025    NAME: Gisell Thurman  MRN: [id number]   : 1960    Patient not seen this date for Physical Therapy due to:      [] Cancel by RN or physician due to:    [] Hemodialysis    [] Critical Lab Value Level     [] Blood transfusion in progress    [] Acute or unstable cardiovascular status   _MAP < 55 or more than >115  _HR < 40 or > 130    [] Acute or unstable pulmonary status   -FiO2 > 60%   _RR < 5 or >40    _O2 sats < 85%    [] Strict Bedrest    [] Off Unit for surgery or procedure    [] Off Unit for testing       [] Pending imaging to R/O fracture    [] Refusal by Patient      [] Other      [x] PT being discontinued at this time. Patient independent. No further needs. Checked on pt @ 1021. Pt up IND in unit witnessed by writer. PT to DC.      [] PT being discontinued at this time as the patient has been transferred to hospice care. No further needs.      Electronically signed by Yessenia Harrell PTA on 3/6/25 at 4:14 PM EST

## 2025-03-06 NOTE — GROUP NOTE
Group Therapy Note    Date: 3/6/2025    Group Start Time: 1100  Group End Time: 1135  Group Topic: Cognitive Skills    [facility]    Cheyanne Alvares CTRS    Cognitive Skills Group Note        Date: March 6, 2025 Start Time: 11am  End Time:  1135 am       Number of Participants in Group & Unit Census:  4/9    Topic: cognitive skills     Goal of Group: pt will demonstrate improved coping skills and improved interpersonal relationships       Comments:     Patient did not participate in Cognitive Skills group, despite staff encouragement and explanation of benefits.  Patient remain seclusive to self.  Q15 minute safety checks maintained for patient safety and will continue to encourage patient to attend unit programming.              Signature:  SHOBHA CASTANO

## 2025-03-06 NOTE — PROGRESS NOTES
Pharmacy Med Education Group Note     Date: 3/6/25                Start Time: 1330                    End Time: 1415     Number Participants in Group:  2/9     Goal:  Patient will demonstrate an understanding of the medication’s intended purpose and possible adverse effects  Topic: [place] for Pharmacy Med Ed Group     Discipline Responsible:      OT   AT   [facility].   RT     X Other         Participation Level:                   None   Minimal      X Active Listener    X Interactive     Monopolizing             Participation Quality:    X Appropriate   Inappropriate     X       Attentive         Intrusive           Sharing         Resistant           Supportive         Lethargic         Affective:            X Congruent   Incongruent   Blunted   Flat     Constricted   Anxious   Elated   Angry     Labile   Depressed   Other             Cognitive:    X Alert   Oriented PPTP      Concentration   X G   F   P   Attention Span   X G   F   P   Short-Term Memory   X G   F   P   Long-Term Memory   G   F   P   ProblemSolving/  Decision Making   G   F   P   Ability to Process  Information   X G   F   P        Contributing Factors              Delusional              Hallucinating              Flight of Ideas              Other:         Modes of Intervention:    X Education   X Support   Exploration     Clarifying   Problem Solving   Confrontation     Socialization   Limit Setting   Reality Testing     Activity   Movement   Media     Other:                  Response to Learning:    X Able to verbalize current knowledge/experience     Able to verbalize/acknowledge new learning     Able to retain information     Capable of insight     Able to change behavior     Progressing to goal     Other:          Comments:       Taylor Maguire, PharmD Candidate

## 2025-03-07 PROCEDURE — 6370000000 HC RX 637 (ALT 250 FOR IP): Performed by: PSYCHIATRY & NEUROLOGY

## 2025-03-07 PROCEDURE — 90833 PSYTX W PT W E/M 30 MIN: CPT | Performed by: PSYCHIATRY & NEUROLOGY

## 2025-03-07 PROCEDURE — APPSS30 APP SPLIT SHARED TIME 16-30 MINUTES: Performed by: NURSE PRACTITIONER

## 2025-03-07 PROCEDURE — 1240000000 HC EMOTIONAL WELLNESS R&B

## 2025-03-07 PROCEDURE — 99232 SBSQ HOSP IP/OBS MODERATE 35: CPT | Performed by: PSYCHIATRY & NEUROLOGY

## 2025-03-07 PROCEDURE — 99232 SBSQ HOSP IP/OBS MODERATE 35: CPT | Performed by: INTERNAL MEDICINE

## 2025-03-07 PROCEDURE — 6370000000 HC RX 637 (ALT 250 FOR IP)

## 2025-03-07 RX ORDER — QUETIAPINE FUMARATE 150 MG/1
150 TABLET, FILM COATED ORAL NIGHTLY
Qty: 30 TABLET | Refills: 0 | Status: SHIPPED | OUTPATIENT
Start: 2025-03-07

## 2025-03-07 RX ORDER — NITROGLYCERIN 0.6 MG/1
0.6 TABLET SUBLINGUAL EVERY 5 MIN PRN
Qty: 5 TABLET | Refills: 0 | Status: SHIPPED | OUTPATIENT
Start: 2025-03-07

## 2025-03-07 RX ORDER — CARBOXYMETHYLCELLULOSE SODIUM 5 MG/ML
2 SOLUTION/ DROPS OPHTHALMIC 3 TIMES DAILY
Qty: 30 ML | Refills: 0 | Status: SHIPPED | OUTPATIENT
Start: 2025-03-07 | End: 2025-03-08

## 2025-03-07 RX ORDER — ESCITALOPRAM OXALATE 10 MG/1
10 TABLET ORAL DAILY
Qty: 30 TABLET | Refills: 0 | Status: SHIPPED | OUTPATIENT
Start: 2025-03-08 | End: 2025-03-08

## 2025-03-07 RX ORDER — METOPROLOL SUCCINATE 25 MG/1
25 TABLET, EXTENDED RELEASE ORAL DAILY
Qty: 30 TABLET | Refills: 0 | Status: SHIPPED | OUTPATIENT
Start: 2025-03-08 | End: 2025-03-08

## 2025-03-07 RX ORDER — PANTOPRAZOLE SODIUM 40 MG/1
40 TABLET, DELAYED RELEASE ORAL
Qty: 30 TABLET | Refills: 0 | Status: SHIPPED | OUTPATIENT
Start: 2025-03-07 | End: 2025-03-08

## 2025-03-07 RX ADMIN — QUETIAPINE FUMARATE 150 MG: 100 TABLET ORAL at 20:58

## 2025-03-07 RX ADMIN — ESCITALOPRAM OXALATE 10 MG: 10 TABLET ORAL at 08:47

## 2025-03-07 RX ADMIN — Medication: at 12:06

## 2025-03-07 RX ADMIN — CARBOXYMETHYLCELLULOSE SODIUM 2 DROP: 5 SOLUTION/ DROPS OPHTHALMIC at 08:47

## 2025-03-07 RX ADMIN — ALPRAZOLAM 0.25 MG: 0.25 TABLET ORAL at 20:58

## 2025-03-07 RX ADMIN — PANTOPRAZOLE SODIUM 40 MG: 40 TABLET, DELAYED RELEASE ORAL at 06:35

## 2025-03-07 RX ADMIN — PANTOPRAZOLE SODIUM 40 MG: 40 TABLET, DELAYED RELEASE ORAL at 17:12

## 2025-03-07 RX ADMIN — CARBOXYMETHYLCELLULOSE SODIUM 2 DROP: 5 SOLUTION/ DROPS OPHTHALMIC at 17:12

## 2025-03-07 RX ADMIN — CARBOXYMETHYLCELLULOSE SODIUM 2 DROP: 5 SOLUTION/ DROPS OPHTHALMIC at 20:58

## 2025-03-07 RX ADMIN — METOPROLOL SUCCINATE 25 MG: 25 TABLET, EXTENDED RELEASE ORAL at 08:47

## 2025-03-07 RX ADMIN — Medication: at 08:46

## 2025-03-07 NOTE — PLAN OF CARE
Problem: Anxiety  Goal: Will report anxiety at manageable levels  Description: INTERVENTIONS:  1. Administer medication as ordered  2. Teach and rehearse alternative coping skills  3. Provide emotional support with 1:1 interaction with staff  3/7/2025 1047 by Hemalatha Justin LPN  Outcome: Progressing     Problem: Safety - Adult  Goal: Free from fall injury  3/7/2025 1047 by Hemalatha Justin LPN  Outcome: Progressing     Problem: Pain  Goal: Verbalizes/displays adequate comfort level or baseline comfort level  3/7/2025 1047 by Hemalatha Justin LPN  Outcome: Progressing   Patient remains safe on the unit free from falls and self harm.  Patient endorses pain and anxiety however, reports levels of each are improving.

## 2025-03-07 NOTE — PROGRESS NOTES
Daily Progress Note  3/7/2025    Patient Name: Gisell Thurman    CHIEF COMPLAINT:  Depression with suicidal ideation          SUBJECTIVE:    Patient was seen for follow-up assessment today.  She remains compliant with scheduled medications with the exception of Metamucil.  She has remained in behavioral control and has not required any emergency medications in the past 24 hours.  Nursing staff report patient remains focused on food and somatic complaints.  She has been mostly isolative to her room today.  Patient was approached in her room where she was found to be resting in bed but awake.  She was agreeable to conversation with writer.  Patient reported that her mood is somewhat improved since admission.  She reported that suicidal ideation had resolved.  She is denying homicidal ideation.  She continued to be focused on stomach issues and believes that she was served spoiled food by the cafeteria.  This was the majority of what she discussed and needed redirection to assessment.  She is reporting some modest improvement of anxiety.  She is denying any perceptual disturbances.    Appetite:  [] Normal/Adequate/Unchanged  [] Increased  [x] Decreased      Sleep:       [] Normal/Adequate/Unchanged  [x] Fair  [] Poor      Group Attendance on Unit:   [] Yes  [x] Selectively    [] No    Medication Side Effects: Patient denies any medication side effects at the time of assessment.         Mental Status Exam  Level of consciousness: Alert and awake.   Appearance: Appropriate attire for setting, resting in bed, with fair  grooming and hygiene.   Behavior/Motor: Approachable, anxious  Attitude toward examiner: Cooperative, attentive, good eye contact.  Speech: Normal rate, normal volume, normal tone.  Mood: Improving  Affect: Blunted  Thought processes: Linear and coherent.   Thought content: Denies homicidal ideation.  Suicidal Ideation: Reports improvement in suicidal ideations  Delusions: No evidence of delusions. Denies

## 2025-03-07 NOTE — PROGRESS NOTES
[facility] Internal Medicine  Sánchez Saldivar MD; Zain Najera MD, Robert Mooney MD, Sybil Ramírez MD, Dr Andrey Ceja MD; Mal Ruggiero MD    [facility] Internal Medicine   IN-PATIENT SERVICE   [facility]    Progress Note            Date:   3/7/2025  Patient name:  Gisell Thurman  Date of admission:  2025  9:59 AM  MRN:   [id number]  Account:  [id number]  YOB: 1960  PCP:    Boaz Chung MD  Room:   [address] [place] [address]  Code Status:    Full Code      Chief Complaint:     Suicidal /Ac Psychosis    History Obtained From:     Patient/EMR/bedside RN     History of Present Illness:         Past Medical History:     Past Medical History:   Diagnosis Date    Asthma     Blindness of left eye     Cardiomyopathy (HCC)     Cervical herniated disc     C5-C6, with nerve compression.    Congenital hip dysplasia     Degenerative joint disease     (Right knee) -     Depression     Depression with suicidal ideation 2025    Diffuse large B cell lymphoma (HCC)     Dysfunctional uterine bleeding     Dyspnea     (progressive - multifactorial.    Encounter for chronic pain management 2015    Failed total right knee replacement     Fatigue     Fibromyalgia     Generalized anxiety disorder     GERD (gastroesophageal reflux disease)     Grief at loss of child     4-yr old granddaughter  of brain tumor in .    Hip pain, bilateral     Secondary to piriformis syndrome and bilateral greater trochanteric bursitis. (Injection of Celestone and Marcaine).    History of blood transfusion     History of tobacco use     Currently not smoking.    Hypertension     Inverted nipple     Left.  Lifelong    Kidney stone     Lumbar disc disease     Chronic.    Migraine     NSTEMI (non-ST elevated myocardial infarction) ([facility]) 2020    [place] [facility] - tranfer to [facility]    Obesity     Osteoarthritis     Degenerative joint disease - of all joints.    Seasonal  hour(s)).      Imaging/Diagonstics:  No results found.    Assessment :      Hospital Problems             Last Modified POA    * (Principal) Major depressive disorder, recurrent severe without psychotic features (HCC) 2/28/2025 Yes    Depression with suicidal ideation 2/28/2025 Yes       Plan:     Admitted to inpatient psych with depression with suicidal ideation  Morbid obesity.  Asthma.  Stable.  Hypertension.  Controlled.  NICM.  History of SVT s/p AV pawel ablation 2006.  Was initially on Entresto and Coreg.  Recently discontinued by his cardiologist due to patient's allergies.  Currently on Toprol-XL.  History of stage IV diffuse B large B-cell lymphoma.  On observation with oncology  Labs and medications reviewed.     DVT prophylaxis,  Pt mobile   Full code status       Consultations:   IP CONSULT TO INTERNAL MEDICINE      Sánchez Saldivar MD  3/7/2025  12:51 PM    Copy sent to Dr. Chung, Boaz GILLESPIE MD    Please note that this chart was generated using voice recognition Dragon dictation software.  Although every effort was made to ensure the accuracy of this automated transcription, some errors in transcription may have occurred.

## 2025-03-07 NOTE — GROUP NOTE
Group Therapy Note    Date: 3/7/2025    Group Start Time: 1100  Group End Time: 1115  Group Topic: Hygiene    Hemalatha Corley LPN    Patient educated on the importance of personal hygiene.  Patient offered additional hygiene supplies and encouraged the use of showers on the unit with staff assistance if necessary.  Patient reminded patient we may wash and dry their clothes while on the unit    Group Therapy Note    Attendees: 10/12     Signature:  Hemalatha Justin LPN

## 2025-03-07 NOTE — GROUP NOTE
Group Therapy Note    Date: 3/6/2025    Group Start Time: 2000  Group End Time: 2030  Group Topic: Relaxation    [facility] Keesha Davis LPN        Group Therapy Note    Pt was encouraged to attend evening wrap up group with staff encouragement. Pt declined to attend. Pt will continue to be encouraged to attend groups.               Signature:  Keesha Russo LPN

## 2025-03-07 NOTE — CARE COORDINATION
Rafaelar attempted to make appointment with [facility] pain clinic, rafaelar informed by medical assistant Guadalupe that Gisell has been terminated by the practice, state she will receive a letter in the mail with more details.

## 2025-03-07 NOTE — PROGRESS NOTES
Behavioral Services                                              Medicare Re-Certification    I certify that the inpatient psychiatric hospital services furnished since the previous certification/re-certification were, and continue to be, medically necessary for;    [x] (1) Treatment which could reasonably be expected to improve the patient's condition,    [x] (2) Or for diagnostic study.    Estimated length of stay/service 1 to 2 days    Plan for post-hospital care home with outpatient community mental health follow-up    This patient continues to need, on a daily basis, active treatment furnished directly by or requiring the supervision of inpatient psychiatric personnel.    Electronically signed by MARTÍNEZ PELAYO MD on 3/7/2025 at 12:55 PM

## 2025-03-07 NOTE — GROUP NOTE
Group Therapy Note    Date: 3/7/2025    Group Start Time: 1330  Group End Time: 1410  Group Topic: Cognitive Skills    Cheyanne Rao CTRS    Cognitive Skills Group Note        Date: March 7, 2025 Start Time: 1:30pm  End Time:  210pm      Number of Participants in Group & Unit Census:  5/12    Topic: cognitive skills     Goal of Group: pt will demonstrate improved coping skills and improved interpersonal relationships       Comments:     Patient did not participate in Cognitive Skills group, despite staff encouragement and explanation of benefits.  Patient remain seclusive to self.  Q15 minute safety checks maintained for patient safety and will continue to encourage patient to attend unit programming.              Signature:  SHOBHA CASTANO

## 2025-03-07 NOTE — GROUP NOTE
Group Therapy Note    Date: 3/7/2025    Group Start Time: 0815  Group End Time: 0830  Group Topic: Orientation Group    Hemalatha Corley LPN    Group Therapy Note     Date: 3/7/25     Group Start Time: 8:15  Group End Time: 8:30  Group Topic: Orientation Group     [facility]    LPN           Group Therapy Note     Attendees: 7/12     Patient attended morning orientation group and actively listened while educated on unit policies, expectations, and orientation. Patient was reminded of group times, location of white board, today's staff, shower location/time, group time limitations at nurses station, phone and tv times. Patient educated on discharge process and planning as well as informed that all patient's are seen by a MD or NP every day.            Signature: Hemalatha Justin    Group Therapy Note

## 2025-03-07 NOTE — PLAN OF CARE
Problem: Depression  Goal: Will be euthymic at discharge  Description: INTERVENTIONS:  1. Administer medication as ordered  2. Provide emotional support via 1:1 interaction with staff  3. Encourage involvement in milieu/groups/activities  4. Monitor for social isolation  3/6/2025 2209 by Paxton Tesfaye LPN  Outcome: Progressing  Note: Patient expresses feelings of depression. Staff ensure safety by providing safety checks on the unit intermittently and every 15 minutes. Patient is encouraged to notify staff if depression occurs.         Problem: Dolores  Goal: Will exhibit normal sleep and speech and no impulsivity  Description: INTERVENTIONS:  1. Administer medication as ordered  2. Set limits on impulsive behavior  3. Make attempts to decrease external stimuli as possible  3/6/2025 2209 by Paxton Tesfaye LPN  Outcome: Progressing  Note: Patient reports getting 4-5 hours of sleep & displays with normal speech without impulsivity.     Problem: Psychosis  Goal: Will report no hallucinations or delusions  Description: INTERVENTIONS:  1. Administer medication as  ordered  2. Assist with reality testing to support increasing orientation  3. Assess if patient's hallucinations or delusions are encouraging self harm or harm to others and intervene as appropriate  Outcome: Progressing  Note: Patient does not appear to be experiencing delusions and hallucinations at this time. Staff ensure safety by providing safety checks on the unit intermittently and every 15 minutes. Patient is encouraged to notify staff if delusions or hallucinations occurs.      Problem: Anxiety  Goal: Will report anxiety at manageable levels  Description: INTERVENTIONS:  1. Administer medication as ordered  2. Teach and rehearse alternative coping skills  3. Provide emotional support with 1:1 interaction with staff  Outcome: Progressing  Note: Patient reports having some increase in anxiety. Patient states she understands staff is here to provide  support and help if needed. Staff doing safety checks 15 minutes intermittently.      Problem: Pain  Goal: Verbalizes/displays adequate comfort level or baseline comfort level  Outcome: Progressing  Note: Patient does not reports any signs or symptoms involving pain at this current moment. Patient aware that medication can be given upon request for pain medication. Staff to continue 15 minute checks.     Problem: Safety - Adult  Goal: Free from fall injury  Outcome: Progressing  Note: Patient remains free from falls. Staff ensure safety by providing safety checks on the unit intermittently and every 15 minutes.      Problem: Nutrition Deficit:  Goal: Optimize nutritional status  Outcome: Progressing  Note: Patient maintains optimal nutritional status during this shift.

## 2025-03-07 NOTE — GROUP NOTE
Group Therapy Note    Date: 3/7/2025    Group Start Time: 1000  Group End Time: 1025  Group Topic: Psychotherapy     Elizabeth Morrissey MSW        Group Therapy Note    Attendees: 2/12     Patient was offered group therapy today but declined to participate despite encouragement from staff.  1:1 was offered.    Discipline Responsible: /Counselor      Signature:  CHARLIE Pacheco

## 2025-03-07 NOTE — BH NOTE
[facility]  Week Interdisciplinary Treatment Plan Note     Review Date & Time: 3/7/2025   1300    Admission Type:   Admission Type: Voluntary    Reason for admission:  Reason for Admission: Patient presents to unit with complaints of increased anxiety and increased insomnia. Patient states she hasn't slept in \"days\". Patient states she has been taking xanax for 10+ years and believes her anxiety is not being helped with medication but only getting worse but patient also does not want to stop medication. Patient denies SI and HI on admit. Patient reports hearing voices at home (during this period of time where she hasn't slept). Patient endorses extreme anxiety and denies hearing voices since and denies hearing them any previous time prior to hospitalization.    Estimated Length of Stay:  8-14 days  Estimated Discharge Date Update:   to be determined by physician    PATIENT STRENGTHS:  Patient Strengths:   Patient Strengths and Limitations:Limitations: External locus of control, Difficulty problem solving/relies on others to help solve problems, Multiple barriers to leisure interests, General negative or hopeless attitude about future/recovery  Addictive Behavior:Addictive Behavior  In the Past 3 Months, Have You Felt or Has Someone Told You That You Have a Problem With  : None  Medical Problems:   Past Medical History:   Diagnosis Date    Asthma     Blindness of left eye     Cardiomyopathy (HCC)     Cervical herniated disc     C5-C6, with nerve compression.    Congenital hip dysplasia     Degenerative joint disease     (Right knee) -     Depression     Depression with suicidal ideation 2/28/2025    Diffuse large B cell lymphoma (HCC)     Dysfunctional uterine bleeding     Dyspnea     (progressive - multifactorial.    Encounter for chronic pain management 02/24/2015    Failed total right knee replacement     Fatigue     Fibromyalgia     Generalized anxiety disorder     GERD (gastroesophageal reflux  Symptoms: No problems reported or observed.  Hallucinations: None  Delusions: Yes  Delusions: Somatic  Memory:Normal: Yes  Memory: Poor recent, Poor remote  Insight and Judgment: No  Insight and Judgment: Poor judgment, Poor insight    Daily Assessment Last Entry:   Daily Sleep (WDL): Within Defined Limits            Daily Nutrition (WDL): Within Defined Limits  Appetite Change: Normal for patient  Barriers to Nutrition: Food intolerance  Level of Assistance: Independent/Self    Patient Monitoring:  Frequency of Checks: 4 times per hour, close    Psychiatric Symptoms:   Depression Symptoms  Depression Symptoms: Feelings of hopelessess, Isolative, Loss of interest  Anxiety Symptoms  Anxiety Symptoms: Generalized  Dolores Symptoms  Dolores Symptoms: No problems reported or observed.          Suicide Risk CSSR-S:  1) Within the past month, have you wished you were dead or wished you could go to sleep and not wake up? : No  2) Have you actually had any thoughts of killing yourself? : No  6) Have you ever done anything, started to do anything, or prepared to do anything to end your life?: No  Change in result:  No  Change in plan of care:  No    EDUCATION:   Learner Progress Toward Treatment Goals:   Reviewed results and recommendations of this team, reviewed group plan and strategies, reviewed signs, symptoms and risk of self harm and violent behavior, reviewed goals and plan of care.    Method:  individual education, small group, verbal education.    Outcome:    Pt verbalized understanding, but needs reinforcement to obtain goals.    PATIENT GOALS:  Short term:  Per pt \"To feel better and increase intake\"  Long term:  Per pt \" To regain physical strength and resume medications\"    PLAN/TREATMENT RECOMMENDATIONS UPDATE:   Continue with group therapies, education of coping skills   Continue to monitor patient on unit.    Medications provided/medication compliance by patient.  Continue for plans to obtain long term goals

## 2025-03-07 NOTE — CARE COORDINATION
left voice mail for  at Dr. Chung's office requesting assistance with scheduling Gisell a followup appointment.

## 2025-03-07 NOTE — GROUP NOTE
Group Therapy Note    Date: 3/7/2025    Group Start Time: 0945  Group End Time: 0956  Group Topic: Nursing    Jackie Roberson, RN    Nursing  Group Note        Date: March 7, 2025 Start Time:  0945   End Time:  0956      Number of Participants in Group & Unit Census:  1/12    Topic: Goals Group    Goal of Group:Identify goal for the day       Comments:     Patient did not participate in  Nursing  group, despite staff encouragement and explanation of benefits.  Patient remain seclusive to self.  Q15 minute safety checks maintained for patient safety and will continue to encourage patient to attend unit programming.

## 2025-03-08 VITALS
TEMPERATURE: 97.8 F | HEIGHT: 63 IN | DIASTOLIC BLOOD PRESSURE: 87 MMHG | WEIGHT: 228 LBS | RESPIRATION RATE: 16 BRPM | OXYGEN SATURATION: 94 % | HEART RATE: 83 BPM | SYSTOLIC BLOOD PRESSURE: 127 MMHG | BODY MASS INDEX: 40.4 KG/M2

## 2025-03-08 PROCEDURE — 6370000000 HC RX 637 (ALT 250 FOR IP)

## 2025-03-08 PROCEDURE — 99239 HOSP IP/OBS DSCHRG MGMT >30: CPT | Performed by: PSYCHIATRY & NEUROLOGY

## 2025-03-08 PROCEDURE — 6370000000 HC RX 637 (ALT 250 FOR IP): Performed by: PSYCHIATRY & NEUROLOGY

## 2025-03-08 RX ORDER — ESCITALOPRAM OXALATE 10 MG/1
10 TABLET ORAL DAILY
Qty: 30 TABLET | Refills: 0 | Status: SHIPPED | OUTPATIENT
Start: 2025-03-08

## 2025-03-08 RX ORDER — CARBOXYMETHYLCELLULOSE SODIUM 5 MG/ML
2 SOLUTION/ DROPS OPHTHALMIC 3 TIMES DAILY
Qty: 30 ML | Refills: 0 | Status: SHIPPED | OUTPATIENT
Start: 2025-03-08

## 2025-03-08 RX ORDER — PANTOPRAZOLE SODIUM 40 MG/1
40 TABLET, DELAYED RELEASE ORAL
Qty: 30 TABLET | Refills: 0 | Status: SHIPPED | OUTPATIENT
Start: 2025-03-08

## 2025-03-08 RX ORDER — METOPROLOL SUCCINATE 25 MG/1
25 TABLET, EXTENDED RELEASE ORAL DAILY
Qty: 30 TABLET | Refills: 0 | Status: SHIPPED | OUTPATIENT
Start: 2025-03-08

## 2025-03-08 RX ADMIN — PANTOPRAZOLE SODIUM 40 MG: 40 TABLET, DELAYED RELEASE ORAL at 16:21

## 2025-03-08 RX ADMIN — CARBOXYMETHYLCELLULOSE SODIUM 2 DROP: 5 SOLUTION/ DROPS OPHTHALMIC at 14:11

## 2025-03-08 RX ADMIN — CARBOXYMETHYLCELLULOSE SODIUM 2 DROP: 5 SOLUTION/ DROPS OPHTHALMIC at 08:09

## 2025-03-08 RX ADMIN — ESCITALOPRAM OXALATE 10 MG: 10 TABLET ORAL at 08:09

## 2025-03-08 RX ADMIN — Medication: at 02:18

## 2025-03-08 RX ADMIN — METOPROLOL SUCCINATE 25 MG: 25 TABLET, EXTENDED RELEASE ORAL at 08:09

## 2025-03-08 RX ADMIN — PANTOPRAZOLE SODIUM 40 MG: 40 TABLET, DELAYED RELEASE ORAL at 08:09

## 2025-03-08 NOTE — BH NOTE
Patient given quit line phone number [phone] at this time, refusing to call at this time, states \" I just don't want to quit now\"-  dangers of longterm tobacco use discussed.  staff will continue to reinforce importance of smoking cessation.

## 2025-03-08 NOTE — GROUP NOTE
Group Therapy Note    Date: 3/8/2025    Group Start Time: 1100  Group End Time: 1105  Group Topic: Healthy Living/Wellness    Cheyanne Rao CTRS    Health/Wellness Group Note        Date: March 8, 2025 Start Time: 1030 am  End Time:  1105 am      Number of Participants in Group & Unit Census:  3/12    Topic: health/ wellness    Goal of Group: pt will identify benefits of daily movement and improve coping skills       Comments:     Patient did not participate in Health/Wellness group, despite staff encouragement and explanation of benefits.  Patient remain seclusive to self.  Q15 minute safety checks maintained for patient safety and will continue to encourage patient to attend unit programming.              Signature:  SHOBHA CASTANO

## 2025-03-08 NOTE — CARE COORDINATION
Discharge Arrangements:      Guardian notified: N/A    Discharge destination/address: home  [address] [place] [address]    Transported by:  cab    Patient was not] accepting of referral.  Follow up appointment is scheduled for Dr Chung    [address] [place] [address]  [phone]   3/20 @[facility]   *GABBY resources were offered to patient throughout admission and at time of discharge. This list of [facility] GABBY providers was provided to patient:     [facility]  [address] [facility] [address]   [address] [facility] [address]  Phone: [phone]     Phone: [phone]    [facility]  [place]   [address] [facility] [place] [address]   [address] [facility] [address]  Phone: [phone]     Phone: [phone]    [facility] [place]  [address] [facility] [address]    [address] [facility] [facility] [address]  Phone: [phone]     Phone: [phone]    [facility] Connections     [facility]   [address] [facility] [address]  [place] [address]      Phone: [phone]  Phone: [phone]        [facility]  [address] [facility] [address]   [address] [place] [address]  Phone: [phone]     Phone:  [phone]    [facility]      [facility]  [address] [facility] [address]   [address] [facility] [address]  Phone: [phone]     Phone: [phone]    [facility]  [address] [facility] [address]   [address] [facility] [address]  Phone: [phone]     Phone: [phone]    [facility]  [facility]  [address] [facility] [address] [address] [facility] [address]  Phone: [phone]     Phone: [phone]    [facility]     [facility]  [facility]

## 2025-03-08 NOTE — BH NOTE
[facility]  Discharge Note    Pt discharged with followings belongings:   Dental Appliances: None  Vision - Corrective Lenses: Eyeglasses (reading glasses)  Hearing Aid: None  Jewelry: Ring  Body Piercings Removed: No  Clothing: Footwear, Jacket/Coat, Pajamas, Pants, Shirt, Socks, Undergarments, Sweater  Other Valuables: Suitcase   Valuables sent home with patient. Patient educated on aftercare instructions: given to patient.  Information faxed to Dr. Chung by staff  at 5:21 PM .Patient verbalize understanding of AVS:  yes, patient discharged home in private vehicle by sister in law.    Status EXAM upon discharge:  Mental Status and Behavioral Exam  Normal: No  Level of Assistance: Independent/Self  Facial Expression: Flat  Affect: Blunt  Level of Consciousness: Alert  Frequency of Checks: 4 times per hour, close  Mood:Normal: No  Mood: Anxious  Motor Activity:Normal: No  Motor Activity: Decreased, Unusual posture/gait  Eye Contact: Fair  Observed Behavior: Cooperative  Sexual Misconduct History: Current - no  Preception: [place] to person, [place] to time, [place] to place, [place] to situation  Attention:Normal: No  Attention: Distractible  Thought Processes: Circumstantial  Thought Content:Normal: No  Thought Content: Preoccupations  Depression Symptoms: Feelings of hopelessess  Anxiety Symptoms: Generalized  Dolores Symptoms: No problems reported or observed.  Hallucinations: None  Delusions: Yes  Delusions: Somatic  Memory:Normal: Yes  Memory: Poor recent, Poor remote  Insight and Judgment: No  Insight and Judgment: Poor insight    Tobacco Screening:  Practical Counseling, on admission, humberto X, if applicable and completed (first 3 are required if patient doesn't refuse):            (x ) Recognizing danger situations (included triggers and roadblocks)                    ( x) Coping skills (new ways to manage stress,relaxation techniques, changing routine, distraction)

## 2025-03-08 NOTE — DISCHARGE INSTRUCTIONS
Information:  Medications:   Medication summary provided   I understand that I should take only the medications on my list.     -why and when I need to take each medicine.     -which side effects to watch for.     -that I should carry my medication information at all times in case of     Emergency situations.    I will take all of my medicines to follow up appointments.     -check with my physician or pharmacist before taking any new    Medication, over the counter product or drink alcohol.    -Ask about food, drug or dietary supplement interactions.    -discard old lists and update records with medication providers.    Notify Physician:  Notify physician if you notice:   Always call 911 if you feel your life is in danger  In case of an emergency call 911 immediately!  If 911 is not available call your local emergency medical system for help    Behavioral Health Follow Up:  Original Referral Source:ED  Discharge Diagnosis: Depression with suicidal ideation [F32.A, R45.851]  Recommendations for Level of Care: follow up with [facility]  Patient status at discharge: stable  My hospital  was: [facility]  Aftercare plan faxed: yes   -faxed by: staff   -date: 03/0825   -time: 1500  Prescriptions: See AVS    Smoking: Quit Smoking.   Call the [employer]'s smoking quitline at 0-825-89W-QUIT  Know the signs of a heart attack   If you have any of the following symptoms call 911 immediately, do not wait more    Than five minutes.    1. Pressure, fullness and/ or squeezing in the center of the chest spreading to    The jaw, neck or shoulder.    2. Chest discomfort with light headedness, fainting, sweating, nausea or    Shortness of breath.   3. Upper abdominal pressure or discomfort.   4. Lower chest pain, back pain, unusual fatigue, shortness of breath, nausea   Or dizziness.     General Information:   Questions regarding your bill: Call HELP program [phone]     Suicide Hotline ([facility] Crisis Care Line)  (798) 774-5258        To obtain results of pending studies call Medical Records at: [phone]     For emergencies and 24 hour/7 days a week contact information:  [phone]    Substance Use Treatment options provided by [facility] [employer] [facility] - a website/garrett sponsored by the Mental Health & Recovery Services Board of [facility]. For more information please visit https://asmita.[employer].Sipwise/    *GABBY resources were offered to patient throughout admission and at time of discharge. This list of [facility] GABBY providers was provided to patient:     [facility]  [address] [facility] [address]    [address] [facility] [address]  Phone: [phone]      Phone: [phone]    [facility]   [address] [facility] [address]    [address] [facility] [address]  Phone: [phone]      Phone: [phone]    [facility]  [address] [facility] [address]     [address] [facility] [address]  Phone: [phone]      Phone: [phone]    Health Connections      [facility]   [address] [facility] [address]  [address] [facility] [facility] [address]  Phone: [phone]      Phone: [phone]    [facility]  [address] [facility] [address]    [address] [place] [address]  Phone: [phone]      Phone:  [phone]    [facility] Concepts       [facility]  [address] [facility] [address]    [address] [facility] [address]  Phone: [phone]      Phone: [phone]    [facility]  [address] [facility] [address]    [address] [facility] [address]  Phone: [phone]      Phone: [phone]    [facility]   [facility]  [address] [facility] [address]  [address] [facility] [address]  Phone: [phone]      Phone:

## 2025-03-08 NOTE — PLAN OF CARE
Problem: Depression  Goal: Will be euthymic at discharge  Description: INTERVENTIONS:  1. Administer medication as ordered  2. Provide emotional support via 1:1 interaction with staff  3. Encourage involvement in milieu/groups/activities  4. Monitor for social isolation  3/7/2025 2226 by Ava Blevins, RN  Note: Patient denies depression at this time though they remain isolative to self. Patient agrees to seek out nursing staff if need be. Patient educated on alternative measures of coping and relation. Staff ensures safety by providing safety checks on the unit intermittently and every 15 minutes. Staff continues to provide a safe environment.        Problem: Safety - Adult  Goal: Free from fall injury  3/7/2025 2225 by Ava Blevins, RN  Outcome: Progressing  Note: Patient remains free from falls on the unit at this time. Patient continues to use a walker for assistance. Patient has gripper socks on for fall prevention, and verbalizes understanding of individual fall risks. Staff ensures safety by providing safety checks on the unit intermittently and every 15 minutes. Staff continues to provide a safe environment.

## 2025-03-08 NOTE — TRANSITION OF CARE
Behavioral Health Transition Record    Patient Name: Gisell Thurman  YOB: 1960   Medical Record Number: [id number]  Date of Admission: 2/28/2025  9:59 AM   Date of Discharge: 03/08/25    Attending Provider: Jose Armendariz MD   Discharging Provider: Marcello  To contact this individual call [phone] and ask the  to page.  If unavailable, ask to be transferred to Behavioral Health Provider on call.  A Behavioral Health Provider will be available on call 24/7 and during holidays.    Primary Care Provider: Boaz Chung MD    Allergies   Allergen Reactions    Latex Rash    Rituximab Palpitations     Severe tachycardia    Blue Dyes (Parenteral) Other (See Comments)     Mom had trouble breathing     Buspirone Other (See Comments)     Higher dose increased anxiety/trouble sleeping    Cyclobenzaprine Other (See Comments)     Felt odd    Fentanyl Other (See Comments)     sleepwalking    Ferric Sulfate (Iron Persulfate) Other (See Comments)     Severe abd pain    Gabapentin     Hydrocodone-Acetaminophen Other (See Comments)     Flushed and red face    Hydroxyzine Hcl Other (See Comments)     Lightheaded/heart racing    Other Other (See Comments)     IVP Dye  Mom had trouble breathing     Ropivacaine Hives     Was given after revision of left knee and it caused her hives and irritation at the wound site- so she removed herself    Seasonal     Vilazodone Other (See Comments)     Teeth clenching when on with Buspar    Kenalog-10 [Triamcinolone Acetonide] Palpitations     Tachycardia, anxiety     Triamcinolone Palpitations     Tachycardia, anxiety       Tachycardia, anxiety       Reason for Admission: Patient: Gisell Thurman  MRN: [id number]  Reason for Admission to Psychiatric Unit:  Threat to self requiring 24 hour professional observation  Acute disordered/bizarre behavior or psychomotor agitation or retardation;interferes with ADLs so that patient cannot function at a less intensive care level of

## 2025-03-08 NOTE — PLAN OF CARE
Problem: Depression  Goal: Will be euthymic at discharge  Description: INTERVENTIONS:  1. Administer medication as ordered  2. Provide emotional support via 1:1 interaction with staff  3. Encourage involvement in milieu/groups/activities  4. Monitor for social isolation  3/8/2025 0947 by Ana M Carias RN  Outcome: Completed     Problem: Dolores  Goal: Will exhibit normal sleep and speech and no impulsivity  Description: INTERVENTIONS:  1. Administer medication as ordered  2. Set limits on impulsive behavior  3. Make attempts to decrease external stimuli as possible  3/8/2025 0947 by Ana M Carias RN  Outcome: Completed     Problem: Psychosis  Goal: Will report no hallucinations or delusions  Description: INTERVENTIONS:  1. Administer medication as  ordered  2. Assist with reality testing to support increasing orientation  3. Assess if patient's hallucinations or delusions are encouraging self harm or harm to others and intervene as appropriate  3/8/2025 0947 by Ana M Carias RN  Outcome: Completed     Problem: Anxiety  Goal: Will report anxiety at manageable levels  Description: INTERVENTIONS:  1. Administer medication as ordered  2. Teach and rehearse alternative coping skills  3. Provide emotional support with 1:1 interaction with staff  3/8/2025 0947 by Ana M Carias RN  Outcome: Completed     Problem: Pain  Goal: Verbalizes/displays adequate comfort level or baseline comfort level  3/8/2025 0947 by Ana M Carias RN  Outcome: Completed     Problem: Safety - Adult  Goal: Free from fall injury  3/8/2025 0947 by Ana M Carias RN  Outcome: Completed     Problem: Nutrition Deficit:  Goal: Optimize nutritional status  3/8/2025 0947 by Ana M Carias RN  Outcome: Completed

## 2025-03-08 NOTE — DISCHARGE SUMMARY
Provider Discharge Summary     Patient ID:  Gisell Thurman  [id number]  65 y.o.  1960    Admit date: 2/28/2025    Discharge date and time: 3/8/2025  6:38 PM     Admitting Physician: Jose Armendariz MD     Discharge Physician: Jose Armendariz MD    Admission Diagnoses: Depression with suicidal ideation [F32.A, R45.851]    Discharge Diagnoses:      Major depressive disorder, recurrent severe without psychotic features ([facility])     Patient Active Problem List   Diagnosis Code    Chronic left shoulder pain M25.512, G89.29    Spinal osteoarthritis M47.9    Hypertension I10    Generalized anxiety disorder F41.1    Congenital hip dysplasia Q65.89    Kidney stone N20.0    Cervical herniated disc M50.20    Dysfunctional uterine bleeding N93.8    Seasonal allergic rhinitis J30.2    Asthma J45.909    Vitamin D deficiency E55.9    History of supraventricular tachycardia Z86.79    Lumbar disc herniation M51.26    Obesity E66.9    History of tobacco use Z87.891    Dyspnea R06.00    Fatigue R53.83    Grief at loss of child F43.21, Z63.4    Blindness of left eye H54.40    Primary osteoarthritis of left knee M17.12    Hip pain, bilateral M25.551, M25.552    Shoulder pain, right M25.511    Shoulder pain, left M25.512    Failed total right knee replacement T84.012A    Encounter for chronic pain management G89.29    History of depression Z86.59    Lumbar degenerative disc disease M51.369    Low back pain M54.50    Cervical radiculopathy due to degenerative joint disease of spine M47.22    History of tobacco abuse Z87.891    Cervicalgia M54.2    Osteoarthritis of left knee M17.12    Status post left knee replacement Z96.652    Chronic pain of left knee M25.562, G89.29    Cervical radiculopathy M54.12    Family history of diabetes mellitus Z83.3    Neural foraminal stenosis of cervical spine M48.02    B-cell lymphoma of lymph nodes of neck ([facility]) C85.11    DLBCL (diffuse large B cell lymphoma) ([facility]) C83.30    Hyperuricemia E79.0    S/P AV          ALPRAZolam (XANAX) 1 MG tablet Comments:   Reason for Stopping:         vitamin D (ERGOCALCIFEROL) 1.25 MG (79496 UT) CAPS capsule Comments:   Reason for Stopping:         NARCAN 4 MG/0.1ML LIQD nasal spray Comments:   Reason for Stopping:         omeprazole (PRILOSEC) 20 MG delayed release capsule Comments:   Reason for Stopping:         Multiple Vitamins-Minerals (CENTRUM MULTI + OMEGA 3 PO) Comments:   Reason for Stopping:         ENTRESTO 24-26 MG per tablet Comments:   Reason for Stopping:         aspirin 81 MG chewable tablet Comments:   Reason for Stopping:         olopatadine (PATADAY) 0.2 % SOLN ophthalmic solution Comments:   Reason for Stopping:         fluticasone (FLONASE) 50 MCG/ACT nasal spray Comments:   Reason for Stopping:         albuterol sulfate HFA (VENTOLIN HFA) 108 (90 Base) MCG/ACT inhaler Comments:   Reason for Stopping:         rizatriptan (MAXALT-MLT) 10 MG disintegrating tablet Comments:   Reason for Stopping:                Core Measures statement:   Not applicable    Discharge Exam:  Level of consciousness:  Within normal limits  Appearance: Street clothes, seated, with good grooming  Behavior/Motor: No abnormalities noted  Attitude toward examiner:  Cooperative, attentive, good eye contact  Speech:  spontaneous, normal rate, normal volume and well articulated  Mood:  euthymic  Affect:  Full range  Thought processes:  linear, goal directed and coherent  Thought content:  denies homicidal ideation  Suicidal Ideation:  denies suicidal ideation  Delusions:  no evidence of delusions  Perceptual Disturbance:  denies any perceptual disturbance  Cognition:  Intact  Memory: age appropriate  Insight & Judgement: fair  Medication side effects: denies     Disposition: home    Patient Instructions:   Activity: activity as tolerated  1. Patient instructed to take medications regularly and follow up with outpatient appointments.     Follow-up as scheduled with outpatient [facility]

## 2025-03-14 NOTE — TELEPHONE ENCOUNTER
Patient called into office today. Patient states she did not overdose, she was not suicidal. Patient was experiencing insomnia due to stopping xanax. Patient states she was trying to wean herself off  of ALL her medication, which is why she ended up hospitalized and her drug screen came back clean. Patient believed that she has an allergy to one or some of her meds which is why she decided to take herself off of her medication. Patient would like to speak to someone about this if possible.

## 2025-08-14 ENCOUNTER — TELEPHONE (OUTPATIENT)
Dept: PAIN MANAGEMENT | Age: 65
End: 2025-08-14

## (undated) DEVICE — GAUZE,SPONGE,4"X4",12PLY,STERILE,LF,2'S: Brand: MEDLINE

## (undated) DEVICE — DRESSING,GAUZE,XEROFORM,CURAD,5"X9",ST: Brand: CURAD

## (undated) DEVICE — LINE SAMP O2 6.5FT W/FEMALE CONN F/ADULT CAPNOLINE PLUS

## (undated) DEVICE — SYRINGE, LUER LOCK, 10ML: Brand: MEDLINE

## (undated) DEVICE — 3000CC GUARDIAN II: Brand: GUARDIAN

## (undated) DEVICE — APPLICATOR GRN CHLORAPREP 2% CHG 70

## (undated) DEVICE — SCRUB DRY SURG EZ SCRUB BRUSH PREOPERATIVE GRN

## (undated) DEVICE — TOWEL,OR,DSP,ST,BLUE,STD,4/PK,20PK/CS: Brand: MEDLINE

## (undated) DEVICE — BANDAGE COBAN 4 IN COMPR W4INXL5YD FOAM COHESIVE QUIK STK SELF ADH SFT

## (undated) DEVICE — LINER GLOVEXL RED CUT RESIST STRL REPEL LT

## (undated) DEVICE — BLADE CLIPPER GEN PURP NS

## (undated) DEVICE — GAUZE,SPONGE,4"X4",16PLY,XRAY,STRL,LF: Brand: MEDLINE

## (undated) DEVICE — Device

## (undated) DEVICE — 2DE14 2-0 UNDYD MONODERM 30X30: Brand: 2DE14 2-0 UNDYD MONODERM 30X30

## (undated) DEVICE — CHLORAPREP 26ML CLEAR

## (undated) DEVICE — SUTURE STRATAFIX SPRL SZ 2-0 L9IN ABSRB VLT MH L36MM 1/2 SXPD2B408

## (undated) DEVICE — BANDAGE COMPR W6INXL5YD WHT BGE POLY COT M E WRP WV HK AND

## (undated) DEVICE — SOLUTION IV 1000ML 0.9% SOD CHL PH 5 INJ USP VIAFLX PLAS

## (undated) DEVICE — COVER LT HNDL BLU PLAS

## (undated) DEVICE — BANDAGE ADH DIA7/8IN NAT FLEX-FABRIC WVN FOR WND PROTCT

## (undated) DEVICE — SUTURE STRATAFIX SPRL SZ 1 L14IN ABSRB VLT L48CM CTX 1/2 SXPD2B405

## (undated) DEVICE — SKIN AFFIX SURG ADHESIVE 72/CS 0.55ML: Brand: MEDLINE

## (undated) DEVICE — DRAPE,ORTHOMAX ,HIP,W/POUCHES: Brand: MEDLINE

## (undated) DEVICE — INTENDED FOR TISSUE SEPARATION, AND OTHER PROCEDURES THAT REQUIRE A SHARP SURGICAL BLADE TO PUNCTURE OR CUT.: Brand: BARD-PARKER ® CARBON RIB-BACK BLADES

## (undated) DEVICE — SYRINGE, LUER LOCK, 60ML: Brand: MEDLINE

## (undated) DEVICE — NEEDLE SPNL L3.5IN PNK HUB S STL REG WALL FIT STYL W/ QNCKE

## (undated) DEVICE — NEEDLE, QUINCKE, 22GX5": Brand: MEDLINE

## (undated) DEVICE — NEEDLE FLTR 18GA L1.5IN MEM THK5UM BLNT DISP

## (undated) DEVICE — 3M™ COBAN™ NL STERILE NON-LATEX SELF-ADHERENT WRAP, 2084S, 4 IN X 5 YD (10 CM X 4,5 M), 18 ROLLS/CASE: Brand: 3M™ COBAN™

## (undated) DEVICE — HANDPIECE SET WITH SUCTION TUBING: Brand: INTERPULSE

## (undated) DEVICE — 9165 UNIVERSAL PATIENT PLATE: Brand: 3M™

## (undated) DEVICE — GLOVE SURG SZ 85 L12IN FNGR THK13MIL WHT ISOLEX POLYISOPRENE

## (undated) DEVICE — SYSTEM SKIN CLSR 22CM DERMBND PRINEO

## (undated) DEVICE — CEMENT MIXING SYSTEM WITH FEMORAL BREAKWAY NOZZLE: Brand: REVOLUTION

## (undated) DEVICE — PAD,ABDOMINAL,5"X9",ST,LF,25/BX: Brand: MEDLINE INDUSTRIES, INC.

## (undated) DEVICE — GOWN,AURORA,NON-REINFORCED,2XL: Brand: MEDLINE

## (undated) DEVICE — GLOVE SURG SZ 8 L12IN THK91MIL BRN LTX FREE POLYCHLOROPRENE

## (undated) DEVICE — DUAL CUT SAGITTAL BLADE

## (undated) DEVICE — SET EXTN SM 0.5ML L13IN BOR W/O INJ SITE

## (undated) DEVICE — TRAY PAIN CUST

## (undated) DEVICE — PACK SURG PROC REMINDER N WVN DISPOSABLE BEAC TIME OUT

## (undated) DEVICE — GLOVE ORANGE PI 7   MSG9070

## (undated) DEVICE — GLOVE ORANGE PI 8   MSG9080

## (undated) DEVICE — HIGH FLOW TIP